# Patient Record
Sex: MALE | Race: WHITE | Employment: UNEMPLOYED | URBAN - METROPOLITAN AREA
[De-identification: names, ages, dates, MRNs, and addresses within clinical notes are randomized per-mention and may not be internally consistent; named-entity substitution may affect disease eponyms.]

---

## 2017-11-18 ENCOUNTER — HOSPITAL ENCOUNTER (EMERGENCY)
Age: 52
Discharge: HOME | End: 2017-11-18
Attending: EMERGENCY MEDICINE
Payer: MEDICARE

## 2017-11-18 ENCOUNTER — APPOINTMENT (OUTPATIENT)
Dept: GENERAL RADIOLOGY | Age: 52
End: 2017-11-18
Payer: MEDICARE

## 2017-11-18 VITALS
HEART RATE: 72 BPM | OXYGEN SATURATION: 97 % | SYSTOLIC BLOOD PRESSURE: 141 MMHG | HEIGHT: 68 IN | DIASTOLIC BLOOD PRESSURE: 94 MMHG | BODY MASS INDEX: 25.76 KG/M2 | TEMPERATURE: 98.3 F | RESPIRATION RATE: 17 BRPM | WEIGHT: 170 LBS

## 2017-11-18 DIAGNOSIS — L02.91 ABSCESS: Primary | ICD-10-CM

## 2017-11-18 PROCEDURE — 99283 EMERGENCY DEPT VISIT LOW MDM: CPT

## 2017-11-18 PROCEDURE — 6370000000 HC RX 637 (ALT 250 FOR IP): Performed by: EMERGENCY MEDICINE

## 2017-11-18 PROCEDURE — 2500000003 HC RX 250 WO HCPCS: Performed by: EMERGENCY MEDICINE

## 2017-11-18 PROCEDURE — 10060 I&D ABSCESS SIMPLE/SINGLE: CPT

## 2017-11-18 PROCEDURE — 73090 X-RAY EXAM OF FOREARM: CPT

## 2017-11-18 RX ORDER — SULFAMETHOXAZOLE AND TRIMETHOPRIM 800; 160 MG/1; MG/1
1 TABLET ORAL 2 TIMES DAILY
Qty: 14 TABLET | Refills: 0 | Status: SHIPPED | OUTPATIENT
Start: 2017-11-18 | End: 2017-11-25

## 2017-11-18 RX ORDER — CEPHALEXIN 500 MG/1
500 CAPSULE ORAL 4 TIMES DAILY
Qty: 28 CAPSULE | Refills: 0 | Status: SHIPPED | OUTPATIENT
Start: 2017-11-18 | End: 2017-11-25

## 2017-11-18 RX ORDER — CEPHALEXIN 250 MG/1
500 CAPSULE ORAL ONCE
Status: COMPLETED | OUTPATIENT
Start: 2017-11-18 | End: 2017-11-18

## 2017-11-18 RX ORDER — LIDOCAINE HYDROCHLORIDE 10 MG/ML
20 INJECTION, SOLUTION INFILTRATION; PERINEURAL ONCE
Status: COMPLETED | OUTPATIENT
Start: 2017-11-18 | End: 2017-11-18

## 2017-11-18 RX ORDER — SULFAMETHOXAZOLE AND TRIMETHOPRIM 800; 160 MG/1; MG/1
1 TABLET ORAL ONCE
Status: COMPLETED | OUTPATIENT
Start: 2017-11-18 | End: 2017-11-18

## 2017-11-18 RX ADMIN — SULFAMETHOXAZOLE AND TRIMETHOPRIM 1 TABLET: 800; 160 TABLET ORAL at 11:38

## 2017-11-18 RX ADMIN — LIDOCAINE HYDROCHLORIDE 20 ML: 10 INJECTION, SOLUTION INFILTRATION; PERINEURAL at 11:30

## 2017-11-18 RX ADMIN — CEPHALEXIN 500 MG: 250 CAPSULE ORAL at 11:38

## 2017-11-18 ASSESSMENT — ENCOUNTER SYMPTOMS
RHINORRHEA: 0
SINUS PRESSURE: 0
CONSTIPATION: 0
ABDOMINAL PAIN: 0
BLOOD IN STOOL: 0
SHORTNESS OF BREATH: 0
NAUSEA: 0
BACK PAIN: 0
SORE THROAT: 0
VOMITING: 0
DIARRHEA: 0
COUGH: 0

## 2017-11-18 ASSESSMENT — PAIN DESCRIPTION - DESCRIPTORS: DESCRIPTORS: BURNING

## 2017-11-18 ASSESSMENT — PAIN DESCRIPTION - LOCATION: LOCATION: ARM;WRIST

## 2017-11-18 ASSESSMENT — PAIN SCALES - GENERAL
PAINLEVEL_OUTOF10: 10
PAINLEVEL_OUTOF10: 7

## 2017-11-18 ASSESSMENT — PAIN DESCRIPTION - PAIN TYPE: TYPE: ACUTE PAIN

## 2017-11-18 ASSESSMENT — PAIN DESCRIPTION - ORIENTATION: ORIENTATION: LEFT

## 2017-11-18 NOTE — ED PROVIDER NOTES
[facility]  ED  Emergency Department Encounter  Emergency Medicine Resident     Pt Name: Buzz Trent  MRN: [id number]  [place] 1965  Date of evaluation: 11/18/17  PCP:  Zahraa Chappell MD    [address] [place] [address]       Chief Complaint   Patient presents with    Abscess     left arm        HISTORY OF PRESENT ILLNESS  (Location/Symptom, Timing/Onset, Context/Setting, Quality, Duration, Modifying Factors, Severity.)      Buzz Trent is a 49 y.o. male who presents with Left upper extremity abscess. Patient states that he first noticed it 2 days ago. He states that he is an IV heroin user, and states that he does inject in the area where the abscess is. Patient states that he's had multiple abscesses in the past, but not in the area with the abscess is. Patient states last time he used heroin was 2 days ago and it was in the area where the abscess is. Patient states that the area around the abscess isn't painful, and flexion of his left wrist is decreased due to pain. Patient states that he did try to open it himself with a razor blade, but could not penetrate the dermal layer. Patient states that his last tetanus shot was within the last year. Patient denies fever, chills, nausea, vomiting, numbness, tingling, weakness, left elbow pain or decreased range of motion. PAST MEDICAL / SURGICAL / SOCIAL / FAMILY HISTORY      has a past medical history of Bipolar 1 disorder ([facility] [address] Chronic mental illness; Depression; Hepatitis C; Hypertension; and Psoriasis. has no past surgical history on file. Social History     Social History    Marital status: Single     Spouse name: N/A    Number of children: N/A    Years of education: N/A     Occupational History    Not on file.      Social History Main Topics    Smoking status: Current Every Day Smoker     Packs/day: 1.00     Years: 15.00     Types: Cigarettes    Smokeless tobacco: Never Used      Comment: pt accepting of nicotine patch  Alcohol use 0.0 oz/week      Comment: social     Drug use:      Types: IV      Comment: heroion, 2-3 times a day    Sexual activity: Not on file     Other Topics Concern    Not on file     Social History Narrative    No narrative on file       Family History   Problem Relation Age of Onset    Cancer Father     Cancer Child        Allergies:  Haldol [haloperidol]    Home Medications:  Prior to Admission medications    Medication Sig Start Date End Date Taking? Authorizing Provider   cephALEXin (KEFLEX) 500 MG capsule Take 1 capsule by mouth 4 times daily for 7 days 11/18/17 11/25/17 Yes Landry Darnell,    sulfamethoxazole-trimethoprim (BACTRIM DS) 800-160 MG per tablet Take 1 tablet by mouth 2 times daily for 7 days 11/18/17 11/25/17 Yes Landry Darnell DO   citalopram (CELEXA) 20 MG tablet Take 1 tablet by mouth daily 3/29/16  Yes Sky Figueroa MD   QUEtiapine (SEROQUEL) 200 MG tablet Take 1 tablet by mouth nightly 3/29/16  Yes Sky Figueroa MD   traZODone (DESYREL) 50 MG tablet Take 1 tablet by mouth nightly 3/29/16  Yes Sky Figueroa MD   amLODIPine (NORVASC) 10 MG tablet TAKE 1 TABLET BY MOUTH DAILY FOR 30 DAYS. 11/23/15  Yes Nate Pena MD   omeprazole (PRILOSEC OTC) 20 MG tablet Take 1 tablet by mouth daily. 2/10/15 7/1/15  Frieda Zavala MD       REVIEW OF SYSTEMS    (2-9 systems for level 4, 10 or more for level 5)      Review of Systems   Constitutional: Negative for chills and fever. HENT: Negative for congestion, rhinorrhea, sinus pressure and sore throat. Respiratory: Negative for cough and shortness of breath. Cardiovascular: Negative for chest pain. Gastrointestinal: Negative for abdominal pain, blood in stool, constipation, diarrhea, nausea and vomiting. Musculoskeletal: Negative for back pain and neck pain. Skin: Positive for wound. Negative for rash.    Neurological: Negative for dizziness, weakness, light-headedness, numbness and headaches. Psychiatric/Behavioral: Negative for suicidal ideas. PHYSICAL EXAM   (up to 7 for level 4, 8 or more for level 5)      INITIAL VITALS:   BP (!) 141/94   Pulse 72   Temp 98.3 °F (36.8 °C) (Oral)   Resp 17   Ht 5' 8\" (1.727 m)   Wt 170 lb (77.1 kg)   SpO2 97%   BMI 25.85 kg/m²     Physical Exam   Constitutional: He is oriented to person, place, and time. He appears well-developed and well-nourished. HENT:   Head: Normocephalic and atraumatic. Right Ear: Tympanic membrane normal.   Left Ear: Tympanic membrane normal.   Eyes: EOM are normal. Pupils are equal, round, and reactive to light. No scleral icterus. Neck: Normal range of motion. Neck supple. No JVD present. Cardiovascular: Normal rate, regular rhythm, normal heart sounds and intact distal pulses. Exam reveals no gallop and no friction rub. No murmur heard. 2+ left radial pulse. Capillary refill less than 2 seconds in all 5 digits of the left hand. Pulmonary/Chest: Effort normal and breath sounds normal. No respiratory distress. He has no wheezes. He has no rales. Abdominal: Soft. Bowel sounds are normal. He exhibits no distension and no mass. There is no tenderness. There is no rebound and no guarding. Musculoskeletal: Normal range of motion. He exhibits no edema. Lymphadenopathy:     He has no cervical adenopathy. Neurological: He is alert and oriented to person, place, and time. He has normal strength. No sensory deficit. GCS eye subscore is 4. GCS verbal subscore is 5. GCS motor subscore is 6. Flexion and extension decreased in the left wrist due to pain. Sensation intact distally in all 5 digits of the left hand. Skin: Skin is warm and dry. No rash noted. He is not diaphoretic. 3 cm abscess that is approximately 2 cm raised on the dorsal aspect of the left lower extremity proximal to the left wrist.  There is a superficial transverse laceration over the apex that does not penetrate the dermal layer.

## 2018-01-01 ENCOUNTER — HOSPITAL ENCOUNTER (EMERGENCY)
Age: 53
Discharge: HOME | End: 2018-01-01
Attending: EMERGENCY MEDICINE
Payer: MEDICARE

## 2018-01-01 ENCOUNTER — APPOINTMENT (OUTPATIENT)
Dept: GENERAL RADIOLOGY | Age: 53
End: 2018-01-01
Payer: MEDICARE

## 2018-01-01 VITALS
RESPIRATION RATE: 18 BRPM | OXYGEN SATURATION: 97 % | DIASTOLIC BLOOD PRESSURE: 103 MMHG | SYSTOLIC BLOOD PRESSURE: 153 MMHG | TEMPERATURE: 97.2 F | WEIGHT: 180 LBS | BODY MASS INDEX: 27.37 KG/M2 | HEART RATE: 85 BPM

## 2018-01-01 DIAGNOSIS — R13.10 DYSPHAGIA, UNSPECIFIED TYPE: Primary | ICD-10-CM

## 2018-01-01 PROCEDURE — 71046 X-RAY EXAM CHEST 2 VIEWS: CPT

## 2018-01-01 PROCEDURE — 70360 X-RAY EXAM OF NECK: CPT

## 2018-01-01 PROCEDURE — G0383 LEV 4 HOSP TYPE B ED VISIT: HCPCS

## 2018-01-01 PROCEDURE — 6370000000 HC RX 637 (ALT 250 FOR IP): Performed by: EMERGENCY MEDICINE

## 2018-01-01 RX ORDER — MAGNESIUM HYDROXIDE/ALUMINUM HYDROXICE/SIMETHICONE 120; 1200; 1200 MG/30ML; MG/30ML; MG/30ML
30 SUSPENSION ORAL
Status: COMPLETED | OUTPATIENT
Start: 2018-01-01 | End: 2018-01-01

## 2018-01-01 RX ADMIN — LIDOCAINE HYDROCHLORIDE 15 ML: 20 SOLUTION ORAL; TOPICAL at 14:24

## 2018-01-01 RX ADMIN — ALUMINUM HYDROXIDE, MAGNESIUM HYDROXIDE, AND SIMETHICONE 30 ML: 200; 200; 20 SUSPENSION ORAL at 14:24

## 2018-01-01 ASSESSMENT — PAIN DESCRIPTION - FREQUENCY: FREQUENCY: INTERMITTENT

## 2018-01-01 ASSESSMENT — PAIN DESCRIPTION - PAIN TYPE: TYPE: ACUTE PAIN

## 2018-01-01 ASSESSMENT — ENCOUNTER SYMPTOMS
COUGH: 0
NAUSEA: 0
BACK PAIN: 0
VOMITING: 0
SORE THROAT: 0
WHEEZING: 0
EYE ITCHING: 0
EYE DISCHARGE: 0
BLOOD IN STOOL: 0
SHORTNESS OF BREATH: 0
COLOR CHANGE: 0
ABDOMINAL PAIN: 0
ABDOMINAL DISTENTION: 0
CHEST TIGHTNESS: 0
STRIDOR: 0
DIARRHEA: 0

## 2018-01-01 ASSESSMENT — PAIN DESCRIPTION - LOCATION: LOCATION: THROAT

## 2018-01-01 ASSESSMENT — PAIN DESCRIPTION - DESCRIPTORS: DESCRIPTORS: SORE

## 2018-01-01 ASSESSMENT — PAIN SCALES - GENERAL: PAINLEVEL_OUTOF10: 7

## 2018-01-01 NOTE — ED PROVIDER NOTES
Constitutional: He is oriented to person, place, and time. Vital signs are normal. He appears well-developed and well-nourished. No distress. HENT:   Head: Normocephalic and atraumatic. Eyes: Conjunctivae and EOM are normal. Pupils are equal, round, and reactive to light. Right eye exhibits no discharge. Left eye exhibits no discharge. Neck: Neck supple. Cardiovascular: Normal rate, regular rhythm, S1 normal, S2 normal, normal heart sounds and normal pulses. Pulses:       Radial pulses are 2+ on the right side, and 2+ on the left side. Pulmonary/Chest: Effort normal and breath sounds normal. No accessory muscle usage. No respiratory distress. He has no decreased breath sounds. He has no wheezes. He has no rales. He exhibits no tenderness. Abdominal: Soft. Bowel sounds are normal. He exhibits no distension, no abdominal bruit and no mass. There is no tenderness. There is no rebound, no guarding and no CVA tenderness. Musculoskeletal: Normal range of motion. Lymphadenopathy:     He has no cervical adenopathy. Neurological: He is alert and oriented to person, place, and time. GCS eye subscore is 4. GCS verbal subscore is 5. GCS motor subscore is 6. Skin: Skin is warm and dry. He is not diaphoretic. DIFFERENTIAL  DIAGNOSIS     PLAN (LABS / IMAGING / EKG):  Orders Placed This Encounter   Procedures    XR CHEST STANDARD (2 VW)    XR Neck Soft Tissue       MEDICATIONS ORDERED:  Orders Placed This Encounter   Medications    aluminum & magnesium hydroxide-simethicone (MAALOX) 801-457-80 MG/5ML suspension 30 mL    DISCONTD: lidocaine viscous (XYLOCAINE) 2 % solution 15 mL         DIAGNOSTIC RESULTS / EMERGENCY DEPARTMENT COURSE / MDM     LABS:  No results found for this visit on 01/01/18. IMPRESSION: Patient is a 60-year-old gentleman who presents for evaluation for painful swallowing.   Patient reported to me that symptoms started 3 days ago after he had a choking episode while eating ham she was able to dislodge the food bolus. The abdomen is benign and some pain when he swallows solids, he is on better today. On my initial encounter the patient is well-appearing and nontoxic looking doesn't appear to be in any acute distress. Vital signs reviewed hypertensive otherwise not tachycardic tachypneic. He is speaking in full sentences, no stridor. On physical exam there is no subcutaneous emphysema in palpation of the neck or the chest.  Patient was able to swallow a glass water for me without any difficulty. Would obtain plain films of the neck soft tissue and chest to evaluate for any similar cutaneous emphysema I do anticipate discharge. We'll give patient Maalox and viscous lidocaine. RADIOLOGY:  Xr Neck Soft Tissue    Result Date: 1/1/2018  EXAMINATION: TWO VIEWS OF THE CHEST; AP AND LATERAL VIEWS OF THE NECK SOFT TISSUES 1/1/2018 2:36 pm COMPARISON: 8 April 2016 HISTORY: ORDERING SYSTEM PROVIDED HISTORY: choked on foot, throat pain, evaluate for mediastinal air TECHNOLOGIST PROVIDED HISTORY: Reason for exam:->choked on foot, throat pain, evaluate for mediastinal air FINDINGS: Chest:  Osseous and mediastinal structures are unremarkable. Heart size is normal.  Lungs are clear without vascular congestion, focal consolidation, effusion, or pneumothorax. Osseous and mediastinal structures are unremarkable. Soft tissue neck exam:  Alignment of the cervical spine is unremarkable. Mild degenerative and degenerative disc disease is noted at C5-C6. No acute fracture, subluxation, or prevertebral soft tissue swelling is noted. Epiglottis is normal.  No subglottic airway narrowing is noted. Soft tissues are otherwise unremarkable. There is no evidence of a radiopaque foreign body. Chest:  No evidence of acute cardiopulmonary disease. Soft tissues of the neck:  Unremarkable. No evidence of epiglottitis, radiopaque foreign body, or subglottic airway narrowing.      Xr Chest Standard (2

## 2018-01-11 ENCOUNTER — HOSPITAL ENCOUNTER (OUTPATIENT)
Age: 53
Setting detail: SPECIMEN
Discharge: HOME | End: 2018-01-11
Payer: MEDICARE

## 2018-01-11 LAB
ABSOLUTE EOS #: 0.13 K/UL (ref 0–0.44)
ABSOLUTE IMMATURE GRANULOCYTE: 0.03 K/UL (ref 0–0.3)
ABSOLUTE LYMPH #: 2.66 K/UL (ref 1.1–3.7)
ABSOLUTE MONO #: 0.56 K/UL (ref 0.1–1.2)
ALBUMIN SERPL-MCNC: 3.8 G/DL (ref 3.5–5.2)
ALBUMIN/GLOBULIN RATIO: 0.9 (ref 1–2.5)
ALP BLD-CCNC: 73 U/L (ref 40–129)
ALT SERPL-CCNC: 233 U/L (ref 5–41)
ANION GAP SERPL CALCULATED.3IONS-SCNC: 14 MMOL/L (ref 9–17)
AST SERPL-CCNC: 170 U/L
BASOPHILS # BLD: 1 % (ref 0–2)
BASOPHILS ABSOLUTE: 0.07 K/UL (ref 0–0.2)
BILIRUB SERPL-MCNC: 0.62 MG/DL (ref 0.3–1.2)
BILIRUBIN DIRECT: 0.16 MG/DL
BILIRUBIN, INDIRECT: 0.46 MG/DL (ref 0–1)
BUN BLDV-MCNC: 17 MG/DL (ref 6–20)
CALCIUM SERPL-MCNC: 9 MG/DL (ref 8.6–10.4)
CHLORIDE BLD-SCNC: 99 MMOL/L (ref 98–107)
CO2: 25 MMOL/L (ref 20–31)
CREAT SERPL-MCNC: 0.81 MG/DL (ref 0.7–1.2)
DIFFERENTIAL TYPE: ABNORMAL
EOSINOPHILS RELATIVE PERCENT: 2 % (ref 1–4)
GFR AFRICAN AMERICAN: >60 ML/MIN
GFR NON-AFRICAN AMERICAN: >60 ML/MIN
GFR SERPL CREATININE-BSD FRML MDRD: ABNORMAL ML/MIN/{1.73_M2}
GFR SERPL CREATININE-BSD FRML MDRD: ABNORMAL ML/MIN/{1.73_M2}
GLUCOSE BLD-MCNC: 94 MG/DL (ref 70–99)
HAV IGM SER IA-ACNC: NONREACTIVE
HCT VFR BLD CALC: 51.4 % (ref 40.7–50.3)
HEMOGLOBIN: 16.9 G/DL (ref 13–17)
HEPATITIS B CORE IGM ANTIBODY: NONREACTIVE
HEPATITIS B SURFACE ANTIGEN: NONREACTIVE
HEPATITIS C ANTIBODY: REACTIVE
HIV AG/AB: NONREACTIVE
IMMATURE GRANULOCYTES: 0 %
LYMPHOCYTES # BLD: 31 % (ref 24–43)
MCH RBC QN AUTO: 30.2 PG (ref 25.2–33.5)
MCHC RBC AUTO-ENTMCNC: 32.9 G/DL (ref 28.4–34.8)
MCV RBC AUTO: 91.9 FL (ref 82.6–102.9)
MONOCYTES # BLD: 7 % (ref 3–12)
PDW BLD-RTO: 14.4 % (ref 11.8–14.4)
PLATELET # BLD: 259 K/UL (ref 138–453)
PLATELET ESTIMATE: ABNORMAL
PMV BLD AUTO: 10.1 FL (ref 8.1–13.5)
POTASSIUM SERPL-SCNC: 4.8 MMOL/L (ref 3.7–5.3)
RBC # BLD: 5.59 M/UL (ref 4.21–5.77)
RBC # BLD: ABNORMAL 10*6/UL
SEG NEUTROPHILS: 59 % (ref 36–65)
SEGMENTED NEUTROPHILS ABSOLUTE COUNT: 5.22 K/UL (ref 1.5–8.1)
SODIUM BLD-SCNC: 138 MMOL/L (ref 135–144)
T3 FREE: 3.57 PG/ML (ref 2.02–4.43)
THYROXINE, FREE: 1.32 NG/DL (ref 0.93–1.7)
TOTAL PROTEIN: 7.9 G/DL (ref 6.4–8.3)
TSH SERPL DL<=0.05 MIU/L-ACNC: 0.39 MIU/L (ref 0.3–5)
WBC # BLD: 8.7 K/UL (ref 3.5–11.3)
WBC # BLD: ABNORMAL 10*3/UL

## 2018-01-13 LAB
QUANTIFERON (R) TB GOLD (INCUBATED): NEGATIVE
QUANTIFERON MITOGEN: 2.74 IU/ML
QUANTIFERON NIL: 0.61 IU/ML
QUANTIFERON TB AG MINUS NIL: 0.19 IU/ML (ref 0–0.34)

## 2018-02-18 ENCOUNTER — HOSPITAL ENCOUNTER (EMERGENCY)
Age: 53
Discharge: HOME | End: 2018-02-18
Attending: EMERGENCY MEDICINE
Payer: MEDICARE

## 2018-02-18 VITALS
TEMPERATURE: 97 F | SYSTOLIC BLOOD PRESSURE: 158 MMHG | BODY MASS INDEX: 29.65 KG/M2 | OXYGEN SATURATION: 98 % | HEART RATE: 77 BPM | DIASTOLIC BLOOD PRESSURE: 110 MMHG | WEIGHT: 195 LBS | RESPIRATION RATE: 14 BRPM
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
## 2018-02-18 PROCEDURE — 99282 EMERGENCY DEPT VISIT SF MDM: CPT

## 2018-02-18 PROCEDURE — 6370000000 HC RX 637 (ALT 250 FOR IP): Performed by: EMERGENCY MEDICINE

## 2018-02-18 RX ORDER — IBUPROFEN 800 MG/1
800 TABLET ORAL ONCE
Status: COMPLETED | OUTPATIENT
Start: 2018-02-18 | End: 2018-02-18

## 2018-02-18 RX ORDER — GAUZE BANDAGE 4" X 4"
SPONGE TOPICAL
Qty: 12 EACH | Refills: 0 | Status: ON HOLD | OUTPATIENT
Start: 2018-02-18 | End: 2020-03-09

## 2018-02-18 RX ORDER — CEPHALEXIN 250 MG/1
500 CAPSULE ORAL ONCE
Status: COMPLETED | OUTPATIENT
Start: 2018-02-18 | End: 2018-02-18

## 2018-02-18 RX ORDER — IBUPROFEN 800 MG/1
800 TABLET ORAL EVERY 8 HOURS PRN
Qty: 30 TABLET | Refills: 0 | Status: SHIPPED | OUTPATIENT
Start: 2018-02-18 | End: 2018-04-15 | Stop reason: ALTCHOICE

## 2018-02-18 RX ORDER — CEPHALEXIN 500 MG/1
500 CAPSULE ORAL 4 TIMES DAILY
Qty: 28 CAPSULE | Refills: 0 | Status: SHIPPED | OUTPATIENT
Start: 2018-02-18 | End: 2018-02-25

## 2018-02-18 RX ORDER — GAUZE BANDAGE 2" X 2"
BANDAGE TOPICAL
Qty: 30 EACH | Refills: 0 | Status: ON HOLD | OUTPATIENT
Start: 2018-02-18 | End: 2020-03-09

## 2018-02-18 RX ORDER — BACITRACIN, NEOMYCIN, POLYMYXIN B 400; 3.5; 5 [USP'U]/G; MG/G; [USP'U]/G
OINTMENT TOPICAL
Qty: 1 TUBE | Refills: 0 | Status: SHIPPED | OUTPATIENT
Start: 2018-02-18 | End: 2018-02-28

## 2018-02-18 RX ADMIN — CEPHALEXIN 500 MG: 250 CAPSULE ORAL at 10:21

## 2018-02-18 RX ADMIN — IBUPROFEN 800 MG: 800 TABLET, FILM COATED ORAL at 10:21

## 2018-02-18 ASSESSMENT — ENCOUNTER SYMPTOMS
NAUSEA: 0
ABDOMINAL PAIN: 0
COLOR CHANGE: 1
VOMITING: 0
SHORTNESS OF BREATH: 0

## 2018-02-18 ASSESSMENT — PAIN DESCRIPTION - LOCATION: LOCATION: ARM

## 2018-02-18 ASSESSMENT — PAIN SCALES - GENERAL: PAINLEVEL_OUTOF10: 7

## 2018-02-18 ASSESSMENT — PAIN DESCRIPTION - ORIENTATION: ORIENTATION: RIGHT

## 2018-02-18 NOTE — ED NOTES
Pts ring removed by Dr Franklin Dowd and Dr. Tereso Casillas with electric ring cutter. Pts hand placed in iodine bath to soak per Dr. Franklin Dowd. Await plastics consult.      Desirae Delvalle RN  02/18/18 [id number]

## 2018-02-18 NOTE — ED PROVIDER NOTES
[facility]  ED  eMERGENCY dEPARTMENT eNCOUnter   Attending Attestation     Pt Name: Jeane Donnelly  MRN: [id number]  [place] 1965  Date of evaluation: 2/18/18       Jeane Donnelly is a 47 y.o. male who presents with Ring Removal (ring to left index finger for two days that is too tight. pt states there were blisters on the finger that he popped.)      History: pt presents with ring stuck on left index finger. There is significant swelling, pain, blistering and sloughing of skin. Pt has no other complaints. Exam: Patient's left second digit is very erythematous, edematous, there is significant sloughing of circumferential skin and blistering. Capillary refill slow. Ring removal performed by myself and Dr. Cosmo Kyle. There was significant improvement in the swelling and The pain. Patient tolerated the procedure well. Had the patient soak in dilute Betadine bath for a very short period of time or to cleanse the wound given the fact that it had been present for some time and there were fragments of metal secondary to removal.  Will discuss with hand and provide follow-up. Will wrap in nonadherent gauze, started on Keflex, plan for discharge and follow-up. I performed a history and physical examination of the patient and discussed management with the resident. I reviewed the residents note and agree with the documented findings and plan of care. Any areas of disagreement are noted on the chart. I was personally present for the key portions of any procedures. I have documented in the chart those procedures where I was not present during the key portions. I have personally reviewed all images and agree with the resident's interpretation. I have reviewed the emergency nurses triage note. I agree with the chief complaint, past medical history, past surgical history, allergies, medications, social and family history as documented unless otherwise noted below.  Documentation of the HPI,
symptoms worsen      DISCHARGE MEDICATIONS:  Discharge Medication List as of 2/18/2018 10:19 AM      START taking these medications    Details   cephALEXin (KEFLEX) 500 MG capsule Take 1 capsule by mouth 4 times daily for 7 days, Disp-28 capsule, R-0Print      ibuprofen (ADVIL;MOTRIN) 800 MG tablet Take 1 tablet by mouth every 8 hours as needed for Pain, Disp-30 tablet, R-0Print             Kevin Ballard MD  Emergency Medicine Resident    (Please note that portions of this note were completed with a voice recognition program.  Efforts were made to edit the dictations but occasionally words are mis-transcribed.)        Kevin Ballard MD  Resident  02/18/18 [id number]

## 2018-02-24 ENCOUNTER — HOSPITAL ENCOUNTER (EMERGENCY)
Age: 53
Discharge: HOME | End: 2018-02-24
Attending: EMERGENCY MEDICINE
Payer: MEDICARE

## 2018-02-24 VITALS
HEART RATE: 82 BPM | SYSTOLIC BLOOD PRESSURE: 178 MMHG | RESPIRATION RATE: 18 BRPM | OXYGEN SATURATION: 98 % | TEMPERATURE: 96.8 F | DIASTOLIC BLOOD PRESSURE: 106 MMHG

## 2018-02-24 DIAGNOSIS — S61.209S: Primary | ICD-10-CM

## 2018-02-24 LAB
ABSOLUTE EOS #: 0.22 K/UL (ref 0–0.44)
ABSOLUTE IMMATURE GRANULOCYTE: 0.06 K/UL (ref 0–0.3)
ABSOLUTE LYMPH #: 2.47 K/UL (ref 1.1–3.7)
ABSOLUTE MONO #: 0.86 K/UL (ref 0.1–1.2)
BASOPHILS # BLD: 1 % (ref 0–2)
BASOPHILS ABSOLUTE: 0.07 K/UL (ref 0–0.2)
DIFFERENTIAL TYPE: ABNORMAL
EOSINOPHILS RELATIVE PERCENT: 2 % (ref 1–4)
HCT VFR BLD CALC: 44.4 % (ref 40.7–50.3)
HEMOGLOBIN: 14.7 G/DL (ref 13–17)
IMMATURE GRANULOCYTES: 1 %
LYMPHOCYTES # BLD: 19 % (ref 24–43)
MCH RBC QN AUTO: 30.5 PG (ref 25.2–33.5)
MCHC RBC AUTO-ENTMCNC: 33.1 G/DL (ref 28.4–34.8)
MCV RBC AUTO: 92.1 FL (ref 82.6–102.9)
MONOCYTES # BLD: 7 % (ref 3–12)
NRBC AUTOMATED: 0 PER 100 WBC
PDW BLD-RTO: 13.9 % (ref 11.8–14.4)
PLATELET # BLD: 279 K/UL (ref 138–453)
PLATELET ESTIMATE: ABNORMAL
PMV BLD AUTO: 9.2 FL (ref 8.1–13.5)
RBC # BLD: 4.82 M/UL (ref 4.21–5.77)
RBC # BLD: ABNORMAL 10*6/UL
SEG NEUTROPHILS: 70 % (ref 36–65)
SEGMENTED NEUTROPHILS ABSOLUTE COUNT: 9.02 K/UL (ref 1.5–8.1)
WBC # BLD: 12.7 K/UL (ref 3.5–11.3)
WBC # BLD: ABNORMAL 10*3/UL

## 2018-02-24 PROCEDURE — 97597 DBRDMT OPN WND 1ST 20 CM/<: CPT

## 2018-02-24 PROCEDURE — 6360000002 HC RX W HCPCS: Performed by: PHYSICIAN ASSISTANT

## 2018-02-24 PROCEDURE — 85025 COMPLETE CBC W/AUTO DIFF WBC: CPT

## 2018-02-24 PROCEDURE — 96372 THER/PROPH/DIAG INJ SC/IM: CPT

## 2018-02-24 PROCEDURE — 99282 EMERGENCY DEPT VISIT SF MDM: CPT

## 2018-02-24 RX ORDER — KETOROLAC TROMETHAMINE 30 MG/ML
30 INJECTION, SOLUTION INTRAMUSCULAR; INTRAVENOUS ONCE
Status: COMPLETED | OUTPATIENT
Start: 2018-02-24 | End: 2018-02-24

## 2018-02-24 RX ADMIN — KETOROLAC TROMETHAMINE 30 MG: 30 INJECTION, SOLUTION INTRAMUSCULAR at 20:33

## 2018-02-24 ASSESSMENT — PAIN SCALES - GENERAL
PAINLEVEL_OUTOF10: 8
PAINLEVEL_OUTOF10: 7

## 2018-02-24 ASSESSMENT — PAIN DESCRIPTION - DESCRIPTORS: DESCRIPTORS: DISCOMFORT;SHOOTING;SHARP

## 2018-02-24 ASSESSMENT — ENCOUNTER SYMPTOMS
GASTROINTESTINAL NEGATIVE: 1
RESPIRATORY NEGATIVE: 1
ALLERGIC/IMMUNOLOGIC NEGATIVE: 1

## 2018-02-24 ASSESSMENT — PAIN DESCRIPTION - LOCATION: LOCATION: FINGER (COMMENT WHICH ONE)

## 2018-02-24 ASSESSMENT — PAIN DESCRIPTION - ORIENTATION: ORIENTATION: LEFT

## 2018-02-25 NOTE — ED PROVIDER NOTES
[facility] ED  Emergency Department Encounter  Mid Level Provider     Pt Name: Sara Andrade  MRN: [id number]  [place] 1965  Date of evaluation: 2/24/18  PCP:  Fabricio Kwong MD    [address] [place] [address]       Chief Complaint   Patient presents with    Finger Injury     pt reports that he had a blister on his left pointer finger, states he has been taking antibiotics but area has gotten worse       HISTORY OF PRESENT ILLNESS  (Location/Symptom, Timing/Onset, Context/Setting, Quality, Duration, Modifying Factors, Severity.)      Sara Andrade is a 47 y.o. male who presents with Pain and swelling on his left index finger. He was seen in the emergency department approximately one week ago and states that he was told he needed to be admitted to the hospital, but he left against medical advice. He says that he had a ring on his left finger that was on too tight. They eventually were able to cut it off, however, because some wounds and redness on his finger. He was postictal.  Follow up with the plastic surgery office for debridement. However, he never scheduled with them. He is taking the Keflex daily as directed on the label. He says that he has not been having any fevers or chills, nausea, vomiting, chest pain or shortness of breath. He denies numbness or tingling in the left index finger, but says that it's somewhat painful and sometimes it's hard to move. There is been no drainage coming from it. He's been wrapping it with paper towels and gauze. He says she's been putting bacitracin on it as well. Allergic to Haldol says that he smokes. He denies illicit drug use. Maurice to be hooked on drugs but now he is not on them     PAST MEDICAL / SURGICAL / SOCIAL / FAMILY HISTORY      has a past medical history of Bipolar 1 disorder ([address] Chronic mental illness; Depression; Hepatitis C; Hypertension; and Psoriasis. Reviewed   has no past surgical history on file.   Review  Social There are multiple areas of yellow macerated tissue surrounding the proximal circumference of the finger at the proximal digit. There are many areas of skin that is sloughing off. Please see procedure note. All of these areas were slightly debrided with an 11 blade, no purulent material was expressed during this and very light amount of bleeding after removal of these tissues, which was controlled with gentle pressure. Neurological: He is alert and oriented to person, place, and time. He has normal strength. Skin: Skin is warm and intact. Psychiatric: He has a normal mood and affect. His speech is normal and behavior is normal. Judgment and thought content normal.   Nursing note and vitals reviewed. DIFFERENTIAL  DIAGNOSIS   Compartment syndrome. Wound infection. Cellulitis of finger. osteomyelitis. Looks or tenosynovitis  PLAN (LABS / IMAGING / EKG):  Orders Placed This Encounter   Procedures    CBC WITH AUTO DIFFERENTIAL    Wound care       MEDICATIONS ORDERED:  Orders Placed This Encounter   Medications    ketorolac (TORADOL) injection 30 mg       Controlled Substances Monitoring:      DIAGNOSTIC RESULTS / [address] [place] [address] / [facility]     RADIOLOGY:   I directly visualized (with the attending physician) the following  images and reviewed the radiologist interpretations:  No results found.     LABS:  Results for orders placed or performed during the hospital encounter of 02/24/18   CBC WITH AUTO DIFFERENTIAL   Result Value Ref Range    WBC 12.7 (H) 3.5 - 11.3 k/uL    RBC 4.82 4.21 - 5.77 m/uL    Hemoglobin 14.7 13.0 - 17.0 g/dL    Hematocrit 44.4 40.7 - 50.3 %    MCV 92.1 82.6 - 102.9 fL    MCH 30.5 25.2 - 33.5 pg    MCHC 33.1 28.4 - 34.8 g/dL    RDW 13.9 11.8 - 14.4 %    Platelets 729 447 - 157 k/uL    MPV 9.2 8.1 - 13.5 fL    NRBC Automated 0.0 0.0 per 100 WBC    Differential Type NOT REPORTED     Seg Neutrophils 70 (H) 36 - 65 %    Lymphocytes 19 (L) 24 - 43 %    Monocytes 7 3 - 12 % Dressing applied:  Adaptic dressing (bacitracin)    Wrapped with: webroll. Post-procedure details:     Patient tolerance of procedure: Tolerated well, no immediate complications  Comments:      Bacitracin, adaptic, 4x4, webroll dressing after debridement           FINAL IMPRESSION      1. Open wound of finger of left hand, sequela          DISPOSITION / PLAN     DISPOSITION Decision To Discharge  Continue Keflex. Follow-up with hand. Over-the-counter Tylenol or Motrin. Dressing changes.     PATIENT REFERRED TO:  Patricia Chen MD  [address] [place] [address]  [phone]    Schedule an appointment as soon as possible for a visit in 1 week  As needed    [address] [facility]  [address] [place] [address]  [phone]  Call in 2 days  Plastic/Hand clinic for follow up of left index finger wound and recheck wound    [facility]  [address] [place] [address]  [phone]  Go to   As needed, If symptoms worsen      DISCHARGE MEDICATIONS:  Discharge Medication List as of 2/24/2018  9:22 PM          Florentin Camara PA-C PA-C  Emergency Medicine Physician Assistant    (Please note that portions of this note were completed with a voice recognition program.  Efforts were made to edit the dictations but occasionally words are mis-transcribed.)     Florentin Camara PA-C  02/25/18 [id number]

## 2018-04-15 ENCOUNTER — HOSPITAL ENCOUNTER (EMERGENCY)
Age: 53
Discharge: HOME | End: 2018-04-15
Attending: EMERGENCY MEDICINE
Payer: MEDICARE

## 2018-04-15 ENCOUNTER — APPOINTMENT (OUTPATIENT)
Dept: GENERAL RADIOLOGY | Age: 53
End: 2018-04-15
Payer: MEDICARE

## 2018-04-15 VITALS
BODY MASS INDEX: 30.31 KG/M2 | WEIGHT: 200 LBS | SYSTOLIC BLOOD PRESSURE: 156 MMHG | OXYGEN SATURATION: 98 % | RESPIRATION RATE: 18 BRPM | TEMPERATURE: 96.8 F | HEART RATE: 64 BPM | DIASTOLIC BLOOD PRESSURE: 99 MMHG | HEIGHT: 68 IN

## 2018-04-15 DIAGNOSIS — L02.91 ABSCESS: Primary | ICD-10-CM

## 2018-04-15 DIAGNOSIS — L03.114 CELLULITIS OF LEFT UPPER EXTREMITY: ICD-10-CM

## 2018-04-15 PROCEDURE — 73090 X-RAY EXAM OF FOREARM: CPT

## 2018-04-15 PROCEDURE — 99283 EMERGENCY DEPT VISIT LOW MDM: CPT

## 2018-04-15 PROCEDURE — 6370000000 HC RX 637 (ALT 250 FOR IP): Performed by: PHYSICIAN ASSISTANT

## 2018-04-15 RX ORDER — DOXYCYCLINE HYCLATE 100 MG
100 TABLET ORAL 2 TIMES DAILY
Qty: 14 TABLET | Refills: 0 | Status: SHIPPED | OUTPATIENT
Start: 2018-04-15 | End: 2018-04-22

## 2018-04-15 RX ORDER — DOXYCYCLINE HYCLATE 100 MG
100 TABLET ORAL ONCE
Status: COMPLETED | OUTPATIENT
Start: 2018-04-15 | End: 2018-04-15

## 2018-04-15 RX ORDER — IBUPROFEN 800 MG/1
800 TABLET ORAL EVERY 8 HOURS PRN
Qty: 20 TABLET | Refills: 0 | Status: ON HOLD | OUTPATIENT
Start: 2018-04-15 | End: 2018-07-05 | Stop reason: HOSPADM

## 2018-04-15 RX ADMIN — DOXYCYCLINE HYCLATE 100 MG: 100 TABLET, COATED ORAL at 16:27

## 2018-04-15 ASSESSMENT — ENCOUNTER SYMPTOMS
RHINORRHEA: 0
SORE THROAT: 0
EYE DISCHARGE: 0
BACK PAIN: 0
NAUSEA: 0
SHORTNESS OF BREATH: 0
WHEEZING: 0
EYE ITCHING: 0
COLOR CHANGE: 1
EYE PAIN: 0
VOMITING: 0
COUGH: 0

## 2018-04-15 ASSESSMENT — PAIN SCALES - GENERAL: PAINLEVEL_OUTOF10: 7

## 2018-04-15 ASSESSMENT — PAIN DESCRIPTION - PAIN TYPE: TYPE: ACUTE PAIN

## 2018-04-15 ASSESSMENT — PAIN DESCRIPTION - ORIENTATION: ORIENTATION: LEFT

## 2018-04-15 ASSESSMENT — PAIN DESCRIPTION - LOCATION: LOCATION: ARM

## 2018-04-15 ASSESSMENT — PAIN DESCRIPTION - PROGRESSION: CLINICAL_PROGRESSION: NOT CHANGED

## 2018-06-24 ENCOUNTER — HOSPITAL ENCOUNTER (EMERGENCY)
Age: 53
Discharge: HOME | End: 2018-06-24
Attending: EMERGENCY MEDICINE
Payer: MEDICARE

## 2018-06-24 VITALS
WEIGHT: 157 LBS | OXYGEN SATURATION: 97 % | BODY MASS INDEX: 23.79 KG/M2 | TEMPERATURE: 97.9 F | SYSTOLIC BLOOD PRESSURE: 174 MMHG | HEART RATE: 51 BPM | DIASTOLIC BLOOD PRESSURE: 107 MMHG | HEIGHT: 68 IN | RESPIRATION RATE: 14 BRPM

## 2018-06-24 DIAGNOSIS — T40.1X4A DIACETYLMORPHINE OVERDOSE, UNDETERMINED INTENT, INITIAL ENCOUNTER: Primary | ICD-10-CM

## 2018-06-24 PROCEDURE — 99284 EMERGENCY DEPT VISIT MOD MDM: CPT

## 2018-06-24 ASSESSMENT — PAIN DESCRIPTION - PROGRESSION: CLINICAL_PROGRESSION: NOT CHANGED

## 2018-06-24 ASSESSMENT — PAIN DESCRIPTION - PAIN TYPE: TYPE: CHRONIC PAIN

## 2018-06-24 ASSESSMENT — PAIN DESCRIPTION - FREQUENCY: FREQUENCY: CONTINUOUS

## 2018-06-24 ASSESSMENT — ENCOUNTER SYMPTOMS
SHORTNESS OF BREATH: 0
BACK PAIN: 1
WHEEZING: 0
ABDOMINAL PAIN: 0

## 2018-06-24 ASSESSMENT — PAIN SCALES - WONG BAKER: WONGBAKER_NUMERICALRESPONSE: 2

## 2018-06-24 ASSESSMENT — PAIN DESCRIPTION - DESCRIPTORS: DESCRIPTORS: OTHER (COMMENT)

## 2018-06-24 ASSESSMENT — PAIN DESCRIPTION - ONSET: ONSET: ON-GOING

## 2018-06-24 ASSESSMENT — PAIN DESCRIPTION - LOCATION: LOCATION: BACK

## 2018-07-02 ENCOUNTER — HOSPITAL ENCOUNTER (INPATIENT)
Age: 53
LOS: 2 days | Discharge: HOME | DRG: 773 | End: 2018-07-05
Attending: EMERGENCY MEDICINE | Admitting: PSYCHIATRY & NEUROLOGY
Payer: MEDICARE

## 2018-07-02 DIAGNOSIS — F11.20 HEROIN ADDICTION (HCC): Primary | ICD-10-CM

## 2018-07-02 DIAGNOSIS — F11.20 OPIOID TYPE DEPENDENCE, CONTINUOUS (HCC): Chronic | ICD-10-CM

## 2018-07-02 PROCEDURE — 99284 EMERGENCY DEPT VISIT MOD MDM: CPT

## 2018-07-02 ASSESSMENT — ENCOUNTER SYMPTOMS
CONSTIPATION: 0
EYE REDNESS: 0
NAUSEA: 0
VOMITING: 0
COUGH: 0
ABDOMINAL PAIN: 0
WHEEZING: 0
DIARRHEA: 0
EYE DISCHARGE: 0
COLOR CHANGE: 0
EYE PAIN: 0
SINUS PRESSURE: 0
BLOOD IN STOOL: 0
CHEST TIGHTNESS: 0
TROUBLE SWALLOWING: 0
FACIAL SWELLING: 0
BACK PAIN: 0
SORE THROAT: 0
SHORTNESS OF BREATH: 0
RHINORRHEA: 0

## 2018-07-03 LAB
ABSOLUTE EOS #: 0.5 K/UL (ref 0–0.4)
ABSOLUTE IMMATURE GRANULOCYTE: ABNORMAL K/UL (ref 0–0.3)
ABSOLUTE LYMPH #: 2.7 K/UL (ref 1–4.8)
ABSOLUTE MONO #: 0.9 K/UL (ref 0.1–1.3)
ALBUMIN SERPL-MCNC: 3.2 G/DL (ref 3.5–5.2)
ALBUMIN/GLOBULIN RATIO: ABNORMAL (ref 1–2.5)
ALP BLD-CCNC: 65 U/L (ref 40–129)
ALT SERPL-CCNC: 51 U/L (ref 5–41)
ANION GAP SERPL CALCULATED.3IONS-SCNC: 11 MMOL/L (ref 9–17)
AST SERPL-CCNC: 42 U/L
BASOPHILS # BLD: 1 % (ref 0–2)
BASOPHILS ABSOLUTE: 0.1 K/UL (ref 0–0.2)
BILIRUB SERPL-MCNC: 0.25 MG/DL (ref 0.3–1.2)
BUN BLDV-MCNC: 13 MG/DL (ref 6–20)
BUN/CREAT BLD: ABNORMAL (ref 9–20)
CALCIUM SERPL-MCNC: 8.3 MG/DL (ref 8.6–10.4)
CHLORIDE BLD-SCNC: 110 MMOL/L (ref 98–107)
CHOLESTEROL/HDL RATIO: 3.2
CHOLESTEROL: 70 MG/DL
CO2: 24 MMOL/L (ref 20–31)
CREAT SERPL-MCNC: 0.66 MG/DL (ref 0.7–1.2)
DIFFERENTIAL TYPE: ABNORMAL
EOSINOPHILS RELATIVE PERCENT: 6 % (ref 0–4)
ESTIMATED AVERAGE GLUCOSE: 105 MG/DL
GFR AFRICAN AMERICAN: >60 ML/MIN
GFR NON-AFRICAN AMERICAN: >60 ML/MIN
GFR SERPL CREATININE-BSD FRML MDRD: ABNORMAL ML/MIN/{1.73_M2}
GFR SERPL CREATININE-BSD FRML MDRD: ABNORMAL ML/MIN/{1.73_M2}
GLUCOSE BLD-MCNC: 122 MG/DL (ref 70–99)
HBA1C MFR BLD: 5.3 % (ref 4–6)
HCT VFR BLD CALC: 42.5 % (ref 41–53)
HDLC SERPL-MCNC: 22 MG/DL
HEMOGLOBIN: 14.1 G/DL (ref 13.5–17.5)
IMMATURE GRANULOCYTES: ABNORMAL %
LDL CHOLESTEROL: 37 MG/DL (ref 0–130)
LYMPHOCYTES # BLD: 34 % (ref 24–44)
MCH RBC QN AUTO: 30.2 PG (ref 26–34)
MCHC RBC AUTO-ENTMCNC: 33.2 G/DL (ref 31–37)
MCV RBC AUTO: 90.9 FL (ref 80–100)
MONOCYTES # BLD: 11 % (ref 1–7)
NRBC AUTOMATED: ABNORMAL PER 100 WBC
PDW BLD-RTO: 14.1 % (ref 11.5–14.9)
PLATELET # BLD: 193 K/UL (ref 150–450)
PLATELET ESTIMATE: ABNORMAL
PMV BLD AUTO: 8.3 FL (ref 6–12)
POTASSIUM SERPL-SCNC: 3.7 MMOL/L (ref 3.7–5.3)
RBC # BLD: 4.67 M/UL (ref 4.5–5.9)
RBC # BLD: ABNORMAL 10*6/UL
SEG NEUTROPHILS: 48 % (ref 36–66)
SEGMENTED NEUTROPHILS ABSOLUTE COUNT: 3.8 K/UL (ref 1.3–9.1)
SODIUM BLD-SCNC: 145 MMOL/L (ref 135–144)
T3 FREE: 3.26 PG/ML (ref 2.02–4.43)
T4 TOTAL: 7.2 UG/DL (ref 4.5–12)
TOTAL PROTEIN: 6.6 G/DL (ref 6.4–8.3)
TRIGL SERPL-MCNC: 57 MG/DL
TSH SERPL DL<=0.05 MIU/L-ACNC: 0.48 MIU/L (ref 0.3–5)
VLDLC SERPL CALC-MCNC: ABNORMAL MG/DL (ref 1–30)
WBC # BLD: 7.9 K/UL (ref 3.5–11)
WBC # BLD: ABNORMAL 10*3/UL

## 2018-07-03 PROCEDURE — 80061 LIPID PANEL: CPT

## 2018-07-03 PROCEDURE — 80053 COMPREHEN METABOLIC PANEL: CPT

## 2018-07-03 PROCEDURE — 84436 ASSAY OF TOTAL THYROXINE: CPT

## 2018-07-03 PROCEDURE — 85025 COMPLETE CBC W/AUTO DIFF WBC: CPT

## 2018-07-03 PROCEDURE — 84443 ASSAY THYROID STIM HORMONE: CPT

## 2018-07-03 PROCEDURE — 84481 FREE ASSAY (FT-3): CPT

## 2018-07-03 PROCEDURE — 36415 COLL VENOUS BLD VENIPUNCTURE: CPT

## 2018-07-03 PROCEDURE — 83036 HEMOGLOBIN GLYCOSYLATED A1C: CPT

## 2018-07-03 PROCEDURE — 1240000000 HC EMOTIONAL WELLNESS R&B

## 2018-07-03 RX ORDER — BUPRENORPHINE AND NALOXONE 8; 2 MG/1; MG/1
1 FILM, SOLUBLE BUCCAL; SUBLINGUAL 2 TIMES DAILY
Status: DISCONTINUED | OUTPATIENT
Start: 2018-07-03 | End: 2018-07-05 | Stop reason: HOSPADM

## 2018-07-03 RX ORDER — TRAZODONE HYDROCHLORIDE 50 MG/1
50 TABLET ORAL NIGHTLY PRN
Status: DISCONTINUED | OUTPATIENT
Start: 2018-07-03 | End: 2018-07-05 | Stop reason: HOSPADM

## 2018-07-03 RX ORDER — QUETIAPINE FUMARATE 200 MG/1
200 TABLET, FILM COATED ORAL NIGHTLY
Status: DISCONTINUED | OUTPATIENT
Start: 2018-07-03 | End: 2018-07-05 | Stop reason: HOSPADM

## 2018-07-03 RX ORDER — ACETAMINOPHEN 500 MG
500 TABLET ORAL EVERY 4 HOURS PRN
Status: DISCONTINUED | OUTPATIENT
Start: 2018-07-03 | End: 2018-07-05 | Stop reason: HOSPADM

## 2018-07-03 RX ORDER — LOPERAMIDE HYDROCHLORIDE 2 MG/1
2 CAPSULE ORAL 2 TIMES DAILY PRN
Status: DISCONTINUED | OUTPATIENT
Start: 2018-07-03 | End: 2018-07-05 | Stop reason: HOSPADM

## 2018-07-03 RX ORDER — MAGNESIUM HYDROXIDE/ALUMINUM HYDROXICE/SIMETHICONE 120; 1200; 1200 MG/30ML; MG/30ML; MG/30ML
30 SUSPENSION ORAL 3 TIMES DAILY PRN
Status: DISCONTINUED | OUTPATIENT
Start: 2018-07-03 | End: 2018-07-05 | Stop reason: HOSPADM

## 2018-07-03 RX ORDER — CITALOPRAM 20 MG/1
20 TABLET ORAL DAILY
Status: DISCONTINUED | OUTPATIENT
Start: 2018-07-03 | End: 2018-07-05 | Stop reason: HOSPADM

## 2018-07-03 ASSESSMENT — SLEEP AND FATIGUE QUESTIONNAIRES
DO YOU USE A SLEEP AID: NO
AVERAGE NUMBER OF SLEEP HOURS: 9
SLEEP PATTERN: NORMAL
DO YOU HAVE DIFFICULTY SLEEPING: NO
DO YOU HAVE DIFFICULTY SLEEPING: COMMENT
DO YOU USE A SLEEP AID: COMMENT
SLEEP PATTERN: UTA

## 2018-07-03 ASSESSMENT — LIFESTYLE VARIABLES
HISTORY_ALCOHOL_USE: NO
HISTORY_ALCOHOL_USE: NO

## 2018-07-03 ASSESSMENT — PAIN - FUNCTIONAL ASSESSMENT: PAIN_FUNCTIONAL_ASSESSMENT: 0-10

## 2018-07-03 ASSESSMENT — PATIENT HEALTH QUESTIONNAIRE - PHQ9: SUM OF ALL RESPONSES TO PHQ QUESTIONS 1-9: 4

## 2018-07-03 NOTE — PLAN OF CARE
medication compliance, individualized assessments and care planning    Outcome: needs reinforcement    PATIENT GOALS: to be discussed with patient within 72 hours    PLAN/TREATMENT RECOMMENDATIONS:     continue group therapy , medications compliance, goal setting, individualized assessments and care, continue to monitor pt on unit      SHORT-TERM GOALS:   Time frame for Short-Term Goals: 5-7 days    LONG-TERM GOALS:  Time frame for Long-Term Goals: 6 months  Members Present in Team Meeting: See Signature Sheet    Rusty Chance Sellers

## 2018-07-03 NOTE — CARE COORDINATION
BHI Biopsychosocial Assessment    Current Level of Psychosocial Functioning     Independent   Dependent    Minimal Assist  X    Psychosocial High Risk Factors (check all that apply)    Unable to obtain meds   Chronic illness/pain    Substance abuse  X- hx of heroin dependence  Lack of Family Support  X  Financial stress  X  Isolation  X  Inadequate Community Resources  Suicide attempt(s) X  Not taking medications  X  Victim of crime   Developmental Delay  Unable to manage personal needs    Age 72 or older   Homeless  No transportation   Readmission within 30 days  Unemployment X  Traumatic Event      Psychiatric Advanced Directives: SONIDO    Family to Involve in Treatment:  SONIDO    Sexual Orientation:   SONIDO    Patient Strengths: SSI income, medicaid, linked with [facility]    Patient Barriers:  AOD abuse, lack of insight, not compliant with treatment    Opiate/AOD Education Provided:  Patient is linked and not compliant information resource folder left for patient    CMHC/mental health history: linked with [facility] center / Dr Israel Shah of Care   medication management, group/individual therapies, family meetings, psycho -education, treatment team meetings to assist with stabilization    Initial Discharge Plan:  Link back to [facility] for MH/ Link to Suboxone program at [facility] and identify safe alternative housing if patient cannot return home      Clinical Summary:  Clinician attempted several times to meet with patient and Dr Riya Guerra met with clinician and reported patient linked with [facility] and to link patient with suboxone program when ready for discharge. Henrik Choe is a 47year old male who presents to the ED for heroin dependence. Pt states Dr. Norma Madison told him to come to the ED to be admitted inpatient. Pt denies SI/HI. Pt denies hallucinations/delusions. Pt states he abuses heroin 3 times a day. Pt states last use was this morning. Pt has a past diagnosis of Bipolar. Pt states he is med compliant.  Pt is linked with [facility],

## 2018-07-03 NOTE — BH NOTE
Patient was admitted through the ER with complaints of increasing depression and suicidal ideation. Detailed note dictated    Mental Status Examination:    Appearance: grooming:alert, cooperative, severe distress  Motor Activity:psychomotor activity: slow  Speech:rate/volume:delayed, increased latency of response, normal pitch and normal volume  Attitude: cooperative with interview  Affect: labile  Mood: dysphoric  Thought Processes: linear goal directed  Thought Content: ideas of helplessness and hopelessness  Suicidal Ideation: has suicidal thoughts  Homicidal Ideation: patient denies  Perceptions: denies  Insight/Judgment: impaired  Cognition: Alert, oriented to person, place, time, and situation; immediate recall 3/3 and delayed recall 3/3; concentration mildly impaired; it is evident during interview that patient's fund of knowledge is average; no language deficit noted  Axis I Opiate dependence, Bipolar disorder.   Axis II None  Axis III Hypertension  Axis IV Severe  Axis V 30  Tx plan: Safe therapeutic milieu  Medications as listed   Current Facility-Administered Medications   Medication Dose Route Frequency Provider Last Rate Last Dose    acetaminophen (TYLENOL) tablet 500 mg  500 mg Oral Q4H PRN Rebecca Arita MD        aluminum & magnesium hydroxide-simethicone (MAALOX) 200-200-20 MG/5ML suspension 30 mL  30 mL Oral TID PRN Rebecca Arita MD        loperamide (IMODIUM) capsule 2 mg  2 mg Oral BID PRN Rebecca Arita MD        magnesium hydroxide (MILK OF MAGNESIA) 400 MG/5ML suspension 10 mL  10 mL Oral BID PRN Rebecca Arita MD        traZODone (DESYREL) tablet 50 mg  50 mg Oral Nightly PRN Barrett CROWLEY MD        QUEtiapine (SEROQUEL) tablet 200 mg  200 mg Oral Nightly Barrett CROWLEY MD        citalopram (CELEXA) tablet 20 mg  20 mg Oral Daily Barrett CROWLEY MD        buprenorphine-naloxone (SUBOXONE) 8-2 MG SL film 1 Film  1 Film Sublingual BID Ulysses Coffer Geovany Amato MD         [email or web address]    H&P  Labs  Encourage to attend groups  Supportive therapy  ELOS: 5-7 days      Electronically signed by Vesna Carey MD on 7/3/2018 at 9:03 AM

## 2018-07-03 NOTE — PLAN OF CARE
Problem: Substance Abuse:  Goal: Participates in care planning  Participates in care planning   Outcome: Ongoing  Pt did not attend Well-being/Coping Skills group at 1330 d/t resting in room despite staff invitation to attend.

## 2018-07-03 NOTE — ED PROVIDER NOTES
[address]  eMERGENCY dEPARTMENT eNCOUnter      Pt Name: Josue Manning  MRN: [id number]  [place] 1965  Date of evaluation: 7/2/18      CHIEF COMPLAINT       Chief Complaint   Patient presents with    Addiction Problem     opiates: Pt said that he last used Heroin this morning. HISTORY OF PRESENT ILLNESS    Josue Manning is a 47 y.o. male who presents complaining of Drug dependence. Patient is here stating that he wants to be admitted for heroin use and drug rehab. Patient states that he used to be on Suboxone but ended up losing his doctor. Patient has been using again and states his last use was this morning. Patient states that he does see Dr. Barber Baez who evidently about a month ago wanted to make him an inpatient for drug rehab but states that he was unable to at that time. Patient states now he is able to do that and would like to be admitted. Patient denies any suicidal or homicidal thoughts. Patient states he is in mild withdrawal just some shakiness but no other symptoms. REVIEW OF SYSTEMS       Review of Systems   Constitutional: Negative for activity change, appetite change, chills, diaphoresis and fever. HENT: Negative for congestion, ear pain, facial swelling, nosebleeds, rhinorrhea, sinus pressure, sore throat and trouble swallowing. Eyes: Negative for pain, discharge and redness. Respiratory: Negative for cough, chest tightness, shortness of breath and wheezing. Cardiovascular: Negative for chest pain, palpitations and leg swelling. Gastrointestinal: Negative for abdominal pain, blood in stool, constipation, diarrhea, nausea and vomiting. Genitourinary: Negative for difficulty urinating, dysuria, flank pain, frequency, genital sores and hematuria. Musculoskeletal: Negative for arthralgias, back pain, gait problem, joint swelling, myalgias and neck pain. Skin: Negative for color change, pallor, rash and wound.    Neurological: Negative for

## 2018-07-03 NOTE — PLAN OF CARE
Problem: Substance Abuse:  Goal: Participates in care planning  Participates in care planning   Outcome: Ongoing  Pt did not attend Community Meeting/Goal Setting skills group at 0900 d/t resting in room despite staff invitation to attend.

## 2018-07-03 NOTE — FLOWSHEET NOTE
Patient declined visit with Nuzhat Magdaleno, stating he is \"too sick\";      07/03/18 4271   Encounter Summary   Services provided to: Patient   Referral/Consult From: Rolando   Continue Visiting (7/3/18)   Complexity of Encounter Low   Length of Encounter 15 minutes   Routine   Outcome Refused/declined

## 2018-07-03 NOTE — CARE COORDINATION
Clinician attempted to complete psychosocial assessment and patient was in bed and would not rouse to any verbal prompts.
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
## 2018-07-03 NOTE — PLAN OF CARE
Problem: Substance Abuse:  Goal: Absence of drug withdrawal signs and symptoms  Absence of drug withdrawal signs and symptoms   Patient did not attend skills group which focused on therapeutic jenga from [phone] despite staff encouragement and prompts.

## 2018-07-04 PROCEDURE — 1240000000 HC EMOTIONAL WELLNESS R&B

## 2018-07-04 PROCEDURE — 6360000002 HC RX W HCPCS: Performed by: PSYCHIATRY & NEUROLOGY

## 2018-07-04 PROCEDURE — 6370000000 HC RX 637 (ALT 250 FOR IP): Performed by: PSYCHIATRY & NEUROLOGY

## 2018-07-04 RX ORDER — NICOTINE 21 MG/24HR
1 PATCH, TRANSDERMAL 24 HOURS TRANSDERMAL DAILY
Status: DISCONTINUED | OUTPATIENT
Start: 2018-07-04 | End: 2018-07-05 | Stop reason: HOSPADM

## 2018-07-04 RX ADMIN — TRAZODONE HYDROCHLORIDE 50 MG: 50 TABLET ORAL at 20:36

## 2018-07-04 RX ADMIN — QUETIAPINE FUMARATE 200 MG: 200 TABLET ORAL at 20:36

## 2018-07-04 RX ADMIN — BUPRENORPHINE HYDROCHLORIDE, NALOXONE HYDROCHLORIDE 1 FILM: 8; 2 FILM, SOLUBLE BUCCAL; SUBLINGUAL at 20:36

## 2018-07-04 RX ADMIN — CITALOPRAM HYDROBROMIDE 20 MG: 20 TABLET ORAL at 08:46

## 2018-07-04 RX ADMIN — BUPRENORPHINE HYDROCHLORIDE, NALOXONE HYDROCHLORIDE 1 FILM: 8; 2 FILM, SOLUBLE BUCCAL; SUBLINGUAL at 08:46

## 2018-07-04 ASSESSMENT — PAIN SCALES - GENERAL
PAINLEVEL_OUTOF10: 0
PAINLEVEL_OUTOF10: 0

## 2018-07-04 NOTE — CARE COORDINATION
SOCIAL SERVICE NOTE: SW faxed Court letter to [facility] court as requested by PT. PT reports that he has a scheduled court date at [facility] court on July 5, 2018.

## 2018-07-04 NOTE — CARE COORDINATION
Pt declined to attend psychotherapy at 1100 am despite encouragement. PT was offered 1:1 and declined on this date.

## 2018-07-04 NOTE — PLAN OF CARE
Problem: Substance Abuse:  Goal: Absence of drug withdrawal signs and symptoms  Absence of drug withdrawal signs and symptoms   [facility]  Day 3 Interdisciplinary Treatment Plan NOTE    Review Date & Time:7/4/18    Admission Type:   Admission Type: Voluntary    Reason for admission:  Reason for Admission: Pt has been off of his medications and is having severe depression. Estimated Length of Stay: 5-7 days  Estimated Discharge Date Update: to be determined by physician    PATIENT STRENGTHS:  Patient Strengths Strengths: Communication, Connection to output provider  Patient Strengths and Limitations:Limitations: Tendency to isolate self, Difficulty problem solving/relies on others to help solve problems  Addictive Behavior:Addictive Behavior  In the past 3 months, have you felt or has someone told you that you have a problem with:  : None  Do you have a history of Chemical Use?: No  Do you have a history of Alcohol Use?: No  Do you have a history of Street Drug Abuse?: Yes  Histroy of Prescripton Drug Abuse?: No  Medical Problems:  Past Medical History:   Diagnosis Date    B[facility] ([facility]     Chronic mental illness     Depression     Hepatitis C     Hypertension     Psoriasis        Risk:  Fall RiskTotal: 75  Cj Scale Cj Scale Score: 22  BVC Total: 1  Change in scores no Changes to plan of Care no    Status EXAM:   Status and Exam  Normal: No  Facial Expression: Flat, Sad, Avoids Gaze  Affect: Constricted  Level of Consciousness: Alert  Mood:Normal: No  Mood: Irritable, Anxious  Motor Activity:Normal: No  Motor Activity: Decreased  Interview Behavior: Evasive  Preception: Bypro to Person, Bypro to Time, Bypro to Place, Bypro to Situation  Attention:Normal: No  Attention: Distractible  Thought Processes: Loose Assoc.   Thought Content:Normal: No  Thought Content: Preoccupations  Hallucinations: None  Delusions: Yes  Delusions: Persecution  Memory:Normal: Yes  Insight and Judgment: No  Insight and Judgment: Poor Judgment, Poor Insight, Unmotivated  Present Suicidal Ideation: No  Present Homicidal Ideation: No    Daily Assessment Last Entry:   Daily Sleep (WDL): Within Defined Limits         Patient Currently in Pain: Denies  Daily Nutrition (WDL): Exceptions to WDL  Appetite Change: Decreased  Barriers to Nutrition: None  Level of Assistance: Independent/Self    Patient Monitoring:  Frequency of Checks: 4 times per hour, close    Psychiatric Symptoms:   Depression Symptoms  Depression Symptoms: Feelings of helplessness, Feelings of hopelessess, Impaired concentration  Anxiety Symptoms  Anxiety Symptoms: Generalized  Celeste Symptoms  Celeste Symptoms: No problems reported or observed.      Psychosis Symptoms  Delusion Type: Persecutory    Suicide Risk CSSR-S:  1) Within the past month, have you wished you were dead or wished you could go to sleep and not wake up? :  (SONIDO)  2) Within the past month, have you actually had any thoughts of killing yourself? :  (SONIDO)  6)  Have you ever done anything, started to do anything, or prepared to do anything to end your life?: NO  Change in Result na  Change in Plan of care na       EDUCATION:   EDUCATION:   Learner Progress Toward Treatment Goals: Reviewed results and recommendations of this team, Reviewed group plan and strategies, Reviewed signs, symptoms and risk of self harm and violent behavior, Reviewed goals and plan of care    Method:small group, individual verbal education    Outcome:verbalized by patient, but needs reinforcement to obtain goals    PATIENT GOALS:  Short term: Patient is encouraged to set goals   Long term: patient is encouraged to set goals    PLAN/TREATMENT RECOMMENDATIONS UPDATE: continue with group therapies, increased socialization, continue planning for after discharge goals, continue with medication compliance    SHORT-TERM GOALS UPDATE:   Time frame for Short-Term Goals: 5-7 days    LONG-TERM GOALS UPDATE:   Time frame for Long-Term Goals: 6 months  Members Present in Team Meeting: See Signature [address] [facility]

## 2018-07-04 NOTE — PLAN OF CARE
Problem: Substance Abuse:  Goal: Absence of drug withdrawal signs and symptoms  Absence of drug withdrawal signs and symptoms   Outcome: Ongoing    Goal: Participates in care planning  Participates in care planning   Outcome: Ongoing  Pt denies any SI,HI AVH, calm controlled cooperative with treatment. Evasive during 1:1. Isolative aloof of peers, mostly out for needs only. Reports decreased appetite normal sleep. spontaneous safety checks to be maintained by staff.

## 2018-07-05 VITALS
HEART RATE: 56 BPM | HEIGHT: 68 IN | OXYGEN SATURATION: 96 % | DIASTOLIC BLOOD PRESSURE: 85 MMHG | SYSTOLIC BLOOD PRESSURE: 146 MMHG | TEMPERATURE: 97.7 F | BODY MASS INDEX: 27.28 KG/M2 | RESPIRATION RATE: 14 BRPM | WEIGHT: 180 LBS

## 2018-07-05 PROBLEM — F11.20 OPIOID TYPE DEPENDENCE, CONTINUOUS (HCC): Chronic | Status: ACTIVE | Noted: 2018-07-05

## 2018-07-05 PROBLEM — F11.20 OPIOID TYPE DEPENDENCE, CONTINUOUS (HCC): Status: ACTIVE | Noted: 2018-07-05

## 2018-07-05 PROCEDURE — 6370000000 HC RX 637 (ALT 250 FOR IP): Performed by: PSYCHIATRY & NEUROLOGY

## 2018-07-05 PROCEDURE — 6360000002 HC RX W HCPCS: Performed by: PSYCHIATRY & NEUROLOGY

## 2018-07-05 PROCEDURE — 5130000000 HC BRIDGE APPOINTMENT: Performed by: COUNSELOR

## 2018-07-05 RX ORDER — TRAZODONE HYDROCHLORIDE 50 MG/1
50 TABLET ORAL NIGHTLY
Qty: 15 TABLET | Refills: 0 | Status: ON HOLD | OUTPATIENT
Start: 2018-07-05 | End: 2020-03-09

## 2018-07-05 RX ORDER — QUETIAPINE FUMARATE 200 MG/1
200 TABLET, FILM COATED ORAL NIGHTLY
Qty: 30 TABLET | Refills: 0 | Status: ON HOLD | OUTPATIENT
Start: 2018-07-05 | End: 2020-03-09 | Stop reason: DRUGHIGH

## 2018-07-05 RX ORDER — BUPRENORPHINE AND NALOXONE 8; 2 MG/1; MG/1
1 FILM, SOLUBLE BUCCAL; SUBLINGUAL 2 TIMES DAILY
Qty: 14 EACH | Refills: 0 | Status: SHIPPED | OUTPATIENT
Start: 2018-07-05 | End: 2018-07-12

## 2018-07-05 RX ORDER — CITALOPRAM 20 MG/1
20 TABLET ORAL DAILY
Qty: 30 TABLET | Refills: 0 | Status: ON HOLD | OUTPATIENT
Start: 2018-07-05 | End: 2020-06-24 | Stop reason: SDUPTHER

## 2018-07-05 RX ADMIN — CITALOPRAM HYDROBROMIDE 20 MG: 20 TABLET ORAL at 08:25

## 2018-07-05 RX ADMIN — BUPRENORPHINE HYDROCHLORIDE, NALOXONE HYDROCHLORIDE 1 FILM: 8; 2 FILM, SOLUBLE BUCCAL; SUBLINGUAL at 08:26

## 2018-07-05 ASSESSMENT — PAIN SCALES - GENERAL: PAINLEVEL_OUTOF10: 0

## 2018-07-05 NOTE — PLAN OF CARE
Problem: Substance Abuse:  Goal: Participates in care planning  Participates in care planning   Outcome: Ongoing  Patient is unwilling to participate in care planning at this time.

## 2018-07-05 NOTE — BH NOTE
Patient given tobacco quitline number 19297276297 at this time, refusing to call at this time, states \" I just dont want to quit now\"- patient given information as to the dangers of long term tobacco use. Continue to reinforce the importance of tobacco cessation.

## 2018-07-05 NOTE — PLAN OF CARE
Problem: Substance Abuse:  Goal: Participates in care planning  Participates in care planning   Outcome: Ongoing  Psychoeducation Group Note    Date: 7/5/2018  Start Time: 0900  End Time: 0915    Number Participants in Group:  5    Goal:  Patient will demonstrate increased interpersonal interaction   Topic: Community meeting/ goals group    Discipline Responsible:   OT  AT  [facility]. x RT  Other       Participation Level:     None x Minimal   x Active Listener  Interactive    Monopolizing         Participation Quality:  x Appropriate  Inappropriate          Attentive        Intrusive   x       Sharing        Resistant          Supportive        Lethargic       Affective:   x Congruent  Incongruent  Blunted  Flat    Constricted  Anxious  Elated  Angry    Labile  Depressed  Other         Cognitive:  x Alert x Oriented PPTP     Concentration  G x F  P   Attention Span  G x F  P   Short-Term Memory x G  F  P   Long-Term Memory x G  F  P   ProblemSolving/  Decision Making x G  F  P   Ability to Process  Information x G  F  P      Contributing Factors             Delusional             Hallucinating             Flight of Ideas             Other:       Modes of Intervention:  x Education x Support x Exploration   x Clarifying x Problem Solving  Confrontation   x Socialization  Limit Setting x Reality Testing   x Activity  Movement  Media    Other:            Response to Learning:  x Able to verbalize current knowledge/experience   x Able to verbalize/acknowledge new learning   x Able to retain information    Capable of insight    Able to change behavior   x Progressing to goal    Other:        Comments:

## 2018-07-05 NOTE — PLAN OF CARE
Problem: Substance Abuse:  Goal: Absence of drug withdrawal signs and symptoms  Absence of drug withdrawal signs and symptoms   Outcome: Ongoing  Patient verbalizes active withdrawals.

## 2018-07-05 NOTE — DISCHARGE SUMMARY
Psychiatry: Discharge Note  Patient is stable. He  denies any hallucinations or delusions. He denies any suicidal or homicidal thoughts or plans. He agreed to follow up with [address] [place] [address]. He verbalizes a plan to f/u with outpatient care and keep himself safe  Discharge diagnosis:  Axis I: Bipolar disorder, Opiate dependence  Axis II: None  Axis III : HTN, Hep c  Axis IV : Lack of support  Axis V 45  Discharge Medications:   Current Discharge Medication List           Details   buprenorphine-naloxone (SUBOXONE) 8-2 MG FILM SL film Place 1 Film under the tongue 2 times daily for 7 days. Sami Peoples: 14 each, Refills: 0    Comments: [id number]  Associated Diagnoses: Opioid type dependence, continuous ([facility]              Details   citalopram (CELEXA) 20 MG tablet Take 1 tablet by mouth daily  Qty: 30 tablet, Refills: 0      QUEtiapine (SEROQUEL) 200 MG tablet Take 1 tablet by mouth nightly  Qty: 30 tablet, Refills: 0      traZODone (DESYREL) 50 MG tablet Take 1 tablet by mouth nightly  Qty: 15 tablet, Refills: 0              Details   amLODIPine (NORVASC) 10 MG tablet TAKE 1 TABLET BY MOUTH DAILY FOR 30 DAYS. Qty: 30 tablet, Refills: 0      Wound Dressings (ADAPTIC NON-ADHERING DRESSING) PADS Use to cover finger after applying bacitracin on a daily basis. Qty: 12 each, Refills: 0      Gauze Bandages (QC STRETCH GAUZE BDG 2\"X2YD) MISC Used to cover the finger after dressing changes.   Qty: 30 each, Refills: 0           Electronically signed by Juanpablo Peters MD on 7/5/2018 at 8:57 AM Appears euvolemic at present. Continue coreg and hydralazine, remains off diuretics. Strict I & O Appears euvolemic at present. Continue coreg and hydralazine, and isordil

## 2018-07-05 NOTE — PLAN OF CARE
Problem: Substance Abuse:  Goal: Participates in care planning  Participates in care planning   Psychoeducation Group Note    Date: 7/5/18  Start Time:  1100  End Time: 1150    Number Participants in Group:  7/18    Goal:  Patient will demonstrate increased interpersonal interaction   Topic: creative expression    Discipline Responsible:   OT  AT  [facility]. x RT  Other       Participation Level:     None  Minimal   x Active Listener x Interactive    Monopolizing         Participation Quality:  x Appropriate  Inappropriate   x       Attentive        Intrusive          Sharing        Resistant          Supportive        Lethargic       Affective:   x Congruent  Incongruent  Blunted  Flat    Constricted  Anxious  Elated  Angry    Labile  Depressed  Other         Cognitive:  x Alert x Oriented PPTP     Concentration x G  F  P   Attention Span x G  F  P   Short-Term Memory  G  F  P   Long-Term Memory  G  F  P   ProblemSolving/  Decision Making x G  F  P   Ability to Process  Information x G  F  P      Contributing Factors             Delusional             Hallucinating             Flight of Ideas             Other:       Modes of Intervention:  x Education x Support  Exploration    Clarifying x Problem Solving  Confrontation   x Socialization  Limit Setting  Reality Testing   x Activity  Movement  Media    Other:            Response to Learning:  x Able to verbalize current knowledge/experience   x Able to verbalize/acknowledge new learning   x Able to retain information   x Capable of insight    Able to change behavior    Progressing to goal    Other:        Comments:
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
## 2018-07-06 NOTE — DISCHARGE SUMMARY
[address]                   [address] [facility], [address]                                 DISCHARGE SUMMARY    PATIENT NAME: Meliza Leon                   :        1965  MED REC NO:   [id number]                              ROOM:       12  ACCOUNT NO:   [de-identified]                           ADMIT DATE: 2018  PROVIDER:     Bishop Gibbons                   [facility] DATE: 2018    HISTORY OF PRESENTING ILLNESS AND REASON FOR CURRENT ADMISSION:  The  patient is a 27-year-old male who is withdrawing from opioids, feels  depressed and sad, has suicidal thoughts and wanted to kill himself. With  this, he is admitted to [address] [place] [address]:  History of bipolar disorder and opioid  dependence. MEDICAL AND SURGICAL HISTORY:  He has hepatitis C and hypertension. He  states he has adverse reactions with HALOPERIDOL and LATUDA. COURSE DURING THE HOSPITAL STAY:  After getting admitted to the hospital,  he was started on Seroquel 200 mg at bedtime, trazodone 50 mg at bedtime,  Suboxone sublingual strips one strip two times a day and he is stabilized  and he is being discharged home. MENTAL STATUS EXAMINATION:  At the time of discharge, the patient is  cooperative. He has adequate eye contact. He has adequate rapport. His  speech is within normal limits. His mood subjectively okay and objectively  appears to be euthymic. He has appropriate affect. Thought process and  contents are within normal limits. He denies any hallucinations or  delusions. He denies any suicidal or homicidal thoughts or plans. He is  of average intelligence. He is oriented to time, place, and person. His  memory to recent, remote, and immediate events is within normal limits. He  has adequate attention and concentration. His insight and judgement are  fair. His abstraction is fair. DIAGNOSES:  At the time of discharge:  1.

## 2018-07-09 NOTE — CARE COORDINATION
Name: Aleisha Martines    : 1965    Discharge Date: 18    Primary Auth/Cert #:[id number]  Discharged to home     Discharge Medications:      Medication List      START taking these medications    buprenorphine-naloxone 8-2 MG Film SL film  Commonly known as:  SUBOXONE  Place 1 Film under the tongue 2 times daily for 7 days. .  Notes to patient:  Treat addiction        CONTINUE taking these medications    ADAPTIC NON-ADHERING DRESSING Pads  Use to cover finger after applying bacitracin on a daily basis. Notes to patient:  Topical antibiotic     amLODIPine 10 MG tablet  Commonly known as:  NORVASC  TAKE 1 TABLET BY MOUTH DAILY FOR 30 DAYS. Notes to patient:  Decrease blood pressure     citalopram 20 MG tablet  Commonly known as:  CELEXA  Take 1 tablet by mouth daily  Notes to patient:  Antidepressant     QC STRETCH GAUZE BDG 2\"X2YD Misc  Used to cover the finger after dressing changes.   Notes to patient:  Applied to fingers after dressing changes     QUEtiapine 200 MG tablet  Commonly known as:  SEROQUEL  Take 1 tablet by mouth nightly  Notes to patient:  Help clearer thinking     traZODone 50 MG tablet  Commonly known as:  DESYREL  Take 1 tablet by mouth nightly  Notes to patient:  Improve sleep/ Antidepressant        STOP taking these medications    ibuprofen 800 MG tablet  Commonly known as:  ADVIL;MOTRIN           Where to Get Your Medications      You can get these medications from any pharmacy    Bring a paper prescription for each of these medications  · buprenorphine-naloxone 8-2 MG Film SL film  · citalopram 20 MG tablet  · QUEtiapine 200 MG tablet  · traZODone 50 MG tablet         Follow Up Appointment: Jessica Espitia MD  [address] [facility]  [facility] [address] [phone]          [facility] [address]  [phone]  Rimma Ortega [phone]  On 2018  @ 1 pm  with Dr Estuardo Jones [facility] [place] [address]  [phone]  [phone]  On 7/9/2018  @ 730 am walk in assessment for suboxone program (m-f 730 am- 12 PM) first come first serve assessment

## 2018-07-27 ENCOUNTER — HOSPITAL ENCOUNTER (EMERGENCY)
Age: 53
Discharge: LEFT BEFORE DISCHARGE | End: 2018-07-27
Payer: MEDICARE

## 2018-07-30 ENCOUNTER — HOSPITAL ENCOUNTER (EMERGENCY)
Age: 53
Discharge: HOME | End: 2018-07-30
Attending: EMERGENCY MEDICINE
Payer: MEDICARE

## 2018-07-30 VITALS
BODY MASS INDEX: 27.37 KG/M2 | DIASTOLIC BLOOD PRESSURE: 101 MMHG | RESPIRATION RATE: 16 BRPM | SYSTOLIC BLOOD PRESSURE: 180 MMHG | HEART RATE: 63 BPM | WEIGHT: 180 LBS | OXYGEN SATURATION: 96 % | TEMPERATURE: 97.3 F

## 2018-07-30 DIAGNOSIS — K40.90 LEFT INGUINAL HERNIA: Primary | ICD-10-CM

## 2018-07-30 PROCEDURE — 99283 EMERGENCY DEPT VISIT LOW MDM: CPT

## 2018-07-30 RX ORDER — IBUPROFEN 400 MG/1
400 TABLET ORAL EVERY 6 HOURS PRN
Qty: 60 TABLET | Refills: 0 | Status: ON HOLD | OUTPATIENT
Start: 2018-07-30 | End: 2018-11-21 | Stop reason: HOSPADM

## 2018-07-30 RX ORDER — ACETAMINOPHEN 325 MG/1
325 TABLET ORAL EVERY 6 HOURS PRN
Qty: 60 TABLET | Refills: 0 | Status: ON HOLD | OUTPATIENT
Start: 2018-07-30 | End: 2018-11-21 | Stop reason: HOSPADM

## 2018-07-30 ASSESSMENT — PAIN DESCRIPTION - LOCATION: LOCATION: GROIN

## 2018-07-30 ASSESSMENT — ENCOUNTER SYMPTOMS
SHORTNESS OF BREATH: 0
WHEEZING: 0
BLOOD IN STOOL: 0
BACK PAIN: 0
CONSTIPATION: 0
ABDOMINAL PAIN: 1
VOMITING: 0
NAUSEA: 0

## 2018-07-30 ASSESSMENT — PAIN DESCRIPTION - ORIENTATION: ORIENTATION: LEFT

## 2018-07-30 NOTE — ED PROVIDER NOTES
[facility]     Emergency Department     Faculty Attestation    I performed a history and physical examination of the patient and discussed management with the resident. I reviewed the residents note and agree with the documented findings including all diagnostic interpretations and plan of care. Any areas of disagreement are noted on the chart. I was personally present for the key portions of any procedures. I have documented in the chart those procedures where I was not present during the key portions. I have reviewed the emergency nurses triage note. I agree with the chief complaint, past medical history, past surgical history, allergies, medications, social and family history as documented unless otherwise noted below. Documentation of the HPI, Physical Exam and Medical Decision Making performed by luciibsoniya is based on my personal performance of the HPI, PE and MDM. For Physician Assistant/ Nurse Practitioner cases/documentation I have personally evaluated this patient and have completed at least one if not all key elements of the E/M (history, physical exam, and MDM). Additional findings are as noted. Primary Care Physician: Jericho Lee MD    History: This is a 47 y.o. male who presents to the Emergency Department with complaint of groin pain. Left-sided. Bulging. Worse with cough and lifting. No previous history of hernia. No previous history of abdominal surgery. Normal bowel movements. Physical:     weight is 180 lb (81.6 kg). His oral temperature is 97.3 °F (36.3 °C). His blood pressure is 180/101 (abnormal) and his pulse is 63.  His respiration is 16 and oxygen saturation is 96%.    47 y.o. male no acute distress, abdomen soft nontender, there is a left indirect hernia, normal scrotal exam.  Hernia is reducible    Impression: Direct inguinal hernia    Plan: Surgery follow-up, analgesia      Subhash Patel MD  Attending Emergency Physician        Luis Alberto Wilson MD  07/30/18 5516

## 2018-08-10 ENCOUNTER — APPOINTMENT (OUTPATIENT)
Dept: GENERAL RADIOLOGY | Age: 53
End: 2018-08-10
Payer: MEDICARE

## 2018-08-10 ENCOUNTER — HOSPITAL ENCOUNTER (EMERGENCY)
Age: 53
Discharge: HOME | End: 2018-08-11
Attending: EMERGENCY MEDICINE
Payer: MEDICARE

## 2018-08-10 DIAGNOSIS — R07.89 CHEST WALL PAIN: Primary | ICD-10-CM

## 2018-08-10 LAB
ABSOLUTE EOS #: 0.29 K/UL (ref 0–0.44)
ABSOLUTE IMMATURE GRANULOCYTE: 0.04 K/UL (ref 0–0.3)
ABSOLUTE LYMPH #: 2.98 K/UL (ref 1.1–3.7)
ABSOLUTE MONO #: 0.79 K/UL (ref 0.1–1.2)
ALBUMIN SERPL-MCNC: 3.9 G/DL (ref 3.5–5.2)
ALBUMIN/GLOBULIN RATIO: 1.1 (ref 1–2.5)
ALP BLD-CCNC: 85 U/L (ref 40–129)
ALT SERPL-CCNC: 127 U/L (ref 5–41)
ANION GAP SERPL CALCULATED.3IONS-SCNC: 11 MMOL/L (ref 9–17)
AST SERPL-CCNC: 93 U/L
BASOPHILS # BLD: 1 % (ref 0–2)
BASOPHILS ABSOLUTE: 0.06 K/UL (ref 0–0.2)
BILIRUB SERPL-MCNC: 0.4 MG/DL (ref 0.3–1.2)
BUN BLDV-MCNC: 12 MG/DL (ref 6–20)
BUN/CREAT BLD: ABNORMAL (ref 9–20)
CALCIUM SERPL-MCNC: 8.9 MG/DL (ref 8.6–10.4)
CHLORIDE BLD-SCNC: 99 MMOL/L (ref 98–107)
CO2: 28 MMOL/L (ref 20–31)
CREAT SERPL-MCNC: 0.81 MG/DL (ref 0.7–1.2)
DIFFERENTIAL TYPE: NORMAL
EKG ATRIAL RATE: 62 BPM
EKG P AXIS: 26 DEGREES
EKG P-R INTERVAL: 148 MS
EKG Q-T INTERVAL: 400 MS
EKG QRS DURATION: 84 MS
EKG QTC CALCULATION (BAZETT): 406 MS
EKG R AXIS: 48 DEGREES
EKG T AXIS: 36 DEGREES
EKG VENTRICULAR RATE: 62 BPM
EOSINOPHILS RELATIVE PERCENT: 3 % (ref 1–4)
GFR AFRICAN AMERICAN: >60 ML/MIN
GFR NON-AFRICAN AMERICAN: >60 ML/MIN
GFR SERPL CREATININE-BSD FRML MDRD: ABNORMAL ML/MIN/{1.73_M2}
GFR SERPL CREATININE-BSD FRML MDRD: ABNORMAL ML/MIN/{1.73_M2}
GLUCOSE BLD-MCNC: 97 MG/DL (ref 70–99)
HCT VFR BLD CALC: 46.4 % (ref 40.7–50.3)
HEMOGLOBIN: 15.4 G/DL (ref 13–17)
IMMATURE GRANULOCYTES: 0 %
LYMPHOCYTES # BLD: 29 % (ref 24–43)
MCH RBC QN AUTO: 30.6 PG (ref 25.2–33.5)
MCHC RBC AUTO-ENTMCNC: 33.2 G/DL (ref 28.4–34.8)
MCV RBC AUTO: 92.2 FL (ref 82.6–102.9)
MONOCYTES # BLD: 8 % (ref 3–12)
NRBC AUTOMATED: 0 PER 100 WBC
PDW BLD-RTO: 14.4 % (ref 11.8–14.4)
PLATELET # BLD: 249 K/UL (ref 138–453)
PLATELET ESTIMATE: NORMAL
PMV BLD AUTO: 10.2 FL (ref 8.1–13.5)
POC TROPONIN I: 0 NG/ML (ref 0–0.1)
POC TROPONIN INTERP: NORMAL
POTASSIUM SERPL-SCNC: 3.6 MMOL/L (ref 3.7–5.3)
RBC # BLD: 5.03 M/UL (ref 4.21–5.77)
RBC # BLD: NORMAL 10*6/UL
SEG NEUTROPHILS: 59 % (ref 36–65)
SEGMENTED NEUTROPHILS ABSOLUTE COUNT: 6.26 K/UL (ref 1.5–8.1)
SODIUM BLD-SCNC: 138 MMOL/L (ref 135–144)
TOTAL PROTEIN: 7.4 G/DL (ref 6.4–8.3)
WBC # BLD: 10.4 K/UL (ref 3.5–11.3)
WBC # BLD: NORMAL 10*3/UL

## 2018-08-10 PROCEDURE — 99285 EMERGENCY DEPT VISIT HI MDM: CPT

## 2018-08-10 PROCEDURE — 80053 COMPREHEN METABOLIC PANEL: CPT

## 2018-08-10 PROCEDURE — 71046 X-RAY EXAM CHEST 2 VIEWS: CPT

## 2018-08-10 PROCEDURE — 85025 COMPLETE CBC W/AUTO DIFF WBC: CPT

## 2018-08-10 PROCEDURE — 84484 ASSAY OF TROPONIN QUANT: CPT

## 2018-08-10 PROCEDURE — 93005 ELECTROCARDIOGRAM TRACING: CPT

## 2018-08-10 ASSESSMENT — HEART SCORE: ECG: 0

## 2018-08-11 VITALS
HEART RATE: 61 BPM | BODY MASS INDEX: 25.01 KG/M2 | SYSTOLIC BLOOD PRESSURE: 161 MMHG | HEIGHT: 68 IN | DIASTOLIC BLOOD PRESSURE: 107 MMHG | WEIGHT: 165 LBS | OXYGEN SATURATION: 95 % | TEMPERATURE: 97.2 F | RESPIRATION RATE: 18 BRPM

## 2018-08-11 LAB
POC TROPONIN I: 0 NG/ML (ref 0–0.1)
POC TROPONIN INTERP: NORMAL

## 2018-08-11 PROCEDURE — 84484 ASSAY OF TROPONIN QUANT: CPT

## 2018-08-11 NOTE — ED PROVIDER NOTES
[facility] ED     Emergency Department     Faculty Attestation    I performed a history and physical examination of the patient and discussed management with the resident. I reviewed the residents note and agree with the documented findings and plan of care. Any areas of disagreement are noted on the chart. I was personally present for the key portions of any procedures. I have documented in the chart those procedures where I was not present during the key portions. I have reviewed the emergency nurses triage note. I agree with the chief complaint, past medical history, past surgical history, allergies, medications, social and family history as documented unless otherwise noted below. For Physician Assistant/ Nurse Practitioner cases/documentation I have personally evaluated this patient and have completed at least one if not all key elements of the E/M (history, physical exam, and MDM). Additional findings are as noted. Patient presents with chest pain that he has had off and on for the past few days. He says it is sharp and shoots across his chest.  He says he does feel little short of breath with it. He denies fever or chills, cough, abdominal pain. He says he has had some nausea and vomiting. Patient has no significant history of coronary artery disease but does have a history of hypertension and does smoke. On exam, patient is resting comfortable in the bed. Lungs are clear to auscultation bilaterally and heart sounds are normal.  Abdomen is soft and nontender. N/L calves are NONTENDER AND NONSWOLLEN. WILL GET EKG, CHEST X-RAY, AND LABS AND REASSESS.     EKG Interpretation    Interpreted by emergency department physician    Rhythm: normal sinus   Rate: normal  Axis: normal  Ectopy: none  Conduction: normal  ST Segments: normal  T Waves: non specific changes  Q Waves: none    Clinical Impression: non-specific EKG    Judith Marie MD  Attending Emergency Physician              Vibha Arthur MD  08/10/18 4985

## 2018-08-11 NOTE — ED PROVIDER NOTES
Basophils 1 0 - 2 %    Immature Granulocytes 0 0 %    Segs Absolute 6.26 1.50 - 8.10 k/uL    Absolute Lymph # 2.98 1.10 - 3.70 k/uL    Absolute Mono # 0.79 0.10 - 1.20 k/uL    Absolute Eos # 0.29 0.00 - 0.44 k/uL    Basophils # 0.06 0.00 - 0.20 k/uL    Absolute Immature Granulocyte 0.04 0.00 - 0.30 k/uL    WBC Morphology NOT REPORTED     RBC Morphology NOT REPORTED     Platelet Estimate NOT REPORTED    Comprehensive Metabolic Panel   Result Value Ref Range    Glucose 97 70 - 99 mg/dL    BUN 12 6 - 20 mg/dL    CREATININE 0.81 0.70 - 1.20 mg/dL    Bun/Cre Ratio NOT REPORTED 9 - 20    Calcium 8.9 8.6 - 10.4 mg/dL    Sodium 138 135 - 144 mmol/L    Potassium 3.6 (L) 3.7 - 5.3 mmol/L    Chloride 99 98 - 107 mmol/L    CO2 28 20 - 31 mmol/L    Anion Gap 11 9 - 17 mmol/L    Alkaline Phosphatase 85 40 - 129 U/L     (H) 5 - 41 U/L    AST 93 (H) <40 U/L    Total Bilirubin 0.40 0.3 - 1.2 mg/dL    Total Protein 7.4 6.4 - 8.3 g/dL    Alb 3.9 3.5 - 5.2 g/dL    Albumin/Globulin Ratio 1.1 1.0 - 2.5    GFR Non-African American >60 >60 mL/min    GFR African American >60 >60 mL/min    GFR Comment          GFR Staging NOT REPORTED    POCT troponin   Result Value Ref Range    POC Troponin I 0.00 0.00 - 0.10 ng/mL    POC Troponin Interp       The Troponin-I (POC) results cannot be compared to the Troponin-T results. POCT troponin   Result Value Ref Range    POC Troponin I 0.00 0.00 - 0.10 ng/mL    POC Troponin Interp       The Troponin-I (POC) results cannot be compared to the Troponin-T results. RADIOLOGY:  Xr Chest Standard (2 Vw)    Result Date: 8/10/2018  EXAMINATION: TWO VIEWS OF THE CHEST 8/10/2018 10:05 pm COMPARISON: January 1, 2018 HISTORY: ORDERING SYSTEM PROVIDED HISTORY: chest pain FINDINGS: The lungs are without acute focal process. There is no effusion or pneumothorax. The cardiomediastinal silhouette is normal. The osseous structures are intact without acute process.      Stable negative chest.     EKG

## 2018-08-16 ENCOUNTER — HOSPITAL ENCOUNTER (EMERGENCY)
Age: 53
Discharge: LEFT BEFORE DISCHARGE | End: 2018-08-16
Attending: EMERGENCY MEDICINE
Payer: MEDICARE

## 2018-08-16 VITALS
SYSTOLIC BLOOD PRESSURE: 181 MMHG | HEIGHT: 68 IN | HEART RATE: 69 BPM | RESPIRATION RATE: 18 BRPM | BODY MASS INDEX: 24.25 KG/M2 | DIASTOLIC BLOOD PRESSURE: 100 MMHG | OXYGEN SATURATION: 97 % | TEMPERATURE: 98.4 F | WEIGHT: 160 LBS

## 2018-08-16 DIAGNOSIS — K40.91 UNILATERAL RECURRENT INGUINAL HERNIA WITHOUT OBSTRUCTION OR GANGRENE: Primary | ICD-10-CM

## 2018-08-16 PROCEDURE — 99282 EMERGENCY DEPT VISIT SF MDM: CPT

## 2018-08-17 ASSESSMENT — ENCOUNTER SYMPTOMS
NAUSEA: 0
VOMITING: 0
WHEEZING: 0
SHORTNESS OF BREATH: 0
COLOR CHANGE: 0
ABDOMINAL PAIN: 1
COUGH: 0
DIARRHEA: 0
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
## 2018-08-17 NOTE — ED PROVIDER NOTES
[facility]  ED  Emergency Department Encounter  Emergency Medicine Resident Note     Pt Name: Jose A Harden  MRN: [id number]  [place] 1965  Date of evaluation: 8/16/2018  PCP:  Renetta Paz MD    [address] [place] [address]       Chief Complaint   Patient presents with    Hernia       HISTORY OF PRESENT ILLNESS  (Location/Symptom, Timing/Onset, Context/Setting, Quality, Duration, Modifying Factors, Severity.)      Jose A Harden is a 47 y.o. male who presents with Concerns of his left inguinal hernia. He's been seen for this before. He states that when he coughs or stands up that it pops out. He states that it quickly reduces, but is causing him pain. Patient had been seen for this prior visit and never signed up. 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 Patient denies any nausea or vomiting. Denies any diarrhea or constipation. No blood in his stools. No fevers or chills. PAST MEDICAL / SURGICAL / SOCIAL / FAMILY HISTORY     Past Medical History:  has a past medical history of Bipolar 1 disorder ([address] Chronic mental illness; Depression; Hepatitis C; Hypertension; and Psoriasis. Past Surgical History: reviewed with patient and none reported. Allergies:  Haldol [haloperidol] and Latuda [lurasidone hcl]     Home Meds:   Prior to Visit Medications    Medication Sig Taking?  Authorizing Provider   citalopram (CELEXA) 20 MG tablet Take 1 tablet by mouth daily Yes Barrett CROWLEY MD   QUEtiapine (SEROQUEL) 200 MG tablet Take 1 tablet by mouth nightly Yes Rebecca Arita MD   traZODone (DESYREL) 50 MG tablet Take 1 tablet by mouth nightly Yes Eliel CROWLEY MD   acetaminophen (TYLENOL) 325 MG tablet Take 1 tablet by mouth every 6 hours as needed for Pain  Divya Dirk, DO   ibuprofen (IBU) 400 MG tablet Take 1 tablet by mouth every 6 hours as needed for Pain  Divya Dirk, DO   Wound Dressings atraumatic. Mouth/Throat: Oropharynx is clear and moist.   Eyes: Conjunctivae and EOM are normal. No scleral icterus. Neck: Normal range of motion. Neck supple. Cardiovascular: Normal rate, regular rhythm and normal heart sounds. Exam reveals no gallop and no friction rub. No murmur heard. Pulmonary/Chest: Effort normal and breath sounds normal. No respiratory distress. He has no wheezes. He has no rales. Abdominal: Soft. He exhibits no distension. There is no tenderness. There is no rebound and no guarding. There is a left inguinal hernia that does protrude when patient bears down, however otherwise is not present is easily reducible no tenderness. Musculoskeletal: Normal range of motion. He exhibits no deformity. Neurological: He is alert and oriented to person, place, and time. Coordination normal.   Skin: Skin is warm. No rash noted. He is not diaphoretic. No erythema. No pallor. Psychiatric: He has a normal mood and affect. His behavior is normal. Judgment and thought content normal.   Nursing note and vitals reviewed. DIFFERENTIAL DIAGNOSIS/IMPRESSION     DDX: Inguinal hernia    Impression: 47 y.o. male who presents with intermittent inguinal hernia. Patient does have a hernia that protrudes only with straining. Easily reducible. No tenderness. No signs or symptoms of infection including no fever, chills, nausea, vomiting or stool changes. Hasn't seen for this previously. No further workup is indicated at this time in the emergency department. Did plan on discharging the patient with instructions to follow-up with the surgery clinic, however patient eloped prior to getting evaluation by attending physician or receiving discharge papers. DIAGNOSTIC RESULTS     EKG: All EKG's are interpreted by the Emergency Department Physician who either signs or Co-signs this chart in the absence of a cardiologist.    Not clinically indicated at this time.       LABS: Labs were reviewed by

## 2018-09-08 ENCOUNTER — APPOINTMENT (OUTPATIENT)
Dept: CT IMAGING | Age: 53
End: 2018-09-08
Payer: MEDICARE

## 2018-09-08 ENCOUNTER — HOSPITAL ENCOUNTER (EMERGENCY)
Age: 53
Discharge: HOME | End: 2018-09-08
Attending: EMERGENCY MEDICINE
Payer: MEDICARE

## 2018-09-08 VITALS
OXYGEN SATURATION: 98 % | SYSTOLIC BLOOD PRESSURE: 156 MMHG | RESPIRATION RATE: 16 BRPM | TEMPERATURE: 97.7 F | HEART RATE: 65 BPM | DIASTOLIC BLOOD PRESSURE: 90 MMHG

## 2018-09-08 DIAGNOSIS — S02.642A CLOSED FRACTURE OF LEFT RAMUS OF MANDIBLE, INITIAL ENCOUNTER (HCC): Primary | ICD-10-CM

## 2018-09-08 PROCEDURE — 99283 EMERGENCY DEPT VISIT LOW MDM: CPT

## 2018-09-08 PROCEDURE — 6370000000 HC RX 637 (ALT 250 FOR IP): Performed by: STUDENT IN AN ORGANIZED HEALTH CARE EDUCATION/TRAINING PROGRAM

## 2018-09-08 PROCEDURE — 70486 CT MAXILLOFACIAL W/O DYE: CPT

## 2018-09-08 RX ORDER — AMOXICILLIN AND CLAVULANATE POTASSIUM 875; 125 MG/1; MG/1
1 TABLET, FILM COATED ORAL 2 TIMES DAILY
Qty: 14 TABLET | Refills: 0 | Status: SHIPPED | OUTPATIENT
Start: 2018-09-08 | End: 2018-09-15

## 2018-09-08 RX ORDER — AMOXICILLIN AND CLAVULANATE POTASSIUM 875; 125 MG/1; MG/1
1 TABLET, FILM COATED ORAL ONCE
Status: COMPLETED | OUTPATIENT
Start: 2018-09-08 | End: 2018-09-08

## 2018-09-08 RX ORDER — HYDROCODONE BITARTRATE AND ACETAMINOPHEN 5; 325 MG/1; MG/1
1 TABLET ORAL ONCE
Status: COMPLETED | OUTPATIENT
Start: 2018-09-08 | End: 2018-09-08

## 2018-09-08 RX ORDER — HYDROCODONE BITARTRATE AND ACETAMINOPHEN 5; 325 MG/1; MG/1
1 TABLET ORAL EVERY 6 HOURS PRN
Qty: 15 TABLET | Refills: 0 | Status: SHIPPED | OUTPATIENT
Start: 2018-09-08 | End: 2018-09-15

## 2018-09-08 RX ADMIN — AMOXICILLIN AND CLAVULANATE POTASSIUM 1 TABLET: 875; 125 TABLET, FILM COATED ORAL at 21:32

## 2018-09-08 RX ADMIN — HYDROCODONE BITARTRATE AND ACETAMINOPHEN 1 TABLET: 5; 325 TABLET ORAL at 20:22

## 2018-09-08 ASSESSMENT — PAIN SCALES - GENERAL
PAINLEVEL_OUTOF10: 9
PAINLEVEL_OUTOF10: 9

## 2018-09-08 ASSESSMENT — ENCOUNTER SYMPTOMS
VOMITING: 0
FACIAL SWELLING: 1
ABDOMINAL PAIN: 0
NAUSEA: 0
SHORTNESS OF BREATH: 0

## 2018-09-08 ASSESSMENT — PAIN DESCRIPTION - ORIENTATION: ORIENTATION: LEFT;LOWER

## 2018-09-08 ASSESSMENT — PAIN DESCRIPTION - FREQUENCY: FREQUENCY: CONTINUOUS

## 2018-09-08 ASSESSMENT — PAIN DESCRIPTION - PAIN TYPE: TYPE: ACUTE PAIN

## 2018-09-08 ASSESSMENT — PAIN DESCRIPTION - DESCRIPTORS: DESCRIPTORS: SHARP

## 2018-09-08 ASSESSMENT — PAIN DESCRIPTION - LOCATION: LOCATION: JAW

## 2018-09-09 NOTE — ED PROVIDER NOTES
Emergency Medicine Attending Note    I have seen and examined the patient concurrently at the bedside with the Resident/MLP. Please see my key portion documented:      I agree with the assessment and plan as discussed with Dr. Deejay Chatterjee. Electronically signed:  Micaela Steen M.D.            Jose Meza MD  09/08/18 [id number]
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
## 2018-10-07 ENCOUNTER — HOSPITAL ENCOUNTER (EMERGENCY)
Age: 53
Discharge: HOME | End: 2018-10-08
Attending: EMERGENCY MEDICINE
Payer: MEDICARE

## 2018-10-07 VITALS
TEMPERATURE: 97.7 F | WEIGHT: 160 LBS | DIASTOLIC BLOOD PRESSURE: 108 MMHG | HEART RATE: 65 BPM | HEIGHT: 68 IN | BODY MASS INDEX: 24.25 KG/M2 | OXYGEN SATURATION: 96 % | SYSTOLIC BLOOD PRESSURE: 191 MMHG | RESPIRATION RATE: 16 BRPM

## 2018-10-07 DIAGNOSIS — K40.91 UNILATERAL RECURRENT INGUINAL HERNIA WITHOUT OBSTRUCTION OR GANGRENE: Primary | ICD-10-CM

## 2018-10-07 LAB
ABSOLUTE EOS #: 0.51 K/UL (ref 0–0.4)
ABSOLUTE IMMATURE GRANULOCYTE: 0 K/UL (ref 0–0.3)
ABSOLUTE LYMPH #: 2.41 K/UL (ref 1–4.8)
ABSOLUTE MONO #: 0.89 K/UL (ref 0.1–0.8)
ALBUMIN SERPL-MCNC: 4 G/DL (ref 3.5–5.2)
ALBUMIN/GLOBULIN RATIO: 1.3 (ref 1–2.5)
ALP BLD-CCNC: 84 U/L (ref 40–129)
ALT SERPL-CCNC: 207 U/L (ref 5–41)
ANION GAP SERPL CALCULATED.3IONS-SCNC: 11 MMOL/L (ref 9–17)
AST SERPL-CCNC: 164 U/L
BASOPHILS # BLD: 0 % (ref 0–2)
BASOPHILS ABSOLUTE: 0 K/UL (ref 0–0.2)
BILIRUB SERPL-MCNC: 0.46 MG/DL (ref 0.3–1.2)
BILIRUBIN DIRECT: 0.17 MG/DL
BILIRUBIN, INDIRECT: 0.29 MG/DL (ref 0–1)
BUN BLDV-MCNC: 11 MG/DL (ref 6–20)
BUN/CREAT BLD: ABNORMAL (ref 9–20)
CALCIUM SERPL-MCNC: 8.6 MG/DL (ref 8.6–10.4)
CHLORIDE BLD-SCNC: 102 MMOL/L (ref 98–107)
CO2: 27 MMOL/L (ref 20–31)
CREAT SERPL-MCNC: 0.7 MG/DL (ref 0.7–1.2)
DIFFERENTIAL TYPE: ABNORMAL
EOSINOPHILS RELATIVE PERCENT: 4 % (ref 1–4)
GFR AFRICAN AMERICAN: >60 ML/MIN
GFR NON-AFRICAN AMERICAN: >60 ML/MIN
GFR SERPL CREATININE-BSD FRML MDRD: ABNORMAL ML/MIN/{1.73_M2}
GFR SERPL CREATININE-BSD FRML MDRD: ABNORMAL ML/MIN/{1.73_M2}
GLOBULIN: ABNORMAL G/DL (ref 1.5–3.8)
GLUCOSE BLD-MCNC: 109 MG/DL (ref 70–99)
HCT VFR BLD CALC: 46.7 % (ref 40.7–50.3)
HEMOGLOBIN: 15.5 G/DL (ref 13–17)
IMMATURE GRANULOCYTES: 0 %
INR BLD: 1.1
LYMPHOCYTES # BLD: 19 % (ref 24–44)
MCH RBC QN AUTO: 31 PG (ref 25.2–33.5)
MCHC RBC AUTO-ENTMCNC: 33.2 G/DL (ref 28.4–34.8)
MCV RBC AUTO: 93.4 FL (ref 82.6–102.9)
MONOCYTES # BLD: 7 % (ref 1–7)
MORPHOLOGY: ABNORMAL
NRBC AUTOMATED: 0 PER 100 WBC
PARTIAL THROMBOPLASTIN TIME: 27 SEC (ref 20.5–30.5)
PDW BLD-RTO: 14.6 % (ref 11.8–14.4)
PLATELET # BLD: 232 K/UL (ref 138–453)
PLATELET ESTIMATE: ABNORMAL
PMV BLD AUTO: 10 FL (ref 8.1–13.5)
POTASSIUM SERPL-SCNC: 3.4 MMOL/L (ref 3.7–5.3)
PROTHROMBIN TIME: 11.4 SEC (ref 9–12)
RBC # BLD: 5 M/UL (ref 4.21–5.77)
RBC # BLD: ABNORMAL 10*6/UL
SEG NEUTROPHILS: 70 % (ref 36–66)
SEGMENTED NEUTROPHILS ABSOLUTE COUNT: 8.89 K/UL (ref 1.8–7.7)
SODIUM BLD-SCNC: 140 MMOL/L (ref 135–144)
TOTAL PROTEIN: 7.2 G/DL (ref 6.4–8.3)
WBC # BLD: 12.7 K/UL (ref 3.5–11.3)
WBC # BLD: ABNORMAL 10*3/UL

## 2018-10-07 PROCEDURE — 80076 HEPATIC FUNCTION PANEL: CPT

## 2018-10-07 PROCEDURE — 85025 COMPLETE CBC W/AUTO DIFF WBC: CPT

## 2018-10-07 PROCEDURE — 85610 PROTHROMBIN TIME: CPT

## 2018-10-07 PROCEDURE — 85730 THROMBOPLASTIN TIME PARTIAL: CPT

## 2018-10-07 PROCEDURE — 80048 BASIC METABOLIC PNL TOTAL CA: CPT

## 2018-10-07 PROCEDURE — 99283 EMERGENCY DEPT VISIT LOW MDM: CPT

## 2018-10-07 RX ORDER — IBUPROFEN 400 MG/1
600 TABLET ORAL ONCE
Status: DISCONTINUED | OUTPATIENT
Start: 2018-10-07 | End: 2018-10-07

## 2018-10-08 ASSESSMENT — ENCOUNTER SYMPTOMS
VOMITING: 0
DIARRHEA: 0
BACK PAIN: 0
COUGH: 0
BLOOD IN STOOL: 1
ABDOMINAL PAIN: 0
RECTAL PAIN: 1
SHORTNESS OF BREATH: 0
NAUSEA: 0
EYE PAIN: 0

## 2018-10-08 NOTE — ED PROVIDER NOTES
Absolute 8.89 (*)     Absolute Mono # 0.89 (*)     Absolute Eos # 0.51 (*)     All other components within normal limits   BASIC METABOLIC PANEL - Abnormal; Notable for the following:     Glucose 109 (*)     Potassium 3.4 (*)     All other components within normal limits   HEPATIC FUNCTION PANEL - Abnormal; Notable for the following:      (*)      (*)     All other components within normal limits   PROTIME-INR   APTT       EMERGENCY DEPARTMENT COURSE:     -------------------------  BP: (!) 191/108, Temp: 97.7 °F (36.5 °C), Pulse: 65, Resp: 16  Physical Exam __  Constitutional:  oriented to person, place, and time. appears well-developed and well-nourished. HENT: no facial swelling or edema  Head: Normocephalic and atraumatic. Eyes: Right eye exhibits no discharge. Left eye exhibits no discharge. No scleral icterus. Neck: No JVD present. No tracheal deviation present. Cardiovascular: pulses present in extremities  Pulmonary/Chest: Effort normal. No respiratory distress. Musculoskeletal: Normal range of motion. exhibits no edema. Neurological: they are alert and oriented to person, place, and time. Skin: Skin is warm and dry. Psychiatric: has a normal mood and affect. behavior is normal.  Abd: soft non tender easily reducible hernia    Comments    Easily reducible no incarceration will need gen surgery follow up. Told to follow with PCP for bp this week    Schultz DO, RDMS.   Attending Emergency Physician         Tonja Conn DO  10/08/18 5218
DIFFERENTIAL  DIAGNOSIS     PLAN (LABS / IMAGING / EKG):  Orders Placed This Encounter   Procedures    CBC WITH AUTO DIFFERENTIAL    BASIC METABOLIC PANEL    Hepatic Function Panel    PROTIME-INR    APTT       MEDICATIONS ORDERED:  Orders Placed This Encounter   Medications    DISCONTD: ibuprofen (ADVIL;MOTRIN) tablet 600 mg       DDX:   1.) Left indirect Inguinal hernia - reducible  2.) testicular torsion  3.) external hemorrhoids  4.) Hepatitis C (chronic, known, untreated)  DIAGNOSTIC RESULTS / EMERGENCY DEPARTMENT COURSE / MDM     LABS:  Results for orders placed or performed during the hospital encounter of 10/07/18   CBC WITH AUTO DIFFERENTIAL   Result Value Ref Range    WBC 12.7 (H) 3.5 - 11.3 k/uL    RBC 5.00 4.21 - 5.77 m/uL    Hemoglobin 15.5 13.0 - 17.0 g/dL    Hematocrit 46.7 40.7 - 50.3 %    MCV 93.4 82.6 - 102.9 fL    MCH 31.0 25.2 - 33.5 pg    MCHC 33.2 28.4 - 34.8 g/dL    RDW 14.6 (H) 11.8 - 14.4 %    Platelets 188 772 - 185 k/uL    MPV 10.0 8.1 - 13.5 fL    NRBC Automated 0.0 0.0 per 100 WBC    Differential Type NOT REPORTED     WBC Morphology NOT REPORTED     RBC Morphology NOT REPORTED     Platelet Estimate NOT REPORTED     Immature Granulocytes 0 0 %    Seg Neutrophils 70 (H) 36 - 66 %    Lymphocytes 19 (L) 24 - 44 %    Monocytes 7 1 - 7 %    Eosinophils % 4 1 - 4 %    Basophils 0 0 - 2 %    Absolute Immature Granulocyte 0.00 0.00 - 0.30 k/uL    Segs Absolute 8.89 (H) 1.8 - 7.7 k/uL    Absolute Lymph # 2.41 1.0 - 4.8 k/uL    Absolute Mono # 0.89 (H) 0.1 - 0.8 k/uL    Absolute Eos # 0.51 (H) 0.0 - 0.4 k/uL    Basophils # 0.00 0.0 - 0.2 k/uL    Morphology ANISOCYTOSIS PRESENT    BASIC METABOLIC PANEL   Result Value Ref Range    Glucose 109 (H) 70 - 99 mg/dL    BUN 11 6 - 20 mg/dL    CREATININE 0.70 0.70 - 1.20 mg/dL    Bun/Cre Ratio NOT REPORTED 9 - 20    Calcium 8.6 8.6 - 10.4 mg/dL    Sodium 140 135 - 144 mmol/L    Potassium 3.4 (L) 3.7 - 5.3 mmol/L    Chloride 102 98 - 107 mmol/L

## 2018-10-26 ENCOUNTER — HOSPITAL ENCOUNTER (OUTPATIENT)
Age: 53
Setting detail: OBSERVATION
Discharge: LEFT BEFORE DISCHARGE | End: 2018-10-27
Attending: EMERGENCY MEDICINE | Admitting: EMERGENCY MEDICINE
Payer: MEDICARE

## 2018-10-26 ENCOUNTER — APPOINTMENT (OUTPATIENT)
Dept: GENERAL RADIOLOGY | Age: 53
End: 2018-10-26
Payer: MEDICARE

## 2018-10-26 DIAGNOSIS — R07.9 CHEST PAIN, UNSPECIFIED TYPE: Primary | ICD-10-CM

## 2018-10-26 LAB
ABSOLUTE EOS #: 0.22 K/UL (ref 0–0.44)
ABSOLUTE IMMATURE GRANULOCYTE: 0.04 K/UL (ref 0–0.3)
ABSOLUTE LYMPH #: 3.2 K/UL (ref 1.1–3.7)
ABSOLUTE MONO #: 1.11 K/UL (ref 0.1–1.2)
ANION GAP SERPL CALCULATED.3IONS-SCNC: 15 MMOL/L (ref 9–17)
BASOPHILS # BLD: 1 % (ref 0–2)
BASOPHILS ABSOLUTE: 0.08 K/UL (ref 0–0.2)
BUN BLDV-MCNC: 18 MG/DL (ref 6–20)
BUN/CREAT BLD: ABNORMAL (ref 9–20)
CALCIUM SERPL-MCNC: 9.2 MG/DL (ref 8.6–10.4)
CHLORIDE BLD-SCNC: 101 MMOL/L (ref 98–107)
CO2: 27 MMOL/L (ref 20–31)
CREAT SERPL-MCNC: 0.85 MG/DL (ref 0.7–1.2)
DIFFERENTIAL TYPE: ABNORMAL
EOSINOPHILS RELATIVE PERCENT: 2 % (ref 1–4)
GFR AFRICAN AMERICAN: >60 ML/MIN
GFR NON-AFRICAN AMERICAN: >60 ML/MIN
GFR SERPL CREATININE-BSD FRML MDRD: ABNORMAL ML/MIN/{1.73_M2}
GFR SERPL CREATININE-BSD FRML MDRD: ABNORMAL ML/MIN/{1.73_M2}
GLUCOSE BLD-MCNC: 150 MG/DL (ref 70–99)
HCT VFR BLD CALC: 47.3 % (ref 40.7–50.3)
HEMOGLOBIN: 15.8 G/DL (ref 13–17)
IMMATURE GRANULOCYTES: 0 %
LYMPHOCYTES # BLD: 26 % (ref 24–43)
MCH RBC QN AUTO: 30.7 PG (ref 25.2–33.5)
MCHC RBC AUTO-ENTMCNC: 33.4 G/DL (ref 28.4–34.8)
MCV RBC AUTO: 92 FL (ref 82.6–102.9)
MONOCYTES # BLD: 9 % (ref 3–12)
NRBC AUTOMATED: 0 PER 100 WBC
PDW BLD-RTO: 14.2 % (ref 11.8–14.4)
PLATELET # BLD: 284 K/UL (ref 138–453)
PLATELET ESTIMATE: ABNORMAL
PMV BLD AUTO: 10.4 FL (ref 8.1–13.5)
POC TROPONIN I: 0 NG/ML (ref 0–0.1)
POC TROPONIN I: 0.03 NG/ML (ref 0–0.1)
POC TROPONIN INTERP: NORMAL
POC TROPONIN INTERP: NORMAL
POTASSIUM SERPL-SCNC: 4.1 MMOL/L (ref 3.7–5.3)
RBC # BLD: 5.14 M/UL (ref 4.21–5.77)
RBC # BLD: ABNORMAL 10*6/UL
SEG NEUTROPHILS: 62 % (ref 36–65)
SEGMENTED NEUTROPHILS ABSOLUTE COUNT: 7.82 K/UL (ref 1.5–8.1)
SODIUM BLD-SCNC: 143 MMOL/L (ref 135–144)
WBC # BLD: 12.5 K/UL (ref 3.5–11.3)
WBC # BLD: ABNORMAL 10*3/UL

## 2018-10-26 PROCEDURE — 81001 URINALYSIS AUTO W/SCOPE: CPT

## 2018-10-26 PROCEDURE — 71045 X-RAY EXAM CHEST 1 VIEW: CPT

## 2018-10-26 PROCEDURE — 84484 ASSAY OF TROPONIN QUANT: CPT

## 2018-10-26 PROCEDURE — 85025 COMPLETE CBC W/AUTO DIFF WBC: CPT

## 2018-10-26 PROCEDURE — G0378 HOSPITAL OBSERVATION PER HR: HCPCS

## 2018-10-26 PROCEDURE — 80048 BASIC METABOLIC PNL TOTAL CA: CPT

## 2018-10-26 PROCEDURE — 99285 EMERGENCY DEPT VISIT HI MDM: CPT

## 2018-10-26 PROCEDURE — 93005 ELECTROCARDIOGRAM TRACING: CPT

## 2018-10-26 PROCEDURE — G0480 DRUG TEST DEF 1-7 CLASSES: HCPCS

## 2018-10-26 PROCEDURE — 80307 DRUG TEST PRSMV CHEM ANLYZR: CPT

## 2018-10-26 RX ORDER — ASPIRIN 81 MG/1
324 TABLET, CHEWABLE ORAL ONCE
Status: DISCONTINUED | OUTPATIENT
Start: 2018-10-26 | End: 2018-10-27 | Stop reason: HOSPADM

## 2018-10-26 RX ORDER — ONDANSETRON 2 MG/ML
4 INJECTION INTRAMUSCULAR; INTRAVENOUS ONCE
Status: DISCONTINUED | OUTPATIENT
Start: 2018-10-26 | End: 2018-10-27 | Stop reason: HOSPADM

## 2018-10-26 ASSESSMENT — PAIN DESCRIPTION - ORIENTATION: ORIENTATION: LEFT

## 2018-10-26 ASSESSMENT — HEART SCORE
ECG: 1
ECG: 1

## 2018-10-26 ASSESSMENT — PAIN DESCRIPTION - DESCRIPTORS: DESCRIPTORS: STABBING

## 2018-10-26 ASSESSMENT — PAIN DESCRIPTION - LOCATION: LOCATION: CHEST

## 2018-10-26 ASSESSMENT — PAIN DESCRIPTION - ONSET: ONSET: GRADUAL

## 2018-10-26 ASSESSMENT — PAIN DESCRIPTION - PAIN TYPE: TYPE: ACUTE PAIN

## 2018-10-26 ASSESSMENT — PAIN SCALES - GENERAL: PAINLEVEL_OUTOF10: 7

## 2018-10-26 ASSESSMENT — PAIN DESCRIPTION - FREQUENCY: FREQUENCY: CONTINUOUS

## 2018-10-27 VITALS
WEIGHT: 160 LBS | SYSTOLIC BLOOD PRESSURE: 138 MMHG | TEMPERATURE: 96 F | HEIGHT: 68 IN | HEART RATE: 57 BPM | RESPIRATION RATE: 16 BRPM | DIASTOLIC BLOOD PRESSURE: 89 MMHG | BODY MASS INDEX: 24.25 KG/M2 | OXYGEN SATURATION: 94 %

## 2018-10-27 LAB
-: ABNORMAL
ACETAMINOPHEN LEVEL: <5 UG/ML (ref 10–30)
AMORPHOUS: ABNORMAL
AMPHETAMINE SCREEN URINE: NEGATIVE
BACTERIA: ABNORMAL
BARBITURATE SCREEN URINE: NEGATIVE
BENZODIAZEPINE SCREEN, URINE: POSITIVE
BILIRUBIN URINE: ABNORMAL
BUPRENORPHINE URINE: ABNORMAL
CANNABINOID SCREEN URINE: NEGATIVE
CASTS UA: ABNORMAL /LPF (ref 0–2)
COCAINE METABOLITE, URINE: NEGATIVE
COLOR: ABNORMAL
COMMENT UA: ABNORMAL
CRYSTALS, UA: ABNORMAL /HPF
EKG ATRIAL RATE: 57 BPM
EKG ATRIAL RATE: 60 BPM
EKG ATRIAL RATE: 69 BPM
EKG P AXIS: 33 DEGREES
EKG P AXIS: 37 DEGREES
EKG P AXIS: 74 DEGREES
EKG P-R INTERVAL: 140 MS
EKG P-R INTERVAL: 154 MS
EKG P-R INTERVAL: 172 MS
EKG Q-T INTERVAL: 408 MS
EKG Q-T INTERVAL: 504 MS
EKG Q-T INTERVAL: 516 MS
EKG QRS DURATION: 86 MS
EKG QRS DURATION: 90 MS
EKG QRS DURATION: 92 MS
EKG QTC CALCULATION (BAZETT): 437 MS
EKG QTC CALCULATION (BAZETT): 502 MS
EKG QTC CALCULATION (BAZETT): 504 MS
EKG R AXIS: 50 DEGREES
EKG R AXIS: 59 DEGREES
EKG R AXIS: 66 DEGREES
EKG T AXIS: 24 DEGREES
EKG T AXIS: 27 DEGREES
EKG T AXIS: 6 DEGREES
EKG VENTRICULAR RATE: 57 BPM
EKG VENTRICULAR RATE: 60 BPM
EKG VENTRICULAR RATE: 69 BPM
EPITHELIAL CELLS UA: ABNORMAL /HPF (ref 0–5)
ETHANOL PERCENT: <0.01 %
ETHANOL: <10 MG/DL
GLUCOSE URINE: NEGATIVE
KETONES, URINE: ABNORMAL
LEUKOCYTE ESTERASE, URINE: NEGATIVE
MDMA URINE: ABNORMAL
METHADONE SCREEN, URINE: POSITIVE
METHAMPHETAMINE, URINE: ABNORMAL
MUCUS: ABNORMAL
NITRITE, URINE: NEGATIVE
OPIATES, URINE: POSITIVE
OTHER OBSERVATIONS UA: ABNORMAL
OXYCODONE SCREEN URINE: NEGATIVE
PH UA: 5 (ref 5–8)
PHENCYCLIDINE, URINE: NEGATIVE
PROPOXYPHENE, URINE: ABNORMAL
PROTEIN UA: ABNORMAL
RBC UA: ABNORMAL /HPF (ref 0–2)
RENAL EPITHELIAL, UA: ABNORMAL /HPF
SALICYLATE LEVEL: <1 MG/DL (ref 3–10)
SPECIFIC GRAVITY UA: 1.03 (ref 1–1.03)
TEST INFORMATION: ABNORMAL
TOXIC TRICYCLIC SC,BLOOD: NEGATIVE
TRICHOMONAS: ABNORMAL
TRICYCLIC ANTIDEPRESSANTS, UR: ABNORMAL
TURBIDITY: CLEAR
URINE HGB: NEGATIVE
UROBILINOGEN, URINE: NORMAL
WBC UA: ABNORMAL /HPF (ref 0–5)
YEAST: ABNORMAL

## 2018-10-27 PROCEDURE — G0378 HOSPITAL OBSERVATION PER HR: HCPCS

## 2018-10-27 PROCEDURE — 93005 ELECTROCARDIOGRAM TRACING: CPT

## 2018-10-27 RX ORDER — TRAZODONE HYDROCHLORIDE 50 MG/1
50 TABLET ORAL NIGHTLY
Status: DISCONTINUED | OUTPATIENT
Start: 2018-10-27 | End: 2018-10-27 | Stop reason: HOSPADM

## 2018-10-27 RX ORDER — AMLODIPINE BESYLATE 10 MG/1
10 TABLET ORAL DAILY
Status: DISCONTINUED | OUTPATIENT
Start: 2018-10-27 | End: 2018-10-27 | Stop reason: HOSPADM

## 2018-10-27 RX ORDER — CITALOPRAM 20 MG/1
20 TABLET ORAL DAILY
Status: DISCONTINUED | OUTPATIENT
Start: 2018-10-27 | End: 2018-10-27 | Stop reason: HOSPADM

## 2018-10-27 RX ORDER — SODIUM CHLORIDE 0.9 % (FLUSH) 0.9 %
10 SYRINGE (ML) INJECTION PRN
Status: DISCONTINUED | OUTPATIENT
Start: 2018-10-27 | End: 2018-10-27 | Stop reason: HOSPADM

## 2018-10-27 RX ORDER — QUETIAPINE FUMARATE 200 MG/1
200 TABLET, FILM COATED ORAL NIGHTLY
Status: DISCONTINUED | OUTPATIENT
Start: 2018-10-27 | End: 2018-10-27 | Stop reason: HOSPADM

## 2018-10-27 RX ORDER — SODIUM CHLORIDE 0.9 % (FLUSH) 0.9 %
10 SYRINGE (ML) INJECTION EVERY 12 HOURS SCHEDULED
Status: DISCONTINUED | OUTPATIENT
Start: 2018-10-27 | End: 2018-10-27 | Stop reason: HOSPADM

## 2018-10-27 RX ORDER — ACETAMINOPHEN 325 MG/1
650 TABLET ORAL EVERY 4 HOURS PRN
Status: DISCONTINUED | OUTPATIENT
Start: 2018-10-27 | End: 2018-10-27 | Stop reason: HOSPADM

## 2018-10-27 NOTE — H&P
sensation  Dermatological ROS - No lesions, No rash     (PQRS) Advance directives on face sheet per hospital policy. No change unless specifically mentioned in chart    [address]    has a past medical history of Bipolar 1 disorder ([facility] [address] Chronic mental illness; Depression; Hepatitis C; Hypertension; and Psoriasis. I have reviewed the past medical history with the patient and it is pertinent to this complaint. SURGICAL HISTORY      has no past surgical history on file. I have reviewed and agree with Surgical History entered and it is not pertinent to this complaint. CURRENT MEDICATIONS         amLODIPine (NORVASC) tablet 10 mg Daily   citalopram (CELEXA) tablet 20 mg Daily   QUEtiapine (SEROQUEL) tablet 200 mg Nightly   traZODone (DESYREL) tablet 50 mg Nightly   sodium chloride flush 0.9 % injection 10 mL 2 times per day   sodium chloride flush 0.9 % injection 10 mL PRN   acetaminophen (TYLENOL) tablet 650 mg Q4H PRN   enoxaparin (LOVENOX) injection 40 mg Daily   aspirin chewable tablet 324 mg Once   ondansetron (ZOFRAN) injection 4 mg Once       All medication charted and reviewed. ALLERGIES     is allergic to haldol [haloperidol] and latuda [lurasidone hcl]. FAMILY HISTORY     indicated that the status of his father is unknown. He indicated that the status of his child is unknown.      family history includes Cancer in his child and father. The patient denies any pertinent family history. I have reviewed and agree with the family history entered. I have reviewed the Family History and it is not significant to the case    SOCIAL HISTORY      reports that he has been smoking Cigarettes. He has a 15.00 pack-year smoking history. He has never used smokeless tobacco. He reports that he does not drink alcohol or use drugs. I have reviewed and agree with all Social.  There are no concerns for substance abuse/use.     PHYSICAL EXAM     INITIAL VITALS:  height is 5' 8\" (1.727 m) and

## 2018-10-27 NOTE — CONSULTS
Haldol [haloperidol] and Latuda [lurasidone hcl]    Social History:   reports that he has been smoking Cigarettes. He has a 15.00 pack-year smoking history. He has never used smokeless tobacco. He reports that he does not drink alcohol or use drugs. Family History:   Positive for early CAD    REVIEW OF SYSTEMS:    · Constitutional: there has been no unanticipated weight loss. There's been No change in energy level, No change in activity level. · Eyes: No visual changes or diplopia. No scleral icterus. · ENT: No Headaches, hearing loss or vertigo. No mouth sores or sore throat. · Cardiovascular: No problem  · Respiratory: No previous reported problems  · Gastrointestinal: No abdominal pain, appetite loss, blood in stools. No change in bowel or bladder habits. · Genitourinary: No dysuria, trouble voiding, or hematuria. · Musculoskeletal:  No gait disturbance, No weakness or joint complaints. · Integumentary: No rash or pruritis. · Neurological: No headache, diplopia, change in muscle strength, numbness or tingling. No change in gait, balance, coordination, mood, affect, memory, mentation, behavior. · Psychiatric: No anxiety, or depression. · Endocrine: No temperature intolerance. No excessive thirst, fluid intake, or urination. No tremor. · Hematologic/Lymphatic: No abnormal bruising or bleeding, blood clots or swollen lymph nodes. · Allergic/Immunologic: No nasal congestion or hives. PHYSICAL EXAM:    Physical Examination:    /89   Pulse 57   Temp 96 °F (35.6 °C) (Oral)   Resp 16   Ht 5' 8\" (1.727 m)   Wt 160 lb (72.6 kg)   SpO2 94%   BMI 24.33 kg/m²    Constitutional and General Appearance: alert, cooperative, no distress and appears stated age  HEENT: PERRL, no cervical lymphadenopathy. No masses palpable. Normal oral mucosa  Respiratory:  · Normal excursion and expansion without use of accessory muscles  · Resp Auscultation: Good respiratory effort.  No for increased work of

## 2018-10-27 NOTE — CARE COORDINATION
Case Management Initial Discharge Plan  Mai Davenport,             Met with:patient to discuss discharge plans. Information verified: address, contacts, phone number, , insurance Yes  PCP: Josh Reardon MD  Date of last visit: 6 month    Insurance Provider: [place]     Discharge Planning    Living Arrangements:  Spouse/Significant Other   Support Systems:  Spouse/Significant Other, Family Members    Home has 2 stories  4 stairs to climb to get into front door, flight stairs to climb to reach second floor  Location of bedroom/bathroom in home up     Patient able to perform ADL's:Independent    Current Services (outpatient & in home) none  DME equipment: none  DME provider: none    Pharmacy: [facility]    Potential Assistance Purchasing Medications:  No  Does patient want to participate in local refill/ meds to beds program?  No    Potential Assistance Needed:  N/A    Patient agreeable to home care: No  [place] of choice provided:  n/a    Prior SNF/Rehab Placement and Facility: no  Agreeable to SNF/Rehab: No  [place] of choice provided: n/a   Evaluation: n/a    Expected Discharge date:  10/27/18  Patient expects to be discharged to:  Home  Follow Up Appointment: Best Day/ Time:     Transportation provider: medical cab   Transportation arrangements needed for discharge: No    Readmission Risk              Risk of Unplanned Readmission:        19             Does patient have a readmission risk score greater than 14?: Yes  If yes, follow-up appointment must be made within 7 days of discharge. Discharge Plan: Plan to discharge to home independently; has established follow up care.            Electronically signed by Russ Rodriguez RN on 10/27/18 at 8:15 AM

## 2018-10-27 NOTE — PROGRESS NOTES
CDU Daily Progress Note  Attending Physician       Pt Name: Ruben Holguin  MRN: [id number]  [place] 1965  Date of evaluation: 10/27/18    I performed a history and physical examination of the patient and discussed management with the resident. I reviewed the residents note and agree with the documented findings and plan of care. Any areas of disagreement are noted on the chart. I was personally present for the key portions of any procedures. I have documented in the chart those procedures where I was not present during the key portions. I have reviewed the emergency nurses triage note. I agree with the chief complaint, past medical history, past surgical history, allergies, medications, social and family history as documented unless otherwise noted below. Documentation of the HPI, Physical Exam and Medical Decision Making performed by medical students or scribes is based on my personal performance of the HPI, PE and MDM. For Physician Assistant/ Nurse Practitioner cases/documentation I have personally evaluated this patient and have completed at least one if not all key elements of the E/M (history, physical exam, and MDM). Additional findings are as noted. The Family History, Social History and Review of Systems are unchanged from the previous day. No significant events overnight. Intermittent chest pain x few days assoc w nausea and SOB. PMHx: recent URI, HTN (noncompliant w meds), bipolar, hernia Lgroin, Hep C. ECG TWI multiple leads new, prolonged QT. CXR no acute. UA shows mod calcium oxylate crystals. Cards consulted.  Patient eloped, father called and told to have patient return    Stepan Abernathy MD  Attending Physician  Critical Decision Unit

## 2018-10-27 NOTE — PROGRESS NOTES
Spoke with patient and stated he thought he was already discharged.  I informed patient that he was not and he stated he would come back to the hospital.

## 2018-10-27 NOTE — ED PROVIDER NOTES
Emergency Medicine Attending Note    I have seen and examined the patient concurrently at the bedside with the Resident/MLP. Please see my key portion documented:    I agree with the assessment and plan as discussed with Dr. Yehuda Leong. I did look at a recent cath that showed essentially clean coronaries other than intramyocardial bridging. The profound ECG changes with T-wave inversions and all the inferior leads and also in the precordial leads V3 through V6 cannot be explained. Very long QTc on repeat ECG. Bedside echo reveals normal LV function without any obvious wall motion abnormality. RV maybe a little bit but no significant right heart strain. TA PSE visualized and appears to be normal , good systolic function. Patient will need to be admitted to the hospital and observed overnight with cardiology consultation. Addendum: Urine Tox screen positive for Methadone. This is the likely culprit of the ECG changes including Long QT as well as notable U waves. Electronically signed:  DANIKA Mccormick MD  10/26/18 9916       Emmanuelle Richter MD  10/26/18 9904       Emmanuelle Richter MD  10/27/18 1248

## 2018-10-29 NOTE — ED PROVIDER NOTES
for rash and wound. Allergic/Immunologic: Negative for environmental allergies and food allergies. Neurological: Negative for syncope and weakness. Hematological: Negative for adenopathy. Does not bruise/bleed easily. Psychiatric/Behavioral: Negative for confusion. The patient is not nervous/anxious. PHYSICAL EXAM   (up to 7 for level 4, 8 or more for level 5)      INITIAL VITALS:   /89   Pulse 57   Temp 96 °F (35.6 °C) (Oral)   Resp 16   Ht 5' 8\" (1.727 m)   Wt 160 lb (72.6 kg)   SpO2 94%   BMI 24.33 kg/m²     Physical Exam   Constitutional: He is oriented to person, place, and time. He appears well-developed and well-nourished. No distress. HENT:   Head: Normocephalic and atraumatic. Eyes: Pupils are equal, round, and reactive to light. Conjunctivae and EOM are normal.   Neck: Normal range of motion. Neck supple. Cardiovascular: Normal rate, regular rhythm, normal heart sounds and intact distal pulses. Exam reveals no gallop and no friction rub. No murmur heard. Pulmonary/Chest: Effort normal and breath sounds normal. No respiratory distress. He has no wheezes. Abdominal: Soft. Bowel sounds are normal. He exhibits no distension. There is no tenderness. There is no rebound and no guarding. Musculoskeletal: Normal range of motion. He exhibits no edema or tenderness. Neurological: He is alert and oriented to person, place, and time. Skin: Skin is warm and dry. No rash noted. Psychiatric: He has a normal mood and affect.  His behavior is normal.       DIFFERENTIAL  DIAGNOSIS     PLAN (LABS / IMAGING / EKG):  Orders Placed This Encounter   Procedures    XR CHEST PORTABLE    CBC WITH AUTO DIFFERENTIAL    Basic Metabolic Panel    Urinalysis Reflex to Culture    Urine Drug Screen    TOX SCR, BLD, ED    Microscopic Urinalysis    LAB SCANNED REPORT    Inpatient consult to Cardiology    Inpatient consult to Social Work    POCT troponin    POCT troponin    POCT Medicine Resident    (Please note that portions of thisnote were completed with a voice recognition program.  Efforts were made to edit the dictations but occasionally words are mis-transcribed.)       David Galindo MD  Resident  10/31/18 6774

## 2018-10-31 ASSESSMENT — ENCOUNTER SYMPTOMS
SORE THROAT: 0
SHORTNESS OF BREATH: 1
NAUSEA: 1
COUGH: 0
DIARRHEA: 0
VOMITING: 0
ABDOMINAL PAIN: 0
EYE PAIN: 0

## 2018-11-20 ENCOUNTER — APPOINTMENT (OUTPATIENT)
Dept: GENERAL RADIOLOGY | Age: 53
End: 2018-11-20
Payer: MEDICARE

## 2018-11-20 ENCOUNTER — HOSPITAL ENCOUNTER (OUTPATIENT)
Age: 53
Setting detail: OBSERVATION
Discharge: HOME | End: 2018-11-21
Attending: EMERGENCY MEDICINE | Admitting: EMERGENCY MEDICINE
Payer: MEDICARE

## 2018-11-20 DIAGNOSIS — I20.0 UNSTABLE ANGINA PECTORIS (HCC): Primary | ICD-10-CM

## 2018-11-20 LAB
ABSOLUTE EOS #: 0.14 K/UL (ref 0–0.44)
ABSOLUTE IMMATURE GRANULOCYTE: 0.03 K/UL (ref 0–0.3)
ABSOLUTE LYMPH #: 3.03 K/UL (ref 1.1–3.7)
ABSOLUTE MONO #: 0.62 K/UL (ref 0.1–1.2)
ANION GAP SERPL CALCULATED.3IONS-SCNC: 11 MMOL/L (ref 9–17)
BASOPHILS # BLD: 1 % (ref 0–2)
BASOPHILS ABSOLUTE: 0.09 K/UL (ref 0–0.2)
BUN BLDV-MCNC: 30 MG/DL (ref 6–20)
BUN/CREAT BLD: ABNORMAL (ref 9–20)
CALCIUM SERPL-MCNC: 9.3 MG/DL (ref 8.6–10.4)
CHLORIDE BLD-SCNC: 105 MMOL/L (ref 98–107)
CO2: 26 MMOL/L (ref 20–31)
CREAT SERPL-MCNC: 0.68 MG/DL (ref 0.7–1.2)
DIFFERENTIAL TYPE: NORMAL
EOSINOPHILS RELATIVE PERCENT: 1 % (ref 1–4)
GFR AFRICAN AMERICAN: >60 ML/MIN
GFR NON-AFRICAN AMERICAN: >60 ML/MIN
GFR SERPL CREATININE-BSD FRML MDRD: ABNORMAL ML/MIN/{1.73_M2}
GFR SERPL CREATININE-BSD FRML MDRD: ABNORMAL ML/MIN/{1.73_M2}
GLUCOSE BLD-MCNC: 105 MG/DL (ref 70–99)
HCT VFR BLD CALC: 48 % (ref 40.7–50.3)
HEMOGLOBIN: 16.1 G/DL (ref 13–17)
IMMATURE GRANULOCYTES: 0 %
LYMPHOCYTES # BLD: 30 % (ref 24–43)
MCH RBC QN AUTO: 31 PG (ref 25.2–33.5)
MCHC RBC AUTO-ENTMCNC: 33.5 G/DL (ref 28.4–34.8)
MCV RBC AUTO: 92.5 FL (ref 82.6–102.9)
MONOCYTES # BLD: 6 % (ref 3–12)
NRBC AUTOMATED: 0 PER 100 WBC
PDW BLD-RTO: 13.6 % (ref 11.8–14.4)
PLATELET # BLD: 236 K/UL (ref 138–453)
PLATELET ESTIMATE: NORMAL
PMV BLD AUTO: 10.8 FL (ref 8.1–13.5)
POC TROPONIN I: 0 NG/ML (ref 0–0.1)
POC TROPONIN I: 0.01 NG/ML (ref 0–0.1)
POC TROPONIN INTERP: NORMAL
POC TROPONIN INTERP: NORMAL
POTASSIUM SERPL-SCNC: 4.2 MMOL/L (ref 3.7–5.3)
RBC # BLD: 5.19 M/UL (ref 4.21–5.77)
RBC # BLD: NORMAL 10*6/UL
SEG NEUTROPHILS: 62 % (ref 36–65)
SEGMENTED NEUTROPHILS ABSOLUTE COUNT: 6.28 K/UL (ref 1.5–8.1)
SODIUM BLD-SCNC: 142 MMOL/L (ref 135–144)
WBC # BLD: 10.2 K/UL (ref 3.5–11.3)
WBC # BLD: NORMAL 10*3/UL

## 2018-11-20 PROCEDURE — 96374 THER/PROPH/DIAG INJ IV PUSH: CPT

## 2018-11-20 PROCEDURE — 85025 COMPLETE CBC W/AUTO DIFF WBC: CPT

## 2018-11-20 PROCEDURE — 6370000000 HC RX 637 (ALT 250 FOR IP): Performed by: STUDENT IN AN ORGANIZED HEALTH CARE EDUCATION/TRAINING PROGRAM

## 2018-11-20 PROCEDURE — G0378 HOSPITAL OBSERVATION PER HR: HCPCS

## 2018-11-20 PROCEDURE — 2580000003 HC RX 258: Performed by: EMERGENCY MEDICINE

## 2018-11-20 PROCEDURE — 6370000000 HC RX 637 (ALT 250 FOR IP): Performed by: EMERGENCY MEDICINE

## 2018-11-20 PROCEDURE — 6360000002 HC RX W HCPCS: Performed by: STUDENT IN AN ORGANIZED HEALTH CARE EDUCATION/TRAINING PROGRAM

## 2018-11-20 PROCEDURE — 80048 BASIC METABOLIC PNL TOTAL CA: CPT

## 2018-11-20 PROCEDURE — 71046 X-RAY EXAM CHEST 2 VIEWS: CPT

## 2018-11-20 PROCEDURE — 84484 ASSAY OF TROPONIN QUANT: CPT

## 2018-11-20 PROCEDURE — 93005 ELECTROCARDIOGRAM TRACING: CPT

## 2018-11-20 PROCEDURE — 99285 EMERGENCY DEPT VISIT HI MDM: CPT

## 2018-11-20 RX ORDER — SODIUM CHLORIDE 0.9 % (FLUSH) 0.9 %
10 SYRINGE (ML) INJECTION EVERY 12 HOURS SCHEDULED
Status: DISCONTINUED | OUTPATIENT
Start: 2018-11-20 | End: 2018-11-21 | Stop reason: HOSPADM

## 2018-11-20 RX ORDER — TRAZODONE HYDROCHLORIDE 50 MG/1
50 TABLET ORAL NIGHTLY
Status: DISCONTINUED | OUTPATIENT
Start: 2018-11-20 | End: 2018-11-21 | Stop reason: HOSPADM

## 2018-11-20 RX ORDER — IBUPROFEN 800 MG/1
800 TABLET ORAL ONCE
Status: COMPLETED | OUTPATIENT
Start: 2018-11-20 | End: 2018-11-20

## 2018-11-20 RX ORDER — ACETAMINOPHEN 325 MG/1
325 TABLET ORAL EVERY 6 HOURS PRN
Status: DISCONTINUED | OUTPATIENT
Start: 2018-11-20 | End: 2018-11-21 | Stop reason: HOSPADM

## 2018-11-20 RX ORDER — ACETAMINOPHEN 325 MG/1
650 TABLET ORAL EVERY 4 HOURS PRN
Status: DISCONTINUED | OUTPATIENT
Start: 2018-11-20 | End: 2018-11-21 | Stop reason: HOSPADM

## 2018-11-20 RX ORDER — SODIUM CHLORIDE 0.9 % (FLUSH) 0.9 %
10 SYRINGE (ML) INJECTION PRN
Status: DISCONTINUED | OUTPATIENT
Start: 2018-11-20 | End: 2018-11-21 | Stop reason: HOSPADM

## 2018-11-20 RX ORDER — AMLODIPINE BESYLATE 10 MG/1
10 TABLET ORAL DAILY
Status: DISCONTINUED | OUTPATIENT
Start: 2018-11-20 | End: 2018-11-21 | Stop reason: HOSPADM

## 2018-11-20 RX ORDER — IBUPROFEN 400 MG/1
400 TABLET ORAL EVERY 6 HOURS PRN
Status: DISCONTINUED | OUTPATIENT
Start: 2018-11-20 | End: 2018-11-21 | Stop reason: HOSPADM

## 2018-11-20 RX ORDER — ASPIRIN 81 MG/1
324 TABLET, CHEWABLE ORAL ONCE
Status: COMPLETED | OUTPATIENT
Start: 2018-11-20 | End: 2018-11-20

## 2018-11-20 RX ORDER — CITALOPRAM 20 MG/1
20 TABLET ORAL DAILY
Status: DISCONTINUED | OUTPATIENT
Start: 2018-11-21 | End: 2018-11-21 | Stop reason: HOSPADM

## 2018-11-20 RX ORDER — ONDANSETRON 2 MG/ML
4 INJECTION INTRAMUSCULAR; INTRAVENOUS EVERY 6 HOURS PRN
Status: DISCONTINUED | OUTPATIENT
Start: 2018-11-20 | End: 2018-11-21 | Stop reason: HOSPADM

## 2018-11-20 RX ORDER — QUETIAPINE FUMARATE 200 MG/1
200 TABLET, FILM COATED ORAL NIGHTLY
Status: DISCONTINUED | OUTPATIENT
Start: 2018-11-20 | End: 2018-11-21 | Stop reason: HOSPADM

## 2018-11-20 RX ORDER — ONDANSETRON 2 MG/ML
4 INJECTION INTRAMUSCULAR; INTRAVENOUS ONCE
Status: COMPLETED | OUTPATIENT
Start: 2018-11-20 | End: 2018-11-20

## 2018-11-20 RX ADMIN — Medication 10 ML: at 21:32

## 2018-11-20 RX ADMIN — AMLODIPINE BESYLATE 10 MG: 10 TABLET ORAL at 21:30

## 2018-11-20 RX ADMIN — IBUPROFEN 800 MG: 800 TABLET, FILM COATED ORAL at 16:43

## 2018-11-20 RX ADMIN — ONDANSETRON 4 MG: 2 INJECTION INTRAMUSCULAR; INTRAVENOUS at 16:42

## 2018-11-20 RX ADMIN — QUETIAPINE FUMARATE 200 MG: 200 TABLET ORAL at 21:30

## 2018-11-20 RX ADMIN — TRAZODONE HYDROCHLORIDE 50 MG: 50 TABLET ORAL at 21:30

## 2018-11-20 RX ADMIN — ASPIRIN 81 MG 324 MG: 81 TABLET ORAL at 17:54

## 2018-11-20 ASSESSMENT — ENCOUNTER SYMPTOMS
VOMITING: 0
SORE THROAT: 0
NAUSEA: 1
DIARRHEA: 0
CHEST TIGHTNESS: 0
CONSTIPATION: 0
RHINORRHEA: 0
SHORTNESS OF BREATH: 1
EYE PAIN: 0
BACK PAIN: 0
COUGH: 0
EYE DISCHARGE: 0
ABDOMINAL PAIN: 0
COLOR CHANGE: 0
BLOOD IN STOOL: 0

## 2018-11-20 ASSESSMENT — HEART SCORE: ECG: 1

## 2018-11-20 ASSESSMENT — PAIN DESCRIPTION - PAIN TYPE
TYPE: ACUTE PAIN
TYPE: ACUTE PAIN

## 2018-11-20 ASSESSMENT — PAIN DESCRIPTION - DESCRIPTORS: DESCRIPTORS: PRESSURE;SHARP

## 2018-11-20 ASSESSMENT — PAIN SCALES - GENERAL
PAINLEVEL_OUTOF10: 3
PAINLEVEL_OUTOF10: 5
PAINLEVEL_OUTOF10: 5

## 2018-11-20 ASSESSMENT — PAIN DESCRIPTION - LOCATION
LOCATION: CHEST
LOCATION: CHEST

## 2018-11-20 ASSESSMENT — PAIN DESCRIPTION - ORIENTATION: ORIENTATION: LEFT

## 2018-11-20 NOTE — CARE COORDINATION
Case Management Initial Discharge Plan  Norma Tyler,             Met with:patient to discuss discharge plans. Information verified: address, contacts, phone number, , insurance Yes  PCP: Rach Ca MD  Date of last visit: 2 weeks ago    Insurance Provider: [place] [employer]    Discharge Planning    Living Arrangements:  Family Members   Support Systems:  Family Members, Friends/Neighbors    Home has 1 stories  none stairs to climb to get into front door, none stairs to climb to reach second floor  Location of bedroom/bathroom in home first floor    Patient able to perform ADL's:Independent    Current Services (outpatient & in home) none  DME equipment: none  DME provider: none    Pharmacy: [facility]   Potential Assistance Purchasing Medications:  No  Does patient want to participate in local refill/ meds to beds program?  No    Potential Assistance Needed:  N/A    Patient agreeable to home care: Yes  [place] of choice provided:  n/a    Prior SNF/Rehab Placement and Facility: No  Agreeable to SNF/Rehab: No  [place] of choice provided: no   Evaluation: no    Expected Discharge date:     Patient expects to be discharged to:  HOME  Follow Up Appointment: Best Day/ Time:      Transportation provider: medical cab  Transportation arrangements needed for discharge: No    Readmission Risk              Risk of Unplanned Readmission:        0             Does patient have a readmission risk score greater than 14?: No  If yes, follow-up appointment must be made within 7 days of discharge. Discharge Plan: Discharge home independent, has established PCP. Medical cab on discharge.           Electronically signed by Kenia Laurent RN on 18 at 6:51 PM

## 2018-11-20 NOTE — ED NOTES
Pt resting on cot, alert, oriented, no distress noted. Pt updated on plan for admission, awaiting bed placement. Meal tray ordered. Pt denies any further needs at this time, will continue to monitor.       Shavon Menon RN  11/20/18 8220

## 2018-11-20 NOTE — ED PROVIDER NOTES
MEDICAL HISTORY      has a past medical history of Bipolar 1 disorder ([facility] [address] Chronic mental illness; Depression; Hepatitis C; Hypertension; and Psoriasis. SURGICAL HISTORY        has no past surgical history on file. CURRENT MEDICATIONS       Previous Medications    ACETAMINOPHEN (TYLENOL) 325 MG TABLET    Take 1 tablet by mouth every 6 hours as needed for Pain    AMLODIPINE (NORVASC) 10 MG TABLET    TAKE 1 TABLET BY MOUTH DAILY FOR 30 DAYS. CITALOPRAM (CELEXA) 20 MG TABLET    Take 1 tablet by mouth daily    GAUZE BANDAGES (QC STRETCH GAUZE BDG 2\"X2YD) MISC    Used to cover the finger after dressing changes. IBUPROFEN (IBU) 400 MG TABLET    Take 1 tablet by mouth every 6 hours as needed for Pain    QUETIAPINE (SEROQUEL) 200 MG TABLET    Take 1 tablet by mouth nightly    TRAZODONE (DESYREL) 50 MG TABLET    Take 1 tablet by mouth nightly    WOUND DRESSINGS (ADAPTIC NON-ADHERING DRESSING) PADS    Use to cover finger after applying bacitracin on a daily basis. ALLERGIES       Allergies   Allergen Reactions    Haldol [Haloperidol]     Latuda [Lurasidone Hcl] Other (See Comments)     Pt reports muscle spasms and fatigue       FAMILY HISTORY       indicated that the status of his father is unknown. He indicated that the status of his child is unknown.      family history includes Cancer in his child and father. SOCIAL HISTORY        reports that he has been smoking Cigarettes. He has a 15.00 pack-year smoking history. He has never used smokeless tobacco. He reports that he does not drink alcohol or use drugs. PHYSICALEXAM       INITIAL VITALS:  height is 5' 9\" (1.753 m) and weight is 160 lb (72.6 kg). His oral temperature is 96.8 °F (36 °C). His blood pressure is 141/90 (abnormal) and his pulse is 58. His respiration is 20 and oxygen saturation is 99%. Physical Exam   Constitutional: He is oriented to person, place, and time. He appears well-developed and well-nourished.  No CONSULTS:   None     PROCEDURES:  Procedures  None    MEDICATIONS:  Medications   ondansetron (ZOFRAN) injection 4 mg (4 mg Intravenous Given 11/20/18 1642)   ibuprofen (ADVIL;MOTRIN) tablet 800 mg (800 mg Oral Given 11/20/18 1643)   aspirin chewable tablet 324 mg (324 mg Oral Given 11/20/18 1754)         DIFFERENTIAL DIAGNOSIS/MDM:     ACS/MI, PE, PTX, AAA, Costochondritis    ACS/MI less likely due to negative troponins relatively unremarkable EKG aside from poor R wave progression. PE considered however the lack of pleuritic chest pain tachypnea or tachycardia and low while score we'll not pursue workup at this time. Pneumothorax unlikely based on presentation and negative chest x-ray. Abdominal aortic dissection considered due to patient's hypertension age and history of tobacco use however less likely based on presentation. Performed EKG demonstrated poor R-wave progression otherwise unremarkable. Chest x-ray showing no acute process. Negative troponin ×2 CBC and BMP unremarkable. Provided 324 mg of aspirin as well as ibuprofen for pain control. Zofran administered for nausea. Pain and nausea reassessed patient is improving. Observation Admission:  81-year-old male with history of coronary artery disease hypertension hyperlipidemia tobacco use presenting to emergency department with left-sided substernal chest pain that started at rest and is associated with nausea and dyspnea on exertion. Unremarkable physical exam lungs clear to auscultation all fields no murmurs rubs or gallops pulses symmetric bilaterally no pulsatile abdominal mass capillary refill is in 2 seconds. Patient had negative cardiac workup in emergency Department however he has a heart score of 5 and has not been compliant with following up his cardiologist so he'll be admitted to the observation unit for cardiology consult               FINAL IMPRESSION      1.  Unstable angina pectoris (HCC)            DISPOSITION/PLAN

## 2018-11-21 VITALS
HEIGHT: 68 IN | DIASTOLIC BLOOD PRESSURE: 91 MMHG | HEART RATE: 64 BPM | WEIGHT: 158 LBS | TEMPERATURE: 97 F | RESPIRATION RATE: 14 BRPM | SYSTOLIC BLOOD PRESSURE: 154 MMHG | OXYGEN SATURATION: 97 % | BODY MASS INDEX: 23.95 KG/M2

## 2018-11-21 LAB
TROPONIN INTERP: NORMAL
TROPONIN INTERP: NORMAL
TROPONIN T: <0.03 NG/ML
TROPONIN T: <0.03 NG/ML

## 2018-11-21 PROCEDURE — 84484 ASSAY OF TROPONIN QUANT: CPT

## 2018-11-21 PROCEDURE — G0378 HOSPITAL OBSERVATION PER HR: HCPCS

## 2018-11-21 PROCEDURE — 6370000000 HC RX 637 (ALT 250 FOR IP): Performed by: EMERGENCY MEDICINE

## 2018-11-21 PROCEDURE — 2580000003 HC RX 258: Performed by: EMERGENCY MEDICINE

## 2018-11-21 PROCEDURE — 93005 ELECTROCARDIOGRAM TRACING: CPT

## 2018-11-21 PROCEDURE — 36415 COLL VENOUS BLD VENIPUNCTURE: CPT

## 2018-11-21 RX ADMIN — Medication 10 ML: at 09:31

## 2018-11-21 RX ADMIN — IBUPROFEN 400 MG: 400 TABLET, FILM COATED ORAL at 10:17

## 2018-11-21 RX ADMIN — AMLODIPINE BESYLATE 10 MG: 10 TABLET ORAL at 09:31

## 2018-11-21 RX ADMIN — CITALOPRAM 20 MG: 20 TABLET, FILM COATED ORAL at 09:31

## 2018-11-21 ASSESSMENT — PAIN SCALES - GENERAL: PAINLEVEL_OUTOF10: 6

## 2018-11-21 NOTE — CONSULTS
[facility]  In Patient Cardiology Consult             Date:   11/21/2018  Patient name: Norma Tyler  Date of admission:  11/20/2018  3:54 PM  MRN:   [id number]  YOB: 1965    Reason for Admission: chest pain    CHIEF COMPLAINT:  Chest pain     History Obtained From:  Patient and chart    HISTORY OF PRESENT ILLNESS:    This is a 47year old male with history as below who presents due to chest pains. Left sided ache. Started while at rest. No associated diaphoresis, nausea, vomiting. Gets better when he lays on his side. Presented to ER and admitted due to the above. Past Medical History:   has a past medical history of Bipolar 1 disorder ([facility] [address] Chronic mental illness; Depression; Hepatitis C; Hypertension; and Psoriasis. Past Surgical History:   has no past surgical history on file. Home Medications:    Prior to Admission medications    Medication Sig Start Date End Date Taking? Authorizing Provider   citalopram (CELEXA) 20 MG tablet Take 1 tablet by mouth daily 7/5/18  Yes Suzi CROWLEY MD   QUEtiapine (SEROQUEL) 200 MG tablet Take 1 tablet by mouth nightly 7/5/18  Yes Estefani Ramirez MD   traZODone (DESYREL) 50 MG tablet Take 1 tablet by mouth nightly 7/5/18  Yes Davon CROWLEY MD   Wound Dressings (ADAPTIC NON-ADHERING DRESSING) PADS Use to cover finger after applying bacitracin on a daily basis. 2/18/18  Yes Tay Segal MD   Gauze Bandages (QC STRETCH GAUZE BDG 2\"X2YD) MISC Used to cover the finger after dressing changes. 2/18/18  Yes Tay Segal MD   amLODIPine (NORVASC) 10 MG tablet TAKE 1 TABLET BY MOUTH DAILY FOR 30 DAYS. 11/23/15  Yes Chandana Gee MD   omeprazole (PRILOSEC OTC) 20 MG tablet Take 1 tablet by mouth daily. 2/10/15 7/1/15  Sarah Hanson MD       Allergies:  Haldol [haloperidol] and Milana Pastel hcl]    Social History:   reports that he has been smoking Cigarettes. He has a 15.00 pack-year smoking history.  He has

## 2018-11-22 LAB
EKG ATRIAL RATE: 61 BPM
EKG ATRIAL RATE: 72 BPM
EKG P AXIS: 44 DEGREES
EKG P AXIS: 49 DEGREES
EKG P-R INTERVAL: 150 MS
EKG P-R INTERVAL: 150 MS
EKG Q-T INTERVAL: 412 MS
EKG Q-T INTERVAL: 446 MS
EKG QRS DURATION: 88 MS
EKG QRS DURATION: 90 MS
EKG QTC CALCULATION (BAZETT): 448 MS
EKG QTC CALCULATION (BAZETT): 451 MS
EKG R AXIS: 55 DEGREES
EKG R AXIS: 56 DEGREES
EKG T AXIS: -42 DEGREES
EKG T AXIS: 67 DEGREES
EKG VENTRICULAR RATE: 61 BPM
EKG VENTRICULAR RATE: 72 BPM

## 2018-11-23 LAB
EKG ATRIAL RATE: 64 BPM
EKG P AXIS: 76 DEGREES
EKG P-R INTERVAL: 162 MS
EKG Q-T INTERVAL: 430 MS
EKG QRS DURATION: 90 MS
EKG QTC CALCULATION (BAZETT): 443 MS
EKG R AXIS: 71 DEGREES
EKG T AXIS: 62 DEGREES
EKG VENTRICULAR RATE: 64 BPM

## 2018-11-23 NOTE — H&P
[facility]  CDU / OBSERVATION eNCOUnter  Resident Note     Pt Name: Sabrina Giron  MRN: [id number]  [place] 1965  Date of evaluation: 11/21/18  Patient's PCP is :  Violet Mariano MD    CHIEF COMPLAINT       Chief Complaint   Patient presents with    Chest Pain         HISTORY OF PRESENT ILLNESS    Sabrina Giron is a 47 y.o. male who presents acute onset lest sided chest pain. The pain started while the patient was watching TV. The patient says the chest pain has been intermittent for the past three days. He says the pain lasts for hours. He endorses associated nausea and shortness of breath. Location/Symptom: left sided chest pain   Timing/Onset: acute, 3 days ago  Provocation: none  Quality: dull   Radiation: none  Timing/Duration: hours  Modifying Factors: none    REVIEW OF SYSTEMS       General ROS - No fevers, No malaise   Ophthalmic ROS - No discharge, No changes in vision  ENT ROS -  No sore throat, No rhinorrhea,   Respiratory ROS - + shortness of breath, no cough, no  wheezing  Cardiovascular ROS - +chest pain, no dyspnea on exertion  Gastrointestinal ROS - No abdominal pain, +nausea, no vomiting, no change in bowel habits, no black or bloody stools  Genito-Urinary ROS - No dysuria, trouble voiding, or hematuria  Musculoskeletal ROS - No myalgias, No arthalgias  Neurological ROS - No headache, no dizziness/lightheadedness, No focal weakness, no loss of sensation  Dermatological ROS - No lesions, No rash     (PQRS) Advance directives on face sheet per hospital policy. No change unless specifically mentioned in chart    [address]    has a past medical history of Bipolar 1 disorder ([facility] [address] Chronic mental illness; Depression; Hepatitis C; Hypertension; and Psoriasis. I have reviewed the past medical history with the patient and it is pertinent to this complaint. SURGICAL HISTORY      has no past surgical history on file.   I have reviewed and agree with (0 pts)   1-2 Risk Factors (1 pt)   = 3 Risk Factors (2 pts)   Risk Factors Include:   Hypercholesterolemia   Hypertension   Diabetes Mellitus   Cigarette smoking   Positive family history   Obesity   CAD   (SLE, CKDz, HIV, Cocaine abuse)   Troponin Levels   = Normal Limit (0 pts)   1-3 Times Normal Limit (1 pt)   > 3 Times Normal Limit (2 pts)  TOTAL: 3    Percent Risk for Major Adverse Cardiac Event (MACE)  0-3 pts indicates low risk for MACE   2.5% (DISCHARGE)   4-7 pts indicates moderate risk for MACE  20.3% (OBS)  8-10 pts indicates high risk for MACE  72.7% (EARLY INVASIVE TX)    CDU IMPRESSION / PLAN      Puja Broussard is a 47 y.o. male who presents with acute left sided chest pain secondary to possible ACS      · EKG significant for T wave inversions in V3, V4, II, III, avF   · Chest Xray unremarkable, troponins were negative   · Cardiology consulted   · Continue home medications and pain control  · Monitor vitals, labs, and imaging  · DISPO: pending consults and clinical improvement    CONSULTS:    IP CONSULT TO CARDIOLOGY    PROCEDURES:  Not indicated       PATIENT REFERRED TO:    Gamal Kenney MD  [facility] [address]  [address] [place] [address], [address] [place] [address] [address]  [phone]    In 2 weeks  follow up      --  Juve Zapata MD   Emergency Medicine Resident     This dictation was generated by voice recognition computer software. Although all attempts are made to edit the dictation for accuracy, there may be errors in the transcription that are not intended.

## 2019-12-13 ENCOUNTER — HOSPITAL ENCOUNTER (EMERGENCY)
Age: 54
Discharge: HOME | End: 2019-12-13
Attending: EMERGENCY MEDICINE
Payer: MEDICARE

## 2019-12-13 ENCOUNTER — APPOINTMENT (OUTPATIENT)
Dept: CT IMAGING | Age: 54
End: 2019-12-13
Payer: MEDICARE

## 2019-12-13 VITALS
WEIGHT: 180 LBS | OXYGEN SATURATION: 96 % | SYSTOLIC BLOOD PRESSURE: 160 MMHG | HEIGHT: 68 IN | HEART RATE: 64 BPM | RESPIRATION RATE: 18 BRPM | TEMPERATURE: 94.5 F | BODY MASS INDEX: 27.28 KG/M2 | DIASTOLIC BLOOD PRESSURE: 102 MMHG

## 2019-12-13 DIAGNOSIS — R10.32 ABDOMINAL PAIN, LEFT LOWER QUADRANT: Primary | ICD-10-CM

## 2019-12-13 DIAGNOSIS — K62.5 GASTROINTESTINAL HEMORRHAGE ASSOCIATED WITH ANORECTAL SOURCE: ICD-10-CM

## 2019-12-13 LAB
ABSOLUTE EOS #: 0.12 K/UL (ref 0–0.44)
ABSOLUTE IMMATURE GRANULOCYTE: 0.04 K/UL (ref 0–0.3)
ABSOLUTE LYMPH #: 1.71 K/UL (ref 1.1–3.7)
ABSOLUTE MONO #: 0.64 K/UL (ref 0.1–1.2)
ALBUMIN SERPL-MCNC: 3.9 G/DL (ref 3.5–5.2)
ALBUMIN/GLOBULIN RATIO: 1 (ref 1–2.5)
ALP BLD-CCNC: 85 U/L (ref 40–129)
ALT SERPL-CCNC: 127 U/L (ref 5–41)
ANION GAP SERPL CALCULATED.3IONS-SCNC: 8 MMOL/L (ref 9–17)
AST SERPL-CCNC: 110 U/L
BASOPHILS # BLD: 0 % (ref 0–2)
BASOPHILS ABSOLUTE: 0.04 K/UL (ref 0–0.2)
BILIRUB SERPL-MCNC: 0.47 MG/DL (ref 0.3–1.2)
BUN BLDV-MCNC: 10 MG/DL (ref 6–20)
BUN/CREAT BLD: ABNORMAL (ref 9–20)
CALCIUM SERPL-MCNC: 9.1 MG/DL (ref 8.6–10.4)
CHLORIDE BLD-SCNC: 103 MMOL/L (ref 98–107)
CO2: 28 MMOL/L (ref 20–31)
CREAT SERPL-MCNC: 0.72 MG/DL (ref 0.7–1.2)
DIFFERENTIAL TYPE: ABNORMAL
EOSINOPHILS RELATIVE PERCENT: 1 % (ref 1–4)
GFR AFRICAN AMERICAN: >60 ML/MIN
GFR NON-AFRICAN AMERICAN: >60 ML/MIN
GFR SERPL CREATININE-BSD FRML MDRD: ABNORMAL ML/MIN/{1.73_M2}
GFR SERPL CREATININE-BSD FRML MDRD: ABNORMAL ML/MIN/{1.73_M2}
GLUCOSE BLD-MCNC: 144 MG/DL (ref 70–99)
HCT VFR BLD CALC: 43 % (ref 40.7–50.3)
HEMOGLOBIN: 14.1 G/DL (ref 13–17)
IMMATURE GRANULOCYTES: 0 %
INR BLD: 1.2
LIPASE: 17 U/L (ref 13–60)
LYMPHOCYTES # BLD: 17 % (ref 24–43)
MCH RBC QN AUTO: 30.9 PG (ref 25.2–33.5)
MCHC RBC AUTO-ENTMCNC: 32.8 G/DL (ref 28.4–34.8)
MCV RBC AUTO: 94.1 FL (ref 82.6–102.9)
MONOCYTES # BLD: 6 % (ref 3–12)
NRBC AUTOMATED: 0 PER 100 WBC
PDW BLD-RTO: 13.2 % (ref 11.8–14.4)
PLATELET # BLD: 185 K/UL (ref 138–453)
PLATELET ESTIMATE: ABNORMAL
PMV BLD AUTO: 9.8 FL (ref 8.1–13.5)
POTASSIUM SERPL-SCNC: 4.1 MMOL/L (ref 3.7–5.3)
PROTHROMBIN TIME: 12.1 SEC (ref 9–12)
RBC # BLD: 4.57 M/UL (ref 4.21–5.77)
RBC # BLD: ABNORMAL 10*6/UL
SEG NEUTROPHILS: 76 % (ref 36–65)
SEGMENTED NEUTROPHILS ABSOLUTE COUNT: 7.5 K/UL (ref 1.5–8.1)
SODIUM BLD-SCNC: 139 MMOL/L (ref 135–144)
TOTAL PROTEIN: 7.7 G/DL (ref 6.4–8.3)
WBC # BLD: 10.1 K/UL (ref 3.5–11.3)
WBC # BLD: ABNORMAL 10*3/UL

## 2019-12-13 PROCEDURE — 99284 EMERGENCY DEPT VISIT MOD MDM: CPT

## 2019-12-13 PROCEDURE — 85610 PROTHROMBIN TIME: CPT

## 2019-12-13 PROCEDURE — 83690 ASSAY OF LIPASE: CPT

## 2019-12-13 PROCEDURE — 80053 COMPREHEN METABOLIC PANEL: CPT

## 2019-12-13 PROCEDURE — 85025 COMPLETE CBC W/AUTO DIFF WBC: CPT

## 2019-12-13 PROCEDURE — 96372 THER/PROPH/DIAG INJ SC/IM: CPT

## 2019-12-13 PROCEDURE — 6360000002 HC RX W HCPCS: Performed by: STUDENT IN AN ORGANIZED HEALTH CARE EDUCATION/TRAINING PROGRAM

## 2019-12-13 RX ORDER — DICYCLOMINE HYDROCHLORIDE 10 MG/ML
20 INJECTION INTRAMUSCULAR ONCE
Status: COMPLETED | OUTPATIENT
Start: 2019-12-13 | End: 2019-12-13

## 2019-12-13 RX ADMIN — DICYCLOMINE HYDROCHLORIDE 20 MG: 10 INJECTION INTRAMUSCULAR at 15:59

## 2019-12-13 ASSESSMENT — PAIN DESCRIPTION - PAIN TYPE: TYPE: ACUTE PAIN

## 2019-12-13 ASSESSMENT — PAIN DESCRIPTION - DESCRIPTORS: DESCRIPTORS: ACHING

## 2019-12-13 ASSESSMENT — PAIN DESCRIPTION - LOCATION: LOCATION: ABDOMEN

## 2019-12-13 ASSESSMENT — PAIN SCALES - GENERAL: PAINLEVEL_OUTOF10: 7

## 2020-02-18 ENCOUNTER — APPOINTMENT (OUTPATIENT)
Dept: GENERAL RADIOLOGY | Age: 55
End: 2020-02-18
Payer: MEDICARE

## 2020-02-18 ENCOUNTER — APPOINTMENT (OUTPATIENT)
Dept: CT IMAGING | Age: 55
End: 2020-02-18
Payer: MEDICARE

## 2020-02-18 ENCOUNTER — HOSPITAL ENCOUNTER (EMERGENCY)
Age: 55
Discharge: HOME | End: 2020-02-19
Attending: EMERGENCY MEDICINE
Payer: MEDICARE

## 2020-02-18 VITALS
DIASTOLIC BLOOD PRESSURE: 107 MMHG | SYSTOLIC BLOOD PRESSURE: 159 MMHG | BODY MASS INDEX: 27.28 KG/M2 | TEMPERATURE: 97 F | RESPIRATION RATE: 20 BRPM | HEART RATE: 80 BPM | WEIGHT: 180 LBS | HEIGHT: 68 IN | OXYGEN SATURATION: 99 %

## 2020-02-18 PROCEDURE — 70450 CT HEAD/BRAIN W/O DYE: CPT

## 2020-02-18 PROCEDURE — G0480 DRUG TEST DEF 1-7 CLASSES: HCPCS

## 2020-02-18 PROCEDURE — 99284 EMERGENCY DEPT VISIT MOD MDM: CPT

## 2020-02-18 PROCEDURE — 71045 X-RAY EXAM CHEST 1 VIEW: CPT

## 2020-02-18 PROCEDURE — 80307 DRUG TEST PRSMV CHEM ANLYZR: CPT

## 2020-02-18 PROCEDURE — 72125 CT NECK SPINE W/O DYE: CPT

## 2020-02-18 RX ORDER — 0.9 % SODIUM CHLORIDE 0.9 %
1000 INTRAVENOUS SOLUTION INTRAVENOUS ONCE
Status: COMPLETED | OUTPATIENT
Start: 2020-02-18 | End: 2020-02-19

## 2020-02-19 LAB
ACETAMINOPHEN LEVEL: <5 UG/ML (ref 10–30)
AMPHETAMINE SCREEN URINE: NEGATIVE
BARBITURATE SCREEN URINE: NEGATIVE
BENZODIAZEPINE SCREEN, URINE: POSITIVE
BUPRENORPHINE URINE: ABNORMAL
CANNABINOID SCREEN URINE: NEGATIVE
COCAINE METABOLITE, URINE: POSITIVE
ETHANOL PERCENT: <0.01 %
ETHANOL: <10 MG/DL
MDMA URINE: ABNORMAL
METHADONE SCREEN, URINE: NEGATIVE
METHAMPHETAMINE, URINE: ABNORMAL
OPIATES, URINE: POSITIVE
OXYCODONE SCREEN URINE: NEGATIVE
PHENCYCLIDINE, URINE: NEGATIVE
PROPOXYPHENE, URINE: ABNORMAL
SALICYLATE LEVEL: <1 MG/DL (ref 3–10)
TEST INFORMATION: ABNORMAL
TRICYCLIC ANTIDEPRESSANTS, UR: ABNORMAL

## 2020-02-19 PROCEDURE — 2580000003 HC RX 258: Performed by: STUDENT IN AN ORGANIZED HEALTH CARE EDUCATION/TRAINING PROGRAM

## 2020-02-19 RX ADMIN — SODIUM CHLORIDE 1000 ML: 9 INJECTION, SOLUTION INTRAVENOUS at 00:32

## 2020-02-19 NOTE — ED PROVIDER NOTES
Relationships    Social connections:     Talks on phone: Not on file     Gets together: Not on file     Attends [employer] service: Not on file     Active member of club or organization: Not on file     Attends meetings of clubs or organizations: Not on file     Relationship status: Not on file    Intimate partner violence:     Fear of current or ex partner: Not on file     Emotionally abused: Not on file     Physically abused: Not on file     Forced sexual activity: Not on file   Other Topics Concern    Not on file   Social History Narrative    Not on file       Patient was advised to stop smoking or to avoid tobacco use    Family History   Problem Relation Age of Onset    Cancer Father     Cancer Child        Allergies:  Haldol [haloperidol] and Latuda [lurasidone hcl]    Home Medications:  Prior to Admission medications    Medication Sig Start Date End Date Taking? Authorizing Provider   citalopram (CELEXA) 20 MG tablet Take 1 tablet by mouth daily 7/5/18   John Hernandez MD   QUEtiapine (SEROQUEL) 200 MG tablet Take 1 tablet by mouth nightly 7/5/18   John Hernandez MD   traZODone (DESYREL) 50 MG tablet Take 1 tablet by mouth nightly 7/5/18   John Hernandez MD   Wound Dressings (ADAPTIC NON-ADHERING DRESSING) PADS Use to cover finger after applying bacitracin on a daily basis. 2/18/18   Ladan Back MD   Gauze Bandages (QC STRETCH GAUZE BDG 2\"X2YD) MISC Used to cover the finger after dressing changes. 2/18/18   Ladan Back MD   amLODIPine (NORVASC) 10 MG tablet TAKE 1 TABLET BY MOUTH DAILY FOR 30 DAYS. 11/23/15   Pat Strong MD   omeprazole (PRILOSEC OTC) 20 MG tablet Take 1 tablet by mouth daily.  2/10/15 7/1/15  Jean Claude Ramos MD       REVIEW OF SYSTEMS    (2-9 systems for level 4, 10 or more for level 5)      Review of Systems  Unable to be obtained secondary to patient's level of cooperation  PHYSICAL EXAM   (up to 7 for level 4, 8 or more for level 5)      INITIAL VITALS:  BP (!) 159/107   Pulse 80   Temp 97 °F (36.1 °C) (Oral)   Resp 20   Ht 5' 8\" (1.727 m)   Wt 180 lb (81.6 kg)   SpO2 99%   BMI 27.37 kg/m²     Physical Exam  Vitals signs reviewed. Constitutional:       General: He is not in acute distress. Appearance: He is well-developed. He is not diaphoretic. HENT:      Head: Normocephalic. Comments: Abrasions present over the forehead no hematomas     Mouth/Throat:      Mouth: Mucous membranes are moist.   Eyes:      Pupils: Pupils are equal, round, and reactive to light. Comments: Pupils pinpoint and equal reactive   Cardiovascular:      Rate and Rhythm: Normal rate and regular rhythm. Heart sounds: Normal heart sounds. No murmur. Pulmonary:      Effort: Pulmonary effort is normal. No respiratory distress. Breath sounds: Rhonchi present. Abdominal:      General: There is no distension. Palpations: Abdomen is soft. Tenderness: There is no abdominal tenderness. Musculoskeletal: Normal range of motion. Skin:     General: Skin is warm and dry. Neurological:      Mental Status: He is alert. Motor: No weakness.       Coordination: Coordination normal.         DIFFERENTIAL  DIAGNOSIS     PLAN (LABS / IMAGING / EKG):  Orders Placed This Encounter   Procedures    XR CHEST PORTABLE    CT HEAD WO CONTRAST    CT CERVICAL SPINE WO CONTRAST    Ethanol    Drug screen multi urine    Acetaminophen level    Salicylate       MEDICATIONS ORDERED:  Orders Placed This Encounter   Medications    0.9 % sodium chloride bolus       DIAGNOSTIC RESULTS / DEPARTMENT COURSE / MDM     LABS:  Results for orders placed or performed during the hospital encounter of 02/18/20   Ethanol   Result Value Ref Range    Ethanol <10 <10 mg/dL    Ethanol percent <0.010 <0.010 %   Drug screen multi urine   Result Value Ref Range    Amphetamine Screen, Ur NEGATIVE NEGATIVE    Barbiturate Screen, Ur NEGATIVE NEGATIVE    Benzodiazepine Screen, Urine POSITIVE (A) NEGATIVE    Cocaine Metabolite, Urine POSITIVE (A) NEGATIVE    Methadone Screen, Urine NEGATIVE NEGATIVE    Opiates, Urine POSITIVE (A) NEGATIVE    Phencyclidine, Urine NEGATIVE NEGATIVE    Propoxyphene, Urine NOT REPORTED NEGATIVE    Cannabinoid Scrn, Ur NEGATIVE NEGATIVE    Oxycodone Screen, Ur NEGATIVE NEGATIVE    Methamphetamine, Urine NOT REPORTED NEGATIVE    Tricyclic Antidepressants, Urine NOT REPORTED NEGATIVE    MDMA, Urine NOT REPORTED NEGATIVE    Buprenorphine Urine NOT REPORTED NEGATIVE    Test Information       Assay provides medical screening only. The absence of expected drug(s) and/or metabolite(s) may indicate diluted or adulterated urine, limitations of testing or timing of collection. Acetaminophen level   Result Value Ref Range    Acetaminophen Level <5 (L) 10 - 30 ug/mL   Salicylate   Result Value Ref Range    Salicylate Lvl <1 (L) 3 - 10 mg/dL       IMPRESSION: Patient is a 79-year-old male who presents with TPD and EMS for suspected drug overdose. Will obtain CT head, cervical spine, ED tox panel chest x-ray give fluids and continue to monitor patient. Patient requesting discharge. Patient was given written and verbalinstructions prior to discharge. Patient understood and agreed. The patient had no further questions. RADIOLOGY:  Ct Head Wo Contrast    Result Date: 2/19/2020  EXAMINATION: CT OF THE HEAD WITHOUT CONTRAST  2/18/2020 11:48 pm TECHNIQUE: CT of the head was performed without the administration of intravenous contrast. Dose modulation, iterative reconstruction, and/or weight based adjustment of the mA/kV was utilized to reduce the radiation dose to as low as reasonably achievable.  COMPARISON: 06/29/2010 HISTORY: ORDERING SYSTEM PROVIDED HISTORY: Found down in own emesis, unknown down time, abrasion to forehead TECHNOLOGIST PROVIDED HISTORY: Found down in own emesis, unknown down time, abrasion to forehead Reason for Exam: Found down in own emesis, unknown down time, abrasion to forehead Acuity: Acute Type of Exam: Initial FINDINGS: BRAIN/VENTRICLES: There is unchanged left frontal lobe encephalomalacia. There is mild to moderate patchy periventricular and subcortical white matter low attenuation that is nonspecific but most consistent with chronic small vessel ischemia. There is no acute intracranial hemorrhage, mass effect or midline shift. No abnormal extra-axial fluid collection. The gray-white differentiation is maintained without evidence of an acute infarct. There is no evidence of hydrocephalus. ORBITS: The visualized portion of the orbits demonstrate no acute abnormality. SINUSES: Mucosal thickening in the bilateral ethmoid sinuses and right sphenoid locule. Mucous retention cyst in the inferior left maxillary sinus. Paranasal sinuses are otherwise clear. Mastoids are well aerated. SOFT TISSUES/SKULL:  No acute abnormality of the visualized skull or soft tissues. Metallic opacities adjacent to the outer table of the posterior skull unchanged from the prior study. Stable cranial CT examination with unchanged left frontal lobe encephalomalacia and unchanged chronic small vessel ischemic white matter disease. No acute intracranial abnormality. Ct Cervical Spine Wo Contrast    Result Date: 2/19/2020  EXAMINATION: CT OF THE CERVICAL SPINE WITHOUT CONTRAST 2/18/2020 11:48 pm TECHNIQUE: CT of the cervical spine was performed without the administration of intravenous contrast. Multiplanar reformatted images are provided for review. Dose modulation, iterative reconstruction, and/or weight based adjustment of the mA/kV was utilized to reduce the radiation dose to as low as reasonably achievable. COMPARISON: None.  HISTORY: ORDERING SYSTEM PROVIDED HISTORY: Found down in own emesis suspected OD TECHNOLOGIST PROVIDED HISTORY: Found down in own emesis suspected OD Reason for Exam: Found down in own emesis, unknown down time, abrasion to forehead Acuity: Acute Type of Urine NOT REPORTED   MDMA, Urine NOT REPORTED   Buprenorphine Urine NOT. .. [BL]      ED Course User Index  [BL] Elmer Flores DO     Patient reevaluated resting comfortable in bed no acute distress. Patient tested positive for benzos, cocaine, opiates. Patient had no new complaints or issues at this time. Patient request to be discharged home. He ambulated on his own accord tolerated oral intake. PROCEDURES:  None    CONSULTS:  None      FINAL IMPRESSION      1.  Drug overdose, undetermined intent, initial encounter          DISPOSITION / PLAN     DISPOSITION Decision To Discharge    PATIENT REFERRED TO:  MD Ángel Hickman[address] Counts include 234 beds at the [facility]  [phone]    Call   As needed    [facility] ED  [address] [place] [address]  [phone]    As needed      DISCHARGE MEDICATIONS:  Discharge Medication List as of 2/19/2020  3:16 AM          Elmer Flores DO  Emergency Medicine Resident    (Please note thatportions of this note were completed with a voice recognition program.  Efforts were made to edit the dictations but occasionally words are mis-transcribed.)       Elmer Flores DO  Resident  02/19/20 8182

## 2020-03-08 ENCOUNTER — HOSPITAL ENCOUNTER (INPATIENT)
Age: 55
LOS: 2 days | Discharge: HOME | DRG: 753 | End: 2020-03-10
Attending: EMERGENCY MEDICINE | Admitting: PSYCHIATRY & NEUROLOGY
Payer: MEDICARE

## 2020-03-08 PROBLEM — F31.9 BIPOLAR 1 DISORDER (HCC): Status: ACTIVE | Noted: 2020-03-08

## 2020-03-08 PROBLEM — F31.9 BIPOLAR DISORDER (HCC): Status: ACTIVE | Noted: 2020-03-08

## 2020-03-08 PROCEDURE — 1240000000 HC EMOTIONAL WELLNESS R&B

## 2020-03-08 PROCEDURE — 99285 EMERGENCY DEPT VISIT HI MDM: CPT

## 2020-03-08 RX ORDER — HYDROXYZINE HYDROCHLORIDE 25 MG/1
25 TABLET, FILM COATED ORAL 3 TIMES DAILY PRN
Status: DISCONTINUED | OUTPATIENT
Start: 2020-03-08 | End: 2020-03-10 | Stop reason: HOSPADM

## 2020-03-08 RX ORDER — DICYCLOMINE HCL 20 MG
20 TABLET ORAL
Status: DISCONTINUED | OUTPATIENT
Start: 2020-03-08 | End: 2020-03-10 | Stop reason: HOSPADM

## 2020-03-08 RX ORDER — DIPHENHYDRAMINE HCL 25 MG
50 TABLET ORAL NIGHTLY PRN
Status: DISCONTINUED | OUTPATIENT
Start: 2020-03-08 | End: 2020-03-10 | Stop reason: HOSPADM

## 2020-03-08 RX ORDER — NICOTINE 21 MG/24HR
1 PATCH, TRANSDERMAL 24 HOURS TRANSDERMAL DAILY
Status: DISCONTINUED | OUTPATIENT
Start: 2020-03-09 | End: 2020-03-09

## 2020-03-08 RX ORDER — CHLORDIAZEPOXIDE HYDROCHLORIDE 25 MG/1
25 CAPSULE, GELATIN COATED ORAL 3 TIMES DAILY PRN
Status: DISCONTINUED | OUTPATIENT
Start: 2020-03-08 | End: 2020-03-10 | Stop reason: HOSPADM

## 2020-03-08 RX ORDER — TRAZODONE HYDROCHLORIDE 50 MG/1
50 TABLET ORAL NIGHTLY PRN
Status: DISCONTINUED | OUTPATIENT
Start: 2020-03-08 | End: 2020-03-10 | Stop reason: HOSPADM

## 2020-03-08 RX ORDER — ACETAMINOPHEN 325 MG/1
650 TABLET ORAL EVERY 4 HOURS PRN
Status: DISCONTINUED | OUTPATIENT
Start: 2020-03-08 | End: 2020-03-10 | Stop reason: HOSPADM

## 2020-03-08 RX ORDER — BENZTROPINE MESYLATE 1 MG/ML
2 INJECTION INTRAMUSCULAR; INTRAVENOUS 2 TIMES DAILY PRN
Status: DISCONTINUED | OUTPATIENT
Start: 2020-03-08 | End: 2020-03-10 | Stop reason: HOSPADM

## 2020-03-08 RX ORDER — CYCLOBENZAPRINE HCL 10 MG
10 TABLET ORAL 3 TIMES DAILY PRN
Status: DISCONTINUED | OUTPATIENT
Start: 2020-03-08 | End: 2020-03-10 | Stop reason: HOSPADM

## 2020-03-08 RX ORDER — CLONIDINE HYDROCHLORIDE 0.1 MG/1
0.1 TABLET ORAL 3 TIMES DAILY PRN
Status: DISCONTINUED | OUTPATIENT
Start: 2020-03-08 | End: 2020-03-10 | Stop reason: HOSPADM

## 2020-03-08 RX ORDER — FOLIC ACID 1 MG/1
1 TABLET ORAL DAILY
Status: DISCONTINUED | OUTPATIENT
Start: 2020-03-09 | End: 2020-03-10 | Stop reason: HOSPADM

## 2020-03-08 RX ORDER — THIAMINE MONONITRATE (VIT B1) 100 MG
100 TABLET ORAL DAILY
Status: DISCONTINUED | OUTPATIENT
Start: 2020-03-09 | End: 2020-03-10 | Stop reason: HOSPADM

## 2020-03-08 RX ORDER — MAGNESIUM HYDROXIDE/ALUMINUM HYDROXICE/SIMETHICONE 120; 1200; 1200 MG/30ML; MG/30ML; MG/30ML
30 SUSPENSION ORAL EVERY 6 HOURS PRN
Status: DISCONTINUED | OUTPATIENT
Start: 2020-03-08 | End: 2020-03-10 | Stop reason: HOSPADM

## 2020-03-08 ASSESSMENT — SLEEP AND FATIGUE QUESTIONNAIRES
DO YOU HAVE DIFFICULTY SLEEPING: NO
AVERAGE NUMBER OF SLEEP HOURS: 6
DO YOU USE A SLEEP AID: NO

## 2020-03-08 ASSESSMENT — ENCOUNTER SYMPTOMS
ABDOMINAL PAIN: 0
COUGH: 0

## 2020-03-08 ASSESSMENT — PAIN SCALES - GENERAL: PAINLEVEL_OUTOF10: 0

## 2020-03-08 ASSESSMENT — LIFESTYLE VARIABLES: HISTORY_ALCOHOL_USE: YES

## 2020-03-08 NOTE — ED PROVIDER NOTES
[address]      Pt Name: Malka Loya  MRN: [id number]  [place] 1965  Date of evaluation: 3/8/20      CHIEF COMPLAINT       Chief Complaint   Patient presents with    Suicidal     HISTORY OF PRESENT ILLNESS   HPI 49 y.o. male presents with c/o suicidal thoughts. The patient took a cab from home to the emergency department. The patient reports feeling depressed and having thought of suicide for the last few days. The patient does have a h/o of previous suicide attempt many years ago when he attempted to hang himself. The patient reports that their symptoms were provoked by housing issues, drug use. The patient does have a h/o of previous psychiatric admission. The patient denies attempts at self harm to this point. The patient denies medical complaints at this time. REVIEW OF SYSTEMS     Review of Systems   Constitutional: Negative for fever. HENT: Negative for congestion. Eyes: Negative for visual disturbance. Respiratory: Negative for cough. Cardiovascular: Negative for chest pain. Gastrointestinal: Negative for abdominal pain. Genitourinary: Negative for dysuria. Musculoskeletal: Negative for arthralgias. Skin: Negative for rash. Neurological: Negative for headaches. PAST MEDICAL HISTORY     Past Medical History:   Diagnosis Date    Bipolar 1 disorder ([facility] [address]     Chronic mental illness     Depression     Hepatitis C     Hypertension     Psoriasis        SURGICAL HISTORY     History reviewed. No pertinent surgical history. CURRENT MEDICATIONS       Current Discharge Medication List      CONTINUE these medications which have NOT CHANGED    Details   QUEtiapine (SEROQUEL) 400 MG tablet Take 400 mg by mouth 2 times daily      buprenorphine-naloxone (SUBOXONE) 8-2 MG FILM SL film Place 1 Film under the tongue 2 times daily.  LF 2/28/20 (5 day supply)   Last prescribed by Dr. Ingrid Camargo      metoprolol tartrate (LOPRESSOR) 25 MG tablet Take 25 mg by mouth 2 times daily      hydrOXYzine (VISTARIL) 25 MG capsule Take 25 mg by mouth 3 times daily as needed for Anxiety      citalopram (CELEXA) 20 MG tablet Take 1 tablet by mouth daily  Qty: 30 tablet, Refills: 0      amLODIPine (NORVASC) 10 MG tablet TAKE 1 TABLET BY MOUTH DAILY FOR 30 DAYS. Qty: 30 tablet, Refills: 0             ALLERGIES     is allergic to haldol [haloperidol] and latuda [lurasidone hcl]. FAMILY HISTORY     He indicated that the status of his father is unknown. He indicated that the status of his child is unknown. SOCIAL HISTORY      reports that he has been smoking cigarettes. He has a 15.00 pack-year smoking history. He has never used smokeless tobacco. He reports previous drug use. He reports that he does not drink alcohol. PHYSICAL EXAM     INITIAL VITALS: /76   Pulse 50   Temp 98 °F (36.7 °C) (Oral)   Resp 14   Ht 5' 8\" (1.727 m)   Wt 180 lb (81.6 kg)   SpO2 97%   BMI 27.37 kg/m²   Gen: NAD  Head: Normocephalic, atraumatic  Eye: Pupils equal round reactive to light, no conjunctivitis  Heart: Regular rate and rhythm no murmurs  Lungs: Clear to auscultation bilaterally, no respiratory distress  Chest wall: No crepitus, no tenderness palpation  Abdomen: Soft, nontender, nondistended, with no peritoneal signs  Skin: No diaphoresis. no lacerations. Neurologic: Patient is alert and oriented x3, motor and sensation is intact in all 4 extremities, speech is fluent  Extremities: Full range of motion, no cyanosis, no edema, no signs of trauma, no tenderness to palpation    MEDICAL DECISION MAKING:     [facility]  49 y.o. male with depression, suicidal thoughts, previous suicide attempt. The patient does not appear intoxicated. The patient is showing no signs of any acute active toxidrome. The patient denies any overdose attempt or  medical complaints at this time. The patient is medically clear for psychiatric evaluation.           Hospital Course: magnesium hydroxide-simethicone (MAALOX) 200-200-20 MG/5ML suspension 30 mL    nicotine (NICODERM CQ) 14 MG/24HR 1 patch    cloNIDine (CATAPRES) tablet 0.1 mg    cyclobenzaprine (FLEXERIL) tablet 10 mg    dicyclomine (BENTYL) tablet 20 mg    diphenhydrAMINE (BENADRYL) tablet 50 mg    hydrOXYzine (ATARAX) tablet 25 mg    chlordiazePOXIDE (LIBRIUM) capsule 25 mg    folic acid (FOLVITE) tablet 1 mg    vitamin B-1 (THIAMINE) tablet 100 mg     -------------------------  CRITICAL CARE:   CONSULTS: IP CONSULT TO HOSPITALIST  PROCEDURES: Procedures     FINAL IMPRESSION      1.  Suicidal ideation          DISPOSITION/PLAN   DISPOSITION Admitted 03/08/2020 05:17:34 PM      PATIENT REFERRED TO:  Isaac Martini MD  [place] [address]  [phone]            DISCHARGE MEDICATIONS:  Current Discharge Medication List            Mary Stein MD  Attending Emergency Physician        Mary Stein MD  03/09/20 1111       Mary Stein MD  03/09/20 1110

## 2020-03-08 NOTE — ED NOTES
Provisional Diagnosis:   Bipolar disorder    Psychosocial and Contextual Factors:     Patient has substance abuse  Issues. Patient has social issues. C-SSRS Summary:      Patient: X  Family:   Agency:         Present Suicidal Behavior:  X    Verbal:  Patient reports suicidal ideations. Attempt: Patient denies. Past Suicidal Behavior:  X    Verbal: Patient reports a history of suicidal thoughts. Attempt: Patient reports trying to hang himself in 2005. Substance Abuse: Opiate abuse. Self-Injurious/Self-Mutilation: Patient denies. Trauma Identified:  Patient denies. Protective Factors:    Patient has housing. Patient has insurance. Patient is linked with mental health agency. Risk Factors:    Patient is non compliant with psych medications. Patient has substance abuse issues. Patient has poor insight and coping skills. Clinical Summary:    Patient is a 49year old  male that arrived to the emergency department today by self transportion  from his home for suicidal thoughts. Patient reports feeling suicidal because of housing issues and substance abuse. Patient reports previous suicide attempt; in 2005 by hanging himself. Patient denies H/I at this time. Patient denies auditory or visual hallucinations. Patient reports a history of depression. Patient reports being linked to [facility] and is non compliant with treatment. Per [facility] patient was last admitted to the [facility] in 2018. Patient reports he is not currently taking any medications. Patient reports having housing at this time but verbalizes that there are stressors related to it. Patient has a history of substance abuse issues. Patient reports continuing to struggle with opiate abuse. Patient denies any drug or alcohol use today prior to his arrival to the ER. Patient is voluntary.              Level of Care Disposition:    Writer consulted with Dr. Patric Lopez, psychiatrist. Patient to be admitted to the [facility] for safety and stabilization.

## 2020-03-09 LAB
ABSOLUTE EOS #: 0.3 K/UL (ref 0–0.4)
ABSOLUTE IMMATURE GRANULOCYTE: ABNORMAL K/UL (ref 0–0.3)
ABSOLUTE LYMPH #: 2.8 K/UL (ref 1–4.8)
ABSOLUTE MONO #: 0.7 K/UL (ref 0.1–1.3)
ALBUMIN SERPL-MCNC: 4.1 G/DL (ref 3.5–5.2)
ALBUMIN/GLOBULIN RATIO: ABNORMAL (ref 1–2.5)
ALP BLD-CCNC: 79 U/L (ref 40–129)
ALT SERPL-CCNC: 167 U/L (ref 5–41)
ANION GAP SERPL CALCULATED.3IONS-SCNC: 10 MMOL/L (ref 9–17)
AST SERPL-CCNC: 147 U/L
BASOPHILS # BLD: 1 % (ref 0–2)
BASOPHILS ABSOLUTE: 0.1 K/UL (ref 0–0.2)
BILIRUB SERPL-MCNC: 0.53 MG/DL (ref 0.3–1.2)
BUN BLDV-MCNC: 25 MG/DL (ref 6–20)
BUN/CREAT BLD: ABNORMAL (ref 9–20)
CALCIUM SERPL-MCNC: 9.3 MG/DL (ref 8.6–10.4)
CHLORIDE BLD-SCNC: 110 MMOL/L (ref 98–107)
CHOLESTEROL/HDL RATIO: 3.1
CHOLESTEROL: 83 MG/DL
CO2: 23 MMOL/L (ref 20–31)
CREAT SERPL-MCNC: 0.88 MG/DL (ref 0.7–1.2)
DIFFERENTIAL TYPE: ABNORMAL
EOSINOPHILS RELATIVE PERCENT: 4 % (ref 0–4)
ESTIMATED AVERAGE GLUCOSE: 103 MG/DL
GFR AFRICAN AMERICAN: >60 ML/MIN
GFR NON-AFRICAN AMERICAN: >60 ML/MIN
GFR SERPL CREATININE-BSD FRML MDRD: ABNORMAL ML/MIN/{1.73_M2}
GFR SERPL CREATININE-BSD FRML MDRD: ABNORMAL ML/MIN/{1.73_M2}
GLUCOSE BLD-MCNC: 111 MG/DL (ref 70–99)
HBA1C MFR BLD: 5.2 % (ref 4–6)
HCT VFR BLD CALC: 45.1 % (ref 41–53)
HDLC SERPL-MCNC: 27 MG/DL
HEMOGLOBIN: 14.9 G/DL (ref 13.5–17.5)
IMMATURE GRANULOCYTES: ABNORMAL %
LDL CHOLESTEROL: 41 MG/DL (ref 0–130)
LYMPHOCYTES # BLD: 35 % (ref 24–44)
MCH RBC QN AUTO: 30.8 PG (ref 26–34)
MCHC RBC AUTO-ENTMCNC: 33.1 G/DL (ref 31–37)
MCV RBC AUTO: 93.1 FL (ref 80–100)
MONOCYTES # BLD: 9 % (ref 1–7)
NRBC AUTOMATED: ABNORMAL PER 100 WBC
PDW BLD-RTO: 14.4 % (ref 11.5–14.9)
PLATELET # BLD: 204 K/UL (ref 150–450)
PLATELET ESTIMATE: ABNORMAL
PMV BLD AUTO: 8.3 FL (ref 6–12)
POTASSIUM SERPL-SCNC: 4.9 MMOL/L (ref 3.7–5.3)
RBC # BLD: 4.84 M/UL (ref 4.5–5.9)
RBC # BLD: ABNORMAL 10*6/UL
SEG NEUTROPHILS: 51 % (ref 36–66)
SEGMENTED NEUTROPHILS ABSOLUTE COUNT: 4 K/UL (ref 1.3–9.1)
SODIUM BLD-SCNC: 143 MMOL/L (ref 135–144)
THYROXINE, FREE: 1.14 NG/DL (ref 0.93–1.7)
TOTAL PROTEIN: 7.5 G/DL (ref 6.4–8.3)
TRIGL SERPL-MCNC: 74 MG/DL
TSH SERPL DL<=0.05 MIU/L-ACNC: 0.5 MIU/L (ref 0.3–5)
VLDLC SERPL CALC-MCNC: ABNORMAL MG/DL (ref 1–30)
WBC # BLD: 8 K/UL (ref 3.5–11)
WBC # BLD: ABNORMAL 10*3/UL

## 2020-03-09 PROCEDURE — 90792 PSYCH DIAG EVAL W/MED SRVCS: CPT | Performed by: NURSE PRACTITIONER

## 2020-03-09 PROCEDURE — 36415 COLL VENOUS BLD VENIPUNCTURE: CPT

## 2020-03-09 PROCEDURE — 84439 ASSAY OF FREE THYROXINE: CPT

## 2020-03-09 PROCEDURE — 6370000000 HC RX 637 (ALT 250 FOR IP): Performed by: NURSE PRACTITIONER

## 2020-03-09 PROCEDURE — 83036 HEMOGLOBIN GLYCOSYLATED A1C: CPT

## 2020-03-09 PROCEDURE — 84443 ASSAY THYROID STIM HORMONE: CPT

## 2020-03-09 PROCEDURE — 1240000000 HC EMOTIONAL WELLNESS R&B

## 2020-03-09 PROCEDURE — 80061 LIPID PANEL: CPT

## 2020-03-09 PROCEDURE — 80053 COMPREHEN METABOLIC PANEL: CPT

## 2020-03-09 PROCEDURE — 85025 COMPLETE CBC W/AUTO DIFF WBC: CPT

## 2020-03-09 RX ORDER — HYDROXYZINE PAMOATE 25 MG/1
25 CAPSULE ORAL 3 TIMES DAILY PRN
Status: ON HOLD | COMMUNITY
End: 2020-03-10 | Stop reason: HOSPADM

## 2020-03-09 RX ORDER — QUETIAPINE FUMARATE 200 MG/1
400 TABLET, FILM COATED ORAL 2 TIMES DAILY
Status: DISCONTINUED | OUTPATIENT
Start: 2020-03-09 | End: 2020-03-09

## 2020-03-09 RX ORDER — QUETIAPINE FUMARATE 400 MG/1
400 TABLET, FILM COATED ORAL 2 TIMES DAILY
Status: ON HOLD | COMMUNITY
End: 2020-03-10 | Stop reason: HOSPADM

## 2020-03-09 RX ORDER — QUETIAPINE FUMARATE 200 MG/1
200 TABLET, FILM COATED ORAL NIGHTLY
Status: DISCONTINUED | OUTPATIENT
Start: 2020-03-09 | End: 2020-03-10 | Stop reason: HOSPADM

## 2020-03-09 RX ORDER — AMLODIPINE BESYLATE 10 MG/1
10 TABLET ORAL DAILY
Status: DISCONTINUED | OUTPATIENT
Start: 2020-03-09 | End: 2020-03-10 | Stop reason: HOSPADM

## 2020-03-09 RX ORDER — CITALOPRAM 20 MG/1
20 TABLET ORAL DAILY
Status: DISCONTINUED | OUTPATIENT
Start: 2020-03-09 | End: 2020-03-10 | Stop reason: HOSPADM

## 2020-03-09 RX ORDER — BUPRENORPHINE AND NALOXONE 8; 2 MG/1; MG/1
1 FILM, SOLUBLE BUCCAL; SUBLINGUAL 2 TIMES DAILY
Status: ON HOLD | COMMUNITY
End: 2020-03-10 | Stop reason: HOSPADM

## 2020-03-09 RX ADMIN — CLONIDINE HYDROCHLORIDE 0.1 MG: 0.1 TABLET ORAL at 01:30

## 2020-03-09 RX ADMIN — TRAZODONE HYDROCHLORIDE 50 MG: 50 TABLET ORAL at 01:30

## 2020-03-09 RX ADMIN — FOLIC ACID 1 MG: 1 TABLET ORAL at 08:42

## 2020-03-09 RX ADMIN — DICYCLOMINE HYDROCHLORIDE 20 MG: 20 TABLET ORAL at 12:31

## 2020-03-09 RX ADMIN — THIAMINE HCL TAB 100 MG 100 MG: 100 TAB at 08:42

## 2020-03-09 RX ADMIN — CYCLOBENZAPRINE HYDROCHLORIDE 10 MG: 10 TABLET, FILM COATED ORAL at 17:36

## 2020-03-09 RX ADMIN — QUETIAPINE FUMARATE 200 MG: 200 TABLET ORAL at 20:30

## 2020-03-09 RX ADMIN — DICYCLOMINE HYDROCHLORIDE 20 MG: 20 TABLET ORAL at 17:36

## 2020-03-09 RX ADMIN — CYCLOBENZAPRINE HYDROCHLORIDE 10 MG: 10 TABLET, FILM COATED ORAL at 08:42

## 2020-03-09 RX ADMIN — AMLODIPINE BESYLATE 10 MG: 10 TABLET ORAL at 12:31

## 2020-03-09 RX ADMIN — ACETAMINOPHEN 650 MG: 325 TABLET, FILM COATED ORAL at 01:30

## 2020-03-09 RX ADMIN — CHLORDIAZEPOXIDE HYDROCHLORIDE 25 MG: 25 CAPSULE ORAL at 08:41

## 2020-03-09 RX ADMIN — METOPROLOL TARTRATE 25 MG: 25 TABLET ORAL at 20:30

## 2020-03-09 RX ADMIN — DICYCLOMINE HYDROCHLORIDE 20 MG: 20 TABLET ORAL at 20:30

## 2020-03-09 RX ADMIN — CITALOPRAM HYDROBROMIDE 20 MG: 20 TABLET ORAL at 12:31

## 2020-03-09 RX ADMIN — METOPROLOL TARTRATE 25 MG: 25 TABLET ORAL at 12:31

## 2020-03-09 RX ADMIN — DICYCLOMINE HYDROCHLORIDE 20 MG: 20 TABLET ORAL at 08:42

## 2020-03-09 RX ADMIN — CHLORDIAZEPOXIDE HYDROCHLORIDE 25 MG: 25 CAPSULE ORAL at 17:36

## 2020-03-09 RX ADMIN — TRAZODONE HYDROCHLORIDE 50 MG: 50 TABLET ORAL at 20:30

## 2020-03-09 RX ADMIN — CYCLOBENZAPRINE HYDROCHLORIDE 10 MG: 10 TABLET, FILM COATED ORAL at 01:30

## 2020-03-09 RX ADMIN — CHLORDIAZEPOXIDE HYDROCHLORIDE 25 MG: 25 CAPSULE ORAL at 01:31

## 2020-03-09 RX ADMIN — HYDROXYZINE HYDROCHLORIDE 25 MG: 25 TABLET, FILM COATED ORAL at 01:30

## 2020-03-09 ASSESSMENT — SLEEP AND FATIGUE QUESTIONNAIRES
DO YOU HAVE DIFFICULTY SLEEPING: NO
DO YOU USE A SLEEP AID: NO
AVERAGE NUMBER OF SLEEP HOURS: 6

## 2020-03-09 ASSESSMENT — PAIN DESCRIPTION - PAIN TYPE: TYPE: ACUTE PAIN

## 2020-03-09 ASSESSMENT — PAIN SCALES - GENERAL
PAINLEVEL_OUTOF10: 2
PAINLEVEL_OUTOF10: 0

## 2020-03-09 ASSESSMENT — PAIN DESCRIPTION - LOCATION: LOCATION: HEAD

## 2020-03-09 ASSESSMENT — LIFESTYLE VARIABLES: HISTORY_ALCOHOL_USE: YES

## 2020-03-09 NOTE — PROGRESS NOTES
Pharmacy Medication History Note      List of current medications patient is taking is complete. Source of information: Ofe Franklin, [facility]    Changes made to medication list:  Medications removed (include reason, ex. therapy complete or physician discontinued, noncompliance):  Gauze bandages (list clean up), Wound dressings (list clean up)     Medications added/doses adjusted:  Adjusted Citalopram to 20 mg daily  Adjusted Quetiapine to 400 mg twice daily  Added Hydroxyzine pamoate 25 mg three times daily as needed for anxiety  Added Metoprolol tartrate 25 mg twice daily  Added Suboxone 8-2 SL film twice daily    Other notes (ex. Recent course of antibiotics, Coumadin dosing):  Suboxone was last prescribed by a Dr. Moshe Rasheed. Last filled on 2/28/20 for a 5 day supply. The patient's remaining prescriptions have not been filled since January. Please let me know if you have any questions about this encounter. Thank you!     Electronically signed by Stephanie Ivory, [facility] on 3/9/2020 at 10:20 AM

## 2020-03-09 NOTE — GROUP NOTE
Group Therapy Note    Date: 3/9/2020    Group Start Time: 0900  Group End Time: 3280  Group Topic: [address]      Patient declined to attend community meeting/goal setting group at 0900 despite encouragement from staff. 1:1 talk time offered by staff as alternative to group session.           Signature:  Cuong Mcgovern

## 2020-03-09 NOTE — GROUP NOTE
Group Therapy Note    Date: 3/9/2020    Group Start Time: 4617  Group End Time: 6853  Group Topic: Healthy Living/Wellness    [address], [facility]        Group Therapy Note    Attendees: 8/23  patient refused to attend discharge planning  group at [phone] after encouragement from staff.   1:1 talk time provided as alternative to group session              Signature:  Catherine Son, [address]

## 2020-03-09 NOTE — GROUP NOTE
Group Therapy Note    Date: 3/9/2020    Group Start Time: 1600  Group End Time: 6889  Group Topic: Healthy Living/Wellness    [address] [facility]        Group Therapy Note    Attendees: 12    Patient refused 1600 group. Patient was offered alternative 1:1, and continued to refuse. Staff will continue to educate and encourage group attendance.     Signature:  Stephon Calderon LPN

## 2020-03-09 NOTE — FLOWSHEET NOTE
03/09/20 1432   Encounter Summary   Services provided to: Patient   Referral/Consult From: Rolando   Continue Visiting   (3-9-20)   Complexity of Encounter Low   Length of Encounter 15 minutes   Routine   Type Initial   Spiritual/Yarsanism   Type Ritual   Intervention Anointing   Sacraments   Sacrament of Sick-Anointing Anointed  (Cheryle Balls 3-9-20)

## 2020-03-09 NOTE — PLAN OF CARE
[address] [facility]  Initial Interdisciplinary Treatment Plan NO      Original treatment plan Date & Time: 3/9/20    Admission Type:  Admission Type: Voluntary    Reason for admission:   Reason for Admission: Patient  verbalized SI no plan    Estimated Length of Stay:  5-7days  Estimated Discharge Date: to be determined by physician    PATIENT STRENGTHS:  Patient Strengths:Strengths: Medication Compliance, Connection to output provider  Patient Strengths and Limitations:Limitations: Perceives need for assistance with self-care, General negative or hopeless attitude about future/recovery, Inappropriate/potentially harmful leisure interests  Addictive Behavior: Addictive Behavior  In the past 3 months, have you felt or has someone told you that you have a problem with:  : None  Do you have a history of Chemical Use?: No  Do you have a history of Alcohol Use?: Yes  Do you have a history of Street Drug Abuse?: Yes  Histroy of Prescripton Drug Abuse?: No  Medical Problems:  Past Medical History:   Diagnosis Date    Bipolar 1 disorder ([facility] [address]     Chronic mental illness     Depression     Hepatitis C     Hypertension     Psoriasis      Status EXAM:Status and Exam  Normal: Yes  Facial Expression: Flat  Affect: Appropriate  Level of Consciousness: Alert  Mood:Normal: No  Mood: Anxious, Sad  Motor Activity:Normal: Yes  Interview Behavior: Impulsive  Preception: [place] to Person, [place] to Time, [place] to Place, [place] to Situation  Attention:Normal: No  Attention: Distractible  Thought Processes: Blocking  Thought Content:Normal: No  Thought Content: Poverty of Content, Preoccupations  Hallucinations: None  Delusions: No  Memory:Normal: No  Memory: Poor Recent, Poor Remote  Insight and Judgment: Yes  Present Suicidal Ideation: No  Present Homicidal Ideation: No    EDUCATION:   Learner Progress Toward Treatment Goals: reviewed group plans and strategies for care    Method:group therapy, medication compliance,

## 2020-03-09 NOTE — GROUP NOTE
Group Therapy Note    Date: 3/9/2020    Group Start Time: 1100  Group End Time: 4715  Group Topic: Psychoeducation    [facility]      Patient declined to attend recreational therapy group at 1100 despite encouragement from staff. 1:1 talk time offered by staff as alternative to group session.       Signature:  Dirk Dias
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
## 2020-03-09 NOTE — PLAN OF CARE
Problem: Altered Mood, Depressive Behavior:  Goal: Ability to disclose and discuss suicidal ideas will improve  Description: Ability to disclose and discuss suicidal ideas will improve  3/9/2020 1959 by Nikole Rich LPN  Outcome: Ongoing     Problem: Substance Abuse:  Goal: Absence of drug withdrawal signs and symptoms  Description: Absence of drug withdrawal signs and symptoms  3/9/2020 1959 by Nikole Rich LPN  Outcome: Ongoing   Patient denies suicidal ideas at this time. Patient has complaints of stomach cramps at this time. Patient received withdrawal medications at 1736. Patient is free of self harm at this time. Patient agrees to seek out staff if thoughts to harm self arise. Staff will provide support and reassurance as needed. Safety checks maintained every 15 minutes.

## 2020-03-09 NOTE — BH NOTE
Reported history of psychiatric inpatient hospitalizations. Reported history of suicide attempts. History of Substance Abuse     Currently reports using alcohol 2 times/week estimating he drinks 4-6 beers per setting. Denies current drug use. Stated that he was linked with a suboxone program.  Patient's last fill for suboxone was on 2/28/2020 for a five day supply. Reports he smokes a pack a day of cigarettes. Family History of psychiatric disorders    Family history: denied . Past psychiatric medications: Seroquel, Suboxone, Celexa, BuSpar    Medical History   Allergies:  Haldol [haloperidol] and Latuda [lurasidone hcl]   Past Medical History:   Diagnosis Date    Bipolar 1 disorder ([facility] [address]     Chronic mental illness     Depression     Hepatitis C     Hypertension     Psoriasis       History reviewed. No pertinent surgical history. Neurologic Exam    See H&P for further physical examination. SOCIAL HISTORY:  patient was born and raised in [address]. He is currently homeless. He was just evicted from his apartment in [facility]. He has 2 brothers and 1 sisters. Denies any history of abuse. Currently is on disability. Reports he last worked in 2007 and worked in an [employer] shop. He is not . He has 1 son. Reports his highest level of education was high school. No  history. Denies any legal issues. Identifies as [employer]an.     Social History     Socioeconomic History    Marital status: Single     Spouse name: Not on file    Number of children: Not on file    Years of education: Not on file    Highest education level: Not on file   Occupational History    Not on file   Social Needs    Financial resource strain: Not on file    Food insecurity     Worry: Not on file     Inability: Not on file    Transportation needs     Medical: Not on file     Non-medical: Not on file   Tobacco Use    Smoking status: Current Every Day Smoker     Packs/day: 1.00     Years: 15.00     Pack years: 15.00     Types: Cigarettes    Smokeless tobacco: Never Used   Substance and Sexual Activity    Alcohol use: No     Alcohol/week: 0.0 standard drinks     Comment: social     Drug use: Not Currently     Comment: reports hisory of heroin abuse, currently receiving treatment with suboxone.  Sexual activity: Not on file   Lifestyle    Physical activity     Days per week: Not on file     Minutes per session: Not on file    Stress: Not on file   Relationships    Social connections     Talks on phone: Not on file     Gets together: Not on file     Attends Muslim service: Not on file     Active member of club or organization: Not on file     Attends meetings of clubs or organizations: Not on file     Relationship status: Not on file    Intimate partner violence     Fear of current or ex partner: Not on file     Emotionally abused: Not on file     Physically abused: Not on file     Forced sexual activity: Not on file   Other Topics Concern    Not on file   Social History Narrative    Not on file         Mental Status  Pt. was alert, fully oriented, and cooperative. Appearance and hygiene weredisheveled, poor hygiene . Mood was irritable. Affect was mood congruent Thought process was tangential; racing thoughts; flight of ideas; rumination on being evicted from apartment. Patient denied any hallucinations or paranoia. Patient denied suicidal ideations. Patient denied homicidal ideations . Patient's gross cognitive functions were intact. Insight and judgement were poor. Both recent and remote memory were intact.     Psychomotor status was restless     Labs  Recent Results (from the past 72 hour(s))   Comprehensive Metabolic Panel w/ Reflex to MG    Collection Time: 03/09/20  7:03 AM   Result Value Ref Range    Glucose 111 (H) 70 - 99 mg/dL    BUN 25 (H) 6 - 20 mg/dL    CREATININE 0.88 0.70 - 1.20 mg/dL    Bun/Cre Ratio NOT REPORTED 9 - 20    Calcium 9.3 8.6 - 10.4 mg/dL    Sodium 143 135 - 144 mmol/L    Potassium 4.9 3.7 - 5.3 mmol/L    Chloride 110 (H) 98 - 107 mmol/L    CO2 23 20 - 31 mmol/L    Anion Gap 10 9 - 17 mmol/L    Alkaline Phosphatase 79 40 - 129 U/L     (H) 5 - 41 U/L     (H) <40 U/L    Total Bilirubin 0.53 0.3 - 1.2 mg/dL    Total Protein 7.5 6.4 - 8.3 g/dL    Alb 4.1 3.5 - 5.2 g/dL    Albumin/Globulin Ratio NOT REPORTED 1.0 - 2.5    GFR Non-African American >60 >60 mL/min    GFR African American >60 >60 mL/min    GFR Comment          GFR Staging NOT REPORTED    Hemoglobin A1c    Collection Time: 03/09/20  7:03 AM   Result Value Ref Range    Hemoglobin A1C 5.2 4.0 - 6.0 %    Estimated Avg Glucose 103 mg/dL   TSH without Reflex    Collection Time: 03/09/20  7:03 AM   Result Value Ref Range    TSH 0.50 0.30 - 5.00 mIU/L   T4, free    Collection Time: 03/09/20  7:03 AM   Result Value Ref Range    Thyroxine, Free 1.14 0.93 - 1.70 ng/dL   Lipid panel - fasting    Collection Time: 03/09/20  7:03 AM   Result Value Ref Range    Cholesterol 83 <200 mg/dL    HDL 27 (L) >40 mg/dL    LDL Cholesterol 41 0 - 130 mg/dL    Chol/HDL Ratio 3.1 <5    Triglycerides 74 <150 mg/dL    VLDL NOT REPORTED 1 - 30 mg/dL   CBC auto differential    Collection Time: 03/09/20  7:03 AM   Result Value Ref Range    WBC 8.0 3.5 - 11.0 k/uL    RBC 4.84 4.5 - 5.9 m/uL    Hemoglobin 14.9 13.5 - 17.5 g/dL    Hematocrit 45.1 41 - 53 %    MCV 93.1 80 - 100 fL    MCH 30.8 26 - 34 pg    MCHC 33.1 31 - 37 g/dL    RDW 14.4 11.5 - 14.9 %    Platelets 976 434 - 620 k/uL    MPV 8.3 6.0 - 12.0 fL    NRBC Automated NOT REPORTED per 100 WBC    Differential Type NOT REPORTED     Seg Neutrophils 51 36 - 66 %    Lymphocytes 35 24 - 44 %    Monocytes 9 (H) 1 - 7 %    Eosinophils % 4 0 - 4 %    Basophils 1 0 - 2 %    Immature Granulocytes NOT REPORTED 0 %    Segs Absolute 4.00 1.3 - 9.1 k/uL    Absolute Lymph # 2.80 1.0 - 4.8 k/uL    Absolute Mono # 0.70 0.1 - 1.3 k/uL    Absolute Eos # 0.30 0.0 - 0.4 k/uL    Basophils Absolute 0. 10 0.0 - 0.2 k/uL    Absolute Immature Granulocyte NOT REPORTED 0.00 - 0.30 k/uL    WBC Morphology NOT REPORTED     RBC Morphology NOT REPORTED     Platelet Estimate NOT REPORTED          Diagnostic Impression  Active Problems:    Bipolar disorder (HCC)    Bipolar 1 disorder (HCC)  Resolved Problems:    * No resolved hospital problems. *          Medications   amLODIPine  1 tablet Oral Daily    citalopram  20 mg Oral Daily    metoprolol tartrate  25 mg Oral BID    QUEtiapine  400 mg Oral BID    nicotine  1 patch Transdermal Daily    dicyclomine  20 mg Oral 4x Daily AC & HS    folic acid  1 mg Oral Daily    thiamine  100 mg Oral Daily     acetaminophen, traZODone, benztropine mesylate, magnesium hydroxide, aluminum & magnesium hydroxide-simethicone, cloNIDine, cyclobenzaprine, diphenhydrAMINE, hydrOXYzine, chlordiazePOXIDE    Treatment Plan:     Admit to inpatient psychiatric treatment   Supportive therapy with medication management. Reviewed risks and benefits as well as potential side effects with patient.  Restart home medications. Seroquel will only be started at 200mg at HS with plan to titrate as necessary to treat symptoms and as tolerated.  Therapeutic activities and groups   Follow up at [facility] after symptoms stabilized    Estimated length of stay: 5-7 days    JILLIAN Egan - GERRI  Psychiatric Advanced Practice Nurse  Fletcher voice recognition software used in portions of this document.  Occasionally words are mis-transcribed [phone]

## 2020-03-09 NOTE — BH NOTE
`[facility] [place]  Admission Note     Admission Type:   Admission Type: Voluntary    Reason for admission:  Reason for Admission: Patient  verbalized SI no plan    PATIENT STRENGTHS:  Strengths: Medication Compliance, Connection to output provider    Patient Strengths and Limitations:  Limitations: Perceives need for assistance with self-care, General negative or hopeless attitude about future/recovery, Inappropriate/potentially harmful leisure interests    Addictive Behavior:   Addictive Behavior  In the past 3 months, have you felt or has someone told you that you have a problem with:  : None  Do you have a history of Chemical Use?: No  Do you have a history of Alcohol Use?: Yes  Do you have a history of Street Drug Abuse?: Yes  Histroy of Prescripton Drug Abuse?: No    Medical Problems:   Past Medical History:   Diagnosis Date    Bipolar 1 disorder ([facility] [address]     Chronic mental illness     Depression     Hepatitis C     Hypertension     Psoriasis        Status EXAM:  Status and Exam  Normal: Yes  Facial Expression: Flat  Affect: Appropriate  Level of Consciousness: Alert  Mood:Normal: No  Mood: Anxious, Sad  Motor Activity:Normal: Yes  Interview Behavior: Impulsive  Preception: [place] to Person, [place] to Time, [place] to Place, [place] to Situation  Attention:Normal: No  Attention: Distractible  Thought Processes: Blocking  Thought Content:Normal: No  Thought Content: Poverty of Content, Preoccupations  Hallucinations: None  Delusions: No  Memory:Normal: No  Memory: Poor Recent, Poor Remote  Insight and Judgment: Yes  Present Suicidal Ideation: No  Present Homicidal Ideation: No    Tobacco Screening:  Practical Counseling, on admission, deshawn X, if applicable and completed (first 3 are required if patient doesn't refuse):            ( )  Recognizing danger situations (included triggers and roadblocks)                    ( )  Coping skills (new ways to manage stress, exercise, relaxation techniques, needs at this time. 15 minute safety checks were initiated.                    Shala Dears, RN

## 2020-03-09 NOTE — PLAN OF CARE
Problem: Altered Mood, Depressive Behavior:  Goal: Ability to disclose and discuss suicidal ideas will improve  Description: Ability to disclose and discuss suicidal ideas will improve  Outcome: Ongoing     Problem: Substance Abuse:  Goal: Absence of drug withdrawal signs and symptoms  Description: Absence of drug withdrawal signs and symptoms  Outcome: Ongoing     Problem: Pain:  Goal: Pain level will decrease  Description: Pain level will decrease  Outcome: Ongoing

## 2020-03-10 VITALS
BODY MASS INDEX: 27.28 KG/M2 | RESPIRATION RATE: 14 BRPM | TEMPERATURE: 97.1 F | HEART RATE: 60 BPM | HEIGHT: 68 IN | DIASTOLIC BLOOD PRESSURE: 92 MMHG | SYSTOLIC BLOOD PRESSURE: 153 MMHG | WEIGHT: 180 LBS | OXYGEN SATURATION: 97 %

## 2020-03-10 PROCEDURE — 6370000000 HC RX 637 (ALT 250 FOR IP): Performed by: NURSE PRACTITIONER

## 2020-03-10 PROCEDURE — 99238 HOSP IP/OBS DSCHRG MGMT 30/<: CPT | Performed by: NURSE PRACTITIONER

## 2020-03-10 RX ORDER — CLONIDINE HYDROCHLORIDE 0.1 MG/1
0.1 TABLET ORAL 3 TIMES DAILY PRN
Qty: 14 TABLET | Refills: 0 | Status: ON HOLD | OUTPATIENT
Start: 2020-03-10 | End: 2020-06-24 | Stop reason: HOSPADM

## 2020-03-10 RX ORDER — QUETIAPINE FUMARATE 200 MG/1
200 TABLET, FILM COATED ORAL NIGHTLY
Qty: 14 TABLET | Refills: 0 | Status: ON HOLD | OUTPATIENT
Start: 2020-03-10 | End: 2020-06-24 | Stop reason: HOSPADM

## 2020-03-10 RX ORDER — HYDROXYZINE HYDROCHLORIDE 25 MG/1
25 TABLET, FILM COATED ORAL 3 TIMES DAILY PRN
Qty: 30 TABLET | Refills: 0 | Status: SHIPPED | OUTPATIENT
Start: 2020-03-10 | End: 2020-03-20

## 2020-03-10 RX ORDER — LANOLIN ALCOHOL/MO/W.PET/CERES
100 CREAM (GRAM) TOPICAL DAILY
Qty: 14 TABLET | Refills: 0 | Status: SHIPPED | OUTPATIENT
Start: 2020-03-11 | End: 2021-08-18 | Stop reason: SDUPTHER

## 2020-03-10 RX ORDER — TRAZODONE HYDROCHLORIDE 50 MG/1
50 TABLET ORAL NIGHTLY PRN
Qty: 14 TABLET | Refills: 0 | Status: ON HOLD | OUTPATIENT
Start: 2020-03-10 | End: 2020-06-24 | Stop reason: SDUPTHER

## 2020-03-10 RX ORDER — FOLIC ACID 1 MG/1
1 TABLET ORAL DAILY
Qty: 14 TABLET | Refills: 0 | Status: SHIPPED | OUTPATIENT
Start: 2020-03-11 | End: 2021-08-18 | Stop reason: SDUPTHER

## 2020-03-10 RX ADMIN — DICYCLOMINE HYDROCHLORIDE 20 MG: 20 TABLET ORAL at 08:30

## 2020-03-10 RX ADMIN — THIAMINE HCL TAB 100 MG 100 MG: 100 TAB at 08:30

## 2020-03-10 RX ADMIN — AMLODIPINE BESYLATE 10 MG: 10 TABLET ORAL at 08:30

## 2020-03-10 RX ADMIN — FOLIC ACID 1 MG: 1 TABLET ORAL at 08:30

## 2020-03-10 RX ADMIN — CITALOPRAM HYDROBROMIDE 20 MG: 20 TABLET ORAL at 08:30

## 2020-03-10 RX ADMIN — CHLORDIAZEPOXIDE HYDROCHLORIDE 25 MG: 25 CAPSULE ORAL at 08:33

## 2020-03-10 RX ADMIN — METOPROLOL TARTRATE 25 MG: 25 TABLET ORAL at 08:30

## 2020-03-10 NOTE — BH NOTE
[facility]  Discharge Note    Pt discharged with followings belongings:   Dentures: None  Vision - Corrective Lenses: None  Hearing Aid: None  Jewelry: Ring  Body Piercings Removed: N/A  Clothing: Footwear, Jacket / coat, Pants, Shirt  Were All Patient Medications Collected?: Not Applicable  Other Valuables: Cell phone, [address] sent home with patient. Valuables retrieved from safe and returned to patient. Patient education on aftercare instructions. Information faxed to [facility] by Oscar Orozco. Patient verbalize understanding of AVS. Pt discharged to home via cab.      Status EXAM upon discharge:  Status and Exam  Normal: No  Facial Expression: Avoids Gaze, Flat  Affect: Appropriate  Level of Consciousness: Alert  Mood:Normal: No  Mood: Depressed, Anxious  Motor Activity:Normal: Yes  Motor Activity: Decreased  Interview Behavior: Cooperative, Evasive  Preception: [place] to Person, [place] to Time, [place] to Place, [place] to Situation  Attention:Normal: No  Attention: Unable to Concentrate  Thought Processes: Blocking  Thought Content:Normal: No  Thought Content: Poverty of Content  Hallucinations: None  Delusions: No  Memory:Normal: Yes  Memory: Poor Recent, Poor Remote  Insight and Judgment: No  Insight and Judgment: Poor Judgment, Poor Insight  Present Suicidal Ideation: No  Present Homicidal Ideation: No      Metabolic Screening:    Lab Results   Component Value Date    LABA1C 5.2 03/09/2020       Lab Results   Component Value Date    CHOL 83 03/09/2020    CHOL 70 07/03/2018    CHOL 107 09/23/2015    CHOL 120 03/10/2013     Lab Results   Component Value Date    TRIG 74 03/09/2020    TRIG 57 07/03/2018    TRIG 101 09/23/2015    TRIG 110 03/10/2013     Lab Results   Component Value Date    HDL 27 (L) 03/09/2020    HDL 22 (L) 07/03/2018    HDL 12 (L) 09/23/2015    HDL 29 (L) 03/10/2013     No components found for: LDLCAL  No results found for: Gianni Ferris RN

## 2020-03-10 NOTE — BH NOTE
Patient given tobacco quitline number 11030383940 at this time, refusing to call at this time, states \" I just dont want to quit now\"- patient given information as to the dangers of long term tobacco use. Continue to reinforce the importance of tobacco cessation.

## 2020-03-10 NOTE — DISCHARGE SUMMARY
DISCHARGE SUMMARY  Patient ID:  Vidhay Whyte  [id number]  39 y.o.  1965    Admit date: 3/8/2020    Discharge date and time: 3/10/2020  11:03 AM     Admitting Physician: Shadi Felder MD     Discharge Physician:  JILLIAN Regan CNP    Admission Diagnoses: Bipolar disorder ([address] [F31.9]  Bipolar disorder ([address] [F31.9]  Bipolar 1 disorder ([address] [F31.9]    Discharge Diagnoses:   Bipolar 1 disorder ([address]     Patient Active Problem List   Diagnosis Code    Hepatitis C B19.20    Psoriasis L40.9    HTN (hypertension) I10    Chest pain R07.9    Uncontrolled hypertension I10    Smoker F17.200    Non-intractable vomiting with nausea R11.2    Sinus bradycardia R00.1    Opioid type dependence, continuous ([address] F11.20    Unstable angina ([address] I20.0    Bipolar disorder ([address] F31.9    Bipolar 1 disorder ([address] F31.9        Admission Condition: poor    Discharged Condition: stable    Indication for Admission: threat to self    History of Present Illnes (present tense wording indicates findings from admission exam on 3/8/2020 and are not necessarily indicative of current findings): Hospital Course:   Upon admission, Vidhya Whyte was provided a safe secure environment, introduced to unit milieu. Patient participated in groups and individual therapies. Meds were adjusted. After few days of hospital care, patient began to feel improvement. Depression lifted, thoughts to harm self ceased. Sleep improved, appetite was good. On morning rounds 3/10/2020, patient endorsed feeling ready for discharge. Patient denies suicidal or homicidal ideations, denies hallucinations or delusions. Denies SE's from meds.   It was decided that pt had achieved maximum benefit from hospital care and can be discharged     Consults:   None    Significant Diagnostic Studies: Routine labs and diagnostics    Treatments: Psychotropic medications, therapy with group, milieu, and 1:1 with nurses, social workers, YESY/CNP, and Attending physician. Discharge Medications:  Current Discharge Medication List      START taking these medications    Details   hydrOXYzine (ATARAX) 25 MG tablet Take 1 tablet by mouth 3 times daily as needed for Anxiety  Qty: 30 tablet, Refills: 0      cloNIDine (CATAPRES) 0.1 MG tablet Take 1 tablet by mouth 3 times daily as needed for Other (withdrawal)  Qty: 14 tablet, Refills: 0      folic acid (FOLVITE) 1 MG tablet Take 1 tablet by mouth daily  Qty: 14 tablet, Refills: 0      vitamin B-1 100 MG tablet Take 1 tablet by mouth daily  Qty: 14 tablet, Refills: 0         CONTINUE these medications which have CHANGED    Details   traZODone (DESYREL) 50 MG tablet Take 1 tablet by mouth nightly as needed for Sleep  Qty: 14 tablet, Refills: 0      QUEtiapine (SEROQUEL) 200 MG tablet Take 1 tablet by mouth nightly  Qty: 14 tablet, Refills: 0         CONTINUE these medications which have NOT CHANGED    Details   metoprolol tartrate (LOPRESSOR) 25 MG tablet Take 25 mg by mouth 2 times daily      citalopram (CELEXA) 20 MG tablet Take 1 tablet by mouth daily  Qty: 30 tablet, Refills: 0      amLODIPine (NORVASC) 10 MG tablet TAKE 1 TABLET BY MOUTH DAILY FOR 30 DAYS.   Qty: 30 tablet, Refills: 0         STOP taking these medications       buprenorphine-naloxone (SUBOXONE) 8-2 MG FILM SL film Comments:   Reason for Stopping:         hydrOXYzine (VISTARIL) 25 MG capsule Comments:   Reason for Stopping:         Wound Dressings (ADAPTIC NON-ADHERING DRESSING) PADS Comments:   Reason for Stopping:         Gauze Bandages (QC STRETCH GAUZE BDG 2\"X2YD) MISC Comments:   Reason for Stopping:                  Core Measures statement:   Not applicable    Discharge Exam:  Level of consciousness:  Within normal limits  Appearance: Street clothes, seated, with good grooming  Behavior/Motor: No abnormalities noted  Attitude toward examiner:  Cooperative, attentive, good eye contact  Speech:  spontaneous, normal rate, normal volume and well articulated  Mood:  euthymic  Affect:  mood congruent  Thought processes:  linear, goal directed and coherent  Thought content:  Homocidal ideation denies  Suicidal Ideation:  denies suicidal ideation  Delusions:  no evidence of delusions  Perceptual Disturbance:  denies any perceptual disturbance  Cognition:  In tact  Memory: age appropriate  Insight & Judgement: fair  Medication side effects:  denies     Disposition: home    Patient Instructions: Activity: activity as tolerated    Follow-up as scheduled with [facility]     Time Spent: 35 minutes    Engagement: Patient displayed a good level of engagement with the treatments offered during this admission. Discharge planning, findings, and recommendations were discussed with the patient and treatment team.    Signed:  Paige Nguyen CNP  3/10/2020  11:03 AM  Dragon voice recognition software used in portions of this document.

## 2020-03-10 NOTE — GROUP NOTE
Group Therapy Note    Date: 3/10/2020    Group Start Time: 1100  Group End Time: 1140  Group Topic: Cognitive Skills    CLARKE Gordon        Group Therapy Note    Attendees: 6/11         Patient's Goal:  To increase interpersonal interaction     Notes:      Status After Intervention:  Improved    Participation Level:  Active Listener and Interactive    Participation Quality: Appropriate, Attentive and Sharing      Speech:  normal      Thought Process/Content: Logical  Linear      Affective Functioning: Congruent      Mood: euthymic      Level of consciousness:  Alert, Oriented x4 and Attentive      Response to Learning: Able to verbalize current knowledge/experience, Able to verbalize/acknowledge new learning, Able to retain information and Progressing to goal      Endings: None Reported    Modes of Intervention: Education, Support, Socialization, Exploration, Clarifying, Problem-solving and Activity      Discipline Responsible: Psychoeducational Specialist      Signature:  Nataliya Gordon

## 2020-03-10 NOTE — PLAN OF CARE
Problem: Altered Mood, Depressive Behavior:  Goal: Ability to disclose and discuss suicidal ideas will improve  Description: Ability to disclose and discuss suicidal ideas will improve  Outcome: Completed     Problem: Substance Abuse:  Goal: Absence of drug withdrawal signs and symptoms  Description: Absence of drug withdrawal signs and symptoms  3/10/2020 1104 by Salo Jj RN  Outcome: Completed  3/10/2020 0917 by Salo Jj RN  Outcome: Ongoing  Note: Pt's BP was 153/92 this morning. He was anxious. Pt requested librium for withdrawal symptoms. Pt is resting now. He is calm and cooperative with treatment. Safety checks maintained q15 min and irregular rounding maintained. Problem: Pain:  Goal: Pain level will decrease  Description: Pain level will decrease  Outcome: Completed  Goal: Control of acute pain  Description: Control of acute pain  3/10/2020 1104 by Salo Jj RN  Outcome: Completed  3/10/2020 0917 by Salo Jj RN  Outcome: Ongoing  Note: Pt denies pain at this time. Goal: Control of chronic pain  Description: Control of chronic pain  3/10/2020 1104 by Salo Jj RN  Outcome: Completed  3/10/2020 0917 by Salo Jj RN  Outcome: Ongoing  Note: Pt denies pain at this time.       Problem: Tobacco Use:  Goal: Inpatient tobacco use cessation counseling participation  Description: Inpatient tobacco use cessation counseling participation  Outcome: Completed

## 2020-03-22 ENCOUNTER — HOSPITAL ENCOUNTER (EMERGENCY)
Age: 55
Discharge: HOME | End: 2020-03-22
Attending: EMERGENCY MEDICINE
Payer: MEDICARE

## 2020-03-22 VITALS
BODY MASS INDEX: 26.52 KG/M2 | WEIGHT: 175 LBS | TEMPERATURE: 97.5 F | DIASTOLIC BLOOD PRESSURE: 101 MMHG | OXYGEN SATURATION: 95 % | HEART RATE: 79 BPM | RESPIRATION RATE: 17 BRPM | HEIGHT: 68 IN | SYSTOLIC BLOOD PRESSURE: 151 MMHG

## 2020-03-22 PROCEDURE — 99284 EMERGENCY DEPT VISIT MOD MDM: CPT

## 2020-03-22 ASSESSMENT — ENCOUNTER SYMPTOMS
COUGH: 0
ANAL BLEEDING: 1
NAUSEA: 0
CHEST TIGHTNESS: 0
VOMITING: 0
ABDOMINAL PAIN: 0
SHORTNESS OF BREATH: 0
WHEEZING: 0

## 2020-03-22 ASSESSMENT — PAIN DESCRIPTION - PAIN TYPE: TYPE: CHRONIC PAIN

## 2020-03-22 ASSESSMENT — PAIN SCALES - GENERAL: PAINLEVEL_OUTOF10: 5

## 2020-03-23 NOTE — ED PROVIDER NOTES
[address] [facility]  ED  Emergency Department Encounter  Emergency Medicine Resident     Pt Name: Kassie Weaver  MRN: [id number]  [place] 1965  Date of evaluation: 3/22/20  PCP:  Ashly Rivera MD    CHIEF COMPLAINT       Chief Complaint   Patient presents with    Hernia       HISTORY OFPRESENT ILLNESS  (Location/Symptom, Timing/Onset, Context/Setting, Quality, Duration, Modifying Factors,Severity.)      Kassie Weaver is a 48 yo male who presents with chronic hernia and rectal bleeding. Patient states that for the past few days he has been having bright red blood per rectum as well as with wiping but denies any pain. He notes that a few weeks ago he was having some epigastric pain however this is subsided and he is asymptomatic now. He notes he has a chronic left inguinal hernia but it is currently reduced and only pops out when he coughs. Denies any fevers, chills, chest pain, shortness of breath, abdominal pain, nausea, vomiting at this time and denies any other symptoms. PAST MEDICAL / SURGICAL / SOCIAL / FAMILY HISTORY      has a past medical history of Bipolar 1 disorder ([facility] [address] Chronic mental illness, Depression, Hepatitis C, Hypertension, and Psoriasis. has no past surgical history on file. Social History     Socioeconomic History    Marital status: Single     Spouse name: Not on file    Number of children: Not on file    Years of education: Not on file    Highest education level: Not on file   Occupational History    Not on file   Social Needs    Financial resource strain: Not on file    Food insecurity     Worry: Not on file     Inability: Not on file    Transportation needs     Medical: Not on file     Non-medical: Not on file   Tobacco Use    Smoking status: Current Every Day Smoker     Packs/day: 1.00     Years: 15.00     Pack years: 15.00     Types: Cigarettes    Smokeless tobacco: Never Used   Substance and Sexual Activity    Alcohol use:  No Alcohol/week: 0.0 standard drinks     Comment: social     Drug use: Yes     Types: Opiates      Comment: reports hisory of heroin abuse, currently receiving treatment with suboxone.  Sexual activity: Not on file   Lifestyle    Physical activity     Days per week: Not on file     Minutes per session: Not on file    Stress: Not on file   Relationships    Social connections     Talks on phone: Not on file     Gets together: Not on file     Attends Sikh service: Not on file     Active member of club or organization: Not on file     Attends meetings of clubs or organizations: Not on file     Relationship status: Not on file    Intimate partner violence     Fear of current or ex partner: Not on file     Emotionally abused: Not on file     Physically abused: Not on file     Forced sexual activity: Not on file   Other Topics Concern    Not on file   Social History Narrative    Not on file       Family History   Problem Relation Age of Onset    Cancer Father     Cancer Child         Allergies:  Haldol [haloperidol] and Latuda [lurasidone hcl]    Home Medications:  Prior to Admission medications    Medication Sig Start Date End Date Taking?  Authorizing Provider   traZODone (DESYREL) 50 MG tablet Take 1 tablet by mouth nightly as needed for Sleep 3/10/20   Sondra Ill, APRN - CNP   cloNIDine (CATAPRES) 0.1 MG tablet Take 1 tablet by mouth 3 times daily as needed for Other (withdrawal) 3/10/20   Sondra Ill, APRN - CNP   QUEtiapine (SEROQUEL) 200 MG tablet Take 1 tablet by mouth nightly 3/10/20   Sondra Ill, APRN - CNP   folic acid (FOLVITE) 1 MG tablet Take 1 tablet by mouth daily 3/11/20   Sondra Ill, APRN - CNP   vitamin B-1 100 MG tablet Take 1 tablet by mouth daily 3/11/20   Sondra Ill, APRN - CNP   metoprolol tartrate (LOPRESSOR) 25 MG tablet Take 25 mg by mouth 2 times daily    Historical Provider, MD   citalopram (CELEXA) 20 MG tablet Take 1 tablet by mouth daily 7/5/18   Adis Peña MD amLODIPine (NORVASC) 10 MG tablet TAKE 1 TABLET BY MOUTH DAILY FOR 30 DAYS. 11/23/15   Gemma Burnett MD   omeprazole (PRILOSEC OTC) 20 MG tablet Take 1 tablet by mouth daily. 2/10/15 7/1/15  Jean Duran MD       REVIEW OFSYSTEMS    (2-9 systems for level 4, 10 or more for level 5)      Review of Systems   Constitutional: Negative for chills and fever. Respiratory: Negative for cough, chest tightness, shortness of breath and wheezing. Cardiovascular: Negative for chest pain, palpitations and leg swelling. Gastrointestinal: Positive for anal bleeding. Negative for abdominal pain, nausea and vomiting. Chronic inguinal hernia. Skin: Negative for rash and wound. Neurological: Negative for syncope, weakness, light-headedness and numbness. PHYSICAL EXAM   (up to 7 for level 4, 8 or more forlevel 5)      INITIAL VITALS:   Vitals:    03/22/20 2128 03/22/20 2144   BP: (!) 151/101    Pulse: 79    Resp: 17    Temp:  97.5 °F (36.4 °C)   SpO2: 95%    Weight: 175 lb (79.4 kg)    Height: 5' 8\" (1.727 m)            Physical Exam  Vitals signs and nursing note reviewed. Constitutional:       General: He is not in acute distress. Appearance: Normal appearance. He is not ill-appearing, toxic-appearing or diaphoretic. HENT:      Head: Normocephalic and atraumatic. Cardiovascular:      Rate and Rhythm: Normal rate and regular rhythm. Heart sounds: No murmur. No gallop. Pulmonary:      Effort: Pulmonary effort is normal.      Breath sounds: Normal breath sounds. Abdominal:      General: There is no distension. Palpations: Abdomen is soft. There is no mass. Tenderness: There is no abdominal tenderness. There is no guarding. Comments: Left inguinal hernia currently reduced, nothing palpated at this time. No abdominal tenderness whatsoever. Genitourinary:     Comments: Guaiac negative rectal exam.  There are external and internal hemorrhoids.   There is currently a prolapsed

## 2020-03-23 NOTE — ED NOTES
Mode of arrival (squad #, walk in, police, etc) : walk in         Chief complaint(s): hernia pain        Arrival Note (brief scenario, treatment PTA, etc). : Pt was neem evaluated for a hernia in the past, was given follow up information for a surgeon. Pt states he has never followed up but feels as though the hernia is getting worse. Pt ambulates with a steady gait. GCS 15         C= \"Have you ever felt that you should Cut down on your drinking? \"  No  A= \"Have people Annoyed you by criticizing your drinking? \"  No  G= \"Have you ever felt bad or Guilty about your drinking? \"  No  E= \"Have you ever had a drink as an Eye-opener first thing in the morning to steady your nerves or to help a hangover? \"  No      Deferred []      Reason for deferring: N/A    *If yes to two or more: probable alcohol abuse. Kalpesh Mcarthur RN  03/22/20 3890

## 2020-06-19 ENCOUNTER — APPOINTMENT (OUTPATIENT)
Dept: GENERAL RADIOLOGY | Age: 55
DRG: 817 | End: 2020-06-19
Payer: MEDICARE

## 2020-06-19 ENCOUNTER — HOSPITAL ENCOUNTER (INPATIENT)
Age: 55
LOS: 3 days | Discharge: PSYCHIATRIC OR SUBSTANCE USE FACILITY | DRG: 817 | End: 2020-06-22
Attending: EMERGENCY MEDICINE | Admitting: FAMILY MEDICINE
Payer: MEDICARE

## 2020-06-19 PROBLEM — T50.902A DRUG OVERDOSE, INTENTIONAL SELF-HARM, INITIAL ENCOUNTER (HCC): Status: ACTIVE | Noted: 2020-06-19

## 2020-06-19 LAB
-: NORMAL
ABSOLUTE EOS #: 0 K/UL (ref 0–0.4)
ABSOLUTE IMMATURE GRANULOCYTE: 0 K/UL (ref 0–0.3)
ABSOLUTE LYMPH #: 1.64 K/UL (ref 1–4.8)
ABSOLUTE MONO #: 1.09 K/UL (ref 0.1–0.8)
ACETAMINOPHEN LEVEL: <5 UG/ML (ref 10–30)
ALBUMIN SERPL-MCNC: 3.2 G/DL (ref 3.5–5.2)
ALBUMIN/GLOBULIN RATIO: 0.8 (ref 1–2.5)
ALP BLD-CCNC: 60 U/L (ref 40–129)
ALT SERPL-CCNC: 29 U/L (ref 5–41)
AMORPHOUS: NORMAL
AMPHETAMINE SCREEN URINE: NEGATIVE
ANION GAP SERPL CALCULATED.3IONS-SCNC: 16 MMOL/L (ref 9–17)
AST SERPL-CCNC: 35 U/L
BACTERIA: NORMAL
BARBITURATE SCREEN URINE: NEGATIVE
BASOPHILS # BLD: 0 % (ref 0–2)
BASOPHILS ABSOLUTE: 0 K/UL (ref 0–0.2)
BENZODIAZEPINE SCREEN, URINE: POSITIVE
BILIRUB SERPL-MCNC: 0.44 MG/DL (ref 0.3–1.2)
BILIRUBIN URINE: NEGATIVE
BUN BLDV-MCNC: 25 MG/DL (ref 6–20)
BUN/CREAT BLD: ABNORMAL (ref 9–20)
BUPRENORPHINE URINE: ABNORMAL
CALCIUM SERPL-MCNC: 8.8 MG/DL (ref 8.6–10.4)
CANNABINOID SCREEN URINE: NEGATIVE
CASTS UA: NORMAL /LPF (ref 0–8)
CHLORIDE BLD-SCNC: 108 MMOL/L (ref 98–107)
CO2: 20 MMOL/L (ref 20–31)
COCAINE METABOLITE, URINE: POSITIVE
COLOR: YELLOW
COMMENT UA: ABNORMAL
CREAT SERPL-MCNC: 0.97 MG/DL (ref 0.7–1.2)
CRYSTALS, UA: NORMAL /HPF
DIFFERENTIAL TYPE: ABNORMAL
EOSINOPHILS RELATIVE PERCENT: 0 % (ref 1–4)
EPITHELIAL CELLS UA: NORMAL /HPF (ref 0–5)
ETHANOL PERCENT: <0.01 %
ETHANOL: <10 MG/DL
GFR AFRICAN AMERICAN: >60 ML/MIN
GFR NON-AFRICAN AMERICAN: >60 ML/MIN
GFR SERPL CREATININE-BSD FRML MDRD: ABNORMAL ML/MIN/{1.73_M2}
GFR SERPL CREATININE-BSD FRML MDRD: ABNORMAL ML/MIN/{1.73_M2}
GLUCOSE BLD-MCNC: 119 MG/DL (ref 70–99)
GLUCOSE URINE: NEGATIVE
HCT VFR BLD CALC: 41.8 % (ref 40.7–50.3)
HEMOGLOBIN: 13.7 G/DL (ref 13–17)
IMMATURE GRANULOCYTES: 0 %
INR BLD: 1.1
KETONES, URINE: NEGATIVE
LACTIC ACID, SEPSIS WHOLE BLOOD: 2 MMOL/L (ref 0.5–1.9)
LACTIC ACID, SEPSIS: ABNORMAL MMOL/L (ref 0.5–1.9)
LEUKOCYTE ESTERASE, URINE: NEGATIVE
LYMPHOCYTES # BLD: 9 % (ref 24–44)
MAGNESIUM: 1.7 MG/DL (ref 1.6–2.6)
MCH RBC QN AUTO: 29.7 PG (ref 25.2–33.5)
MCHC RBC AUTO-ENTMCNC: 32.8 G/DL (ref 28.4–34.8)
MCV RBC AUTO: 90.5 FL (ref 82.6–102.9)
MDMA URINE: ABNORMAL
METHADONE SCREEN, URINE: POSITIVE
METHAMPHETAMINE, URINE: ABNORMAL
MONOCYTES # BLD: 6 % (ref 1–7)
MORPHOLOGY: NORMAL
MUCUS: NORMAL
NITRITE, URINE: NEGATIVE
NRBC AUTOMATED: 0 PER 100 WBC
OPIATES, URINE: NEGATIVE
OTHER OBSERVATIONS UA: NORMAL
OXYCODONE SCREEN URINE: NEGATIVE
PARTIAL THROMBOPLASTIN TIME: 27.1 SEC (ref 20.5–30.5)
PDW BLD-RTO: 13.8 % (ref 11.8–14.4)
PH UA: 6 (ref 5–8)
PHENCYCLIDINE, URINE: NEGATIVE
PLATELET # BLD: 222 K/UL (ref 138–453)
PLATELET ESTIMATE: ABNORMAL
PMV BLD AUTO: 10.1 FL (ref 8.1–13.5)
POTASSIUM SERPL-SCNC: 4.3 MMOL/L (ref 3.7–5.3)
PROPOXYPHENE, URINE: ABNORMAL
PROTEIN UA: ABNORMAL
PROTHROMBIN TIME: 11.1 SEC (ref 9–12)
RBC # BLD: 4.62 M/UL (ref 4.21–5.77)
RBC # BLD: ABNORMAL 10*6/UL
RBC UA: NORMAL /HPF (ref 0–4)
RENAL EPITHELIAL, UA: NORMAL /HPF
SALICYLATE LEVEL: <1 MG/DL (ref 3–10)
SEG NEUTROPHILS: 85 % (ref 36–66)
SEGMENTED NEUTROPHILS ABSOLUTE COUNT: 15.47 K/UL (ref 1.8–7.7)
SODIUM BLD-SCNC: 144 MMOL/L (ref 135–144)
SPECIFIC GRAVITY UA: 1.02 (ref 1–1.03)
TEST INFORMATION: ABNORMAL
TOTAL PROTEIN: 7.2 G/DL (ref 6.4–8.3)
TOXIC TRICYCLIC SC,BLOOD: NEGATIVE
TRICHOMONAS: NORMAL
TRICYCLIC ANTIDEPRESSANTS, UR: ABNORMAL
TROPONIN INTERP: NORMAL
TROPONIN T: NORMAL NG/ML
TROPONIN, HIGH SENSITIVITY: 8 NG/L (ref 0–22)
TURBIDITY: CLEAR
URINE HGB: NEGATIVE
UROBILINOGEN, URINE: NORMAL
WBC # BLD: 18.2 K/UL (ref 3.5–11.3)
WBC # BLD: ABNORMAL 10*3/UL
WBC UA: NORMAL /HPF (ref 0–5)
YEAST: NORMAL

## 2020-06-19 PROCEDURE — 80307 DRUG TEST PRSMV CHEM ANLYZR: CPT

## 2020-06-19 PROCEDURE — 2060000000 HC ICU INTERMEDIATE R&B

## 2020-06-19 PROCEDURE — 87150 DNA/RNA AMPLIFIED PROBE: CPT

## 2020-06-19 PROCEDURE — 6360000002 HC RX W HCPCS: Performed by: STUDENT IN AN ORGANIZED HEALTH CARE EDUCATION/TRAINING PROGRAM

## 2020-06-19 PROCEDURE — 85610 PROTHROMBIN TIME: CPT

## 2020-06-19 PROCEDURE — 83605 ASSAY OF LACTIC ACID: CPT

## 2020-06-19 PROCEDURE — 99285 EMERGENCY DEPT VISIT HI MDM: CPT

## 2020-06-19 PROCEDURE — 87086 URINE CULTURE/COLONY COUNT: CPT

## 2020-06-19 PROCEDURE — 83735 ASSAY OF MAGNESIUM: CPT

## 2020-06-19 PROCEDURE — 84484 ASSAY OF TROPONIN QUANT: CPT

## 2020-06-19 PROCEDURE — 87205 SMEAR GRAM STAIN: CPT

## 2020-06-19 PROCEDURE — 71045 X-RAY EXAM CHEST 1 VIEW: CPT

## 2020-06-19 PROCEDURE — 85025 COMPLETE CBC W/AUTO DIFF WBC: CPT

## 2020-06-19 PROCEDURE — 85730 THROMBOPLASTIN TIME PARTIAL: CPT

## 2020-06-19 PROCEDURE — 87040 BLOOD CULTURE FOR BACTERIA: CPT

## 2020-06-19 PROCEDURE — 80053 COMPREHEN METABOLIC PANEL: CPT

## 2020-06-19 PROCEDURE — 93005 ELECTROCARDIOGRAM TRACING: CPT | Performed by: STUDENT IN AN ORGANIZED HEALTH CARE EDUCATION/TRAINING PROGRAM

## 2020-06-19 PROCEDURE — 2580000003 HC RX 258: Performed by: STUDENT IN AN ORGANIZED HEALTH CARE EDUCATION/TRAINING PROGRAM

## 2020-06-19 PROCEDURE — 81001 URINALYSIS AUTO W/SCOPE: CPT

## 2020-06-19 PROCEDURE — G0480 DRUG TEST DEF 1-7 CLASSES: HCPCS

## 2020-06-19 RX ORDER — LORAZEPAM 2 MG/ML
1 INJECTION INTRAMUSCULAR ONCE
Status: COMPLETED | OUTPATIENT
Start: 2020-06-19 | End: 2020-06-19

## 2020-06-19 RX ORDER — AMLODIPINE BESYLATE 10 MG/1
1 TABLET ORAL DAILY
Status: CANCELLED | OUTPATIENT
Start: 2020-06-20

## 2020-06-19 RX ORDER — 0.9 % SODIUM CHLORIDE 0.9 %
1000 INTRAVENOUS SOLUTION INTRAVENOUS ONCE
Status: COMPLETED | OUTPATIENT
Start: 2020-06-19 | End: 2020-06-19

## 2020-06-19 RX ORDER — MAGNESIUM SULFATE 1 G/100ML
2 INJECTION INTRAVENOUS ONCE
Status: COMPLETED | OUTPATIENT
Start: 2020-06-19 | End: 2020-06-19

## 2020-06-19 RX ORDER — METOPROLOL TARTRATE 50 MG/1
25 TABLET, FILM COATED ORAL 2 TIMES DAILY
Status: CANCELLED | OUTPATIENT
Start: 2020-06-19

## 2020-06-19 RX ADMIN — LORAZEPAM 1 MG: 2 INJECTION INTRAMUSCULAR; INTRAVENOUS at 20:51

## 2020-06-19 RX ADMIN — SODIUM CHLORIDE 1000 ML: 9 INJECTION, SOLUTION INTRAVENOUS at 19:34

## 2020-06-19 RX ADMIN — LORAZEPAM 1 MG: 2 INJECTION INTRAMUSCULAR; INTRAVENOUS at 22:06

## 2020-06-19 RX ADMIN — MAGNESIUM SULFATE HEPTAHYDRATE 2 G: 1 INJECTION, SOLUTION INTRAVENOUS at 21:10

## 2020-06-19 RX ADMIN — SODIUM CHLORIDE 1000 ML: 9 INJECTION, SOLUTION INTRAVENOUS at 21:12

## 2020-06-19 ASSESSMENT — ENCOUNTER SYMPTOMS
BACK PAIN: 0
NAUSEA: 0
SHORTNESS OF BREATH: 0
TROUBLE SWALLOWING: 0
ABDOMINAL PAIN: 0
VOMITING: 0

## 2020-06-19 NOTE — ED PROVIDER NOTES
[address] [facility]     Emergency Department     Faculty Attestation    I performed a history and physical examination of the patient and discussed management with the resident. I reviewed the residents note and agree with the documented findings and plan of care. Any areas of disagreement are noted on the chart. I was personally present for the key portions of any procedures. I have documented in the chart those procedures where I was not present during the key portions. I have reviewed the emergency nurses triage note. I agree with the chief complaint, past medical history, past surgical history, allergies, medications, social and family history as documented unless otherwise noted below. For Physician Assistant/ Nurse Practitioner cases/documentation I have personally evaluated this patient and have completed at least one if not all key elements of the E/M (history, physical exam, and MDM). Additional findings are as noted. I have personally seen and evaluated the patient. I find the patient's history and physical exam are consistent with the NP/PA documentation. I agree with the care provided, treatment rendered, disposition and follow-up plan. . Medications today in a form of overdose the patient had to be moderately tachypneic as well as hypoxic here is clearly altered and does not respond to questions in an appropriate fashion      Critical Care     Flores Becerra M.D.   Attending Emergency  Physician              Gerda Sanchez MD  06/19/20 7238

## 2020-06-19 NOTE — ED PROVIDER NOTES
Never Used   Substance and Sexual Activity    Alcohol use: No     Alcohol/week: 0.0 standard drinks     Comment: social     Drug use: Yes     Types: Opiates      Comment: reports hisory of heroin abuse, currently receiving treatment with suboxone.  Sexual activity: Not on file   Lifestyle    Physical activity     Days per week: Not on file     Minutes per session: Not on file    Stress: Not on file   Relationships    Social connections     Talks on phone: Not on file     Gets together: Not on file     Attends Yazdanism service: Not on file     Active member of club or organization: Not on file     Attends meetings of clubs or organizations: Not on file     Relationship status: Not on file    Intimate partner violence     Fear of current or ex partner: Not on file     Emotionally abused: Not on file     Physically abused: Not on file     Forced sexual activity: Not on file   Other Topics Concern    Not on file   Social History Narrative    Not on file       I counseled the patient against using tobacco products. Family History   Problem Relation Age of Onset    Cancer Father     Cancer Child      No other pertinent FamHx on review with patient/guardian. Allergies:  Haldol [haloperidol] and Latuda [lurasidone hcl]    Home Medications:  Prior to Admission medications    Medication Sig Start Date End Date Taking?  Authorizing Provider   traZODone (DESYREL) 50 MG tablet Take 1 tablet by mouth nightly as needed for Sleep 3/10/20   Bulmaro Seip, APRN - CNP   cloNIDine (CATAPRES) 0.1 MG tablet Take 1 tablet by mouth 3 times daily as needed for Other (withdrawal) 3/10/20   Bulmaro Seip, APRN - CNP   QUEtiapine (SEROQUEL) 200 MG tablet Take 1 tablet by mouth nightly 3/10/20   Bulmaro Seip, APRN - CNP   folic acid (FOLVITE) 1 MG tablet Take 1 tablet by mouth daily 3/11/20   Bulmaro Seomar, APRN - CNP   vitamin B-1 100 MG tablet Take 1 tablet by mouth daily 3/11/20   Bulmaro Seomar, APRN - CNP   metoprolol tartrate (LOPRESSOR) 25 MG tablet Take 25 mg by mouth 2 times daily    Historical Provider, MD   citalopram (CELEXA) 20 MG tablet Take 1 tablet by mouth daily 7/5/18   Barrett CROWLEY MD   amLODIPine (NORVASC) 10 MG tablet TAKE 1 TABLET BY MOUTH DAILY FOR 30 DAYS. 11/23/15   Karlie Elizabeth MD   omeprazole (PRILOSEC OTC) 20 MG tablet Take 1 tablet by mouth daily. 2/10/15 7/1/15  Yumiko Alvarez MD       REVIEW OF SYSTEMS    (2-9 systems for level 4, 10 ormore for level 5)      Review of Systems   Constitutional: Negative for fever. HENT: Negative for trouble swallowing. Respiratory: Negative for shortness of breath. Cardiovascular: Negative for chest pain. Gastrointestinal: Negative for abdominal pain, nausea and vomiting. Genitourinary: Negative for dysuria. Musculoskeletal: Negative for back pain and neck pain. Neurological: Negative for weakness, numbness and headaches. Hematological: Does not bruise/bleed easily. Psychiatric/Behavioral: Positive for suicidal ideas. Negative for confusion and self-injury. PHYSICAL EXAM   (up to 7 for level 4, 8 or more for level 5)      INITIAL VITALS:   BP (!) 195/159   Pulse 94   Temp 98.6 °F (37 °C) (Axillary)   Resp (!) 33   SpO2 95%     Physical Exam  Constitutional:       General: He is not in acute distress. Appearance: He is not diaphoretic. Comments: Patient oriented to self and place, intermittently somnolent but arousable, answering questions appropriately and following commands. HENT:      Head: Normocephalic and atraumatic. Right Ear: External ear normal.      Left Ear: External ear normal.      Nose: Nose normal. No rhinorrhea. Mouth/Throat:      Mouth: Mucous membranes are moist.      Pharynx: Oropharynx is clear. Eyes:      Conjunctiva/sclera: Conjunctivae normal.      Pupils: Pupils are equal, round, and reactive to light.       Comments: 2 mm, reactive and equal   Neck:      Musculoskeletal: No neck rigidity or muscular tenderness. Cardiovascular:      Rate and Rhythm: Normal rate. Pulses: Normal pulses. Heart sounds: No murmur. Pulmonary:      Breath sounds: Rales (Diffuse upper airway sounds) present. No wheezing. Comments: Tachypneic  Abdominal:      General: There is no distension. Tenderness: There is no abdominal tenderness. Musculoskeletal: Normal range of motion. General: No tenderness. Skin:     General: Skin is warm and dry. Neurological:      General: No focal deficit present. Mental Status: He is alert. Comments: Sensation and muscle strength intact to extremities and face, no obvious focal deficits.          DIFFERENTIAL  DIAGNOSIS     PLAN (LABS / IMAGING / EKG):  Orders Placed This Encounter   Procedures    Culture, Blood 1    Culture, Blood 1    Culture, Urine    XR CHEST PORTABLE    CBC Auto Differential    Troponin    Urinalysis Reflex to Culture    TOX SCR, BLD, ED    Urine Drug Screen    Comprehensive Metabolic Panel    MAGNESIUM    Lactate, Sepsis    PREVIOUS SPECIMEN    Troponin    Protime-INR    APTT    Microscopic Urinalysis    Troponin    Hemoglobin and hematocrit, blood    SODIUM (POC)    POTASSIUM (POC)    CHLORIDE (POC)    CALCIUM, IONIC (POC)    Sitter at bedside   [employer] nursing order (specify)    Inpatient consult to Hospitalist    Venous Blood Gas, POC    Creatinine W/GFR Point of Care    Lactic Acid, POC    POCT Glucose    Anion Gap (Calc) POC    EKG 12 Lead    PATIENT STATUS (FROM ED OR OR/PROCEDURAL) Inpatient    PATIENT STATUS (FROM ED OR OR/PROCEDURAL) Inpatient       MEDICATIONS ORDERED:  Orders Placed This Encounter   Medications    0.9 % sodium chloride bolus    magnesium sulfate 1 g in dextrose 5% 100 mL IVPB    LORazepam (ATIVAN) injection 1 mg    0.9 % sodium chloride bolus    LORazepam (ATIVAN) injection 1 mg    DISCONTD: LORazepam (ATIVAN) injection 2 mg    LORazepam (ATIVAN) injection 2 mg  LORazepam (ATIVAN) injection 2 mg    cloNIDine (CATAPRES) 0.1 MG/24HR 1 patch    LORazepam (ATIVAN) injection 2 mg    DISCONTD: LORazepam (ATIVAN) injection 2 mg    enoxaparin (LOVENOX) injection 1 mg/kg           DIAGNOSTIC RESULTS / EMERGENCY DEPARTMENT COURSE / MDM     LABS:  Results for orders placed or performed during the hospital encounter of 06/19/20   Culture, Blood 1   Result Value Ref Range    Specimen Description . BLOOD     Special Requests LT FA  1ML     Culture NO GROWTH 3 HOURS    Culture, Blood 1   Result Value Ref Range    Specimen Description . BLOOD     Special Requests LEFT HAND 10MLS     Culture NO GROWTH 3 HOURS    CBC Auto Differential   Result Value Ref Range    WBC 18.2 (H) 3.5 - 11.3 k/uL    RBC 4.62 4.21 - 5.77 m/uL    Hemoglobin 13.7 13.0 - 17.0 g/dL    Hematocrit 41.8 40.7 - 50.3 %    MCV 90.5 82.6 - 102.9 fL    MCH 29.7 25.2 - 33.5 pg    MCHC 32.8 28.4 - 34.8 g/dL    RDW 13.8 11.8 - 14.4 %    Platelets 931 139 - 539 k/uL    MPV 10.1 8.1 - 13.5 fL    NRBC Automated 0.0 0.0 per 100 WBC    Differential Type NOT REPORTED     WBC Morphology NOT REPORTED     RBC Morphology NOT REPORTED     Platelet Estimate NOT REPORTED     Immature Granulocytes 0 0 %    Seg Neutrophils 85 (H) 36 - 66 %    Lymphocytes 9 (L) 24 - 44 %    Monocytes 6 1 - 7 %    Eosinophils % 0 (L) 1 - 4 %    Basophils 0 0 - 2 %    Absolute Immature Granulocyte 0.00 0.00 - 0.30 k/uL    Segs Absolute 15.47 (H) 1.8 - 7.7 k/uL    Absolute Lymph # 1.64 1.0 - 4.8 k/uL    Absolute Mono # 1.09 (H) 0.1 - 0.8 k/uL    Absolute Eos # 0.00 0.0 - 0.4 k/uL    Basophils Absolute 0.00 0.0 - 0.2 k/uL    Morphology Normal    Troponin   Result Value Ref Range    Troponin, High Sensitivity 8 0 - 22 ng/L    Troponin T NOT REPORTED <0.03 ng/mL    Troponin Interp NOT REPORTED    Urinalysis Reflex to Culture   Result Value Ref Range    Color, UA YELLOW YELLOW    Turbidity UA CLEAR CLEAR    Glucose, Ur NEGATIVE NEGATIVE    Bilirubin Urine NEGATIVE NEGATIVE    Ketones, Urine NEGATIVE NEGATIVE    Specific Gravity, UA 1.019 1.005 - 1.030    Urine Hgb NEGATIVE NEGATIVE    pH, UA 6.0 5.0 - 8.0    Protein, UA TRACE (A) NEGATIVE    Urobilinogen, Urine Normal Normal    Nitrite, Urine NEGATIVE NEGATIVE    Leukocyte Esterase, Urine NEGATIVE NEGATIVE    Urinalysis Comments NOT REPORTED    TOX SCR, BLD, ED   Result Value Ref Range    Acetaminophen Level <5 (L) 10 - 30 ug/mL    Ethanol <10 <10 mg/dL    Ethanol percent <6.284 <9.921 %    Salicylate Lvl <1 (L) 3 - 10 mg/dL    Toxic Tricyclic Sc,Blood NEGATIVE NEGATIVE   Urine Drug Screen   Result Value Ref Range    Amphetamine Screen, Ur NEGATIVE NEGATIVE    Barbiturate Screen, Ur NEGATIVE NEGATIVE    Benzodiazepine Screen, Urine POSITIVE (A) NEGATIVE    Cocaine Metabolite, Urine POSITIVE (A) NEGATIVE    Methadone Screen, Urine POSITIVE (A) NEGATIVE    Opiates, Urine NEGATIVE NEGATIVE    Phencyclidine, Urine NEGATIVE NEGATIVE    Propoxyphene, Urine NOT REPORTED NEGATIVE    Cannabinoid Scrn, Ur NEGATIVE NEGATIVE    Oxycodone Screen, Ur NEGATIVE NEGATIVE    Methamphetamine, Urine NOT REPORTED NEGATIVE    Tricyclic Antidepressants, Urine NOT REPORTED NEGATIVE    MDMA, Urine NOT REPORTED NEGATIVE    Buprenorphine Urine NOT REPORTED NEGATIVE    Test Information       Assay provides medical screening only. The absence of expected drug(s) and/or metabolite(s) may indicate diluted or adulterated urine, limitations of testing or timing of collection.    Comprehensive Metabolic Panel   Result Value Ref Range    Glucose 119 (H) 70 - 99 mg/dL    BUN 25 (H) 6 - 20 mg/dL    CREATININE 0.97 0.70 - 1.20 mg/dL    Bun/Cre Ratio NOT REPORTED 9 - 20    Calcium 8.8 8.6 - 10.4 mg/dL    Sodium 144 135 - 144 mmol/L    Potassium 4.3 3.7 - 5.3 mmol/L    Chloride 108 (H) 98 - 107 mmol/L    CO2 20 20 - 31 mmol/L    Anion Gap 16 9 - 17 mmol/L    Alkaline Phosphatase 60 40 - 129 U/L    ALT 29 5 - 41 U/L    AST 35 <40 U/L    Total Bilirubin 0.44 1.33 mmol/L   Venous Blood Gas, POC   Result Value Ref Range    pH, [place] 7.426 7.320 - 7.430    pCO2, [place] 36.4 (L) 41.0 - 51.0 mm Hg    pO2, [place] 51.0 (H) 30.0 - 50.0 mm Hg    HCO3, Venous 23.9 22.0 - 29.0 mmol/L    Total CO2, Venous 25 23.0 - 30.0 mmol/L    Negative Base Excess, [place] NOT REPORTED 0.0 - 2.0    Positive Base Excess, [place] 0 0.0 - 3.0    O2 Sat, [place] 87 (H) 60.0 - 85.0 %    O2 Device/Flow/% NOT REPORTED     Wong Test NOT REPORTED     Sample Site NOT REPORTED     Mode NOT REPORTED     FIO2 NOT REPORTED     Pt Temp NOT REPORTED     POC pH Temp NOT REPORTED     POC pCO2 Temp NOT REPORTED mm Hg    POC pO2 Temp NOT REPORTED mm Hg   Creatinine W/GFR Point of Care   Result Value Ref Range    POC Creatinine 0.84 0.51 - 1.19 mg/dL    GFR Comment >60 >60 mL/min    GFR Non-African American >60 >60 mL/min    GFR Comment         Lactic Acid, POC   Result Value Ref Range    POC Lactic Acid 1.05 0.56 - 1.39 mmol/L   POCT Glucose   Result Value Ref Range    POC Glucose 144 (H) 74 - 100 mg/dL   Anion Gap (Calc) POC   Result Value Ref Range    Anion Gap 10 7 - 16 mmol/L         IMPRESSION/MDM/ED COURSE:  49 y.o. male presented with suspected Seroquel overdose per friends who dropped him off. Patient states took 4-5 of his Seroquel, unknown intent, previous history of suicidal ideation and overdose. No external signs of trauma. Patient arrives tachypneic, hypoxic 88% improving to 96% on 2 L nasal cannula. Intermittently somnolent but arousable and answering questions appropriately and following commands. Rales throughout, no focal deficits. No signs to suggest ICH. Patient hypertensive in the 180s over 110s, afebrile. Leukocytosis of 18, added sepsis work-up. Spoke with poison control who advised given Ativan given hypertension, normal magnesium level but replacing with 2 g due to QTC prolongation 559 MS. Will admit to stepdown Inter[facility] with sitter.     Sepsis Times and Checklist  Vital Signs: BP: (!) 195/159

## 2020-06-20 PROBLEM — R94.31 PROLONGED Q-T INTERVAL ON ECG: Status: ACTIVE | Noted: 2020-06-20

## 2020-06-20 PROBLEM — F14.10 COCAINE ABUSE (HCC): Status: ACTIVE | Noted: 2020-06-20

## 2020-06-20 LAB
ABSOLUTE EOS #: <0.03 K/UL (ref 0–0.44)
ABSOLUTE IMMATURE GRANULOCYTE: 0.1 K/UL (ref 0–0.3)
ABSOLUTE LYMPH #: 1.7 K/UL (ref 1.1–3.7)
ABSOLUTE MONO #: 1.46 K/UL (ref 0.1–1.2)
ALBUMIN SERPL-MCNC: 3 G/DL (ref 3.5–5.2)
ALBUMIN/GLOBULIN RATIO: 0.8 (ref 1–2.5)
ALLEN TEST: ABNORMAL
ALP BLD-CCNC: 62 U/L (ref 40–129)
ALT SERPL-CCNC: 25 U/L (ref 5–41)
ANION GAP SERPL CALCULATED.3IONS-SCNC: 13 MMOL/L (ref 9–17)
ANION GAP: 10 MMOL/L (ref 7–16)
AST SERPL-CCNC: 31 U/L
BASOPHILS # BLD: 0 % (ref 0–2)
BASOPHILS ABSOLUTE: 0.06 K/UL (ref 0–0.2)
BILIRUB SERPL-MCNC: 0.33 MG/DL (ref 0.3–1.2)
BUN BLDV-MCNC: 18 MG/DL (ref 6–20)
BUN/CREAT BLD: ABNORMAL (ref 9–20)
CALCIUM IONIZED: 1.22 MMOL/L (ref 1.13–1.33)
CALCIUM SERPL-MCNC: 8.5 MG/DL (ref 8.6–10.4)
CHLORIDE BLD-SCNC: 110 MMOL/L (ref 98–107)
CO2: 19 MMOL/L (ref 20–31)
CREAT SERPL-MCNC: 0.76 MG/DL (ref 0.7–1.2)
CULTURE: NO GROWTH
D-DIMER QUANTITATIVE: 0.79 MG/L FEU
DIFFERENTIAL TYPE: ABNORMAL
EKG ATRIAL RATE: 93 BPM
EKG P AXIS: 72 DEGREES
EKG P-R INTERVAL: 168 MS
EKG Q-T INTERVAL: 450 MS
EKG QRS DURATION: 84 MS
EKG QTC CALCULATION (BAZETT): 559 MS
EKG R AXIS: 50 DEGREES
EKG T AXIS: 50 DEGREES
EKG VENTRICULAR RATE: 93 BPM
EOSINOPHILS RELATIVE PERCENT: 0 % (ref 1–4)
FERRITIN: 148 UG/L (ref 30–400)
FIO2: ABNORMAL
GFR AFRICAN AMERICAN: >60 ML/MIN
GFR NON-AFRICAN AMERICAN: >60 ML/MIN
GFR NON-AFRICAN AMERICAN: >60 ML/MIN
GFR SERPL CREATININE-BSD FRML MDRD: >60 ML/MIN
GFR SERPL CREATININE-BSD FRML MDRD: ABNORMAL ML/MIN/{1.73_M2}
GFR SERPL CREATININE-BSD FRML MDRD: ABNORMAL ML/MIN/{1.73_M2}
GFR SERPL CREATININE-BSD FRML MDRD: NORMAL ML/MIN/{1.73_M2}
GLUCOSE BLD-MCNC: 133 MG/DL (ref 70–99)
GLUCOSE BLD-MCNC: 144 MG/DL (ref 74–100)
HCO3 VENOUS: 23.9 MMOL/L (ref 22–29)
HCT VFR BLD CALC: 42.7 % (ref 40.7–50.3)
HEMOGLOBIN: 13.6 G/DL (ref 13–17)
IMMATURE GRANULOCYTES: 1 %
INR BLD: 1.1
LACTIC ACID, SEPSIS WHOLE BLOOD: 1.2 MMOL/L (ref 0.5–1.9)
LACTIC ACID, SEPSIS: NORMAL MMOL/L (ref 0.5–1.9)
LACTIC ACID, WHOLE BLOOD: 1.4 MMOL/L (ref 0.7–2.1)
LACTIC ACID, WHOLE BLOOD: 1.5 MMOL/L (ref 0.7–2.1)
LACTIC ACID: NORMAL MMOL/L
LYMPHOCYTES # BLD: 10 % (ref 24–43)
Lab: NORMAL
MAGNESIUM: 2.1 MG/DL (ref 1.6–2.6)
MCH RBC QN AUTO: 29.8 PG (ref 25.2–33.5)
MCHC RBC AUTO-ENTMCNC: 31.9 G/DL (ref 28.4–34.8)
MCV RBC AUTO: 93.6 FL (ref 82.6–102.9)
MODE: ABNORMAL
MONOCYTES # BLD: 9 % (ref 3–12)
NEGATIVE BASE EXCESS, VEN: ABNORMAL (ref 0–2)
NRBC AUTOMATED: 0 PER 100 WBC
O2 DEVICE/FLOW/%: ABNORMAL
O2 SAT, VEN: 87 % (ref 60–85)
PATIENT TEMP: ABNORMAL
PCO2, VEN: 36.4 MM HG (ref 41–51)
PDW BLD-RTO: 14.2 % (ref 11.8–14.4)
PH VENOUS: 7.43 (ref 7.32–7.43)
PHOSPHORUS: 2.7 MG/DL (ref 2.5–4.5)
PLATELET # BLD: 175 K/UL (ref 138–453)
PLATELET ESTIMATE: ABNORMAL
PMV BLD AUTO: 10.2 FL (ref 8.1–13.5)
PO2, VEN: 51 MM HG (ref 30–50)
POC CHLORIDE: 113 MMOL/L (ref 98–107)
POC CREATININE: 0.84 MG/DL (ref 0.51–1.19)
POC HEMATOCRIT: 39 % (ref 41–53)
POC HEMOGLOBIN: 13.3 G/DL (ref 13.5–17.5)
POC IONIZED CALCIUM: 1.22 MMOL/L (ref 1.15–1.33)
POC LACTIC ACID: 1.05 MMOL/L (ref 0.56–1.39)
POC PCO2 TEMP: ABNORMAL MM HG
POC PH TEMP: ABNORMAL
POC PO2 TEMP: ABNORMAL MM HG
POC POTASSIUM: 4.3 MMOL/L (ref 3.5–4.5)
POC SODIUM: 147 MMOL/L (ref 138–146)
POSITIVE BASE EXCESS, VEN: 0 (ref 0–3)
POTASSIUM SERPL-SCNC: 4.4 MMOL/L (ref 3.7–5.3)
PROCALCITONIN: 0.74 NG/ML
PROTHROMBIN TIME: 11.2 SEC (ref 9–12)
RBC # BLD: 4.56 M/UL (ref 4.21–5.77)
RBC # BLD: ABNORMAL 10*6/UL
SAMPLE SITE: ABNORMAL
SEG NEUTROPHILS: 80 % (ref 36–65)
SEGMENTED NEUTROPHILS ABSOLUTE COUNT: 13.37 K/UL (ref 1.5–8.1)
SODIUM BLD-SCNC: 142 MMOL/L (ref 135–144)
SPECIMEN DESCRIPTION: NORMAL
TOTAL CO2, VENOUS: 25 MMOL/L (ref 23–30)
TOTAL PROTEIN: 6.9 G/DL (ref 6.4–8.3)
TROPONIN INTERP: NORMAL
TROPONIN T: NORMAL NG/ML
TROPONIN, HIGH SENSITIVITY: 10 NG/L (ref 0–22)
WBC # BLD: 16.7 K/UL (ref 3.5–11.3)
WBC # BLD: ABNORMAL 10*3/UL

## 2020-06-20 PROCEDURE — 6360000002 HC RX W HCPCS: Performed by: NURSE PRACTITIONER

## 2020-06-20 PROCEDURE — 2500000003 HC RX 250 WO HCPCS: Performed by: FAMILY MEDICINE

## 2020-06-20 PROCEDURE — 83605 ASSAY OF LACTIC ACID: CPT

## 2020-06-20 PROCEDURE — 6360000002 HC RX W HCPCS: Performed by: GENERAL PRACTICE

## 2020-06-20 PROCEDURE — 82330 ASSAY OF CALCIUM: CPT

## 2020-06-20 PROCEDURE — 84295 ASSAY OF SERUM SODIUM: CPT

## 2020-06-20 PROCEDURE — 36415 COLL VENOUS BLD VENIPUNCTURE: CPT

## 2020-06-20 PROCEDURE — 87040 BLOOD CULTURE FOR BACTERIA: CPT

## 2020-06-20 PROCEDURE — 2580000003 HC RX 258: Performed by: NURSE PRACTITIONER

## 2020-06-20 PROCEDURE — 84145 PROCALCITONIN (PCT): CPT

## 2020-06-20 PROCEDURE — 80053 COMPREHEN METABOLIC PANEL: CPT

## 2020-06-20 PROCEDURE — 83735 ASSAY OF MAGNESIUM: CPT

## 2020-06-20 PROCEDURE — 2580000003 HC RX 258: Performed by: FAMILY MEDICINE

## 2020-06-20 PROCEDURE — 82803 BLOOD GASES ANY COMBINATION: CPT

## 2020-06-20 PROCEDURE — 6370000000 HC RX 637 (ALT 250 FOR IP): Performed by: NURSE PRACTITIONER

## 2020-06-20 PROCEDURE — 82728 ASSAY OF FERRITIN: CPT

## 2020-06-20 PROCEDURE — 82947 ASSAY GLUCOSE BLOOD QUANT: CPT

## 2020-06-20 PROCEDURE — 2060000000 HC ICU INTERMEDIATE R&B

## 2020-06-20 PROCEDURE — 93010 ELECTROCARDIOGRAM REPORT: CPT | Performed by: INTERNAL MEDICINE

## 2020-06-20 PROCEDURE — 85025 COMPLETE CBC W/AUTO DIFF WBC: CPT

## 2020-06-20 PROCEDURE — 82435 ASSAY OF BLOOD CHLORIDE: CPT

## 2020-06-20 PROCEDURE — 82565 ASSAY OF CREATININE: CPT

## 2020-06-20 PROCEDURE — APPSS60 APP SPLIT SHARED TIME 46-60 MINUTES: Performed by: NURSE PRACTITIONER

## 2020-06-20 PROCEDURE — 85014 HEMATOCRIT: CPT

## 2020-06-20 PROCEDURE — 6360000002 HC RX W HCPCS: Performed by: FAMILY MEDICINE

## 2020-06-20 PROCEDURE — 6360000002 HC RX W HCPCS: Performed by: STUDENT IN AN ORGANIZED HEALTH CARE EDUCATION/TRAINING PROGRAM

## 2020-06-20 PROCEDURE — 85379 FIBRIN DEGRADATION QUANT: CPT

## 2020-06-20 PROCEDURE — 99223 1ST HOSP IP/OBS HIGH 75: CPT | Performed by: FAMILY MEDICINE

## 2020-06-20 PROCEDURE — 84100 ASSAY OF PHOSPHORUS: CPT

## 2020-06-20 PROCEDURE — 85610 PROTHROMBIN TIME: CPT

## 2020-06-20 PROCEDURE — 84132 ASSAY OF SERUM POTASSIUM: CPT

## 2020-06-20 PROCEDURE — 84484 ASSAY OF TROPONIN QUANT: CPT

## 2020-06-20 RX ORDER — SODIUM CHLORIDE 0.9 % (FLUSH) 0.9 %
10 SYRINGE (ML) INJECTION EVERY 12 HOURS SCHEDULED
Status: DISCONTINUED | OUTPATIENT
Start: 2020-06-20 | End: 2020-06-23 | Stop reason: HOSPADM

## 2020-06-20 RX ORDER — AMLODIPINE BESYLATE 10 MG/1
10 TABLET ORAL DAILY
Status: DISCONTINUED | OUTPATIENT
Start: 2020-06-20 | End: 2020-06-23 | Stop reason: HOSPADM

## 2020-06-20 RX ORDER — FOLIC ACID 1 MG/1
1 TABLET ORAL DAILY
Status: DISCONTINUED | OUTPATIENT
Start: 2020-06-20 | End: 2020-06-23 | Stop reason: HOSPADM

## 2020-06-20 RX ORDER — ACETAMINOPHEN 325 MG/1
650 TABLET ORAL EVERY 6 HOURS PRN
Status: DISCONTINUED | OUTPATIENT
Start: 2020-06-20 | End: 2020-06-23 | Stop reason: HOSPADM

## 2020-06-20 RX ORDER — ONDANSETRON 2 MG/ML
4 INJECTION INTRAMUSCULAR; INTRAVENOUS EVERY 6 HOURS PRN
Status: DISCONTINUED | OUTPATIENT
Start: 2020-06-20 | End: 2020-06-23 | Stop reason: HOSPADM

## 2020-06-20 RX ORDER — LORAZEPAM 2 MG/ML
2 INJECTION INTRAMUSCULAR ONCE
Status: DISCONTINUED | OUTPATIENT
Start: 2020-06-20 | End: 2020-06-20

## 2020-06-20 RX ORDER — PROMETHAZINE HYDROCHLORIDE 25 MG/1
12.5 TABLET ORAL EVERY 6 HOURS PRN
Status: DISCONTINUED | OUTPATIENT
Start: 2020-06-20 | End: 2020-06-23 | Stop reason: HOSPADM

## 2020-06-20 RX ORDER — LORAZEPAM 2 MG/ML
2 INJECTION INTRAMUSCULAR ONCE
Status: COMPLETED | OUTPATIENT
Start: 2020-06-20 | End: 2020-06-20

## 2020-06-20 RX ORDER — HYDRALAZINE HYDROCHLORIDE 20 MG/ML
10 INJECTION INTRAMUSCULAR; INTRAVENOUS EVERY 6 HOURS PRN
Status: DISCONTINUED | OUTPATIENT
Start: 2020-06-20 | End: 2020-06-23 | Stop reason: HOSPADM

## 2020-06-20 RX ORDER — SODIUM CHLORIDE 0.9 % (FLUSH) 0.9 %
10 SYRINGE (ML) INJECTION PRN
Status: DISCONTINUED | OUTPATIENT
Start: 2020-06-20 | End: 2020-06-23 | Stop reason: HOSPADM

## 2020-06-20 RX ORDER — LORAZEPAM 2 MG/ML
1 INJECTION INTRAMUSCULAR ONCE
Status: COMPLETED | OUTPATIENT
Start: 2020-06-20 | End: 2020-06-20

## 2020-06-20 RX ORDER — LORAZEPAM 2 MG/ML
1 INJECTION INTRAMUSCULAR EVERY 4 HOURS PRN
Status: DISCONTINUED | OUTPATIENT
Start: 2020-06-20 | End: 2020-06-23 | Stop reason: HOSPADM

## 2020-06-20 RX ORDER — SODIUM CHLORIDE 9 MG/ML
INJECTION, SOLUTION INTRAVENOUS CONTINUOUS
Status: DISCONTINUED | OUTPATIENT
Start: 2020-06-20 | End: 2020-06-23 | Stop reason: HOSPADM

## 2020-06-20 RX ORDER — CLONIDINE 0.1 MG/24H
1 PATCH, EXTENDED RELEASE TRANSDERMAL WEEKLY
Status: DISCONTINUED | OUTPATIENT
Start: 2020-06-20 | End: 2020-06-23 | Stop reason: HOSPADM

## 2020-06-20 RX ORDER — THIAMINE MONONITRATE (VIT B1) 100 MG
100 TABLET ORAL DAILY
Status: DISCONTINUED | OUTPATIENT
Start: 2020-06-20 | End: 2020-06-23 | Stop reason: HOSPADM

## 2020-06-20 RX ORDER — ACETAMINOPHEN 650 MG/1
650 SUPPOSITORY RECTAL EVERY 6 HOURS PRN
Status: DISCONTINUED | OUTPATIENT
Start: 2020-06-20 | End: 2020-06-23 | Stop reason: HOSPADM

## 2020-06-20 RX ORDER — NICOTINE 21 MG/24HR
1 PATCH, TRANSDERMAL 24 HOURS TRANSDERMAL DAILY PRN
Status: DISCONTINUED | OUTPATIENT
Start: 2020-06-20 | End: 2020-06-23 | Stop reason: HOSPADM

## 2020-06-20 RX ADMIN — LORAZEPAM 2 MG: 2 INJECTION INTRAMUSCULAR; INTRAVENOUS at 01:00

## 2020-06-20 RX ADMIN — THIAMINE HYDROCHLORIDE: 100 INJECTION, SOLUTION INTRAMUSCULAR; INTRAVENOUS at 16:56

## 2020-06-20 RX ADMIN — HYDRALAZINE HYDROCHLORIDE 10 MG: 20 INJECTION INTRAMUSCULAR; INTRAVENOUS at 17:16

## 2020-06-20 RX ADMIN — LORAZEPAM 2 MG: 2 INJECTION INTRAMUSCULAR; INTRAVENOUS at 03:18

## 2020-06-20 RX ADMIN — LORAZEPAM 1 MG: 2 INJECTION INTRAMUSCULAR; INTRAVENOUS at 22:12

## 2020-06-20 RX ADMIN — SODIUM CHLORIDE: 9 INJECTION, SOLUTION INTRAVENOUS at 05:48

## 2020-06-20 RX ADMIN — LORAZEPAM 2 MG: 2 INJECTION INTRAMUSCULAR; INTRAVENOUS at 02:31

## 2020-06-20 RX ADMIN — Medication 10 ML: at 16:38

## 2020-06-20 RX ADMIN — AMLODIPINE BESYLATE 10 MG: 10 TABLET ORAL at 09:36

## 2020-06-20 RX ADMIN — Medication 10 ML: at 17:16

## 2020-06-20 RX ADMIN — ENOXAPARIN SODIUM 80 MG: 80 INJECTION SUBCUTANEOUS at 05:09

## 2020-06-20 RX ADMIN — FOLIC ACID 1 MG: 1 TABLET ORAL at 09:36

## 2020-06-20 RX ADMIN — Medication 100 MG: at 09:36

## 2020-06-20 RX ADMIN — LORAZEPAM 2 MG: 2 INJECTION INTRAMUSCULAR; INTRAVENOUS at 04:47

## 2020-06-20 RX ADMIN — CEFTRIAXONE SODIUM 1 G: 1 INJECTION, POWDER, FOR SOLUTION INTRAMUSCULAR; INTRAVENOUS at 16:58

## 2020-06-20 RX ADMIN — LORAZEPAM 1 MG: 2 INJECTION INTRAMUSCULAR at 16:37

## 2020-06-20 NOTE — ED NOTES
Pt continues to move all extremities in bed, pt continues to mumble incomprehensible words while communicating with staff, pt is in direct & constant view of nurse's station and staff, continue to monitor     [employer], [facility]  06/19/20 5367

## 2020-06-20 NOTE — ED NOTES
Khushbu Washington at bedside, report given to her, bilateral soft wrists restraints to arms released, no change in pt's status     [facility]  06/20/20 0388

## 2020-06-20 NOTE — ED NOTES
Unsuccessful IV starts x 2 to right AC & right FA, no change in pt's status     [facility] Tejinder Wyatt, [address]  06/19/20 2036

## 2020-06-20 NOTE — ED NOTES
Dr Jonna Richardson at bedside, updated with loss of IV access, pt continues to not follow commands, mumbles incomprehensible words, pulling at lines & attempting to get out of bed, pt incontinent of urine, belia care done, adult brief changed     [facility], [address] [facility]  06/20/20 5200

## 2020-06-20 NOTE — CARE COORDINATION
close relationship with patient.  Plan is Home vs [facility]- await psych consult        Electronically signed by Bora Ruiz RN on 6/20/20 at 10:28 AM EDT

## 2020-06-20 NOTE — H&P
[facility]    HISTORY AND PHYSICAL EXAMINATION            Date:   6/19/2020  Patient name:  Cynthia Don  Date of admission:  6/19/2020  6:50 PM  MRN:   [id number]  Account:  [de-identified]  YOB: 1965  PCP:    Angel Castro MD  Room:   14/14  Code Status:    Prior    Chief Complaint:     Chief Complaint   Patient presents with    Drug Overdose       History Obtained From:     electronic medical record, reason patient could not give history:  altered mental status and intoxication impairment    History of Present Illness:     Cynthia Don is a 49 y.o. Non-/non  male with a past medical history bipolar disorder, essential hypertension, and opioid dependency who presents with Drug Overdose   and is admitted to the hospital for the management of drug overdose. Patient was dropped off in the emergency room triage with his persons that dropped him off stating he took \"a bunch of Seroquel today\"-Per ER documentation. Upon arrival patient was tachypneic hypoxic's with 80% oxygenation mumbling with intermittent somnolence and incontinent of urine. ER was able to get from him that he took 4-5 Seroquel and would not answer if it was intentional with thoughts of suicide. His work-up in the emergency room revealed a metabolic panel with a sodium of 144, potassium 4.3, chloride 108, CO2 20, BUN 25 creatinine 0.97. Dasha Ziebach His lactic acid was 2.0. His high sensitive troponin was normal at 8. His GI liver panel was unremarkable, his hemogram showed a significant leukocytosis with a white blood cell count of 18.2 but no other acute anemias, his bleeding times were normal, his urinalysis was negative with the exception of trace protein. His urine tox screen was positive for benzodiazepines cocaine and methadone.     His chest x-ray was unremarkable-despite his lung sounds on exam.            Past Medical History:     Past NRBC Automated 0.0 0.0 per 100 WBC    Differential Type NOT REPORTED     WBC Morphology NOT REPORTED     RBC Morphology NOT REPORTED     Platelet Estimate NOT REPORTED     Immature Granulocytes 0 0 %    Seg Neutrophils 85 (H) 36 - 66 %    Lymphocytes 9 (L) 24 - 44 %    Monocytes 6 1 - 7 %    Eosinophils % 0 (L) 1 - 4 %    Basophils 0 0 - 2 %    Absolute Immature Granulocyte 0.00 0.00 - 0.30 k/uL    Segs Absolute 15.47 (H) 1.8 - 7.7 k/uL    Absolute Lymph # 1.64 1.0 - 4.8 k/uL    Absolute Mono # 1.09 (H) 0.1 - 0.8 k/uL    Absolute Eos # 0.00 0.0 - 0.4 k/uL    Basophils Absolute 0.00 0.0 - 0.2 k/uL    Morphology Normal    Troponin    Collection Time: 06/19/20  7:33 PM   Result Value Ref Range    Troponin, High Sensitivity 8 0 - 22 ng/L    Troponin T NOT REPORTED <0.03 ng/mL    Troponin Interp NOT REPORTED    TOX SCR, BLD, ED    Collection Time: 06/19/20  7:33 PM   Result Value Ref Range    Acetaminophen Level <5 (L) 10 - 30 ug/mL    Ethanol <10 <10 mg/dL    Ethanol percent <4.666 <3.798 %    Salicylate Lvl <1 (L) 3 - 10 mg/dL    Toxic Tricyclic Sc,Blood NEGATIVE NEGATIVE   Comprehensive Metabolic Panel    Collection Time: 06/19/20  7:33 PM   Result Value Ref Range    Glucose 119 (H) 70 - 99 mg/dL    BUN 25 (H) 6 - 20 mg/dL    CREATININE 0.97 0.70 - 1.20 mg/dL    Bun/Cre Ratio NOT REPORTED 9 - 20    Calcium 8.8 8.6 - 10.4 mg/dL    Sodium 144 135 - 144 mmol/L    Potassium 4.3 3.7 - 5.3 mmol/L    Chloride 108 (H) 98 - 107 mmol/L    CO2 20 20 - 31 mmol/L    Anion Gap 16 9 - 17 mmol/L    Alkaline Phosphatase 60 40 - 129 U/L    ALT 29 5 - 41 U/L    AST 35 <40 U/L    Total Bilirubin 0.44 0.3 - 1.2 mg/dL    Total Protein 7.2 6.4 - 8.3 g/dL    Alb 3.2 (L) 3.5 - 5.2 g/dL    Albumin/Globulin Ratio 0.8 (L) 1.0 - 2.5    GFR Non-African American >60 >60 mL/min    GFR African American >60 >60 mL/min    GFR Comment          GFR Staging NOT REPORTED    MAGNESIUM    Collection Time: 06/19/20  7:33 PM   Result Value Ref Range Collection Time: 06/19/20  9:12 PM   Result Value Ref Range    -          WBC, UA 0 TO 2 0 - 5 /HPF    RBC, UA None 0 - 4 /HPF    Casts UA  0 - 8 /LPF     2 TO 5 HYALINE Reference range defined for non-centrifuged specimen. Crystals, UA NOT REPORTED None /HPF    Epithelial Cells UA 0 TO 2 0 - 5 /HPF    Renal Epithelial, UA NOT REPORTED 0 /HPF    Bacteria, UA NOT REPORTED None    Mucus, UA NOT REPORTED None    Trichomonas, UA NOT REPORTED None    Amorphous, UA NOT REPORTED None    Other Observations UA NOT REPORTED NOT REQ. Yeast, UA NOT REPORTED None       Imaging/Diagnostics:  Xr Chest Portable    Result Date: 6/19/2020  No acute pulmonary process. Assessment :      Hospital Problems           Last Modified POA    * (Principal) Drug overdose, intentional self-harm, initial encounter ([facility] 6/20/2020 Yes    Uncontrolled hypertension 6/20/2020 Yes    Bipolar disorder ([facility] 6/20/2020 Yes    Cocaine abuse ([facility] 6/20/2020 Yes    Essential hypertension 6/20/2020 Yes    Opioid type dependence, continuous ([facility] (Chronic) 6/20/2020 Yes          Plan:     Patient status inpatient in the Progressive Unit/Step down    1. Given the fact the drug of choice for overdose was Seroquel will continue to monitor EKG and watch for QT prolongation, avoid medications that prolong QT. In addition patient was positive for cocaine. Will watch cardiac markers and EKG. hold Lopressor. 2. Uncontrolled hypertension- patient ingested cocaine, I have concern that his elevated blood pressure may also be secondary to skipped doses of his home clonidine doses, I do not feel it is safe to give him oral medications at this time, therefore will start a patch, since IV access has been lost 3 times. 3. Bipolar disorder- psych consult, seraquel on hold given the potential overdose. 4. Cocaine abuse- patient on bb and will hold given the cocaine use. 5. GI proph  6.  DVT proph    Consultations:   IP CONSULT TO HOSPITALIST  IP CONSULT

## 2020-06-20 NOTE — ED PROVIDER NOTES
[facility] ED  Emergency Department  Emergency Medicine Resident Sign-out     Care of Katharine Stringer was assumed from Dr. Concepción Chilel and is being seen for Drug Overdose   . The patient's initial evaluation and plan have been discussed with the prior provider who initially evaluated the patient.      EMERGENCY DEPARTMENT COURSE / MEDICAL DECISION MAKING:       MEDICATIONS GIVEN:  Orders Placed This Encounter   Medications    0.9 % sodium chloride bolus    magnesium sulfate 1 g in dextrose 5% 100 mL IVPB    LORazepam (ATIVAN) injection 1 mg    0.9 % sodium chloride bolus    LORazepam (ATIVAN) injection 1 mg    DISCONTD: LORazepam (ATIVAN) injection 2 mg    LORazepam (ATIVAN) injection 2 mg    LORazepam (ATIVAN) injection 2 mg    cloNIDine (CATAPRES) 0.1 MG/24HR 1 patch    LORazepam (ATIVAN) injection 2 mg    DISCONTD: LORazepam (ATIVAN) injection 2 mg    enoxaparin (LOVENOX) injection 1 mg/kg       LABS / RADIOLOGY:     Labs Reviewed   CBC WITH AUTO DIFFERENTIAL - Abnormal; Notable for the following components:       Result Value    WBC 18.2 (*)     Seg Neutrophils 85 (*)     Lymphocytes 9 (*)     Eosinophils % 0 (*)     Segs Absolute 15.47 (*)     Absolute Mono # 1.09 (*)     All other components within normal limits   URINE RT REFLEX TO CULTURE - Abnormal; Notable for the following components:    Protein, UA TRACE (*)     All other components within normal limits   TOX SCR, BLD, ED - Abnormal; Notable for the following components:    Acetaminophen Level <5 (*)     Salicylate Lvl <1 (*)     All other components within normal limits   URINE DRUG SCREEN - Abnormal; Notable for the following components:    Benzodiazepine Screen, Urine POSITIVE (*)     Cocaine Metabolite, Urine POSITIVE (*)     Methadone Screen, Urine POSITIVE (*)     All other components within normal limits   COMPREHENSIVE METABOLIC PANEL - Abnormal; Notable for the following components:    Glucose 119 (*)     BUN 25 (*)

## 2020-06-21 ENCOUNTER — APPOINTMENT (OUTPATIENT)
Dept: GENERAL RADIOLOGY | Age: 55
DRG: 817 | End: 2020-06-21
Payer: MEDICARE

## 2020-06-21 LAB
ABSOLUTE EOS #: <0.03 K/UL (ref 0–0.44)
ABSOLUTE IMMATURE GRANULOCYTE: 0.06 K/UL (ref 0–0.3)
ABSOLUTE LYMPH #: 1.87 K/UL (ref 1.1–3.7)
ABSOLUTE MONO #: 1.01 K/UL (ref 0.1–1.2)
BASOPHILS # BLD: 0 % (ref 0–2)
BASOPHILS ABSOLUTE: 0.04 K/UL (ref 0–0.2)
CALCIUM IONIZED: 1.16 MMOL/L (ref 1.13–1.33)
DIFFERENTIAL TYPE: ABNORMAL
EOSINOPHILS RELATIVE PERCENT: 0 % (ref 1–4)
HCT VFR BLD CALC: 44.2 % (ref 40.7–50.3)
HEMOGLOBIN: 14.4 G/DL (ref 13–17)
IMMATURE GRANULOCYTES: 0 %
INR BLD: 1
LACTIC ACID, SEPSIS WHOLE BLOOD: 1 MMOL/L (ref 0.5–1.9)
LACTIC ACID, SEPSIS WHOLE BLOOD: 2.8 MMOL/L (ref 0.5–1.9)
LACTIC ACID, SEPSIS: ABNORMAL MMOL/L (ref 0.5–1.9)
LACTIC ACID, SEPSIS: NORMAL MMOL/L (ref 0.5–1.9)
LYMPHOCYTES # BLD: 14 % (ref 24–43)
MAGNESIUM: 1.9 MG/DL (ref 1.6–2.6)
MCH RBC QN AUTO: 29.5 PG (ref 25.2–33.5)
MCHC RBC AUTO-ENTMCNC: 32.6 G/DL (ref 28.4–34.8)
MCV RBC AUTO: 90.6 FL (ref 82.6–102.9)
MONOCYTES # BLD: 7 % (ref 3–12)
NRBC AUTOMATED: 0 PER 100 WBC
PDW BLD-RTO: 13.7 % (ref 11.8–14.4)
PHOSPHORUS: 2.4 MG/DL (ref 2.5–4.5)
PLATELET # BLD: 227 K/UL (ref 138–453)
PLATELET ESTIMATE: ABNORMAL
PMV BLD AUTO: 10.5 FL (ref 8.1–13.5)
PROTHROMBIN TIME: 10.1 SEC (ref 9–12)
RBC # BLD: 4.88 M/UL (ref 4.21–5.77)
RBC # BLD: ABNORMAL 10*6/UL
SEG NEUTROPHILS: 79 % (ref 36–65)
SEGMENTED NEUTROPHILS ABSOLUTE COUNT: 10.84 K/UL (ref 1.5–8.1)
WBC # BLD: 13.8 K/UL (ref 3.5–11.3)
WBC # BLD: ABNORMAL 10*3/UL

## 2020-06-21 PROCEDURE — 36415 COLL VENOUS BLD VENIPUNCTURE: CPT

## 2020-06-21 PROCEDURE — 2580000003 HC RX 258: Performed by: NURSE PRACTITIONER

## 2020-06-21 PROCEDURE — 87040 BLOOD CULTURE FOR BACTERIA: CPT

## 2020-06-21 PROCEDURE — 2580000003 HC RX 258: Performed by: FAMILY MEDICINE

## 2020-06-21 PROCEDURE — 71045 X-RAY EXAM CHEST 1 VIEW: CPT

## 2020-06-21 PROCEDURE — 99232 SBSQ HOSP IP/OBS MODERATE 35: CPT | Performed by: FAMILY MEDICINE

## 2020-06-21 PROCEDURE — 2060000000 HC ICU INTERMEDIATE R&B

## 2020-06-21 PROCEDURE — 83735 ASSAY OF MAGNESIUM: CPT

## 2020-06-21 PROCEDURE — 83605 ASSAY OF LACTIC ACID: CPT

## 2020-06-21 PROCEDURE — 84100 ASSAY OF PHOSPHORUS: CPT

## 2020-06-21 PROCEDURE — 82330 ASSAY OF CALCIUM: CPT

## 2020-06-21 PROCEDURE — 6370000000 HC RX 637 (ALT 250 FOR IP): Performed by: NURSE PRACTITIONER

## 2020-06-21 PROCEDURE — 85610 PROTHROMBIN TIME: CPT

## 2020-06-21 PROCEDURE — 85025 COMPLETE CBC W/AUTO DIFF WBC: CPT

## 2020-06-21 PROCEDURE — 6360000002 HC RX W HCPCS: Performed by: FAMILY MEDICINE

## 2020-06-21 PROCEDURE — 6360000002 HC RX W HCPCS: Performed by: NURSE PRACTITIONER

## 2020-06-21 RX ORDER — SODIUM CHLORIDE 0.9 % (FLUSH) 0.9 %
10 SYRINGE (ML) INJECTION PRN
Status: DISCONTINUED | OUTPATIENT
Start: 2020-06-21 | End: 2020-06-23 | Stop reason: HOSPADM

## 2020-06-21 RX ORDER — SODIUM CHLORIDE 0.9 % (FLUSH) 0.9 %
10 SYRINGE (ML) INJECTION EVERY 12 HOURS SCHEDULED
Status: DISCONTINUED | OUTPATIENT
Start: 2020-06-21 | End: 2020-06-23 | Stop reason: HOSPADM

## 2020-06-21 RX ORDER — LOPERAMIDE HYDROCHLORIDE 2 MG/1
2 CAPSULE ORAL 4 TIMES DAILY PRN
Status: DISCONTINUED | OUTPATIENT
Start: 2020-06-21 | End: 2020-06-23 | Stop reason: HOSPADM

## 2020-06-21 RX ADMIN — SODIUM CHLORIDE: 9 INJECTION, SOLUTION INTRAVENOUS at 03:12

## 2020-06-21 RX ADMIN — HYDRALAZINE HYDROCHLORIDE 10 MG: 20 INJECTION INTRAMUSCULAR; INTRAVENOUS at 08:48

## 2020-06-21 RX ADMIN — SODIUM CHLORIDE, PRESERVATIVE FREE 10 ML: 5 INJECTION INTRAVENOUS at 08:33

## 2020-06-21 RX ADMIN — Medication 100 MG: at 08:45

## 2020-06-21 RX ADMIN — LORAZEPAM 1 MG: 2 INJECTION INTRAMUSCULAR at 09:10

## 2020-06-21 RX ADMIN — ENOXAPARIN SODIUM 40 MG: 40 INJECTION SUBCUTANEOUS at 08:45

## 2020-06-21 RX ADMIN — AMLODIPINE BESYLATE 10 MG: 10 TABLET ORAL at 08:45

## 2020-06-21 RX ADMIN — FOLIC ACID 1 MG: 1 TABLET ORAL at 08:45

## 2020-06-21 ASSESSMENT — ENCOUNTER SYMPTOMS
CHOKING: 0
VOICE CHANGE: 0
WHEEZING: 0
RHINORRHEA: 0
CONSTIPATION: 0
VOMITING: 0
DIARRHEA: 1
BACK PAIN: 0
SINUS PRESSURE: 0
SHORTNESS OF BREATH: 0
ABDOMINAL PAIN: 0
COUGH: 0
CHEST TIGHTNESS: 0
NAUSEA: 0

## 2020-06-21 ASSESSMENT — PAIN SCALES - GENERAL: PAINLEVEL_OUTOF10: 0

## 2020-06-21 NOTE — PROGRESS NOTES
[facility]      Daily Progress Note     Admit Date: 6/19/2020  Bed/Room No.  2024/2024-01  Admitting Physician : Bhavana Powers MD  Code Status :[address] Day:  LOS: 2 days   Chief Complaint:     Chief Complaint   Patient presents with    Drug Overdose     Principal Problem:    Intentional drug overdose ([address]  Active Problems:    Essential hypertension    Hypertension    Opioid type dependence, continuous ([address]    Bipolar disorder ([address]    Cocaine abuse ([address]    Prolonged Q-T interval on ECG    Hypoxia    Intentional benzodiazepine overdose ([address]  Resolved Problems:    * No resolved hospital problems. *    Subjective : Interval History/Significant events :  06/21/20    Patient is alert, oriented. He does not have any agitation. Patient is having loose bowel movements and diarrhea. He denies any abdominal pain, nausea, vomiting, fever, chills, breathing difficulty. Vitals - Stable afebrile  Labs -elevated lactic acid 2.8. Nursing notes , Consults notes reviewed. Overnight events and updates discussed with Nursing staff . Background History:         Jann Aguilar is 49 y.o. male  Who was admitted to the hospital on 6/19/2020 for treatment of Intentional drug overdose ([address] Patient came to emergency room with altered mental status. Patient reportedly had taken 10 tablets of Seroquel 300 mg. He reported that he has been abusing medication for last 3 years. Patient reports history of chronic insomnia. He also has history of PTSD and bipolar 1 disorder. Patient is on prescription Seroquel at home. He reported that he was out of medications for few days as his partners/roommates were stealing Seroquel and abusing it. He took extra Seroquel as he was having \"withdrawal symptoms \". He was initially hypoxic on arrival with saturation of 80%. Patient was also hypertensive with blood pressure 154/99. Blood pressure increased to 211/101.   He became tachypneic, tachycardic and temperature was 99.1. Patient has history of hep C, heroin abuse, bipolar disorder, hypertension. He also uses cocaine, and had been on methadone, Suboxone before. Patient reports history of heroin abuse and used 3 days before. .  Patient has son but he does not get along with his son or parents. Lab work showed normal electrolytes, kidney function, lactic acid 2.0, leukocytosis 18.2. Drug screen was positive for cocaine, methadone, benzodiazepine. He has known history of alcohol abuse. Alcohol level was negative at admission. PMH:  Past Medical History:   Diagnosis Date    Bipolar 1 disorder ([facility]     Chronic mental illness     Depression     Hepatitis C     Hypertension     Psoriasis       Allergies: Allergies   Allergen Reactions    Haldol [Haloperidol]     Latuda [Lurasidone Hcl] Other (See Comments)     Pt reports muscle spasms and fatigue      Medications :  sodium chloride flush, 10 mL, Intravenous, 2 times per day  amLODIPine, 10 mg, Oral, Daily  folic acid, 1 mg, Oral, Daily  thiamine, 100 mg, Oral, Daily  sodium chloride flush, 10 mL, Intravenous, 2 times per day  enoxaparin, 40 mg, Subcutaneous, Daily  cloNIDine, 1 patch, Transdermal, Weekly  cefTRIAXone (ROCEPHIN) IV, 1 g, Intravenous, Q24H        Review of Systems   Review of Systems   Constitutional: Negative for activity change, appetite change, fatigue, fever and unexpected weight change. HENT: Negative for congestion, nosebleeds, rhinorrhea, sinus pressure, sneezing and voice change. Respiratory: Negative for cough, choking, chest tightness, shortness of breath and wheezing. Cardiovascular: Negative for chest pain, palpitations and leg swelling. Gastrointestinal: Positive for diarrhea. Negative for abdominal pain, constipation, nausea and vomiting. Genitourinary: Negative for difficulty urinating, discharge, dysuria, frequency and testicular pain. Musculoskeletal: Negative for back pain.    Skin: mis-transcribed.)      Morenita Tomlin MD  6/21/2020

## 2020-06-21 NOTE — PLAN OF CARE
Problem: Falls - Risk of:  Goal: Will remain free from falls  Description: Will remain free from falls  6/21/2020 1727 by Jagruti Avalos RN  Outcome: Met This Shift  6/21/2020 1120 by Jagruti Avalos RN  Outcome: Ongoing  Goal: Absence of physical injury  Description: Absence of physical injury  6/21/2020 1727 by Jagruti Avalos RN  Outcome: Met This Shift  6/21/2020 1120 by Jagruti Avalos RN  Outcome: Ongoing     Problem: Injury - Risk of, Physical Injury:  Goal: Will remain free from falls  Description: Will remain free from falls  6/21/2020 1727 by Jagruti Avalos RN  Outcome: Met This Shift  6/21/2020 1120 by Jagruti Avalos RN  Outcome: Ongoing  Goal: Absence of physical injury  Description: Absence of physical injury  6/21/2020 1727 by Jagruti Avalos RN  Outcome: Met This Shift  6/21/2020 1120 by Jagruti Avalos RN  Outcome: Ongoing    Pt remained free of falls this shift. Bed remains in lowest position, with 2/4 side rails up and all wheels locked. Non skid socks on. Bedside table and call light within reach. Pt calls out appropriately. Will continue to monitor.     Electronically signed by Jagruti Avalos RN on 6/21/2020 at 5:28 PM

## 2020-06-22 VITALS
RESPIRATION RATE: 14 BRPM | SYSTOLIC BLOOD PRESSURE: 167 MMHG | WEIGHT: 158.95 LBS | BODY MASS INDEX: 24.17 KG/M2 | TEMPERATURE: 97.7 F | DIASTOLIC BLOOD PRESSURE: 99 MMHG | OXYGEN SATURATION: 95 % | HEART RATE: 51 BPM

## 2020-06-22 LAB
ABSOLUTE EOS #: 0.03 K/UL (ref 0–0.44)
ABSOLUTE IMMATURE GRANULOCYTE: 0.09 K/UL (ref 0–0.3)
ABSOLUTE LYMPH #: 2.08 K/UL (ref 1.1–3.7)
ABSOLUTE MONO #: 0.96 K/UL (ref 0.1–1.2)
BASOPHILS # BLD: 0 % (ref 0–2)
BASOPHILS ABSOLUTE: 0.05 K/UL (ref 0–0.2)
CALCIUM IONIZED: 1.22 MMOL/L (ref 1.13–1.33)
CULTURE: ABNORMAL
DIFFERENTIAL TYPE: ABNORMAL
EOSINOPHILS RELATIVE PERCENT: 0 % (ref 1–4)
HCT VFR BLD CALC: 45.1 % (ref 40.7–50.3)
HEMOGLOBIN: 14.9 G/DL (ref 13–17)
IMMATURE GRANULOCYTES: 1 %
INR BLD: 1
LYMPHOCYTES # BLD: 14 % (ref 24–43)
Lab: ABNORMAL
MAGNESIUM: 2 MG/DL (ref 1.6–2.6)
MCH RBC QN AUTO: 29.3 PG (ref 25.2–33.5)
MCHC RBC AUTO-ENTMCNC: 33 G/DL (ref 28.4–34.8)
MCV RBC AUTO: 88.6 FL (ref 82.6–102.9)
MONOCYTES # BLD: 7 % (ref 3–12)
NRBC AUTOMATED: 0 PER 100 WBC
PDW BLD-RTO: 13.4 % (ref 11.8–14.4)
PHOSPHORUS: 2.9 MG/DL (ref 2.5–4.5)
PLATELET # BLD: 273 K/UL (ref 138–453)
PLATELET ESTIMATE: ABNORMAL
PMV BLD AUTO: 10.3 FL (ref 8.1–13.5)
PROTHROMBIN TIME: 10.4 SEC (ref 9–12)
RBC # BLD: 5.09 M/UL (ref 4.21–5.77)
RBC # BLD: ABNORMAL 10*6/UL
SARS-COV-2, PCR: NORMAL
SARS-COV-2, RAPID: NOT DETECTED
SARS-COV-2: NORMAL
SEG NEUTROPHILS: 78 % (ref 36–65)
SEGMENTED NEUTROPHILS ABSOLUTE COUNT: 11.39 K/UL (ref 1.5–8.1)
SOURCE: NORMAL
SPECIMEN DESCRIPTION: ABNORMAL
WBC # BLD: 14.6 K/UL (ref 3.5–11.3)
WBC # BLD: ABNORMAL 10*3/UL

## 2020-06-22 PROCEDURE — 85610 PROTHROMBIN TIME: CPT

## 2020-06-22 PROCEDURE — 85025 COMPLETE CBC W/AUTO DIFF WBC: CPT

## 2020-06-22 PROCEDURE — 83735 ASSAY OF MAGNESIUM: CPT

## 2020-06-22 PROCEDURE — 90792 PSYCH DIAG EVAL W/MED SRVCS: CPT | Performed by: NURSE PRACTITIONER

## 2020-06-22 PROCEDURE — 84100 ASSAY OF PHOSPHORUS: CPT

## 2020-06-22 PROCEDURE — 6360000002 HC RX W HCPCS: Performed by: FAMILY MEDICINE

## 2020-06-22 PROCEDURE — 36415 COLL VENOUS BLD VENIPUNCTURE: CPT

## 2020-06-22 PROCEDURE — 2580000003 HC RX 258: Performed by: FAMILY MEDICINE

## 2020-06-22 PROCEDURE — 6370000000 HC RX 637 (ALT 250 FOR IP): Performed by: NURSE PRACTITIONER

## 2020-06-22 PROCEDURE — 99232 SBSQ HOSP IP/OBS MODERATE 35: CPT | Performed by: FAMILY MEDICINE

## 2020-06-22 PROCEDURE — U0002 COVID-19 LAB TEST NON-CDC: HCPCS

## 2020-06-22 PROCEDURE — 82330 ASSAY OF CALCIUM: CPT

## 2020-06-22 RX ORDER — IBUPROFEN 800 MG/1
800 TABLET ORAL EVERY 8 HOURS PRN
Status: CANCELLED | OUTPATIENT
Start: 2020-06-22

## 2020-06-22 RX ORDER — ACETAMINOPHEN 325 MG/1
650 TABLET ORAL EVERY 4 HOURS PRN
Status: CANCELLED | OUTPATIENT
Start: 2020-06-22

## 2020-06-22 RX ORDER — ONDANSETRON 4 MG/1
4 TABLET, FILM COATED ORAL
Status: CANCELLED | OUTPATIENT
Start: 2020-06-22 | End: 2020-06-22

## 2020-06-22 RX ORDER — DIPHENHYDRAMINE HYDROCHLORIDE 50 MG/ML
50 INJECTION INTRAMUSCULAR; INTRAVENOUS ONCE
Status: COMPLETED | OUTPATIENT
Start: 2020-06-22 | End: 2020-06-22

## 2020-06-22 RX ORDER — LORAZEPAM 1 MG/1
1 TABLET ORAL EVERY 6 HOURS PRN
Status: CANCELLED | OUTPATIENT
Start: 2020-06-22

## 2020-06-22 RX ORDER — HALOPERIDOL 5 MG/ML
1 INJECTION INTRAMUSCULAR ONCE
Status: DISCONTINUED | OUTPATIENT
Start: 2020-06-22 | End: 2020-06-22

## 2020-06-22 RX ORDER — NICOTINE 21 MG/24HR
1 PATCH, TRANSDERMAL 24 HOURS TRANSDERMAL DAILY
Status: CANCELLED | OUTPATIENT
Start: 2020-06-23

## 2020-06-22 RX ORDER — CYCLOBENZAPRINE HCL 10 MG
10 TABLET ORAL 3 TIMES DAILY PRN
Status: CANCELLED | OUTPATIENT
Start: 2020-06-22

## 2020-06-22 RX ORDER — LORAZEPAM 2 MG/ML
1 INJECTION INTRAMUSCULAR EVERY 4 HOURS PRN
Status: DISCONTINUED | OUTPATIENT
Start: 2020-06-22 | End: 2020-06-23 | Stop reason: HOSPADM

## 2020-06-22 RX ORDER — TRAZODONE HYDROCHLORIDE 50 MG/1
50 TABLET ORAL NIGHTLY PRN
Status: CANCELLED | OUTPATIENT
Start: 2020-06-23

## 2020-06-22 RX ORDER — LOPERAMIDE HYDROCHLORIDE 2 MG/1
2 CAPSULE ORAL 4 TIMES DAILY PRN
Status: CANCELLED | OUTPATIENT
Start: 2020-06-22

## 2020-06-22 RX ORDER — MAGNESIUM HYDROXIDE/ALUMINUM HYDROXICE/SIMETHICONE 120; 1200; 1200 MG/30ML; MG/30ML; MG/30ML
30 SUSPENSION ORAL EVERY 6 HOURS PRN
Status: CANCELLED | OUTPATIENT
Start: 2020-06-22

## 2020-06-22 RX ORDER — DIPHENHYDRAMINE HCL 25 MG
50 TABLET ORAL EVERY 6 HOURS PRN
Status: CANCELLED | OUTPATIENT
Start: 2020-06-22

## 2020-06-22 RX ORDER — LORAZEPAM 2 MG/ML
0.5 INJECTION INTRAMUSCULAR EVERY 4 HOURS PRN
Status: DISCONTINUED | OUTPATIENT
Start: 2020-06-22 | End: 2020-06-23 | Stop reason: HOSPADM

## 2020-06-22 RX ORDER — LORAZEPAM 2 MG/ML
1 INJECTION INTRAMUSCULAR ONCE
Status: COMPLETED | OUTPATIENT
Start: 2020-06-22 | End: 2020-06-22

## 2020-06-22 RX ORDER — BENZTROPINE MESYLATE 1 MG/ML
2 INJECTION INTRAMUSCULAR; INTRAVENOUS 2 TIMES DAILY PRN
Status: CANCELLED | OUTPATIENT
Start: 2020-06-22

## 2020-06-22 RX ORDER — LORAZEPAM 2 MG/ML
2 INJECTION INTRAMUSCULAR ONCE
Status: COMPLETED | OUTPATIENT
Start: 2020-06-22 | End: 2020-06-22

## 2020-06-22 RX ORDER — DICYCLOMINE HYDROCHLORIDE 10 MG/1
20 CAPSULE ORAL 4 TIMES DAILY PRN
Status: CANCELLED | OUTPATIENT
Start: 2020-06-22

## 2020-06-22 RX ADMIN — SODIUM CHLORIDE, PRESERVATIVE FREE 10 ML: 5 INJECTION INTRAVENOUS at 21:01

## 2020-06-22 RX ADMIN — LORAZEPAM 1 MG: 2 INJECTION INTRAMUSCULAR at 17:47

## 2020-06-22 RX ADMIN — LORAZEPAM 2 MG: 2 INJECTION INTRAMUSCULAR at 18:44

## 2020-06-22 RX ADMIN — FOLIC ACID 1 MG: 1 TABLET ORAL at 08:28

## 2020-06-22 RX ADMIN — Medication 100 MG: at 08:28

## 2020-06-22 RX ADMIN — AMLODIPINE BESYLATE 10 MG: 10 TABLET ORAL at 08:28

## 2020-06-22 RX ADMIN — DIPHENHYDRAMINE HYDROCHLORIDE 50 MG: 50 INJECTION INTRAMUSCULAR; INTRAVENOUS at 18:44

## 2020-06-22 RX ADMIN — LORAZEPAM 1 MG: 2 INJECTION INTRAMUSCULAR at 08:24

## 2020-06-22 RX ADMIN — DIPHENHYDRAMINE HYDROCHLORIDE 50 MG: 50 INJECTION, SOLUTION INTRAMUSCULAR; INTRAVENOUS at 13:39

## 2020-06-22 RX ADMIN — LORAZEPAM 1 MG: 2 INJECTION INTRAMUSCULAR; INTRAVENOUS at 13:39

## 2020-06-22 RX ADMIN — LORAZEPAM 1 MG: 2 INJECTION INTRAMUSCULAR at 02:00

## 2020-06-22 RX ADMIN — LORAZEPAM 1 MG: 2 INJECTION INTRAMUSCULAR at 12:15

## 2020-06-22 ASSESSMENT — ENCOUNTER SYMPTOMS
SINUS PRESSURE: 0
CHEST TIGHTNESS: 0
BACK PAIN: 0
CONSTIPATION: 0
WHEEZING: 0
NAUSEA: 0
CHOKING: 0
VOICE CHANGE: 0
RHINORRHEA: 0
ABDOMINAL PAIN: 0
SHORTNESS OF BREATH: 0
DIARRHEA: 1
VOMITING: 0
COUGH: 0

## 2020-06-22 NOTE — PROGRESS NOTES
[facility]      Daily Progress Note     Admit Date: 6/19/2020  Bed/Room No.  2024/2024-01  Admitting Physician : Neli Gottlieb MD  Code Status :[address] Day:  LOS: 3 days   Chief Complaint:     Chief Complaint   Patient presents with    Drug Overdose     Principal Problem:    Intentional drug overdose ([address]  Active Problems:    Essential hypertension    Hypertension    Opioid type dependence, continuous ([address]    Bipolar disorder ([address]    Cocaine abuse ([address]    Prolonged Q-T interval on ECG    Hypoxia    Intentional benzodiazepine overdose ([address]  Resolved Problems:    * No resolved hospital problems. *    Subjective : Interval History/Significant events :  06/22/20    Patient is alert, oriented. He is feeling anxious to get out of the hospital. He says that he did not sleep last night as he uses seroquel at home for insomnia . Patient says that he needs to get out of the hospital as he has dog at home alone and he does not want the dog to die. He is eating and drinking OK . Vitals - Stable afebrile  Labs - improved white count,     Nursing notes , Consults notes reviewed. Overnight events and updates discussed with Nursing staff . Background History:         Samy Ingram is 49 y.o. male  Who was admitted to the hospital on 6/19/2020 for treatment of Intentional drug overdose ([address] Patient came to emergency room with altered mental status. Patient reportedly had taken 10 tablets of Seroquel 300 mg. He reported that he has been abusing medication for last 3 years. Patient reports history of chronic insomnia. He also has history of PTSD and bipolar 1 disorder. Patient is on prescription Seroquel at home. He reported that he was out of medications for few days as his partners/roommates were stealing Seroquel and abusing it. He took extra Seroquel as he was having \"withdrawal symptoms \". He was initially hypoxic on arrival with saturation of 80%.   Patient was also hypertensive with blood pressure 154/99. Blood pressure increased to 211/101. He became tachypneic, tachycardic and temperature was 99.1. Patient has history of hep C, heroin abuse, bipolar disorder, hypertension. He also uses cocaine, and had been on methadone, Suboxone before. Patient reports history of heroin abuse and used 3 days before. .  Patient has son but he does not get along with his son or parents. Lab work showed normal electrolytes, kidney function, lactic acid 2.0, leukocytosis 18.2. Drug screen was positive for cocaine, methadone, benzodiazepine. He has known history of alcohol abuse. Alcohol level was negative at admission. PMH:  Past Medical History:   Diagnosis Date    B[facility] 1 disorder ([facility]     Chronic mental illness     Depression     Hepatitis C     Hypertension     Psoriasis       Allergies: Allergies   Allergen Reactions    Haldol [Haloperidol]     Latuda [Lurasidone Hcl] Other (See Comments)     Pt reports muscle spasms and fatigue      Medications :  sodium chloride flush, 10 mL, Intravenous, 2 times per day  amLODIPine, 10 mg, Oral, Daily  folic acid, 1 mg, Oral, Daily  thiamine, 100 mg, Oral, Daily  sodium chloride flush, 10 mL, Intravenous, 2 times per day  enoxaparin, 40 mg, Subcutaneous, Daily  cloNIDine, 1 patch, Transdermal, Weekly  cefTRIAXone (ROCEPHIN) IV, 1 g, Intravenous, Q24H        Review of Systems   Review of Systems   Constitutional: Negative for activity change, appetite change, fatigue, fever and unexpected weight change. HENT: Negative for congestion, nosebleeds, rhinorrhea, sinus pressure, sneezing and voice change. Respiratory: Negative for cough, choking, chest tightness, shortness of breath and wheezing. Cardiovascular: Negative for chest pain, palpitations and leg swelling. Gastrointestinal: Positive for diarrhea. Negative for abdominal pain, constipation, nausea and vomiting.    Genitourinary: Negative for difficulty urinating, discharge, dysuria, frequency and testicular pain. Musculoskeletal: Negative for back pain. Skin: Negative for rash. Neurological: Negative for dizziness, weakness, light-headedness and headaches. Hematological: Does not bruise/bleed easily. Psychiatric/Behavioral: Negative for agitation, behavioral problems, confusion, self-injury, sleep disturbance and suicidal ideas. Objective :      Current Vitals : Temp: 97.7 °F (36.5 °C),  Pulse: 61, Resp: 14, BP: (!) 149/95, SpO2: 95 %  Last 24 Hrs Vitals   Patient Vitals for the past 24 hrs:   BP Temp Temp src Pulse Resp SpO2   06/22/20 0747 (!) 149/95 97.7 °F (36.5 °C) Oral 61 14 95 %   06/22/20 0539 -- -- -- 57 -- --   06/22/20 0530 (!) 152/91 98.1 °F (36.7 °C) Oral 54 19 --   06/21/20 1950 (!) 154/93 98 °F (36.7 °C) Oral 65 18 --   06/21/20 1553 (!) 155/97 97.7 °F (36.5 °C) Oral 69 19 98 %   06/21/20 1200 130/80 98.2 °F (36.8 °C) Oral 65 20 98 %     Intake / output   06/21 0701 - 06/22 0700  In: 2120.7 [P.O.:550; I.V.:1570.7]  Out: 0   Physical Exam:  Physical Exam  Vitals signs and nursing note reviewed. Constitutional:       General: He is not in acute distress. Appearance: He is not diaphoretic. HENT:      Head: Normocephalic and atraumatic. Nose:      Right Sinus: No maxillary sinus tenderness or frontal sinus tenderness. Left Sinus: No maxillary sinus tenderness or frontal sinus tenderness. Mouth/Throat:      Pharynx: No oropharyngeal exudate. Eyes:      General: No scleral icterus. Conjunctiva/sclera: Conjunctivae normal.      Pupils: Pupils are equal, round, and reactive to light. Neck:      Musculoskeletal: Full passive range of motion without pain and neck supple. Thyroid: No thyromegaly. Vascular: No JVD. Cardiovascular:      Rate and Rhythm: Normal rate and regular rhythm. Pulses:           Dorsalis pedis pulses are 2+ on the right side and 2+ on the left side.       Heart sounds: Normal

## 2020-06-22 NOTE — FLOWSHEET NOTE
Post conversation between campus Police and patient regarding [address], patient is agreeable to take IV Ativan and Benadryl ordered as one time order per Dr. Mike Ferguson. Patient made Lewis Vergara aware that Haldol has induced seizures in the past and patient can not take this. Allergy list updated and Haldol order discontinued.

## 2020-06-22 NOTE — FLOWSHEET NOTE
Nurse spoke with Dr. Gasper Chavis from [facility] unit.   After review of patient's case, Dr. Fuad Mccormack is willing to take this patient as a transfer to I unit at [facility].

## 2020-06-22 NOTE — FLOWSHEET NOTE
Security advised that patient has a \"Pink Slip\" in place and that Dr. Lakesha Angela was waiting to speak with Psychiatric provider to see if patient could follow-up outpatient vs. Being involuntarily transferred to [facility] unit at [address] made author aware to notify them if patient attempts to leave room/unit.

## 2020-06-22 NOTE — VIRTUAL HEALTH
Consults  Patient Location:  [place] [address]    [facility]      Department of Psychiatry  Consult Service  Nurse Practitioner Psychiatric Assessment      Thank you very much for allowing us to participate in the care of this patient. Reason for Consult:  Intentional Drug Overdose      History obtained from:  patient, electronic medical record    HISTORY OF PRESENT ILLNESS:          The patient is a 49 y.o. male with significant past medical history of Bipolar Disorder with Depression, Opioid Dependence, HTN, Hepatitis C and multiple drug overdoses who is admitted medically for treatment of intentional Seroquel overdose. Patient is seen via tele psych. Patient's last admission to the [facility] was on 3/8/2020. He was dropped off at the [address] [place] [address] [facility] after ingesting four to five Seroquel; while in the ER, he refused to reply if the act was accidental or intentional self-harm. Patient did show signs of voiding self, tachypnia and hypoxia denying other symptoms. He has a history of [facility] admissions, non-compliance with medications and follow-up, homelessness and substance abuse. Patient's urine was positive for benzos, cocaine and methadone. Patient is seen lying in his hospital bed without clothes, draped in a blanket. He stated that he takes Seroquel 100mg and 200mg and that he accidentally took extra Seroquel trying to go to sleep. He then reported that he took ten 100mg Seroquel because he wanted to get some sleep. He reported that he is having issues with his Son and it was his birthday so he was upset because they do not have a relationship. Also, he stated that his roommate called EMS because he was afraid that he was being evicted by [employer] [facility]. Patient has two dogs that he states have been home alone because he did lock his roommate out of the apartment.  He denied SI, HI, hallucinations and depression endorsing PTSD and anxiety related to

## 2020-06-22 NOTE — FLOWSHEET NOTE
Message sent to Dr. Tere Monahan, making provider aware that Tele-Psych session was completed and that per note in patient's chart, Psych recommended \"Pink Slipping\" patient to [facility] unit once medically cleared by provider. Provider also made aware of patient recently being released from four point restraints on 6/21/20 at 1600 and history of verbal threats towards staff while in restraints. Patient continues to ask when he will be released from hospital, stating, \"I have two dogs at home that have been locked up since Saturday, I am leaving today. \"  This statement was made after Jordan Arredondo suggested that  be notified to help take care of the dogs while the patient was in hospital.  No response received from Dr. Tere Monahan from message sent at [phone].

## 2020-06-22 NOTE — PROGRESS NOTES
RAPID Covid 19 swab taken from right nare, labeled, placed in red dot bag, and handed off to second healthcare worker outside of room for transport to laboratory per hospital policy and procedure. Patient tolerated procedure well. 2

## 2020-06-22 NOTE — FLOWSHEET NOTE
Author received call from  in ED. Caller states she attempted to contact patient via telephone in 2024, but was unable to get through. Caller made aware patient is on suicide precautions, and could not have a telephone in room.  made author aware that she will be up to see patient once second  is on ED unit.

## 2020-06-22 NOTE — FLOWSHEET NOTE
Reviewed with Dr. Isac Evans patient's treatment plan. Received instructions from Dr. Tessa Magdaleno to continue to proceed with transfer to [facility] per Psychiatric recommendation.

## 2020-06-23 ENCOUNTER — HOSPITAL ENCOUNTER (INPATIENT)
Age: 55
LOS: 1 days | Discharge: HOME | DRG: 753 | End: 2020-06-24
Attending: PSYCHIATRY & NEUROLOGY | Admitting: PSYCHIATRY & NEUROLOGY
Payer: MEDICARE

## 2020-06-23 PROCEDURE — 6370000000 HC RX 637 (ALT 250 FOR IP): Performed by: INTERNAL MEDICINE

## 2020-06-23 PROCEDURE — 1240000000 HC EMOTIONAL WELLNESS R&B

## 2020-06-23 PROCEDURE — 90792 PSYCH DIAG EVAL W/MED SRVCS: CPT | Performed by: NURSE PRACTITIONER

## 2020-06-23 PROCEDURE — 99253 IP/OBS CNSLTJ NEW/EST LOW 45: CPT | Performed by: INTERNAL MEDICINE

## 2020-06-23 PROCEDURE — 6370000000 HC RX 637 (ALT 250 FOR IP): Performed by: NURSE PRACTITIONER

## 2020-06-23 RX ORDER — HYDROCHLOROTHIAZIDE 25 MG/1
25 TABLET ORAL DAILY
Status: DISCONTINUED | OUTPATIENT
Start: 2020-06-23 | End: 2020-06-24 | Stop reason: HOSPADM

## 2020-06-23 RX ORDER — DICYCLOMINE HCL 20 MG
20 TABLET ORAL 4 TIMES DAILY PRN
Status: DISCONTINUED | OUTPATIENT
Start: 2020-06-23 | End: 2020-06-24 | Stop reason: HOSPADM

## 2020-06-23 RX ORDER — IBUPROFEN 800 MG/1
800 TABLET ORAL EVERY 8 HOURS PRN
Status: DISCONTINUED | OUTPATIENT
Start: 2020-06-23 | End: 2020-06-24 | Stop reason: HOSPADM

## 2020-06-23 RX ORDER — BENZTROPINE MESYLATE 1 MG/ML
2 INJECTION INTRAMUSCULAR; INTRAVENOUS 2 TIMES DAILY PRN
Status: DISCONTINUED | OUTPATIENT
Start: 2020-06-23 | End: 2020-06-24 | Stop reason: HOSPADM

## 2020-06-23 RX ORDER — CITALOPRAM 20 MG/1
20 TABLET ORAL DAILY
Status: DISCONTINUED | OUTPATIENT
Start: 2020-06-23 | End: 2020-06-24 | Stop reason: HOSPADM

## 2020-06-23 RX ORDER — TRAZODONE HYDROCHLORIDE 50 MG/1
50 TABLET ORAL NIGHTLY PRN
Status: DISCONTINUED | OUTPATIENT
Start: 2020-06-23 | End: 2020-06-24 | Stop reason: HOSPADM

## 2020-06-23 RX ORDER — ACETAMINOPHEN 325 MG/1
650 TABLET ORAL EVERY 4 HOURS PRN
Status: DISCONTINUED | OUTPATIENT
Start: 2020-06-23 | End: 2020-06-24 | Stop reason: HOSPADM

## 2020-06-23 RX ORDER — LORAZEPAM 1 MG/1
1 TABLET ORAL EVERY 6 HOURS PRN
Status: DISCONTINUED | OUTPATIENT
Start: 2020-06-23 | End: 2020-06-24 | Stop reason: HOSPADM

## 2020-06-23 RX ORDER — ONDANSETRON 4 MG/1
4 TABLET, FILM COATED ORAL
Status: ACTIVE | OUTPATIENT
Start: 2020-06-23 | End: 2020-06-23

## 2020-06-23 RX ORDER — QUETIAPINE FUMARATE 200 MG/1
400 TABLET, FILM COATED ORAL NIGHTLY
Status: DISCONTINUED | OUTPATIENT
Start: 2020-06-23 | End: 2020-06-24 | Stop reason: HOSPADM

## 2020-06-23 RX ORDER — CYCLOBENZAPRINE HCL 10 MG
10 TABLET ORAL 3 TIMES DAILY PRN
Status: DISCONTINUED | OUTPATIENT
Start: 2020-06-23 | End: 2020-06-24 | Stop reason: HOSPADM

## 2020-06-23 RX ORDER — DIPHENHYDRAMINE HCL 25 MG
50 TABLET ORAL EVERY 6 HOURS PRN
Status: DISCONTINUED | OUTPATIENT
Start: 2020-06-23 | End: 2020-06-24 | Stop reason: HOSPADM

## 2020-06-23 RX ORDER — LOPERAMIDE HYDROCHLORIDE 2 MG/1
2 CAPSULE ORAL 4 TIMES DAILY PRN
Status: DISCONTINUED | OUTPATIENT
Start: 2020-06-23 | End: 2020-06-24 | Stop reason: HOSPADM

## 2020-06-23 RX ORDER — NICOTINE 21 MG/24HR
1 PATCH, TRANSDERMAL 24 HOURS TRANSDERMAL DAILY
Status: DISCONTINUED | OUTPATIENT
Start: 2020-06-23 | End: 2020-06-24 | Stop reason: HOSPADM

## 2020-06-23 RX ORDER — AMLODIPINE BESYLATE 10 MG/1
10 TABLET ORAL DAILY
Status: DISCONTINUED | OUTPATIENT
Start: 2020-06-23 | End: 2020-06-24 | Stop reason: HOSPADM

## 2020-06-23 RX ORDER — MAGNESIUM HYDROXIDE/ALUMINUM HYDROXICE/SIMETHICONE 120; 1200; 1200 MG/30ML; MG/30ML; MG/30ML
30 SUSPENSION ORAL EVERY 6 HOURS PRN
Status: DISCONTINUED | OUTPATIENT
Start: 2020-06-23 | End: 2020-06-24 | Stop reason: HOSPADM

## 2020-06-23 RX ADMIN — DIPHENHYDRAMINE HCL 50 MG: 25 TABLET ORAL at 00:42

## 2020-06-23 RX ADMIN — LORAZEPAM 1 MG: 1 TABLET ORAL at 00:42

## 2020-06-23 RX ADMIN — CYCLOBENZAPRINE 10 MG: 10 TABLET, FILM COATED ORAL at 20:52

## 2020-06-23 RX ADMIN — CYCLOBENZAPRINE 10 MG: 10 TABLET, FILM COATED ORAL at 00:43

## 2020-06-23 RX ADMIN — DICYCLOMINE HYDROCHLORIDE 20 MG: 20 TABLET ORAL at 00:42

## 2020-06-23 RX ADMIN — AMLODIPINE BESYLATE 10 MG: 10 TABLET ORAL at 12:24

## 2020-06-23 RX ADMIN — DIPHENHYDRAMINE HCL 50 MG: 25 TABLET ORAL at 20:52

## 2020-06-23 RX ADMIN — QUETIAPINE FUMARATE 400 MG: 200 TABLET ORAL at 20:52

## 2020-06-23 RX ADMIN — TRAZODONE HYDROCHLORIDE 50 MG: 50 TABLET ORAL at 20:52

## 2020-06-23 ASSESSMENT — SLEEP AND FATIGUE QUESTIONNAIRES
DIFFICULTY ARISING: NO
DIFFICULTY FALLING ASLEEP: YES
DIFFICULTY STAYING ASLEEP: YES
SLEEP PATTERN: DIFFICULTY FALLING ASLEEP
DO YOU HAVE DIFFICULTY SLEEPING: YES
RESTFUL SLEEP: NO

## 2020-06-23 ASSESSMENT — LIFESTYLE VARIABLES: HISTORY_ALCOHOL_USE: COMMENT

## 2020-06-23 ASSESSMENT — PATIENT HEALTH QUESTIONNAIRE - PHQ9: SUM OF ALL RESPONSES TO PHQ QUESTIONS 1-9: 15

## 2020-06-23 NOTE — ED NOTES
Writer spoke with Officer Madison Huggins of [facility] [employer] & relayed patient's statement that dog has been without food & water for 3 days & that patient refused Basim's offers to have several people assist dog. Officer Madison Huggins stated he'll speak with patient's  & remove the dog.       Denver Quinn, MARTHA, LSW  06/23/20 5557

## 2020-06-23 NOTE — PROGRESS NOTES
BHI Biopsychosocial Assessment    Current Level of Psychosocial Functioning     Independent X   Dependent    Minimal Assist     Comments:    Psychosocial High Risk Factors (check all that apply)    Unable to obtain meds   Chronic illness/pain    Substance abuse X   Lack of Family Support   Financial stress   Isolation   Inadequate Community Resources  Suicide attempt(s)  Not taking medications   Victim of crime   Developmental Delay  Unable to manage personal needs    Age 73 or older   Homeless  No transportation   Readmission within 30 days  Unemployment X   Traumatic Event    Comments:   Psychiatric Advanced Directives:  N/A   Family to Involve in Treatment:   No BRENDA for family signed. Sexual Orientation:    Heterosexual   Patient Strengths:  Pt is independent in self-care activities. Patient Barriers:   Pt is unmotivated. Opiate Education Provided:  N/A   CMHC/mental health history:  [facility] for mental health, [address] [place] [address] for MAT   Plan of Care   medication management, group/individual therapies, family meetings, psycho -education, treatment team meetings to assist with stabilization    Initial Discharge Plan:    Pt is planning to return home and continue with [facility] for mental health, [facility] for MAT. Clinical Summary:    Pt is a 49 y.o.  male who presented to the ED after overdosing on Seroquel. Pt reports that he took 10 Seroquel to sleep and was not intending to harm himself. Pt denied current SI, HI, and hallucinations. Pt was oriented to time, place, person, and situation. Pt was evasive during assessment. Pt is linked with [facility] and [address] [place] [address]. Pt has a supportive father and brother. Pt's mother is  and his adult son is estranged from him. Pt denied trauma hx. When asked about AOD use pt reported he is \"myrtle\" sober and that he \"don't wanna talk about it\". Legal issues denied. Pt is planning to return home and continue with [facility] for mental health, [address] [place] [address] for MAT.

## 2020-06-23 NOTE — ED NOTES
Writer contacted [facility] [employer], left voicemail with patient's address/dog information & asked for return call. Message left with cruelty officer Ehsan Mcguire.       MARTHA Todd, [place]  06/23/20 [id number]

## 2020-06-23 NOTE — PLAN OF CARE
Problem: Altered Mood, Depressive Behavior:  Goal: Able to verbalize and/or display a decrease in depressive symptoms  Description: Able to verbalize and/or display a decrease in depressive symptoms  6/23/2020 1641 by Mike Morris RN  Outcome: Ongoing     Patient denies suicidal and homicidal ideations. Patient denies hallucinations. Patient remains in behavioral control and compliant with medications. Patient is irritable at times. Patient consuming meals, reports adequate sleep, and has not attended any groups this shift. Patient is social with select groups and has appropriate interactions with staff. Patient has remained safe and free from injury/harm. Will continue safety checks per provider order.

## 2020-06-23 NOTE — BH NOTE
On call provider, Seth Rodriguez NP, notified of best practice advisory suggesting to place patient on suicide precautions. Provider to discontinue the order as patient does not meet criteria for suicide precautions at this time. Continue to observe patient on every 15 minute checks.

## 2020-06-23 NOTE — BH NOTE
`[facility]  Admission Note     Admission Type:   Admission Type: Involuntary    Reason for admission:  Reason for Admission: Patient overdosed on Seroquel. He said it was unintentional and just wanted to sleep.  He is denying suicidal thoughts    PATIENT STRENGTHS:  Strengths: No significant Physical Illness    Patient Strengths and Limitations:  Limitations: Inappropriate/potentially harmful leisure interests    Addictive Behavior:   Addictive Behavior  In the past 3 months, have you felt or has someone told you that you have a problem with:  : None  Do you have a history of Chemical Use?: No  Do you have a history of Alcohol Use?: (unknown)  Do you have a history of Street Drug Abuse?: Yes  Histroy of Prescripton Drug Abuse?: (unknown)    Medical Problems:   Past Medical History:   Diagnosis Date    B[facility] disorder ([facility] [address] [facility]     Chronic mental illness     Depression     Hepatitis C     Hypertension     Psoriasis        Status EXAM:  Status and Exam  Normal: No  Facial Expression: Avoids Gaze, Flat  Affect: Blunt  Level of Consciousness: Somnolent  Mood:Normal: No  Mood: Depressed, Irritable  Motor Activity:Normal: No  Motor Activity: Decreased  Interview Behavior: Irritable(refused to answer interview tonight)  Preception: [place] to Person, [place] to Place, [place] to Situation  Attention:Normal: No  Attention: Distractible, Unable to Concentrate  Thought Processes: Circumstantial  Thought Content:Normal: Yes  Delusions: No  Memory:Normal: No  Memory: Poor Recent, Poor Remote  Insight and Judgment: No  Insight and Judgment: Poor Judgment, Poor Insight, Unmotivated  Present Suicidal Ideation: No  Present Homicidal Ideation: No    Pt admitted with followings belongings:  Dentures: None  Vision - Corrective Lenses: None  Hearing Aid: None  Jewelry: None  Body Piercings Removed: N/A  Clothing: Pants, Shirt(hospital gown, pants)  Were All Patient Medications Collected?: Not Applicable  Other Valuables: None     Belongings left at [facility], security to brought to unit after admission. Belongings bagged due to bed bugs. Patient oriented to surroundings and program expectations and copy of patient rights given. Received admission packet. Consents reviewed,  refused. Patient verbalized understanding. Patient overdosed on 1,000mg Seroquel. Pt reports it was not intentional suicide attempt, he just wanted to sleep. Depressed over his son's birthday that he does not talk to. Daily alcohol and heroin use.  Last Admit 3/2020                 Carroll Guzman RN

## 2020-06-24 VITALS
HEIGHT: 68 IN | RESPIRATION RATE: 16 BRPM | HEART RATE: 67 BPM | WEIGHT: 155 LBS | SYSTOLIC BLOOD PRESSURE: 140 MMHG | BODY MASS INDEX: 23.49 KG/M2 | TEMPERATURE: 97.2 F | DIASTOLIC BLOOD PRESSURE: 86 MMHG

## 2020-06-24 PROCEDURE — 6370000000 HC RX 637 (ALT 250 FOR IP): Performed by: NURSE PRACTITIONER

## 2020-06-24 PROCEDURE — 99238 HOSP IP/OBS DSCHRG MGMT 30/<: CPT | Performed by: NURSE PRACTITIONER

## 2020-06-24 PROCEDURE — 6370000000 HC RX 637 (ALT 250 FOR IP): Performed by: INTERNAL MEDICINE

## 2020-06-24 RX ORDER — CITALOPRAM 20 MG/1
20 TABLET ORAL DAILY
Qty: 30 TABLET | Refills: 0 | Status: SHIPPED | OUTPATIENT
Start: 2020-06-24 | End: 2021-08-18 | Stop reason: SDUPTHER

## 2020-06-24 RX ORDER — TRAZODONE HYDROCHLORIDE 50 MG/1
50 TABLET ORAL NIGHTLY PRN
Qty: 30 TABLET | Refills: 0 | Status: SHIPPED | OUTPATIENT
Start: 2020-06-24 | End: 2021-08-18 | Stop reason: SDUPTHER

## 2020-06-24 RX ORDER — QUETIAPINE FUMARATE 400 MG/1
400 TABLET, FILM COATED ORAL NIGHTLY
Qty: 30 TABLET | Refills: 0 | Status: SHIPPED | OUTPATIENT
Start: 2020-06-24 | End: 2021-08-18

## 2020-06-24 RX ORDER — AMLODIPINE BESYLATE 10 MG/1
TABLET ORAL
Qty: 30 TABLET | Refills: 0 | Status: SHIPPED | OUTPATIENT
Start: 2020-06-24 | End: 2021-08-18 | Stop reason: SDUPTHER

## 2020-06-24 RX ORDER — HYDROCHLOROTHIAZIDE 25 MG/1
25 TABLET ORAL DAILY
Qty: 30 TABLET | Refills: 0 | Status: SHIPPED | OUTPATIENT
Start: 2020-06-25 | End: 2022-10-06

## 2020-06-24 RX ADMIN — AMLODIPINE BESYLATE 10 MG: 10 TABLET ORAL at 08:30

## 2020-06-24 RX ADMIN — HYDROCHLOROTHIAZIDE 25 MG: 25 TABLET ORAL at 08:30

## 2020-06-24 RX ADMIN — CITALOPRAM HYDROBROMIDE 20 MG: 20 TABLET ORAL at 08:30

## 2020-06-24 NOTE — PLAN OF CARE
Problem: Altered Mood, Depressive Behavior:  Goal: Able to verbalize and/or display a decrease in depressive symptoms  Description: Able to verbalize and/or display a decrease in depressive symptoms  6/23/2020 2126 by Hi Erwin RN  Outcome: Ongoing  Note: Patient presented calm and cooperative. He took a shower and stated he felt better. Will continue to monitor and prove q15 min safety checks. Problem: Substance Abuse:  Goal: Absence of drug withdrawal signs and symptoms  Description: Absence of drug withdrawal signs and symptoms  6/23/2020 2126 by Hi Erwin RN  Outcome: Ongoing     Problem: Falls - Risk of:  Goal: Will remain free from falls  Description: Will remain free from falls  6/23/2020 2126 by Hi Erwin RN  Outcome: Ongoing     Problem: Depressive Behavior With or Without Suicide Precautions:  Goal: Ability to disclose and discuss suicidal ideas will improve  Description: Ability to disclose and discuss suicidal ideas will improve  6/23/2020 2126 by Hi Erwin RN  Outcome: Ongoing  Note: Patient denied and Suicidal ideations. Out and social I n the breakroom.  Will continue to monitor and provide q15 min safety checks

## 2020-06-24 NOTE — H&P
PAST MEDICAL HISTORY     Past Medical History:   Diagnosis Date    Bipolar 1 disorder ([facility] [address]     Chronic mental illness     Depression     Hepatitis C     Hypertension     Psoriasis      Pt denies any history of Diabetes mellitus type 2, stroke, heart disease, COPD, Asthma, GERD, HLD, Cancer, Seizures,Thyroid disease, Kidney Disease, TB.    SURGICAL HISTORY       Past Surgical History:   Procedure Laterality Date    BRAIN SURGERY      at age 37       FAMILY HISTORY       Family History   Problem Relation Age of Onset    Cancer Father     Cancer Child        SOCIAL HISTORY       Social History     Socioeconomic History    Marital status: Single     Spouse name: None    Number of children: None    Years of education: None    Highest education level: None   Occupational History    None   Social Needs    Financial resource strain: None    Food insecurity     Worry: None     Inability: None    Transportation needs     Medical: None     Non-medical: None   Tobacco Use    Smoking status: Current Every Day Smoker     Packs/day: 1.00     Years: 15.00     Pack years: 15.00     Types: Cigarettes    Smokeless tobacco: Never Used   Substance and Sexual Activity    Alcohol use: No     Alcohol/week: 0.0 standard drinks     Comment: social     Drug use: Yes     Types: Opiates      Comment: reports hisory of heroin abuse, currently receiving treatment with suboxone.      Sexual activity: None   Lifestyle    Physical activity     Days per week: None     Minutes per session: None    Stress: None   Relationships    Social connections     Talks on phone: None     Gets together: None     Attends [employer] service: None     Active member of club or organization: None     Attends meetings of clubs or organizations: None     Relationship status: None    Intimate partner violence     Fear of current or ex partner: None     Emotionally abused: None     Physically abused: None     Forced sexual activity: None

## 2020-06-24 NOTE — CONSULTS
H&P done.   MARIUM WAYNE, JILLIAN - CNP on 6/24/2020 at 2:08 PM
[facility] [F31.9] 03/08/2020    Essential hypertension [I10] 04/23/2014    Hepatitis C [B19.20]        Plan:     1. Hypertension, controlled, will start home dose of Norvasc 10 mg, will not start Lopressor, with patient history of cocaine abuse, will add hydrochlorothiazide 25  2. Low-salt diet  3. Polysubstance abuse chronic alcoholism, likely attributed to uncontrolled hypertension  4. Patient need to quit cocaine    Consultations:   IP CONSULT TO HOSPITALIST  IP CONSULT TO INTERNAL MEDICINE      Chico Harvey MD  6/23/2020  6:17 PM    Copy sent to Dr. Jacquelin Huang MD    Please note that this chart was generated using voice recognition Dragon dictation software. Although every effort was made to ensure the accuracy of this automated transcription, some errors in transcription may have occurred.

## 2020-06-24 NOTE — BH NOTE
Patient given tobacco quitline number 21553636385 at this time, refusing to call at this time, states \" I just dont want to quit now\"- patient given information as to the dangers of long term tobacco use. Continue to reinforce the importance of tobacco cessation.

## 2020-06-24 NOTE — PLAN OF CARE
Problem: Depressive Behavior With or Without Suicide Precautions:  Goal: Ability to disclose and discuss suicidal ideas will improve  Description: Ability to disclose and discuss suicidal ideas will improve  Outcome: Ongoing     Problem: Falls - Risk of:  Goal: Will remain free from falls  Description: Will remain free from falls  Outcome: Ongoing  Patient denies thoughts of self harm and is agreeable to seeking out staff should thoughts of self harm arise. Safe environment maintained. Pt remains free of falls and verbalizes understanding of individual fall risks. Pt wearing non skid footwear and encouraged to seek out staff for any assistance needed. 15 minute checks for safety continued per unit policy. Will continue to monitor for safety and provide support and reassurance as needed.

## 2020-06-25 LAB
CULTURE: NORMAL
Lab: NORMAL
SPECIMEN DESCRIPTION: NORMAL

## 2020-06-25 NOTE — CARE COORDINATION
Name: Slime Alfaro    : 1965    Discharge Date: 20    Primary Auth/Cert #:[id number]    Discharged home     Discharge Medications:      Medication List      START taking these medications    hydroCHLOROthiazide 25 MG tablet  Commonly known as:  HYDRODIURIL  Take 1 tablet by mouth daily  Notes to patient: For Blood Pressure        CHANGE how you take these medications    QUEtiapine 400 MG tablet  Commonly known as:  SEROQUEL  Take 1 tablet by mouth nightly  What changed:    · medication strength  · how much to take  Notes to patient:  Mood Stabilizer        CONTINUE taking these medications    amLODIPine 10 MG tablet  Commonly known as:  NORVASC  TAKE 1 TABLET BY MOUTH DAILY FOR 30 DAYS. Notes to patient: For Blood Pressure     citalopram 20 MG tablet  Commonly known as:  CeleXA  Take 1 tablet by mouth daily  Notes to patient:  Mood Stabilizer     folic acid 1 MG tablet  Commonly known as:  FOLVITE  Take 1 tablet by mouth daily  Notes to patient:  vitamin     thiamine 100 MG tablet  Take 1 tablet by mouth daily  Notes to patient:  Vitamin     traZODone 50 MG tablet  Commonly known as:  DESYREL  Take 1 tablet by mouth nightly as needed for Sleep  Notes to patient: For Sleep        STOP taking these medications    cloNIDine 0.1 MG tablet  Commonly known as:  CATAPRES  Notes to patient:  For Heart     metoprolol tartrate 25 MG tablet  Commonly known as:  LOPRESSOR  Notes to patient:   For Blood pressure           Where to Get Your Medications      These medications were sent to [facility], [address] [facility],  [address] [address]    Phone:  [phone]   · amLODIPine 10 MG tablet  · citalopram 20 MG tablet  · hydroCHLOROthiazide 25 MG tablet  · QUEtiapine 400 MG tablet  · traZODone 50 MG tablet         Follow Up Appointment: MD Tom Segura [id number] [facility]

## 2020-06-26 LAB
CULTURE: NORMAL
CULTURE: NORMAL
Lab: NORMAL
Lab: NORMAL
SPECIMEN DESCRIPTION: NORMAL
SPECIMEN DESCRIPTION: NORMAL

## 2020-06-26 NOTE — DISCHARGE SUMMARY
temperature was 99.1.  Patient has history of hep C, heroin abuse, bipolar disorder, hypertension.  He also uses cocaine, and had been on methadone, Suboxone before. Patient reports history of heroin abuse and used 3 days before. Patrick Roche has son but he does not get along with his son or parents. Daisy Lerner work showed normal electrolytes, kidney function, lactic acid 2.0, leukocytosis 18. 2.  Drug screen was positive for cocaine, methadone, benzodiazepine. Steve Rosen has known history of alcohol abuse.  Alcohol level was negative at admission. Mental status improved in 24 hrs. patient was evaluated by Psych and recommended inpatient admission to Psychiatric unit for furthrt treatment of depression. He was transferred to [facility] at  [facility]. Significant therapeutic interventions:   1. Drug overdose of unknown intent -  Seroquel, trazodone, Celexa held . Psychiatric consultation and recommend inpatient Psych treatment for Overdose. Patient is medically cleared and transferred to [facility] involuntarily with pink slip. 2. Toxic encephalopathy secondary to Seroquel use. CT head pending. 3. Alcohol abuse with acute withdrawal symptoms. Ativan as needed. 4. Heroin abuse. 5. Polysubstance abuse -monitor for withdrawal symptoms. 6. Uncontrolled hypertension -Ativan as needed  7. Chronic hep C  8. Bipolar disorder -psychiatry consult. 9. Leukocytosis -empiric antibiotic stop antibiotics . 10. SIRS -empiric antibiotic, Rocephin. 6. Diarrhea-Imodium as needed.     Significant Diagnostic Studies:   Labs / Micro:/Radiology  Recent Labs     06/22/20  0502 06/21/20  0452 06/20/20  0637   WBC 14.6* 13.8* 16.7*   HGB 14.9 14.4 13.6   HCT 45.1 44.2 42.7   MCV 88.6 90.6 93.6    227 175     Labs Renal Latest Ref Rng & Units 6/20/2020 6/19/2020 3/9/2020 12/13/2019 11/20/2018   BUN 6 - 20 mg/dL 18 25(H) 25(H) 10 30(H)   Cr 0.70 - 1.20 mg/dL 0.76 0.97 0.88 0.72 0.68(L)   K 3.7 - 5.3 mmol/L 4.4 4.3 4.9 4.1 4.2   Na 135 - 144 mmol/L 142 144 143 139 142     Lab Results   Component Value Date    ALT 25 06/20/2020    AST 31 06/20/2020    ALKPHOS 62 06/20/2020    BILITOT 0.33 06/20/2020     Lab Results   Component Value Date    TSH 0.50 03/09/2020     Lab Results   Component Value Date    HEPAIGM NONREACTIVE 01/11/2018    HEPBIGM NONREACTIVE 01/11/2018    HEPCAB REACTIVE (A) 01/11/2018     Lab Results   Component Value Date    COLORU YELLOW 06/19/2020    NITRU NEGATIVE 06/19/2020    GLUCOSEU NEGATIVE 06/19/2020    GLUCOSEU NEGATIVE 12/04/2011    KETUA NEGATIVE 06/19/2020    UROBILINOGEN Normal 06/19/2020    BILIRUBINUR NEGATIVE 06/19/2020    BILIRUBINUR NEGATIVE 12/04/2011     Lab Results   Component Value Date    LABA1C 5.2 03/09/2020     Lab Results   Component Value Date     03/09/2020     Lab Results   Component Value Date    INR 1.0 06/22/2020    INR 1.0 06/21/2020    INR 1.1 06/20/2020    PROTIME 10.4 06/22/2020    PROTIME 10.1 06/21/2020    PROTIME 11.2 06/20/2020       Xr Chest (single View Frontal)    Result Date: 6/21/2020  No acute findings. Xr Chest Portable    Result Date: 6/19/2020  No acute pulmonary process. Consultations:    Consults:     Final Specialist Recommendations/Findings:   IP CONSULT TO HOSPITALIST  IP CONSULT TO PSYCHIATRY  IP CONSULT TO SOCIAL WORK      The patient was seen and examined on day of discharge and this discharge summary is in conjunction with any daily progress note from day of discharge. Discharge plan:     Disposition: BHI    Physician Follow Up:     Ashlee Alves MD  [address]  [phone]             Requiring Further Evaluation/Follow Up POST HOSPITALIZATION/Incidental Findings: f/u with psychiatry     Diet: regular diet    Activity: As tolerated. Instructions to Patient: follow up with Psych.      Discharge Medications:      Medication List      CONTINUE taking these medications    folic acid 1 MG tablet  Commonly known as:  FOLVITE  Take 1 tablet

## 2020-06-27 LAB
CULTURE: NORMAL
Lab: NORMAL
SPECIMEN DESCRIPTION: NORMAL

## 2020-10-11 ENCOUNTER — HOSPITAL ENCOUNTER (INPATIENT)
Age: 55
LOS: 1 days | Discharge: LEFT BEFORE DISCHARGE | DRG: 816 | End: 2020-10-12
Attending: EMERGENCY MEDICINE | Admitting: INTERNAL MEDICINE
Payer: MEDICARE

## 2020-10-11 ENCOUNTER — APPOINTMENT (OUTPATIENT)
Dept: CT IMAGING | Age: 55
DRG: 816 | End: 2020-10-11
Payer: MEDICARE

## 2020-10-11 ENCOUNTER — APPOINTMENT (OUTPATIENT)
Dept: GENERAL RADIOLOGY | Age: 55
DRG: 816 | End: 2020-10-11
Payer: MEDICARE

## 2020-10-11 PROBLEM — E87.29 INCREASED ANION GAP METABOLIC ACIDOSIS: Status: ACTIVE | Noted: 2020-10-11

## 2020-10-11 PROBLEM — E87.20 METABOLIC ACIDOSIS: Status: ACTIVE | Noted: 2020-10-11

## 2020-10-11 LAB
-: NORMAL
ABSOLUTE EOS #: 0 K/UL (ref 0–0.4)
ABSOLUTE IMMATURE GRANULOCYTE: 0.24 K/UL (ref 0–0.3)
ABSOLUTE LYMPH #: 2.67 K/UL (ref 1–4.8)
ABSOLUTE MONO #: 1.22 K/UL (ref 0.1–0.8)
ACETAMINOPHEN LEVEL: <5 UG/ML (ref 10–30)
ALBUMIN SERPL-MCNC: 4.6 G/DL (ref 3.5–5.2)
ALBUMIN/GLOBULIN RATIO: 0.8 (ref 1–2.5)
ALLEN TEST: ABNORMAL
ALLEN TEST: ABNORMAL
ALP BLD-CCNC: 143 U/L (ref 40–129)
ALT SERPL-CCNC: 80 U/L (ref 5–41)
AMORPHOUS: NORMAL
ANION GAP SERPL CALCULATED.3IONS-SCNC: 20 MMOL/L (ref 9–17)
ANION GAP: 8 MMOL/L (ref 7–16)
AST SERPL-CCNC: 75 U/L
ATYPICAL LYMPHOCYTE ABSOLUTE COUNT: 0.24 K/UL
ATYPICAL LYMPHOCYTES: 1 %
BACTERIA: NORMAL
BASOPHILS # BLD: 0 % (ref 0–2)
BASOPHILS ABSOLUTE: 0 K/UL (ref 0–0.2)
BILIRUB SERPL-MCNC: 0.5 MG/DL (ref 0.3–1.2)
BILIRUBIN URINE: NEGATIVE
BNP INTERPRETATION: ABNORMAL
BUN BLDV-MCNC: 42 MG/DL (ref 6–20)
BUN/CREAT BLD: ABNORMAL (ref 9–20)
CALCIUM SERPL-MCNC: 11.2 MG/DL (ref 8.6–10.4)
CARBOXYHEMOGLOBIN: 2.3 % (ref 0–5)
CASTS UA: NORMAL /LPF (ref 0–8)
CHLORIDE BLD-SCNC: 95 MMOL/L (ref 98–107)
CO2: 18 MMOL/L (ref 20–31)
COLOR: ABNORMAL
COMMENT UA: ABNORMAL
CREAT SERPL-MCNC: 2.95 MG/DL (ref 0.7–1.2)
CRYSTALS, UA: NORMAL /HPF
DIFFERENTIAL TYPE: ABNORMAL
EOSINOPHILS RELATIVE PERCENT: 0 % (ref 1–4)
EPITHELIAL CELLS UA: NORMAL /HPF (ref 0–5)
ETHANOL PERCENT: <0.01 %
ETHANOL: <10 MG/DL
FIO2: ABNORMAL
FIO2: ABNORMAL
GFR AFRICAN AMERICAN: 27 ML/MIN
GFR NON-AFRICAN AMERICAN: 18 ML/MIN
GFR NON-AFRICAN AMERICAN: 22 ML/MIN
GFR SERPL CREATININE-BSD FRML MDRD: 21 ML/MIN
GFR SERPL CREATININE-BSD FRML MDRD: ABNORMAL ML/MIN/{1.73_M2}
GLUCOSE BLD-MCNC: 175 MG/DL (ref 74–100)
GLUCOSE BLD-MCNC: 182 MG/DL (ref 70–99)
GLUCOSE URINE: NEGATIVE
HCO3 VENOUS: 20 MMOL/L (ref 22–29)
HCO3 VENOUS: 21.8 MMOL/L (ref 24–30)
HCT VFR BLD CALC: 54.5 % (ref 40.7–50.3)
HEMOGLOBIN: 17.8 G/DL (ref 13–17)
HIV AG/AB: NONREACTIVE
IMMATURE GRANULOCYTES: 1 %
INR BLD: 1
KETONES, URINE: NEGATIVE
LACTIC ACID, SEPSIS WHOLE BLOOD: 1.9 MMOL/L (ref 0.5–1.9)
LACTIC ACID, SEPSIS: NORMAL MMOL/L (ref 0.5–1.9)
LACTIC ACID, WHOLE BLOOD: 3.2 MMOL/L (ref 0.7–2.1)
LACTIC ACID: ABNORMAL MMOL/L
LEUKOCYTE ESTERASE, URINE: NEGATIVE
LIPASE: 64 U/L (ref 13–60)
LYMPHOCYTES # BLD: 11 % (ref 24–44)
MAGNESIUM: 2.5 MG/DL (ref 1.6–2.6)
MCH RBC QN AUTO: 27.9 PG (ref 25.2–33.5)
MCHC RBC AUTO-ENTMCNC: 32.7 G/DL (ref 28.4–34.8)
MCV RBC AUTO: 85.4 FL (ref 82.6–102.9)
METHEMOGLOBIN: ABNORMAL % (ref 0–1.5)
MODE: ABNORMAL
MODE: ABNORMAL
MONOCYTES # BLD: 5 % (ref 1–7)
MORPHOLOGY: NORMAL
MUCUS: NORMAL
NEGATIVE BASE EXCESS, VEN: 3.8 MMOL/L (ref 0–2)
NEGATIVE BASE EXCESS, VEN: 4 (ref 0–2)
NITRITE, URINE: NEGATIVE
NOTIFICATION TIME: ABNORMAL
NOTIFICATION: ABNORMAL
NRBC AUTOMATED: 0 PER 100 WBC
O2 DEVICE/FLOW/%: ABNORMAL
O2 DEVICE/FLOW/%: ABNORMAL
O2 SAT, VEN: 50.6 % (ref 60–85)
O2 SAT, VEN: 75 % (ref 60–85)
OTHER OBSERVATIONS UA: NORMAL
OXYHEMOGLOBIN: ABNORMAL % (ref 95–98)
PARTIAL THROMBOPLASTIN TIME: 30.1 SEC (ref 20.5–30.5)
PATIENT TEMP: 37
PATIENT TEMP: ABNORMAL
PCO2, VEN, TEMP ADJ: ABNORMAL MMHG (ref 39–55)
PCO2, VEN: 33.1 MM HG (ref 41–51)
PCO2, VEN: 43.2 (ref 39–55)
PDW BLD-RTO: 14.6 % (ref 11.8–14.4)
PEEP/CPAP: ABNORMAL
PH UA: 6.5 (ref 5–8)
PH VENOUS: 7.32 (ref 7.32–7.42)
PH VENOUS: 7.39 (ref 7.32–7.43)
PH, VEN, TEMP ADJ: ABNORMAL (ref 7.32–7.42)
PLATELET # BLD: 491 K/UL (ref 138–453)
PLATELET ESTIMATE: ABNORMAL
PMV BLD AUTO: 9.8 FL (ref 8.1–13.5)
PO2, VEN, TEMP ADJ: ABNORMAL MMHG (ref 30–50)
PO2, VEN: 32.2 (ref 30–50)
PO2, VEN: 40.2 MM HG (ref 30–50)
POC CHLORIDE: 112 MMOL/L (ref 98–107)
POC CREATININE: 3.64 MG/DL (ref 0.51–1.19)
POC HEMATOCRIT: 63 % (ref 41–53)
POC HEMOGLOBIN: 21.4 G/DL (ref 13.5–17.5)
POC IONIZED CALCIUM: 1.17 MMOL/L (ref 1.15–1.33)
POC LACTIC ACID: 2.69 MMOL/L (ref 0.56–1.39)
POC PCO2 TEMP: ABNORMAL MM HG
POC PH TEMP: ABNORMAL
POC PO2 TEMP: ABNORMAL MM HG
POC POTASSIUM: 4.2 MMOL/L (ref 3.5–4.5)
POC SODIUM: 140 MMOL/L (ref 138–146)
POSITIVE BASE EXCESS, VEN: ABNORMAL (ref 0–3)
POSITIVE BASE EXCESS, VEN: ABNORMAL MMOL/L (ref 0–2)
POTASSIUM SERPL-SCNC: 4.7 MMOL/L (ref 3.7–5.3)
PRO-BNP: 1252 PG/ML
PROTEIN UA: ABNORMAL
PROTHROMBIN TIME: 10.8 SEC (ref 9–12)
PSV: ABNORMAL
PT. POSITION: ABNORMAL
RBC # BLD: 6.38 M/UL (ref 4.21–5.77)
RBC # BLD: ABNORMAL 10*6/UL
RBC UA: NORMAL /HPF (ref 0–4)
RENAL EPITHELIAL, UA: NORMAL /HPF
RESPIRATORY RATE: ABNORMAL
SALICYLATE LEVEL: <1 MG/DL (ref 3–10)
SAMPLE SITE: ABNORMAL
SAMPLE SITE: ABNORMAL
SEG NEUTROPHILS: 82 % (ref 36–66)
SEGMENTED NEUTROPHILS ABSOLUTE COUNT: 19.93 K/UL (ref 1.8–7.7)
SERUM OSMOLALITY: 312 MOSM/KG (ref 275–295)
SET RATE: ABNORMAL
SODIUM BLD-SCNC: 133 MMOL/L (ref 135–144)
SPECIFIC GRAVITY UA: 1.02 (ref 1–1.03)
TEXT FOR RESPIRATORY: ABNORMAL
TOTAL CO2, VENOUS: 21 MMOL/L (ref 23–30)
TOTAL HB: ABNORMAL G/DL (ref 12–16)
TOTAL PROTEIN: 10.7 G/DL (ref 6.4–8.3)
TOTAL RATE: ABNORMAL
TRICHOMONAS: NORMAL
TROPONIN INTERP: ABNORMAL
TROPONIN INTERP: ABNORMAL
TROPONIN T: ABNORMAL NG/ML
TROPONIN T: ABNORMAL NG/ML
TROPONIN, HIGH SENSITIVITY: 28 NG/L (ref 0–22)
TROPONIN, HIGH SENSITIVITY: 28 NG/L (ref 0–22)
TURBIDITY: CLEAR
URINE HGB: NEGATIVE
UROBILINOGEN, URINE: NORMAL
VT: ABNORMAL
WBC # BLD: 24.3 K/UL (ref 3.5–11.3)
WBC # BLD: ABNORMAL 10*3/UL
WBC UA: NORMAL /HPF (ref 0–5)
YEAST: NORMAL

## 2020-10-11 PROCEDURE — G0480 DRUG TEST DEF 1-7 CLASSES: HCPCS

## 2020-10-11 PROCEDURE — 84295 ASSAY OF SERUM SODIUM: CPT

## 2020-10-11 PROCEDURE — 87086 URINE CULTURE/COLONY COUNT: CPT

## 2020-10-11 PROCEDURE — 85730 THROMBOPLASTIN TIME PARTIAL: CPT

## 2020-10-11 PROCEDURE — 82803 BLOOD GASES ANY COMBINATION: CPT

## 2020-10-11 PROCEDURE — 82947 ASSAY GLUCOSE BLOOD QUANT: CPT

## 2020-10-11 PROCEDURE — 82435 ASSAY OF BLOOD CHLORIDE: CPT

## 2020-10-11 PROCEDURE — 80074 ACUTE HEPATITIS PANEL: CPT

## 2020-10-11 PROCEDURE — 86704 HEP B CORE ANTIBODY TOTAL: CPT

## 2020-10-11 PROCEDURE — 84484 ASSAY OF TROPONIN QUANT: CPT

## 2020-10-11 PROCEDURE — 85610 PROTHROMBIN TIME: CPT

## 2020-10-11 PROCEDURE — 84132 ASSAY OF SERUM POTASSIUM: CPT

## 2020-10-11 PROCEDURE — 6360000002 HC RX W HCPCS: Performed by: EMERGENCY MEDICINE

## 2020-10-11 PROCEDURE — 82805 BLOOD GASES W/O2 SATURATION: CPT

## 2020-10-11 PROCEDURE — 2580000003 HC RX 258: Performed by: STUDENT IN AN ORGANIZED HEALTH CARE EDUCATION/TRAINING PROGRAM

## 2020-10-11 PROCEDURE — 2580000003 HC RX 258: Performed by: EMERGENCY MEDICINE

## 2020-10-11 PROCEDURE — 99284 EMERGENCY DEPT VISIT MOD MDM: CPT

## 2020-10-11 PROCEDURE — 2500000003 HC RX 250 WO HCPCS: Performed by: STUDENT IN AN ORGANIZED HEALTH CARE EDUCATION/TRAINING PROGRAM

## 2020-10-11 PROCEDURE — 71045 X-RAY EXAM CHEST 1 VIEW: CPT

## 2020-10-11 PROCEDURE — 86317 IMMUNOASSAY INFECTIOUS AGENT: CPT

## 2020-10-11 PROCEDURE — 6360000002 HC RX W HCPCS: Performed by: STUDENT IN AN ORGANIZED HEALTH CARE EDUCATION/TRAINING PROGRAM

## 2020-10-11 PROCEDURE — 83880 ASSAY OF NATRIURETIC PEPTIDE: CPT

## 2020-10-11 PROCEDURE — 83690 ASSAY OF LIPASE: CPT

## 2020-10-11 PROCEDURE — 96366 THER/PROPH/DIAG IV INF ADDON: CPT

## 2020-10-11 PROCEDURE — 85014 HEMATOCRIT: CPT

## 2020-10-11 PROCEDURE — 82330 ASSAY OF CALCIUM: CPT

## 2020-10-11 PROCEDURE — 83930 ASSAY OF BLOOD OSMOLALITY: CPT

## 2020-10-11 PROCEDURE — 83605 ASSAY OF LACTIC ACID: CPT

## 2020-10-11 PROCEDURE — 87389 HIV-1 AG W/HIV-1&-2 AB AG IA: CPT

## 2020-10-11 PROCEDURE — 82565 ASSAY OF CREATININE: CPT

## 2020-10-11 PROCEDURE — 80307 DRUG TEST PRSMV CHEM ANLYZR: CPT

## 2020-10-11 PROCEDURE — 96365 THER/PROPH/DIAG IV INF INIT: CPT

## 2020-10-11 PROCEDURE — 74176 CT ABD & PELVIS W/O CONTRAST: CPT

## 2020-10-11 PROCEDURE — 80053 COMPREHEN METABOLIC PANEL: CPT

## 2020-10-11 PROCEDURE — 2000000000 HC ICU R&B

## 2020-10-11 PROCEDURE — 36415 COLL VENOUS BLD VENIPUNCTURE: CPT

## 2020-10-11 PROCEDURE — 87641 MR-STAPH DNA AMP PROBE: CPT

## 2020-10-11 PROCEDURE — 06HY33Z INSERTION OF INFUSION DEVICE INTO LOWER VEIN, PERCUTANEOUS APPROACH: ICD-10-PCS | Performed by: EMERGENCY MEDICINE

## 2020-10-11 PROCEDURE — 81001 URINALYSIS AUTO W/SCOPE: CPT

## 2020-10-11 PROCEDURE — 96367 TX/PROPH/DG ADDL SEQ IV INF: CPT

## 2020-10-11 PROCEDURE — 99253 IP/OBS CNSLTJ NEW/EST LOW 45: CPT | Performed by: INTERNAL MEDICINE

## 2020-10-11 PROCEDURE — 83735 ASSAY OF MAGNESIUM: CPT

## 2020-10-11 PROCEDURE — 96368 THER/DIAG CONCURRENT INF: CPT

## 2020-10-11 PROCEDURE — 93005 ELECTROCARDIOGRAM TRACING: CPT | Performed by: EMERGENCY MEDICINE

## 2020-10-11 PROCEDURE — 87040 BLOOD CULTURE FOR BACTERIA: CPT

## 2020-10-11 PROCEDURE — 85025 COMPLETE CBC W/AUTO DIFF WBC: CPT

## 2020-10-11 RX ORDER — HEPARIN SODIUM 1000 [USP'U]/ML
1900 INJECTION, SOLUTION INTRAVENOUS; SUBCUTANEOUS PRN
Status: DISCONTINUED | OUTPATIENT
Start: 2020-10-11 | End: 2020-10-12 | Stop reason: HOSPADM

## 2020-10-11 RX ORDER — FOMEPIZOLE 1 G/ML
15 INJECTION, SOLUTION INTRAVENOUS ONCE
Status: DISCONTINUED | OUTPATIENT
Start: 2020-10-11 | End: 2020-10-11 | Stop reason: SDUPTHER

## 2020-10-11 RX ORDER — SODIUM CHLORIDE, SODIUM LACTATE, POTASSIUM CHLORIDE, AND CALCIUM CHLORIDE .6; .31; .03; .02 G/100ML; G/100ML; G/100ML; G/100ML
30 INJECTION, SOLUTION INTRAVENOUS ONCE
Status: COMPLETED | OUTPATIENT
Start: 2020-10-11 | End: 2020-10-11

## 2020-10-11 RX ORDER — HEPARIN SODIUM 1000 [USP'U]/ML
1400 INJECTION, SOLUTION INTRAVENOUS; SUBCUTANEOUS ONCE
Status: DISCONTINUED | OUTPATIENT
Start: 2020-10-11 | End: 2020-10-11

## 2020-10-11 RX ORDER — HEPARIN SODIUM 1000 [USP'U]/ML
1500 INJECTION, SOLUTION INTRAVENOUS; SUBCUTANEOUS ONCE
Status: DISCONTINUED | OUTPATIENT
Start: 2020-10-11 | End: 2020-10-11

## 2020-10-11 RX ORDER — HEPARIN SODIUM 1000 [USP'U]/ML
1600 INJECTION, SOLUTION INTRAVENOUS; SUBCUTANEOUS PRN
Status: DISCONTINUED | OUTPATIENT
Start: 2020-10-11 | End: 2020-10-12 | Stop reason: HOSPADM

## 2020-10-11 RX ADMIN — SODIUM CHLORIDE, POTASSIUM CHLORIDE, SODIUM LACTATE AND CALCIUM CHLORIDE 2382 ML: 600; 310; 30; 20 INJECTION, SOLUTION INTRAVENOUS at 17:18

## 2020-10-11 RX ADMIN — PIPERACILLIN AND TAZOBACTAM 3.38 G: 3; .375 INJECTION, POWDER, LYOPHILIZED, FOR SOLUTION INTRAVENOUS at 17:18

## 2020-10-11 RX ADMIN — Medication 1250 MG: at 18:54

## 2020-10-11 RX ADMIN — THIAMINE HYDROCHLORIDE: 100 INJECTION, SOLUTION INTRAMUSCULAR; INTRAVENOUS at 18:05

## 2020-10-11 RX ADMIN — FOMEPIZOLE 1190 MG: 1 INJECTION, SOLUTION INTRAVENOUS at 17:00

## 2020-10-11 ASSESSMENT — ENCOUNTER SYMPTOMS
CHOKING: 0
SHORTNESS OF BREATH: 0
BLOOD IN STOOL: 0
FACIAL SWELLING: 0
COUGH: 0
NAUSEA: 0
ABDOMINAL PAIN: 0
BACK PAIN: 0
APNEA: 0
COLOR CHANGE: 0
EYE REDNESS: 0
DIARRHEA: 1
EYE DISCHARGE: 0
PHOTOPHOBIA: 0
VOMITING: 0
SORE THROAT: 0
CHEST TIGHTNESS: 0
ABDOMINAL DISTENTION: 0
TACHYPNEA: 1

## 2020-10-11 ASSESSMENT — PAIN SCALES - GENERAL
PAINLEVEL_OUTOF10: 0
PAINLEVEL_OUTOF10: 0

## 2020-10-11 NOTE — ED NOTES
Pt states that he was working on a car with antifreeze and that it spilled in his face and mouth, but states that it has been over a week since that happened.       Paras Liu RN  10/11/20 6429

## 2020-10-11 NOTE — ED PROVIDER NOTES
[facility]     Emergency Department     Faculty Note/ Attestation      Pt Name: Jessica Louise                                       MRN: [id number]  [place] 1965  Date of evaluation: 10/11/2020    Patients PCP:    Kenyatta Rivera MD    Attestation  I performed a history and physical examination of the patient and discussed management with the resident. I reviewed the residents note and agree with the documented findings and plan of care. Any areas of disagreement are noted on the chart. I was personally present for the key portions of any procedures. I have documented in the chart those procedures where I was not present during the key portions. I have reviewed the emergency nurses triage note. I agree with the chief complaint, past medical history, past surgical history, allergies, medications, social and family history as documented unless otherwise noted below. For Physician Assistant/ Nurse Practitioner cases/documentation I have personally evaluated this patient and have completed at least one if not all key elements of the E/M (history, physical exam, and MDM). Additional findings are as noted. Initial Screens:        Leonel Coma Scale  Eye Opening: Spontaneous  Best Verbal Response: Oriented  Best Motor Response: Obeys commands  Leonel Coma Scale Score: 15    Vitals:    Vitals:    10/11/20 1646 10/11/20 1718 10/11/20 1746 10/11/20 1747   BP: (!) 132/92 (!) 146/92 (!) 156/91    Pulse: 80 82 84 80   Resp:       Temp:       TempSrc:       SpO2: 97% 97%  97%   Weight:       Height:           CHIEF COMPLAINT       Chief Complaint   Patient presents with    Diarrhea     started last night    Emesis    Fatigue       The pt is a 48 YO M who is a known IVDU. He last used today. He typically uses 2 times a day. Diarrhea watery not bloody no sick contacts no antibioitc use. No exposure that he knows of to [place]. The pt has no pain at this time but is very fatigued.   The pt has no other sx now. The pt has lost some wt recently but no fevers night sweats or cough. DIAGNOSTIC RESULTS     RADIOLOGY:   CT ABDOMEN PELVIS WO CONTRAST Additional Contrast? None   Final Result   Bowel findings described above likely reflect an acute nonspecific enteritis   or enterocolitis. No evidence of bowel obstruction. The stomach is   moderately distended by low-attenuation fluid and gas and nasogastric tube   placement may be helpful. Concentric thickening of the wall of the distal esophagus, possibly reflux   esophagitis. Clinical correlation recommended. XR CHEST PORTABLE   Final Result   Unchanged appearance of the chest without acute airspace disease identified.            EKG Interpretation    Interpreted by emergency department physician    Rhythm: normal sinus   Rate: normal  Axis: normal  Ectopy: none  Conduction: Prolonged QTc  ST Segments: nonspecific changes  T Waves: non specific changes  Q Waves: nonspecific    EKG  Impression: non-specific EKG      LABS:  Labs Reviewed   CBC WITH AUTO DIFFERENTIAL - Abnormal; Notable for the following components:       Result Value    WBC 24.3 (*)     RBC 6.38 (*)     Hemoglobin 17.8 (*)     Hematocrit 54.5 (*)     RDW 14.6 (*)     Platelets 094 (*)     Immature Granulocytes 1 (*)     Seg Neutrophils 82 (*)     Lymphocytes 11 (*)     Eosinophils % 0 (*)     Segs Absolute 19.93 (*)     Absolute Mono # 1.22 (*)     All other components within normal limits   OSMOLALITY - Abnormal; Notable for the following components:    Serum Osmolality 312 (*)     All other components within normal limits   BLOOD GAS, VENOUS - Abnormal; Notable for the following components:    HCO3, Venous 21.8 (*)     Negative Base Excess, Davie 3.8 (*)     O2 Sat, Davie 50.6 (*)     All other components within normal limits   LACTIC ACID, PLASMA - Abnormal; Notable for the following components:    Lactic Acid, Whole Blood 3.2 (*)     All other components within normal limits   COMPREHENSIVE METABOLIC PANEL W/ REFLEX TO MG FOR LOW K - Abnormal; Notable for the following components:    Glucose 182 (*)     BUN 42 (*)     CREATININE 2.95 (*)     Calcium 11.2 (*)     Sodium 133 (*)     Chloride 95 (*)     CO2 18 (*)     Anion Gap 20 (*)     Alkaline Phosphatase 143 (*)     ALT 80 (*)     AST 75 (*)     Total Protein 10.7 (*)     Albumin/Globulin Ratio 0.8 (*)     GFR Non- 22 (*)     GFR  27 (*)     All other components within normal limits   TROPONIN - Abnormal; Notable for the following components:    Troponin, High Sensitivity 28 (*)     All other components within normal limits   BRAIN NATRIURETIC PEPTIDE - Abnormal; Notable for the following components:    Pro-BNP 1,252 (*)     All other components within normal limits   LIPASE - Abnormal; Notable for the following components:    Lipase 64 (*)     All other components within normal limits   HGB/HCT - Abnormal; Notable for the following components:    POC Hemoglobin 21.4 (*)     POC Hematocrit 63 (*)     All other components within normal limits   CHLORIDE (POC) - Abnormal; Notable for the following components:    POC Chloride 112 (*)     All other components within normal limits   VENOUS BLOOD GAS, POINT OF CARE - Abnormal; Notable for the following components:    pCO2, Davie 33.1 (*)     HCO3, Venous 20.0 (*)     Total CO2, Venous 21 (*)     Negative Base Excess, Davie 4 (*)     All other components within normal limits   CREATININE W/GFR POINT OF CARE - Abnormal; Notable for the following components:    POC Creatinine 3.64 (*)     GFR Comment 21 (*)     GFR Non- 18 (*)     All other components within normal limits   LACTIC ACID,POINT OF CARE - Abnormal; Notable for the following components:    POC Lactic Acid 2.69 (*)     All other components within normal limits   POCT GLUCOSE - Abnormal; Notable for the following components:    POC Glucose 175 (*)     All other components within normal limits   CULTURE, URINE   CULTURE, BLOOD 1   CULTURE, BLOOD 1   PROTIME-INR   APTT   HIV SCREEN   ETHANOL   SODIUM (POC)   POTASSIUM (POC)   CALCIUM, IONIC (POC)   URINALYSIS   LACTATE, SEPSIS   LACTATE, SEPSIS   LACTATE, SEPSIS   LACTATE, SEPSIS   LACTATE, SEPSIS   LACTATE, SEPSIS   TROPONIN   ANION GAP (CALC) POC   POC BLOOD GAS AND CHEMISTRY   POC BLOOD GAS AND CHEMISTRY   POC BLOOD GAS AND CHEMISTRY   POC BLOOD GAS AND CHEMISTRY   POC BLOOD GAS AND CHEMISTRY   POC BLOOD GAS AND CHEMISTRY       EMERGENCY DEPARTMENT COURSE:     -------------------------  BP: (!) 156/91, Temp: 97 °F (36.1 °C), Pulse: 80, Resp: 23  Physical Exam  Constitutional:       Appearance: He is well-developed. He is not diaphoretic. Comments: Unkempt ill but not toxic appearing sleepy but easily arousable. Thin appearing   HENT:      Head: Normocephalic and atraumatic. Right Ear: External ear normal.      Left Ear: External ear normal.   Eyes:      General: No scleral icterus. Right eye: No discharge. Left eye: No discharge. Neck:      Musculoskeletal: Normal range of motion. Trachea: No tracheal deviation. Cardiovascular:      Rate and Rhythm: Normal rate. Pulmonary:      Effort: Respiratory distress present. Breath sounds: No stridor. Comments: Tachypnea pt breathing over 30 times a minute. The pt has no ronchii and or rhales  Abdominal:      General: Abdomen is flat. There is no distension. Tenderness: There is no abdominal tenderness. There is no guarding. Musculoskeletal: Normal range of motion. Skin:     General: Skin is warm and dry. Capillary Refill: Capillary refill takes less than 2 seconds. Neurological:      Mental Status: He is alert and oriented to person, place, and time.       Coordination: Coordination normal.   Psychiatric:         Behavior: Behavior normal.         Comments    Patient presents mostly concerned with diarrhea however he is pale thin 85.4   MCH 27.9   MCHC 32.7   RDW 14.6(!)   Platelet Count 502(!)   MPV 9.8   NRBC Automated 0.0   Differential Type NOT REPORTED   WBC Morphology NOT REPORTED   RBC Morphology NOT REPORTED   Platelet Estimate NOT REPORTED   Immature Granulocytes 1(!)   Seg Neutrophils 82(!)   Lymphocytes 11(!)   Atypical Lymphocytes 1   Monocytes 5   Eosinophils % 0(!)   Basophils 0   Ab. .. 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 Denying taking it at this time      [WK]   1748 Ethanol back both anion gap and osmolar gap. [WK]   1802 Explained to the pt that he needs to provide urine. However based on labs and CT concerns for Sepsis from Enteritis, but more concerning is endocarditis at this time. Also concerning is ethylene glycol overdose given hx of Suicide attempts. [WK]   1804 CT ABDOMEN PELVIS WO CONTRAST Additional Contrast? None [MG]   1804 XR CHEST PORTABLE [MG]   1857 The pt is not understanding our concers appears to be still slurring words and does not have capacity to make decisions at this time will need admission and if needed he will need a pink slip should he try to leave    [WK]   1903 Cannot woodslamp the urine as pt has not provided but based on the lack of ethanol, the anion gap metabolic acidosis, the osmolar gap, and the renal insufficiency all are concerning but the pt denies.   He has a hx of SI and attempts in past and may be trying to deceive me given current state cannot leave and is a medical / psychiatric hold until his mental state improves cannot make an informed decision to leave AMA    [WK]   1940 Pt urine obtained pt ambulatory and more agreeable to stay at this point.    [WK]      ED Course User Index  [MG] Jolly Martinez MD  [WK] Lilliana Brooks DO       CRITICAL CARE: There was a high probability of clinically significant/life threatening deterioration in this patient's condition which required my urgent intervention. Total critical care time was 37 minutes. This excludes any time for separately reportable procedures. Carole Sinclair,, , RDMS.   Attending Emergency Physician          Carole Sinclair DO  10/11/20 [address] [place] [address]  10/11/20 2003

## 2020-10-11 NOTE — ED PROVIDER NOTES
[facility] ED  Emergency Department Encounter  EmergencyMedicine Resident     Pt Haris Naik  MRN: [id number]  [place] 1965  Date of evaluation: 10/11/20  PCP:  Stanley Durbin MD    [address] [place] [address]       Chief Complaint   Patient presents with    Diarrhea     started last night    Emesis    Fatigue        HISTORY OF PRESENT ILLNESS  (Location/Symptom, Timing/Onset, Context/Setting, Quality, Duration, Modifying Factors, Severity.)      Geo Woodson is a 49 y.o. male with known h/o IV drug abuse now presented with c/o loose stools, started last evening, watery, not accompanied by abdominal pain/ cramping and no h/o passage of blood in stools. Denies h/o nausea, vomiting, fever or any urinary/ bowel complaints. Last meal consisted of peanut butter sandwich last evening. No recent h/o exposure to anyone with similar complaints. Denies recent antibiotic use. No exposure to COVID +ve or suspect, although pt is poor historian. Pt reported that he uses IV drugs and his last drug usage   In the ER, pt looks sick, dehydrated, breathing fast, afebrile with temporal wasting. Concerns for Suicidal ingestion of Ethylene Glycol, given his past h/o suicide attempts. Also concerns about endocarditis given his h/o IV drug abuse and h/o hep C. PAST MEDICAL / SURGICAL / SOCIAL / FAMILY HISTORY      has a past medical history of Bipolar 1 disorder ([address] Chronic mental illness, Depression, Hepatitis C, Hypertension, and Psoriasis. has a past surgical history that includes brain surgery.       Social History     Socioeconomic History    Marital status: Single     Spouse name: Not on file    Number of children: Not on file    Years of education: Not on file    Highest education level: Not on file   Occupational History    Not on file   Social Needs    Financial resource strain: Not on file    Food insecurity     Worry: Not on file     Inability: Not on file   Cabrera Naylor CNP   hydroCHLOROthiazide (HYDRODIURIL) 25 MG tablet Take 1 tablet by mouth daily 6/25/20   Meño Grijalva, APRN - CNP   folic acid (FOLVITE) 1 MG tablet Take 1 tablet by mouth daily 3/11/20   Debi Juan APRN - CNP   vitamin B-1 100 MG tablet Take 1 tablet by mouth daily 3/11/20   Debi Juan, APRN - CNP   omeprazole (PRILOSEC OTC) 20 MG tablet Take 1 tablet by mouth daily. 2/10/15 7/1/15  tSeve Ngo MD       REVIEW OF SYSTEMS    (2-9 systems for level 4, 10 or more for level 5)      Review of Systems   Constitutional: Negative for appetite change and fever. HENT: Negative for ear discharge, ear pain, facial swelling and sore throat. Eyes: Negative for photophobia, discharge and redness. Respiratory: Negative for apnea, cough, choking, chest tightness and shortness of breath. Cardiovascular: Negative for chest pain, palpitations and leg swelling. Gastrointestinal: Positive for diarrhea. Negative for abdominal distention, abdominal pain, blood in stool, nausea and vomiting. Genitourinary: Negative for decreased urine volume, difficulty urinating, flank pain, hematuria, penile pain and urgency. Musculoskeletal: Negative for arthralgias, back pain, joint swelling, myalgias and neck pain. Skin: Negative for color change, pallor, rash and wound. Neurological: Negative for dizziness, tremors, facial asymmetry, speech difficulty, light-headedness and numbness. Psychiatric/Behavioral: Negative for agitation, hallucinations and self-injury.         PHYSICAL EXAM   (up to 7 for level 4, 8 or more for level 5)      INITIAL VITALS:   BP (!) 156/91   Pulse 80   Temp 97 °F (36.1 °C) (Temporal)   Resp 23   Ht 5' 8\" (1.727 m)   Wt 175 lb (79.4 kg)   SpO2 97%   BMI 26.61 kg/m²      Vitals:    10/11/20 1646 10/11/20 1718 10/11/20 1746 10/11/20 1747   BP: (!) 132/92 (!) 146/92 (!) 156/91    Pulse: 80 82 84 80   Resp:       Temp:       TempSrc:       SpO2: 97% 97%  97%   Weight:       Height: Physical Exam  Vitals signs reviewed. Constitutional:       Appearance: He is ill-appearing and toxic-appearing. HENT:      Head: Atraumatic. Nose: Nose normal. No rhinorrhea. Mouth/Throat:      Mouth: Mucous membranes are dry. Pharynx: No oropharyngeal exudate or posterior oropharyngeal erythema. Eyes:      General: No scleral icterus. Extraocular Movements: Extraocular movements intact. Neck:      Musculoskeletal: Normal range of motion. No neck rigidity or muscular tenderness. Cardiovascular:      Rate and Rhythm: Normal rate and regular rhythm. Pulses: Normal pulses. Heart sounds: Normal heart sounds. Pulmonary:      Breath sounds: Normal breath sounds. Abdominal:      General: There is no distension. Palpations: Abdomen is soft. There is no mass. Tenderness: There is no abdominal tenderness. Musculoskeletal: Normal range of motion. General: No swelling or tenderness. Skin:     General: Skin is dry. Findings: No bruising or erythema. Comments: Cold     Neurological:      Sensory: No sensory deficit. Motor: No weakness.        DIFFERENTIAL  DIAGNOSIS     PLAN (LABS / IMAGING / EKG):  Orders Placed This Encounter   Procedures    Culture, Urine    Culture, Blood 1    Culture, Blood 1    XR CHEST PORTABLE    CT ABDOMEN PELVIS WO CONTRAST Additional Contrast? None    CBC Auto Differential    OSMOLALITY    Blood Gas, Venous    Lactic Acid, Plasma    Comprehensive Metabolic Panel w/ Reflex to MG    Troponin    Brain Natriuretic Peptide    Protime-INR    APTT    Urinalysis, reflex to microscopic    Lipase    HIV Screen    Lactate, Sepsis    Ethanol    Hemoglobin and hematocrit, blood    SODIUM (POC)    POTASSIUM (POC)    CHLORIDE (POC)    CALCIUM, IONIC (POC)    Troponin    MAGNESIUM    Telemetry monitoring    Pharmacy to Dose: Vancomycin    Inpatient consult to Internal Medicine    POC Blood Gas and Chemistry    Venous Blood Gas, POC    Creatinine W/GFR Point of Care    Lactic Acid, POC    POCT Glucose    Anion Gap (Calc) POC       MEDICATIONS ORDERED:  Orders Placed This Encounter   Medications    lactated ringers bolus    folic acid 1 mg, thiamine (B-1) 100 mg in dextrose 5 % 50 mL IVPB    piperacillin-tazobactam (ZOSYN) 3.375 g in dextrose 5 % 50 mL IVPB extended infusion (mini-bag)     Order Specific Question:   Antimicrobial Indications     Answer:   Sepsis of Unknown Etiology    vancomycin (VANCOCIN) intermittent dosing (placeholder)     Order Specific Question:   Antimicrobial Indications     Answer:   Sepsis of Unknown Etiology    vancomycin (VANCOCIN) 1250 mg in dextrose 5 % 250 mL IVPB     Order Specific Question:   Antimicrobial Indications     Answer:   Sepsis of Unknown Etiology    DISCONTD: fomepizole (ANTIZOL) injection 1.19 g    fomepizole (ANTIZOL) 1,190 mg in sodium chloride 0.9 % 100 mL IVPB       DIAGNOSTIC RESULTS / EMERGENCY DEPARTMENT COURSE / MDM   LAB RESULTS:  Results for orders placed or performed during the hospital encounter of 10/11/20   CBC Auto Differential   Result Value Ref Range    WBC 24.3 (H) 3.5 - 11.3 k/uL    RBC 6.38 (H) 4.21 - 5.77 m/uL    Hemoglobin 17.8 (H) 13.0 - 17.0 g/dL    Hematocrit 54.5 (H) 40.7 - 50.3 %    MCV 85.4 82.6 - 102.9 fL    MCH 27.9 25.2 - 33.5 pg    MCHC 32.7 28.4 - 34.8 g/dL    RDW 14.6 (H) 11.8 - 14.4 %    Platelets 803 (H) 884 - 453 k/uL    MPV 9.8 8.1 - 13.5 fL    NRBC Automated 0.0 0.0 per 100 WBC    Differential Type NOT REPORTED     WBC Morphology NOT REPORTED     RBC Morphology NOT REPORTED     Platelet Estimate NOT REPORTED     Immature Granulocytes 1 (H) 0 %    Seg Neutrophils 82 (H) 36 - 66 %    Lymphocytes 11 (L) 24 - 44 %    Atypical Lymphocytes 1 %    Monocytes 5 1 - 7 %    Eosinophils % 0 (L) 1 - 4 %    Basophils 0 0 - 2 %    Absolute Immature Granulocyte 0.24 0.00 - 0.30 k/uL    Segs Absolute 19.93 (H) 1.8 - 7.7 k/uL Phosphatase 143 (H) 40 - 129 U/L    ALT 80 (H) 5 - 41 U/L    AST 75 (H) <40 U/L    Total Bilirubin 0.50 0.3 - 1.2 mg/dL    Total Protein 10.7 (H) 6.4 - 8.3 g/dL    Alb 4.6 3.5 - 5.2 g/dL    Albumin/Globulin Ratio 0.8 (L) 1.0 - 2.5    GFR Non-African American 22 (L) >60 mL/min    GFR  27 (L) >60 mL/min    GFR Comment          GFR Staging NOT REPORTED    Troponin   Result Value Ref Range    Troponin, High Sensitivity 28 (H) 0 - 22 ng/L    Troponin T NOT REPORTED <0.03 ng/mL    Troponin Interp NOT REPORTED    Brain Natriuretic Peptide   Result Value Ref Range    Pro-BNP 1,252 (H) <300 pg/mL    BNP Interpretation Pro-BNP Reference Range:    Protime-INR   Result Value Ref Range    Protime 10.8 9.0 - 12.0 sec    INR 1.0    APTT   Result Value Ref Range    PTT 30.1 20.5 - 30.5 sec   Lipase   Result Value Ref Range    Lipase 64 (H) 13 - 60 U/L   HIV Screen   Result Value Ref Range    HIV Ag/Ab NONREACTIVE NONREACTIVE   Lactate, Sepsis   Result Value Ref Range    Lactic Acid, Sepsis NOT REPORTED 0.5 - 1.9 mmol/L    Lactic Acid, Sepsis, Whole Blood 1.9 0.5 - 1.9 mmol/L   Ethanol   Result Value Ref Range    Ethanol <10 <10 mg/dL    Ethanol percent <0.010 <0.010 %   Hemoglobin and hematocrit, blood   Result Value Ref Range    POC Hemoglobin 21.4 (H) 13.5 - 17.5 g/dL    POC Hematocrit 63 (H) 41 - 53 %   SODIUM (POC)   Result Value Ref Range    POC Sodium 140 138 - 146 mmol/L   POTASSIUM (POC)   Result Value Ref Range    POC Potassium 4.2 3.5 - 4.5 mmol/L   CHLORIDE (POC)   Result Value Ref Range    POC Chloride 112 (H) 98 - 107 mmol/L   CALCIUM, IONIC (POC)   Result Value Ref Range    POC Ionized Calcium 1.17 1.15 - 1.33 mmol/L   Venous Blood Gas, POC   Result Value Ref Range    pH, [place] 7.389 7.320 - 7.430    pCO2, [place] 33.1 (L) 41.0 - 51.0 mm Hg    pO2, [place] 40.2 30.0 - 50.0 mm Hg    HCO3, Venous 20.0 (L) 22.0 - 29.0 mmol/L    Total CO2, Venous 21 (L) 23.0 - 30.0 mmol/L    Negative Base Excess, [place] 4 (H) 0.0 - 2.0 Positive Base Excess, [place] NOT REPORTED 0.0 - 3.0    O2 Sat, [address] 75 60.0 - 85.0 %    O2 Device/Flow/% NOT REPORTED     Wong Test NOT REPORTED     Sample Site NOT REPORTED     Mode NOT REPORTED     FIO2 NOT REPORTED     Pt Temp NOT REPORTED     POC pH Temp NOT REPORTED     POC pCO2 Temp NOT REPORTED mm Hg    POC pO2 Temp NOT REPORTED mm Hg   Creatinine W/GFR Point of Care   Result Value Ref Range    POC Creatinine 3.64 (H) 0.51 - 1.19 mg/dL    GFR Comment 21 (L) >60 mL/min    GFR Non- 18 (L) >60 mL/min    GFR Comment         Lactic Acid, POC   Result Value Ref Range    POC Lactic Acid 2.69 (H) 0.56 - 1.39 mmol/L   POCT Glucose   Result Value Ref Range    POC Glucose 175 (H) 74 - 100 mg/dL   Anion Gap (Calc) POC   Result Value Ref Range    Anion Gap 8 7 - 16 mmol/L       IMPRESSION:   Sepsis: Pt look sick and h/o IV drug abuse. Labs show WBC 24.3, Lactic acid 2.69   HIV: H/o IV drug abuse, HIV test ordered, came back negative. Gastroenteritis: Pt has h/o diarrhea last evening. Labs show leukocytosis. CT finding likely reflect an acute nonspecific enteritis or enterocolitis. Could not rule Endocarditis. Could not rule out Ethylene Glycol poisoning. RADIOLOGY:  CT ABDOMEN PELVIS WO CONTRAST Additional Contrast? None   Final Result   Bowel findings described above likely reflect an acute nonspecific enteritis   or enterocolitis. No evidence of bowel obstruction. The stomach is   moderately distended by low-attenuation fluid and gas and nasogastric tube   placement may be helpful. Concentric thickening of the wall of the distal esophagus, possibly reflux   esophagitis. Clinical correlation recommended. XR CHEST PORTABLE   Final Result   Unchanged appearance of the chest without acute airspace disease identified.                 EMERGENCY DEPARTMENT COURSE & MDM:    ED Course as of Oct 11 1917   Marina Reyms Oct 11, 2020   1605 Severe WBC elevation with concerns for sepsis will initiate Sepsis labs. Pt already on the bolus of fluids    [WK]   4790 Metabolic acidosis with minimal respiratory compensation    [WK]   1611 X ray no free air or obvious consolidation    [WK]   1624 Pt has trop elevavation and BNP elevation concern for end organ damage from sepsis. X ray official read no pneumonia    [WK]   1629 Pre renal azotemia with aniongap metabolic acidosis. At this time normal serum osmolar gap    [WK]   1646 Correction elevated osmolar gap at 20 will start fomepizile in case of ethylene glycol    [WK]   1659 Pt has liver function elevation concern for Multi system organ dysfunction.    [WK]   1712 Ethanol [MG]   1713 Brain Natriuretic Peptide(!):    Pro-BNP 1,252(!)   BNP Interpretation Pro-BNP Reference Range: [MG]   1713 Lipase(!):    Lipase 64(!) [MG]   1713 Comprehensive Metabolic Panel w/ Reflex to MG(!):    Glucose 182(!)   BUN 42(!)   Creatinine 2.95(!)   Bun/Cre Ratio NOT REPORTED   Calcium 11.2(!)   Sodium 133(!)   Potassium 4.7   Chloride 95(!)   CO2 18(!)   Anion Gap 20(!)   Alk Phos 143(!)   ALT 80(!)   AST 75(!)   Bilirubin 0.50   Total Protein 10.7(!)   Albumin 4.6   Albumin/Globulin Ratio 0.8(!)   GFR Non- 22(!)   GFR  27(!)   GFR Comment        GFR Staging NOT REPORTED [MG]   1713 Troponin(!):    Troponin, High Sensitivity 28(!)   Troponin T NOT REPORTED   Troponin Interp NOT REPORTED [MG]   1713 CBC Auto Differential(!):    WBC 24.3(!)   RBC 6.38(!)   Hemoglobin Quant 17.8(!)   Hematocrit 54.5(!)   MCV 85.4   MCH 27.9   MCHC 32.7   RDW 14.6(!)   Platelet Count 882(!)   MPV 9.8   NRBC Automated 0.0   Differential Type NOT REPORTED   WBC Morphology NOT REPORTED   RBC Morphology NOT REPORTED   Platelet Estimate NOT REPORTED   Immature Granulocytes 1(!)   Seg Neutrophils 82(!)   Lymphocytes 11(!)   Atypical Lymphocytes 1   Monocytes 5   Eosinophils % 0(!)   Basophils 0   Ab. .. 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 Denying taking it at this time      [WK]   1748 Ethanol back both anion gap and osmolar gap. [WK]   1802 Explained to the pt that he needs to provide urine. However based on labs and CT concerns for Sepsis from Enteritis, but more concerning is endocarditis at this time. Also concerning is ethylene glycol overdose given hx of Suicide attempts. [WK]   1804 CT ABDOMEN PELVIS WO CONTRAST Additional Contrast? None [MG]   1804 XR CHEST PORTABLE [MG]   1857 The pt is not understanding our concers appears to be still slurring words and does not have capacity to make decisions at this time will need admission and if needed he will need a pink slip should he try to leave    [WK]   1903 Cannot woodslamp the urine as pt has not provided but based on the lack of ethanol, the anion gap metabolic acidosis, the osmolar gap, and the renal insufficiency all are concerning but the pt denies. He has a hx of SI and attempts in past and may be trying to deceive me given current state cannot leave and is a medical / psychiatric hold until his mental state improves cannot make an informed decision to leave AMA    [WK]      ED Course User Index  [MG] Sumanth Morales MD  [WK] Yessenia Covington, DO     ED Course as of Oct 11 1917   Sun Oct 11, 2020   1605 Severe WBC elevation with concerns for sepsis will initiate Sepsis labs. Pt already on the bolus of fluids    [WK]   5342 Metabolic acidosis with minimal respiratory compensation    [WK]   1611 X ray no free air or obvious consolidation    [WK]   1624 Pt has trop elevavation and BNP elevation concern for end organ damage from sepsis. X ray official read no pneumonia    [WK]   1629 Pre renal azotemia with aniongap metabolic acidosis.  At this time normal serum osmolar gap    [WK]   1646 Correction elevated osmolar gap at 20 will start fomepizile in case of ethylene glycol    [WK]   1659 Pt has liver function elevation concern for Multi system organ dysfunction.    [WK]   1712 Ethanol [MG]   1713 Brain Natriuretic Peptide(!):    Pro-BNP 1,252(!)   BNP Interpretation Pro-BNP Reference Range: [MG]   1713 Lipase(!):    Lipase 64(!) [MG]   1713 Comprehensive Metabolic Panel w/ Reflex to MG(!):    Glucose 182(!)   BUN 42(!)   Creatinine 2.95(!)   Bun/Cre Ratio NOT REPORTED   Calcium 11.2(!)   Sodium 133(!)   Potassium 4.7   Chloride 95(!)   CO2 18(!)   Anion Gap 20(!)   Alk Phos 143(!)   ALT 80(!)   AST 75(!)   Bilirubin 0.50   Total Protein 10.7(!)   Albumin 4.6   Albumin/Globulin Ratio 0.8(!)   GFR Non- 22(!)   GFR  27(!)   GFR Comment        GFR Staging NOT REPORTED [MG]   1713 Troponin(!):    Troponin, High Sensitivity 28(!)   Troponin T NOT REPORTED   Troponin Interp NOT REPORTED [MG]   1713 CBC Auto Differential(!):    WBC 24.3(!)   RBC 6.38(!)   Hemoglobin Quant 17.8(!)   Hematocrit 54.5(!)   MCV 85.4   MCH 27.9   MCHC 32.7   RDW 14.6(!)   Platelet Count 534(!)   MPV 9.8   NRBC Automated 0.0   Differential Type NOT REPORTED   WBC Morphology NOT REPORTED   RBC Morphology NOT REPORTED   Platelet Estimate NOT REPORTED   Immature Granulocytes 1(!)   Seg Neutrophils 82(!)   Lymphocytes 11(!)   Atypical Lymphocytes 1   Monocytes 5   Eosinophils % 0(!)   Basophils 0   Ab. .. 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 Denying taking it at this time      [WK]   1748 Ethanol back both anion gap and osmolar gap. [WK]   1802 Explained to the pt that he needs to provide urine. However based on labs and CT concerns for Sepsis from Enteritis, but more concerning is endocarditis at this time.   Also concerning is ethylene glycol overdose given hx of Suicide attempts. [WK]   1804 CT ABDOMEN PELVIS WO CONTRAST Additional Contrast? None [MG]   1804 XR CHEST PORTABLE [MG]   1857 The pt is not understanding our concers appears to be still slurring words and does not have capacity to make decisions at this time will need admission and if needed he will need a pink slip should he try to leave    [WK]   1903 Cannot woodslamp the urine as pt has not provided but based on the lack of ethanol, the anion gap metabolic acidosis, the osmolar gap, and the renal insufficiency all are concerning but the pt denies. He has a hx of SI and attempts in past and may be trying to deceive me given current state cannot leave and is a medical / psychiatric hold until his mental state improves cannot make an informed decision to leave AMA    [WK]      ED Course User Index  [MG] Westley Pino MD  [WK] Norman Nolasco, DO      CT show distended bladder, pt did not pass the urine since he came in the ER. Could not perform Urinalysis and test for ethylene glycol. Pt denied getting Melo cath. PROCEDURES:  None    CONSULTS:  PHARMACY TO DOSE VANCOMYCIN  IP CONSULT TO INTERNAL MEDICINE    CRITICAL CARE:  Please see attending note    FINAL IMPRESSION      1. Metabolic acidosis    2. Metabolic acidosis, increased anion gap    3. High serum osmolar gap    4. Septicemia ([address]    5. Enteritis    6. IV drug user    7. Hyponatremia    8. Acute renal failure, unspecified acute renal failure type ([address]          DISPOSITION / PLAN     DISPOSITION    Decision to Admit. PATIENT REFERRED TO:  No follow-up provider specified.     DISCHARGE MEDICATIONS:  Current Discharge Medication List          Westley Pino MD  Emergency Medicine Resident    (Please note that portions of thisnote were completed with a voice recognition program.  Efforts were made to edit the dictations but occasionally words are mis-transcribed.)       Westley Pino MD  Resident  10/11/20 3301

## 2020-10-11 NOTE — ED NOTES
Pt to room 28 with c/o generalized fatigue, diarrhea and emesis. Pt reports that symptoms started last night. Pt reports no blood in stool. Pt placed on continuous cardiac monitor, bp and pulse ox. EKG completed, IV established, blood work drawn. Pt alert and oriented x4, talking in complete sentences, respirations even and unlabored. Pt acting age appropriate. White board updated, will continue to monitor.        Patti Mukherjee RN  10/11/20 [id number]

## 2020-10-12 VITALS
WEIGHT: 163.8 LBS | HEART RATE: 78 BPM | OXYGEN SATURATION: 95 % | DIASTOLIC BLOOD PRESSURE: 93 MMHG | SYSTOLIC BLOOD PRESSURE: 125 MMHG | TEMPERATURE: 98.8 F | RESPIRATION RATE: 26 BRPM | BODY MASS INDEX: 24.83 KG/M2 | HEIGHT: 68 IN

## 2020-10-12 LAB
ABSOLUTE EOS #: 0 K/UL (ref 0–0.44)
ABSOLUTE IMMATURE GRANULOCYTE: 0.32 K/UL (ref 0–0.3)
ABSOLUTE LYMPH #: 3.84 K/UL (ref 1.1–3.7)
ABSOLUTE MONO #: 2.56 K/UL (ref 0.1–1.2)
ACETONE BLOOD: NORMAL
ANION GAP SERPL CALCULATED.3IONS-SCNC: 17 MMOL/L (ref 9–17)
BASOPHILS # BLD: 0 % (ref 0–2)
BASOPHILS ABSOLUTE: 0 K/UL (ref 0–0.2)
BUN BLDV-MCNC: 23 MG/DL (ref 6–20)
BUN/CREAT BLD: ABNORMAL (ref 9–20)
C DIFF AG + TOXIN: NEGATIVE
CALCIUM SERPL-MCNC: 8.2 MG/DL (ref 8.6–10.4)
CAMPYLOBACTER PCR: NORMAL
CHLORIDE BLD-SCNC: 98 MMOL/L (ref 98–107)
CO2: 19 MMOL/L (ref 20–31)
CREAT SERPL-MCNC: 1.49 MG/DL (ref 0.7–1.2)
CULTURE: NO GROWTH
DIFFERENTIAL TYPE: ABNORMAL
DIRECT EXAM: NORMAL
E COLI ENTEROTOXIGENIC PCR: NORMAL
EKG ATRIAL RATE: 83 BPM
EKG P AXIS: 46 DEGREES
EKG P-R INTERVAL: 154 MS
EKG Q-T INTERVAL: 432 MS
EKG QRS DURATION: 88 MS
EKG QTC CALCULATION (BAZETT): 507 MS
EKG R AXIS: 68 DEGREES
EKG T AXIS: 52 DEGREES
EKG VENTRICULAR RATE: 83 BPM
EOSINOPHILS RELATIVE PERCENT: 0 % (ref 1–4)
ETHANOL: NORMAL (ref 0–0.08)
ETHYLENE GLYCOL: NORMAL
GFR AFRICAN AMERICAN: 60 ML/MIN
GFR NON-AFRICAN AMERICAN: 49 ML/MIN
GFR SERPL CREATININE-BSD FRML MDRD: ABNORMAL ML/MIN/{1.73_M2}
GFR SERPL CREATININE-BSD FRML MDRD: ABNORMAL ML/MIN/{1.73_M2}
GLUCOSE BLD-MCNC: 127 MG/DL (ref 70–99)
HAV IGM SER IA-ACNC: NONREACTIVE
HBV SURFACE AB TITR SER: <3.5 MIU/ML
HCT VFR BLD CALC: 43.8 % (ref 40.7–50.3)
HEMOGLOBIN: 15.2 G/DL (ref 13–17)
HEPATITIS B CORE IGM ANTIBODY: NONREACTIVE
HEPATITIS B CORE TOTAL ANTIBODY: NONREACTIVE
HEPATITIS B SURFACE ANTIGEN: NONREACTIVE
HEPATITIS C ANTIBODY: REACTIVE
IMMATURE GRANULOCYTES: 1 %
ISOPROPANOL BLOOD: NORMAL
LACTIC ACID, WHOLE BLOOD: 1.8 MMOL/L (ref 0.7–2.1)
LACTOFERRIN, QUAL: NORMAL
LYMPHOCYTES # BLD: 12 % (ref 24–43)
Lab: NORMAL
MAGNESIUM: 1.9 MG/DL (ref 1.6–2.6)
MCH RBC QN AUTO: 28.4 PG (ref 25.2–33.5)
MCHC RBC AUTO-ENTMCNC: 34.7 G/DL (ref 28.4–34.8)
MCV RBC AUTO: 81.7 FL (ref 82.6–102.9)
METHANOL BLOOD: NORMAL
MICRO OVA & PARASITES: NORMAL
MONOCYTES # BLD: 8 % (ref 3–12)
MORPHOLOGY: ABNORMAL
NRBC AUTOMATED: 0 PER 100 WBC
PDW BLD-RTO: 14.4 % (ref 11.8–14.4)
PLATELET # BLD: 361 K/UL (ref 138–453)
PLATELET ESTIMATE: ABNORMAL
PLESIOMONAS SHIGELLOIDES PCR: NORMAL
PMV BLD AUTO: 9.3 FL (ref 8.1–13.5)
POTASSIUM SERPL-SCNC: 2.9 MMOL/L (ref 3.7–5.3)
RBC # BLD: 5.36 M/UL (ref 4.21–5.77)
RBC # BLD: ABNORMAL 10*6/UL
SALMONELLA PCR: NORMAL
SEG NEUTROPHILS: 79 % (ref 36–65)
SEGMENTED NEUTROPHILS ABSOLUTE COUNT: 25.28 K/UL (ref 1.5–8.1)
SHIGATOXIN GENE PCR: NORMAL
SHIGELLA SP PCR: NORMAL
SODIUM BLD-SCNC: 134 MMOL/L (ref 135–144)
SPECIMEN DESCRIPTION: NORMAL
VIBRIO PCR: NORMAL
WBC # BLD: 32 K/UL (ref 3.5–11.3)
WBC # BLD: ABNORMAL 10*3/UL
YERSINIA ENTEROCOLITICA PCR: NORMAL

## 2020-10-12 PROCEDURE — 83605 ASSAY OF LACTIC ACID: CPT

## 2020-10-12 PROCEDURE — 6370000000 HC RX 637 (ALT 250 FOR IP): Performed by: STUDENT IN AN ORGANIZED HEALTH CARE EDUCATION/TRAINING PROGRAM

## 2020-10-12 PROCEDURE — 90935 HEMODIALYSIS ONE EVALUATION: CPT

## 2020-10-12 PROCEDURE — 5A1D70Z PERFORMANCE OF URINARY FILTRATION, INTERMITTENT, LESS THAN 6 HOURS PER DAY: ICD-10-PCS | Performed by: INTERNAL MEDICINE

## 2020-10-12 PROCEDURE — 85025 COMPLETE CBC W/AUTO DIFF WBC: CPT

## 2020-10-12 PROCEDURE — 83630 LACTOFERRIN FECAL (QUAL): CPT

## 2020-10-12 PROCEDURE — 84600 ASSAY OF VOLATILES: CPT

## 2020-10-12 PROCEDURE — 2580000003 HC RX 258: Performed by: STUDENT IN AN ORGANIZED HEALTH CARE EDUCATION/TRAINING PROGRAM

## 2020-10-12 PROCEDURE — 6360000002 HC RX W HCPCS: Performed by: STUDENT IN AN ORGANIZED HEALTH CARE EDUCATION/TRAINING PROGRAM

## 2020-10-12 PROCEDURE — 6360000002 HC RX W HCPCS: Performed by: INTERNAL MEDICINE

## 2020-10-12 PROCEDURE — 87209 SMEAR COMPLEX STAIN: CPT

## 2020-10-12 PROCEDURE — 87324 CLOSTRIDIUM AG IA: CPT

## 2020-10-12 PROCEDURE — 87506 IADNA-DNA/RNA PROBE TQ 6-11: CPT

## 2020-10-12 PROCEDURE — 87210 SMEAR WET MOUNT SALINE/INK: CPT

## 2020-10-12 PROCEDURE — 87328 CRYPTOSPORIDIUM AG IA: CPT

## 2020-10-12 PROCEDURE — 87329 GIARDIA AG IA: CPT

## 2020-10-12 PROCEDURE — 87449 NOS EACH ORGANISM AG IA: CPT

## 2020-10-12 PROCEDURE — 93010 ELECTROCARDIOGRAM REPORT: CPT | Performed by: INTERNAL MEDICINE

## 2020-10-12 PROCEDURE — 83735 ASSAY OF MAGNESIUM: CPT

## 2020-10-12 PROCEDURE — 36415 COLL VENOUS BLD VENIPUNCTURE: CPT

## 2020-10-12 PROCEDURE — 87015 SPECIMEN INFECT AGNT CONCNTJ: CPT

## 2020-10-12 PROCEDURE — 80048 BASIC METABOLIC PNL TOTAL CA: CPT

## 2020-10-12 RX ORDER — UREA 10 %
3 LOTION (ML) TOPICAL ONCE
Status: COMPLETED | OUTPATIENT
Start: 2020-10-12 | End: 2020-10-12

## 2020-10-12 RX ORDER — HEPARIN SODIUM 1000 [USP'U]/ML
1900 INJECTION, SOLUTION INTRAVENOUS; SUBCUTANEOUS PRN
Status: DISCONTINUED | OUTPATIENT
Start: 2020-10-12 | End: 2020-10-12 | Stop reason: HOSPADM

## 2020-10-12 RX ORDER — POLYETHYLENE GLYCOL 3350 17 G/17G
17 POWDER, FOR SOLUTION ORAL DAILY PRN
Status: DISCONTINUED | OUTPATIENT
Start: 2020-10-12 | End: 2020-10-12 | Stop reason: HOSPADM

## 2020-10-12 RX ORDER — SODIUM CHLORIDE 9 MG/ML
INJECTION, SOLUTION INTRAVENOUS CONTINUOUS
Status: DISCONTINUED | OUTPATIENT
Start: 2020-10-12 | End: 2020-10-12 | Stop reason: HOSPADM

## 2020-10-12 RX ORDER — SODIUM CHLORIDE 0.9 % (FLUSH) 0.9 %
10 SYRINGE (ML) INJECTION EVERY 12 HOURS SCHEDULED
Status: DISCONTINUED | OUTPATIENT
Start: 2020-10-12 | End: 2020-10-12 | Stop reason: HOSPADM

## 2020-10-12 RX ORDER — SODIUM CHLORIDE 0.9 % (FLUSH) 0.9 %
10 SYRINGE (ML) INJECTION PRN
Status: DISCONTINUED | OUTPATIENT
Start: 2020-10-12 | End: 2020-10-12 | Stop reason: HOSPADM

## 2020-10-12 RX ORDER — SODIUM CHLORIDE, SODIUM LACTATE, POTASSIUM CHLORIDE, CALCIUM CHLORIDE 600; 310; 30; 20 MG/100ML; MG/100ML; MG/100ML; MG/100ML
INJECTION, SOLUTION INTRAVENOUS CONTINUOUS
Status: DISCONTINUED | OUTPATIENT
Start: 2020-10-12 | End: 2020-10-12

## 2020-10-12 RX ORDER — PROMETHAZINE HYDROCHLORIDE 12.5 MG/1
12.5 TABLET ORAL EVERY 6 HOURS PRN
Status: DISCONTINUED | OUTPATIENT
Start: 2020-10-12 | End: 2020-10-12 | Stop reason: HOSPADM

## 2020-10-12 RX ORDER — ONDANSETRON 2 MG/ML
4 INJECTION INTRAMUSCULAR; INTRAVENOUS EVERY 6 HOURS PRN
Status: DISCONTINUED | OUTPATIENT
Start: 2020-10-12 | End: 2020-10-12 | Stop reason: HOSPADM

## 2020-10-12 RX ORDER — POTASSIUM CHLORIDE 20 MEQ/1
40 TABLET, EXTENDED RELEASE ORAL ONCE
Status: DISCONTINUED | OUTPATIENT
Start: 2020-10-12 | End: 2020-10-12 | Stop reason: HOSPADM

## 2020-10-12 RX ORDER — ACETAMINOPHEN 325 MG/1
650 TABLET ORAL EVERY 6 HOURS PRN
Status: DISCONTINUED | OUTPATIENT
Start: 2020-10-12 | End: 2020-10-12 | Stop reason: HOSPADM

## 2020-10-12 RX ORDER — CIPROFLOXACIN 2 MG/ML
400 INJECTION, SOLUTION INTRAVENOUS EVERY 24 HOURS
Status: DISCONTINUED | OUTPATIENT
Start: 2020-10-12 | End: 2020-10-12 | Stop reason: HOSPADM

## 2020-10-12 RX ORDER — POTASSIUM CHLORIDE 20 MEQ/1
40 TABLET, EXTENDED RELEASE ORAL ONCE
Status: COMPLETED | OUTPATIENT
Start: 2020-10-12 | End: 2020-10-12

## 2020-10-12 RX ORDER — HEPARIN SODIUM 1000 [USP'U]/ML
1600 INJECTION, SOLUTION INTRAVENOUS; SUBCUTANEOUS PRN
Status: DISCONTINUED | OUTPATIENT
Start: 2020-10-12 | End: 2020-10-12 | Stop reason: HOSPADM

## 2020-10-12 RX ORDER — HEPARIN SODIUM 5000 [USP'U]/ML
5000 INJECTION, SOLUTION INTRAVENOUS; SUBCUTANEOUS EVERY 8 HOURS SCHEDULED
Status: DISCONTINUED | OUTPATIENT
Start: 2020-10-12 | End: 2020-10-12 | Stop reason: HOSPADM

## 2020-10-12 RX ORDER — ACETAMINOPHEN 650 MG/1
650 SUPPOSITORY RECTAL EVERY 6 HOURS PRN
Status: DISCONTINUED | OUTPATIENT
Start: 2020-10-12 | End: 2020-10-12 | Stop reason: HOSPADM

## 2020-10-12 RX ADMIN — HEPARIN SODIUM 5000 UNITS: 5000 INJECTION INTRAVENOUS; SUBCUTANEOUS at 05:38

## 2020-10-12 RX ADMIN — ONDANSETRON 4 MG: 2 INJECTION INTRAMUSCULAR; INTRAVENOUS at 06:45

## 2020-10-12 RX ADMIN — HEPARIN SODIUM 1600 UNITS: 1000 INJECTION INTRAVENOUS; SUBCUTANEOUS at 05:00

## 2020-10-12 RX ADMIN — Medication 3 MG: at 04:40

## 2020-10-12 RX ADMIN — SODIUM CHLORIDE: 9 INJECTION, SOLUTION INTRAVENOUS at 04:59

## 2020-10-12 RX ADMIN — HEPARIN SODIUM 1900 UNITS: 1000 INJECTION INTRAVENOUS; SUBCUTANEOUS at 05:00

## 2020-10-12 RX ADMIN — POTASSIUM CHLORIDE 40 MEQ: 1500 TABLET, EXTENDED RELEASE ORAL at 06:43

## 2020-10-12 ASSESSMENT — PAIN SCALES - GENERAL
PAINLEVEL_OUTOF10: 0

## 2020-10-12 NOTE — H&P
Critical Care - History and Physical Examination    Patient's name:  Merari Evans  Medical Record Number: [id number]  Patient's account/billing number: [de-identified]  Patient's YOB: 1965  Age: 49 y.o. Date of Admission: 10/11/2020  2:37 PM  Reason of ICU admission:   Date of History and Physical Examination: 10/12/2020      Primary Care Physician: Cm Campbell MD  Attending Physician:    Code Status: Prior    Chief complaint: Diarrhea    Reason for ICU admission:   Ethylene glycol poisoning      History Of Present Illness:   History was obtained from the patient. Merari Evans is a 49 y.o. with history of hypertension, bipolar disorder, depression patient presented with a chief complaint of diarrhea. Patient reported that he had a little 20 bowel movements last night, which were loose, watery. Reported he did not have any episodes of diarrhea since morning. Has nausea, 2 episodes of nonbloody nonbilious emesis, mild epigastric abdominal pain. He denies any fever, chills, chest pain, shortness of breath, cough, no recent antibiotic usage, no recent travel history. He reported that he ingested pop, as per him his friend mixed something in the pop which he suspects is antifreeze. Patient has history of IV drug abuse, admits using IV heroin this morning    In the ED he was afebrile,113/82, labs showed SERGE with creatinine 2.95(  BL- 0.8), anion gap metabolic acidosis 20, bicarb 18, lactic acid 3.2, glucose 182, [id number], 28 mildly elevated LFTs alk phos 143, ALT 80, AST 75,  Leukocytosis with WBC 24.3, hemoglobin 17.8,    CT abd pelvis-acute nonspecific enteritis or enterocolitis. No evidence of bowel obstruction. Stomach is moderately distended by low-attenuation fluid and gas and nasogastric placement may be helpful      Patient received 1 dose of omeprazole in the ER. Nephrology consulted, patient to undergo dialysis.       Past Medical History:        Diagnosis Date    Bipolar 1 disorder ([facility] [address]     Chronic mental illness     Depression     Hepatitis C     Hypertension     Psoriasis        Past Surgical History:        Procedure Laterality Date    BRAIN SURGERY      at age 37       Allergies: Allergies   Allergen Reactions    Haldol [Haloperidol] Other (See Comments)     Seizure activity per patient report. Zannie Cowden Latuda [Lurasidone Hcl] Other (See Comments)     Pt reports muscle spasms and fatigue         Home Medications:   Prior to Admission medications    Medication Sig Start Date End Date Taking? Authorizing Provider   citalopram (CELEXA) 20 MG tablet Take 1 tablet by mouth daily 6/24/20   JILLIAN Willson CNP   traZODone (DESYREL) 50 MG tablet Take 1 tablet by mouth nightly as needed for Sleep 6/24/20   JILLIAN Willson CNP   QUEtiapine (SEROQUEL) 400 MG tablet Take 1 tablet by mouth nightly 6/24/20   JILLIAN Willson CNP   amLODIPine (NORVASC) 10 MG tablet TAKE 1 TABLET BY MOUTH DAILY FOR 30 DAYS. 6/24/20   JILLIAN Willson CNP   hydroCHLOROthiazide (HYDRODIURIL) 25 MG tablet Take 1 tablet by mouth daily 6/25/20   JILLIAN Willson CNP   folic acid (FOLVITE) 1 MG tablet Take 1 tablet by mouth daily 3/11/20   JILLIAN Connor CNP   vitamin B-1 100 MG tablet Take 1 tablet by mouth daily 3/11/20   JILLIAN Connor CNP   omeprazole (PRILOSEC OTC) 20 MG tablet Take 1 tablet by mouth daily. 2/10/15 7/1/15  Shayan Mclean MD       Social History:   TOBACCO:   reports that he has been smoking cigarettes. He has a 15.00 pack-year smoking history. He has never used smokeless tobacco.  ETOH:   reports no history of alcohol use. DRUGS:  reports current drug use. Drug: Opiates .   OCCUPATION:      Family History:       Problem Relation Age of Onset    Cancer Father     Cancer Child            REVIEW OF SYSTEMS (ROS):  Review of Systems - Negative except mentioned in HPI   General ROS: negative  Psychological ROS: negative  Ophthalmic ROS: negative  ENT: negative  Hematological and Lymphatic ROS: negative  Endocrine ROS: negative  Breast ROS: negative  Respiratory ROS: no cough, shortness of breath, or wheezing  Cardiovascular ROS: no chest pain or dyspnea on exertion  Gastrointestinal ROS: Nausea, emesis, mild abdominal pain, diarrhea  Genito-Urinary ROS: negative  Musculoskeletal ROS: negative  Neurological ROS: negative  Dermatological ROS: negative      Physical Exam:    Vitals: BP (!) 131/93   Pulse 79   Temp 97.5 °F (36.4 °C) (Axillary)   Resp (!) 39   Ht 5' 8\" (1.727 m)   Wt 175 lb (79.4 kg)   SpO2 97%   BMI 26.61 kg/m²     Body weight:   Wt Readings from Last 3 Encounters:   10/11/20 175 lb (79.4 kg)   06/21/20 158 lb 15.2 oz (72.1 kg)   03/22/20 175 lb (79.4 kg)       Body Mass Index : Body mass index is 26.61 kg/m². PHYSICAL EXAMINATION :  Constitutional: Appears well, in no distress  EENT: PERRLA, EOMI, sclera clear, anicteric, oropharynx clear, no lesions, neck supple with midline trachea. Neck: Supple, symmetrical, trachea midline, no adenopathy, thyroid symmetric, no jvd skin normal  Respiratory: clear to auscultation, no wheezes or rales and unlabored breathing. No intercostal tenderness  Cardiovascular: regular rate and rhythm, normal S1, S2, no murmur noted and 2+ pulses throughout  Abdomen: soft, nontender, nondistended, no masses or organomegaly  Neurological: Alert, oriented, no sensorimotor deficits  Extremities:  peripheral pulses normal, no pedal edema, no clubbing or cyanosis      Laboratory findings:-    CBC:   Recent Labs     10/11/20  1542   WBC 24.3*   HGB 17.8*   *     BMP:    Recent Labs     10/11/20  1542 10/11/20  1713   *  --    K 4.7  --    CL 95*  --    CO2 18*  --    BUN 42*  --    CREATININE 2.95* 3.64*   GLUCOSE 182*  --      S. Calcium:  Recent Labs     10/11/20  1542   CALCIUM 11.2*     S. Ionized Calcium:No results for input(s): IONCA in the last 72 hours.   S. Magnesium:  Recent Labs     10/11/20  1809   MG 2.5     S. Phosphorus:No results for input(s): PHOS in the last 72 hours. S. Glucose:  Recent Labs     10/11/20  1713   POCGLU 175*     Glycosylated hemoglobin A1C: No results for input(s): LABA1C in the last 72 hours. INR:   Recent Labs     10/11/20  1542   INR 1.0     Hepatic functions:   Recent Labs     10/11/20  1542   ALKPHOS 143*   ALT 80*   AST 75*   PROT 10.7*   BILITOT 0.50   LABALBU 4.6     Pancreatic functions:  Recent Labs     10/11/20  1542   LACTA NOT REPORTED     S. Lactic Acid:   Recent Labs     10/11/20  1542   LACTA NOT REPORTED     Cardiac enzymes:No results for input(s): CKTOTAL, CKMB, CKMBINDEX, TROPONINI in the last 72 hours. BNP:No results for input(s): BNP in the last 72 hours. Lipid profile: No results for input(s): CHOL, TRIG, HDL, LDLCALC in the last 72 hours.     Invalid input(s): LDL  Blood Gases: No results found for: PH, PCO2, PO2, HCO3, O2SAT  Thyroid functions:   Lab Results   Component Value Date    TSH 0.50 03/09/2020        Urinalysis:     Microbiology:  Cultures during this admission:     Blood cultures:                 [] None drawn      [] Negative             []  Positive (Details:  )  Urine Culture:                   [] None drawn      [] Negative             []  Positive (Details:  )  Sputum Culture:               [] None drawn       [] Negative             []  Positive (Details:  )   Endotracheal aspirate:     [] None drawn       [] Negative             []  Positive (Details:  )         -----------------------------------------------------------------         Assessment and Plan     Patient Active Problem List   Diagnosis    Hepatitis C    Psoriasis    Essential hypertension    Chest pain    Hypertension    Smoker    Non-intractable vomiting with nausea    Sinus bradycardia    Opioid type dependence, continuous ([facility] [address]    Unstable angina ([facility] [address]    Bipolar disorder ([facility] [address]    Bipolar 1 disorder ([facility] [address]    Intentional drug overdose ([facility] [address]    Cocaine abuse ([facility] [address]    Prolonged Q-T interval on ECG    Hypoxia    Intentional benzodiazepine overdose (HCC)    Increased anion gap metabolic acidosis    SERGE (acute kidney injury) (HCC)    Ethylene glycol poisoning    Lactic acidosis    Hyponatremia    High serum osmolar gap    Hypercalcemia    IV drug abuse ([facility] [address]    History of hepatitis C               Plan:    Suspected ethylene glycol poisoning-anion gap metabolic acidosis, osmolar gap 20  Received 1 dose of fomepizole  To undergo hemodialysis. Sepsis-likely from enterocolitis  Received dose of vancomycin and Zosyn in the ED. We will start on ciprofloxacin and Flagyl. Stool studies - stool for ova& parasite, leucoferrin, C. difficile toxin, stool culture  Follow-up on blood culture, urine culture  Monitor WBC, serial abdominal examination. Received 2.3 L of LR. [email or web address]    History of hypertension-on Norvasc and HCTZ. We will hold the blood pressures for now, restart as needed. SERGE- from ethylene glycol poisoning. Currently undergoing hemodialysis.   Monitor creatinine  Continue IV hydration    F/u Utox    Lactic acidosis  Continue IV hydration, trend lactic acid        Celia Ngo MD  Critical care resident  [facility]             10/12/2020, 12:34 AM

## 2020-10-12 NOTE — PROGRESS NOTES
Pharmacy Note     Renal Dose Adjustment    Keyshawn Espinoza is a 49 y.o. male. Pharmacist assessment of renally cleared medications. Recent Labs     10/11/20  1542   BUN 42*       Recent Labs     10/11/20  1542 10/11/20  1713   CREATININE 2.95* 3.64*       Estimated Creatinine Clearance: 23 mL/min (A) (based on SCr of 3.64 mg/dL (H)). Estimated CrCl using Ideal Body Weight: 23 mL/min (based on IBW 74 kg)    Height:   Ht Readings from Last 1 Encounters:   10/12/20 5' 8\" (1.727 m)     Weight:  Wt Readings from Last 1 Encounters:   10/12/20 163 lb 12.8 oz (74.3 kg)       The following medication dose has been adjusted based upon renal function per P&T Guidelines:             Ciprofloxacin 400 mg IV every 12 hours changed to ciprofloxacin 400 mg IV every 24 hours.

## 2020-10-12 NOTE — CONSULTS
Renal Consult Note    Patient :  Meliza Elmore; 49 y.o. MRN# [id number]  Location:  0127/0127-01  Attending:  Toro Lieberman DO  Admit Date:  10/11/2020   Hospital Day: 0    Reason for Consult:     Asked by Dr Toro Lieberman DO to see for SERGE/Elevated Creatinine. History Obtained From:     patient, electronic medical record    History of Present Illness:     Meliza Elmore; 49 y.o. male with past medical history of IV drug abuse, does heroin, bipolar disorder, hepatitis C, history of hypertension, history of suicide attempts in the past, daily current smoker smokes about 1 pack/day presented to the hospital as he was not feeling well at all and has been having diarrhea since last 1 or 2 days. Patient mentioned that he has been having multiple episodes of diarrhea around 10 times and was feeling quite tired and fatigue. He also reports that he might have drank ethylene glycol (antifreeze) as someone he knows might have slipped it in his drink. Patient states that he does not drink alcohol that often and he was drinking pop and he feels like it was mixed in his pop. He is not a good historian. Looks disheveled. In the ER lab work was done which showed creatinine of 2.95 mg/DL with BUN of 42, sodium 133, potassium 4.7, chloride 95 and bicarb 18 with calcium of 11.2 and glucose 182. Anion gap 20. His magnesium level is okay at 2.5. Lactic acid was elevated at 3.2 had elevated LFTs with alk phos 143, AST 75 and ALT 80 with lipase 64. His ethanol level was <10. Elevated white count of 24.3, hemoglobin 17.8 and platelets of 794. His serum osmolarity was 312. Osmolar gap was 20. Patient refused Melo catheter in the ER. They did straight cath him and got 100 cc of urine. UA was essentially negative. Blood gas shows initial pH of 7.32, PCO2 43.2, PO2 32.2 and bicarb of 21.8. CT abdomen pelvis done 10/11/2020 showed bowel finding reflect an acute nonspecific enteritis or enterocolitis.   No evidence of bowel obstruction. Stomach is moderately distended with low attenuation fluid and gas and NG tube placement will be helpful. Concentric thickening of wall of distal esophagus, possibly reflux esophagitis. Home medications include Celexa, trazodone, Seroquel, Norvasc, hydrochlorothiazide and folic acid and vitamin B. Patient did receive Ringer's lactate bolus 2 L in the ER and also was initiated on omeprazole. Nephrology is consulted due to acute kidney injury and due to ethylene glycol toxicity. Past History/Allergies? Social History:     Past Medical History:   Diagnosis Date    Bipolar 1 disorder ([facility] [address]     Chronic mental illness     Depression     Hepatitis C     Hypertension     Psoriasis        Allergies   Allergen Reactions    Haldol [Haloperidol] Other (See Comments)     Seizure activity per patient report.  Latuda [Lurasidone Hcl] Other (See Comments)     Pt reports muscle spasms and fatigue       Social History     Socioeconomic History    Marital status: Single     Spouse name: Not on file    Number of children: Not on file    Years of education: Not on file    Highest education level: Not on file   Occupational History    Not on file   Social Needs    Financial resource strain: Not on file    Food insecurity     Worry: Not on file     Inability: Not on file    Transportation needs     Medical: Not on file     Non-medical: Not on file   Tobacco Use    Smoking status: Current Every Day Smoker     Packs/day: 1.00     Years: 15.00     Pack years: 15.00     Types: Cigarettes    Smokeless tobacco: Never Used   Substance and Sexual Activity    Alcohol use: No     Alcohol/week: 0.0 standard drinks     Comment: social     Drug use: Yes     Types: Opiates      Comment: reports hisory of heroin abuse, currently receiving treatment with suboxone.      Sexual activity: Not on file   Lifestyle    Physical activity     Days per week: Not on file     Minutes per session: Not on file    hematuria, no pyuria, no dysuria, no flank pain. Extremities:  No swelling or joint pains. ROS was otherwise negative except as mentioned in the [address] Input/Output:       No intake/output data recorded. Vital Signs:   Temperature:  Temp: 97.5 °F (36.4 °C)  TMax:   Temp (24hrs), Av.3 °F (36.3 °C), Min:97 °F (36.1 °C), Max:97.5 °F (36.4 °C)    Respirations:  Resp: 27  Pulse:   Pulse: 74  BP:    BP: (!) 145/99  BP Range: Systolic (01NTS), ULO:203 , Min:113 , UZV:541       Diastolic (11NAG), UUJ:06, Min:72, Max:101      Physical Examination:     General:  AAO x 3, speaking in full sentences, no accessory muscle use. Looks disheveled. HEENT: Atraumatic, normocephalic, no throat congestion, dry mucosa. Eyes:   Pupils equal, round and reactive to light, EOMI. Neck:   Supple  Chest:   Bilateral vesicular breath sounds, no rales or wheezes. Cardiac:  S1 S2 RR, no murmurs, gallops or rubs. Abdomen: Soft, non-tender, no masses or organomegaly, BS audible. :   No suprapubic or flank tenderness. Neuro:  AAO x 3, No FND. SKIN:  No rashes, good skin turgor. Extremities:  No edema.     Labs:       Recent Labs     10/11/20  1542   WBC 24.3*   RBC 6.38*   HGB 17.8*   HCT 54.5*   MCV 85.4   MCH 27.9   MCHC 32.7   RDW 14.6*   *   MPV 9.8      BMP:   Recent Labs     10/11/20  1542 10/11/20  1713   *  --    K 4.7  --    CL 95*  --    CO2 18*  --    BUN 42*  --    CREATININE 2.95* 3.64*   GLUCOSE 182*  --    CALCIUM 11.2*  --       Magnesium:    Recent Labs     10/11/20  1809   MG 2.5     Albumin:    Recent Labs     10/11/20  1542   LABALBU 4.6     SPEP:  Lab Results   Component Value Date    PROT 10.7 10/11/2020     Hep BsAg:         Lab Results   Component Value Date    HEPBSAG NONREACTIVE 2018     Hep C AB:          Lab Results   Component Value Date    HEPCAB REACTIVE 2018       Urinalysis/Chemistries:      Lab Results   Component Value Date    NITRU NEGATIVE 10/11/2020    COLORU DARK drugs/contrast exposure. Thank you for the consultation. Please do not hesitate to contact us for any further questions/concerns. We will continue to follow along with you. Gurjit Santos MD  [facility]     This note is created with the assistance of a speech-recognition program. While intending to generate a document that actually reflects the content of the visit, no guarantees can be provided that every mistake has been identified and corrected by editing.

## 2020-10-12 NOTE — ED NOTES
Jermaine Palma RN updated with POC, pt refusing telemetry, VS and gown, pt taken to ICU in wheelchair by RN with Dr Trinity Weldon, JULISSA  10/11/20 [phone]

## 2020-10-12 NOTE — ED PROVIDER NOTES
[facility]  Emergency Department  Faculty Sign-Out Addendum     Care of Keyshawn Espinoza was assumed from previous attending and is being seen for Diarrhea (started last night); Emesis; and Fatigue  . The patient's initial evaluation and plan have been discussed with the prior provider who initially evaluated the patient. I reviewed the residents note and agree with the documented findings and plan of care. Any areas of disagreement are noted on the chart. I was personally present for the key portions of any procedures. I have documented in the chart those procedures where I was not present during the key portions. I have reviewed the emergency nurses triage note. I agree with the chief complaint, past medical history, past surgical history, allergies, medications, social and family history as documented unless otherwise noted below.       EMERGENCY DEPARTMENT COURSE / MEDICAL DECISION MAKING:       MEDICATIONS GIVEN:  Orders Placed This Encounter   Medications    lactated ringers bolus    folic acid 1 mg, thiamine (B-1) 100 mg in dextrose 5 % 50 mL IVPB    piperacillin-tazobactam (ZOSYN) 3.375 g in dextrose 5 % 50 mL IVPB extended infusion (mini-bag)     Order Specific Question:   Antimicrobial Indications     Answer:   Sepsis of Unknown Etiology    DISCONTD: vancomycin (VANCOCIN) intermittent dosing (placeholder)     Order Specific Question:   Antimicrobial Indications     Answer:   Sepsis of Unknown Etiology    vancomycin (VANCOCIN) 1250 mg in dextrose 5 % 250 mL IVPB     Order Specific Question:   Antimicrobial Indications     Answer:   Sepsis of Unknown Etiology    DISCONTD: fomepizole (ANTIZOL) injection 1.19 g    fomepizole (ANTIZOL) 1,190 mg in sodium chloride 0.9 % 100 mL IVPB    DISCONTD: heparin (porcine) injection 1,400 Units    DISCONTD: heparin (porcine) injection 1,500 Units    heparin (porcine) injection 1,600 Units    heparin (porcine) injection 1,900 Units    sodium chloride flush 0.9 % injection 10 mL    sodium chloride flush 0.9 % injection 10 mL    OR Linked Order Group     acetaminophen (TYLENOL) tablet 650 mg     acetaminophen (TYLENOL) suppository 650 mg    polyethylene glycol (GLYCOLAX) packet 17 g    OR Linked Order Group     promethazine (PHENERGAN) tablet 12.5 mg     ondansetron (ZOFRAN) injection 4 mg    heparin (porcine) injection 5,000 Units    heparin (porcine) injection 1,900 Units    heparin (porcine) injection 1,600 Units       LABS / RADIOLOGY:     Labs Reviewed   CBC WITH AUTO DIFFERENTIAL - Abnormal; Notable for the following components:       Result Value    WBC 24.3 (*)     RBC 6.38 (*)     Hemoglobin 17.8 (*)     Hematocrit 54.5 (*)     RDW 14.6 (*)     Platelets 601 (*)     Immature Granulocytes 1 (*)     Seg Neutrophils 82 (*)     Lymphocytes 11 (*)     Eosinophils % 0 (*)     Segs Absolute 19.93 (*)     Absolute Mono # 1.22 (*)     All other components within normal limits   OSMOLALITY - Abnormal; Notable for the following components:    Serum Osmolality 312 (*)     All other components within normal limits   BLOOD GAS, VENOUS - Abnormal; Notable for the following components:    HCO3, Venous 21.8 (*)     Negative Base Excess, Davie 3.8 (*)     O2 Sat, [place]ie 50.6 (*)     All other components within normal limits   LACTIC ACID, PLASMA - Abnormal; Notable for the following components:    Lactic Acid, Whole Blood 3.2 (*)     All other components within normal limits   COMPREHENSIVE METABOLIC PANEL W/ REFLEX TO MG FOR LOW K - Abnormal; Notable for the following components:    Glucose 182 (*)     BUN 42 (*)     CREATININE 2.95 (*)     Calcium 11.2 (*)     Sodium 133 (*)     Chloride 95 (*)     CO2 18 (*)     Anion Gap 20 (*)     Alkaline Phosphatase 143 (*)     ALT 80 (*)     AST 75 (*)     Total Protein 10.7 (*)     Albumin/Globulin Ratio 0.8 (*)     GFR Non- 22 (*)     GFR  27 (*)     All other components within normal limits   TROPONIN - Abnormal; Notable for the following components:    Troponin, High Sensitivity 28 (*)     All other components within normal limits   BRAIN NATRIURETIC PEPTIDE - Abnormal; Notable for the following components:    Pro-BNP 1,252 (*)     All other components within normal limits   URINALYSIS - Abnormal; Notable for the following components:    Color, UA DARK YELLOW (*)     Protein, UA TRACE (*)     All other components within normal limits   LIPASE - Abnormal; Notable for the following components:    Lipase 64 (*)     All other components within normal limits   HGB/HCT - Abnormal; Notable for the following components:    POC Hemoglobin 21.4 (*)     POC Hematocrit 63 (*)     All other components within normal limits   CHLORIDE (POC) - Abnormal; Notable for the following components:    POC Chloride 112 (*)     All other components within normal limits   TROPONIN - Abnormal; Notable for the following components:    Troponin, High Sensitivity 28 (*)     All other components within normal limits   ACETAMINOPHEN LEVEL - Abnormal; Notable for the following components:    Acetaminophen Level <5 (*)     All other components within normal limits   SALICYLATE LEVEL - Abnormal; Notable for the following components:    Salicylate Lvl <1 (*)     All other components within normal limits   HEPATITIS PANEL, ACUTE - Abnormal; Notable for the following components:    Hepatitis C Ab REACTIVE (*)     All other components within normal limits   VENOUS BLOOD GAS, POINT OF CARE - Abnormal; Notable for the following components:    pCO2, Davie 33.1 (*)     HCO3, Venous 20.0 (*)     Total CO2, Venous 21 (*)     Negative Base Excess, Davie 4 (*)     All other components within normal limits   CREATININE W/GFR POINT OF CARE - Abnormal; Notable for the following components:    POC Creatinine 3.64 (*)     GFR Comment 21 (*)     GFR Non- 18 (*)     All other components within normal limits   LACTIC ACID,POINT OF

## 2020-10-12 NOTE — PROGRESS NOTES
This patient was assigned to me by error. This patient was never interviewed nor examined by me. I did not participate in the care of this patient.     Benji Ji MD  Emergency Medicine Resident  PGY-2

## 2020-10-12 NOTE — ED NOTES
Pt sleeping, easily awakened, voices no complaints at this time, pt refusing causey catheter, continue to monitor     Rusty Arango RN  10/11/20 2055

## 2020-10-12 NOTE — ED NOTES
Dr Akash Peralta nephrology at bedside, pt did get up to use bathroom and refuses urinal, pt states that he urinated & flushed the toilet, pt continues to refuse telemetry and gown     Rusty Arango RN  10/11/20 1455

## 2020-10-12 NOTE — PROGRESS NOTES
Dialysis Post Treatment Note  Vitals:    10/12/20 0508   BP: (!) 145/86   Pulse: 76   Resp: (!) 32   Temp: 97.5 °F (36.4 °C)   SpO2: 97%     Pre-Weight = 94.3  Post-weight = Weight: 163 lb 12.8 oz (74.3 kg)  Total Liters Processed = Total Liters Processed (l/min): 78 l/min  Rinseback Volume (mL) = Rinseback Volume (ml): 350 ml  Net Removal (mL) = NET Removed (ml): 0 ml  Patient's dry weight=not est  Type of access used=fem cath  Length of treatment=240    Pt tolerated 1st tx, pt was restless and anxious t/out the 4hr tx.

## 2020-10-12 NOTE — PLAN OF CARE
Patient wants to leave AMA. We discussed the risks of leaving AMA including death if he leaves now. Patient is alert oriented x4 and understands all the risks of leaving AMA including death. Patient states that he is an heroin addict and has no plans of quitting. He does not want any help. I did explain to him that we can help with this withdrawals and start him back on his home Seroquel, however patient states that he would not wait at any cost.    Even after detailed discussion, patient wants to go. Patient signed Lul Kemp paperwork. Uziel catheter to be removed before he goes.     Juan Antonio Morris MD  INTERNAL MEDICINE RESIDENT, PGY-3  [id number] [place] [address]  10/12/2020,7:54 AM

## 2020-10-12 NOTE — ED NOTES
Pt alert at this time, speaking on phone with family, pt took off telemetry patches stating that he does not have a heart problem, pt updated with POC, pt instructed to stay in bed & use call light that is within reach, Critical Care team at bedside     [facility], JULISSA  10/11/20 [id number]

## 2020-10-12 NOTE — PLAN OF CARE
Problem: Falls - Risk of:  Goal: Will remain free from falls  Description: Will remain free from falls  Outcome: Met This Shift     Problem: Falls - Risk of:  Goal: Absence of physical injury  Description: Absence of physical injury  Outcome: Met This Shift     Problem: Skin Integrity:  Goal: Will show no infection signs and symptoms  Description: Will show no infection signs and symptoms  Outcome: Met This Shift     Problem: Skin Integrity:  Goal: Absence of new skin breakdown  Description: Absence of new skin breakdown  Outcome: Met This Shift     Problem: Skin Integrity:  Goal: Risk for impaired skin integrity will decrease  Description: Risk for impaired skin integrity will decrease  Outcome: Met This Shift     Problem: Skin Integrity:  Goal: Demonstration of wound healing without infection will improve  Description: Demonstration of wound healing without infection will improve  Outcome: Met This Shift     Problem: Pain:  Goal: Pain level will decrease  Description: Pain level will decrease  Outcome: Ongoing     Problem: Pain:  Goal: Control of acute pain  Description: Control of acute pain  Outcome: Ongoing     Problem: Pain:  Goal: Control of chronic pain  Description: Control of chronic pain  Outcome: Ongoing     Problem: Bowel/Gastric:  Goal: Control of bowel function will improve  Description: Control of bowel function will improve  Outcome: Ongoing     Problem:  Bowel/Gastric:  Goal: Ability to achieve a regular elimination pattern will improve  Description: Ability to achieve a regular elimination pattern will improve  Outcome: Ongoing     Problem: Nutritional:  Goal: Ability to follow a diet with enough fiber (20 to 30 grams) for normal bowel function will improve  Description: Ability to follow a diet with enough fiber (20 to 30 grams) for normal bowel function will improve  Outcome: Ongoing     Problem: Nutritional:  Goal: Nutritional status will improve  Description: Nutritional status will improve  Outcome: Ongoing     Problem: Physical Regulation:  Goal: Diagnostic test results will improve  Description: Diagnostic test results will improve  Outcome: Ongoing     Problem: Physical Regulation:  Goal: Will remain free from infection  Description: Will remain free from infection  Outcome: Ongoing     Problem: Physical Regulation:  Goal: Ability to maintain vital signs within normal range will improve  Description: Ability to maintain vital signs within normal range will improve  Outcome: Ongoing     Problem: Respiratory:  Goal: Ability to maintain normal respiratory secretions will improve  Description: Ability to maintain normal respiratory secretions will improve  Outcome: Ongoing

## 2020-10-12 NOTE — PROGRESS NOTES
At 0743 Dr. Fany Roberson and I went into pt.'s room d/t him expressing that he wants to leave AMA. After a detailed conversation pt. Still demanded he leave. See Dr. Sandoval  note for conversation had. AMA paper signed by myself, Dr. Fany Roberson and the Pt. Zeke Rene line removed at [phone], pressure held with no bleeding and dressing placed. x2 PIV's removed at 0800, pressure held with no bleeding and dressings placed. Pt. Taken off monitor and changed into his clothes. States he has a ride that is on its way. Pt. Left unit at [facility].

## 2020-10-20 NOTE — DISCHARGE SUMMARY
[address] [place] [address]     Department of Internal Medicine - Staff Internal Medicine Service    INPATIENT DISCHARGE SUMMARY        Patient Identification:  Derrick Kurtz is a 49 y.o. male. :  1965  MRN: [id number]     Acct: [de-identified]   Admit Date:  10/11/2020  Discharge date and time: 10/12/2020  8:05 AM   Attending Provider: No att. providers found                                     Admission Diagnoses:   Metabolic acidosis [N79.7]    Discharge Diagnoses: Active Problems:    Increased anion gap metabolic acidosis    SERGE (acute kidney injury) (HCC)    Ethylene glycol poisoning    Lactic acidosis    Hyponatremia    High serum osmolar gap    Hypercalcemia    IV drug abuse ([facility] [address]    History of hepatitis C  Resolved Problems:    * No resolved hospital problems. *       Consults:   nephrology    Brief Inpatient course:     Derrick Kurtz is a 49 y.o. with history of hypertension, bipolar disorder, depression patient presented with a chief complaint of diarrhea. Patient reported that he had a little 20 bowel movements last night, which were loose, watery. Reported he did not have any episodes of diarrhea since morning. Has nausea, 2 episodes of nonbloody nonbilious emesis, mild epigastric abdominal pain. He denies any fever, chills, chest pain, shortness of breath, cough, no recent antibiotic usage, no recent travel history. He reported that he ingested pop, as per him his friend mixed something in the pop which he suspects is antifreeze. Patient has history of IV drug abuse, admits using IV heroin this morning     In the ED he was afebrile,113/82, labs showed SERGE with creatinine 2.95(  BL- 0.8), anion gap metabolic acidosis 20, bicarb 18, lactic acid 3.2, glucose 182, [id number], 28 mildly elevated LFTs alk phos 143, ALT 80, AST 75,  Leukocytosis with WBC 24.3, hemoglobin 17.8,    CT abd pelvis-acute nonspecific enteritis or enterocolitis. No evidence of bowel obstruction.   Stomach is

## 2020-12-01 NOTE — CONSULTS
H&P done.   MARIUM WAYNE, JILLIAN - CNP on 3/9/2020 at 3:08 PM Click to Modify Medication Indication on Note Save

## 2021-01-09 ENCOUNTER — APPOINTMENT (OUTPATIENT)
Dept: CT IMAGING | Age: 56
End: 2021-01-09
Payer: MEDICARE

## 2021-01-09 ENCOUNTER — HOSPITAL ENCOUNTER (EMERGENCY)
Age: 56
Discharge: HOME | End: 2021-01-09
Attending: EMERGENCY MEDICINE
Payer: MEDICARE

## 2021-01-09 VITALS
WEIGHT: 175 LBS | HEIGHT: 68 IN | OXYGEN SATURATION: 96 % | HEART RATE: 84 BPM | DIASTOLIC BLOOD PRESSURE: 85 MMHG | TEMPERATURE: 99 F | SYSTOLIC BLOOD PRESSURE: 156 MMHG | BODY MASS INDEX: 26.52 KG/M2

## 2021-01-09 DIAGNOSIS — L03.119 CELLULITIS OF UPPER EXTREMITY, UNSPECIFIED LATERALITY: Primary | ICD-10-CM

## 2021-01-09 LAB
ABSOLUTE EOS #: 0.29 K/UL (ref 0–0.44)
ABSOLUTE IMMATURE GRANULOCYTE: 0.03 K/UL (ref 0–0.3)
ABSOLUTE LYMPH #: 1.95 K/UL (ref 1.1–3.7)
ABSOLUTE MONO #: 0.79 K/UL (ref 0.1–1.2)
ALBUMIN SERPL-MCNC: 3.2 G/DL (ref 3.5–5.2)
ALBUMIN/GLOBULIN RATIO: 0.8 (ref 1–2.5)
ALP BLD-CCNC: 84 U/L (ref 40–129)
ALT SERPL-CCNC: 52 U/L (ref 5–41)
ANION GAP SERPL CALCULATED.3IONS-SCNC: 10 MMOL/L (ref 9–17)
AST SERPL-CCNC: 53 U/L
BASOPHILS # BLD: 1 % (ref 0–2)
BASOPHILS ABSOLUTE: 0.05 K/UL (ref 0–0.2)
BILIRUB SERPL-MCNC: 0.19 MG/DL (ref 0.3–1.2)
BUN BLDV-MCNC: 7 MG/DL (ref 6–20)
BUN/CREAT BLD: ABNORMAL (ref 9–20)
C-REACTIVE PROTEIN: 25.5 MG/L (ref 0–5)
CALCIUM SERPL-MCNC: 8.4 MG/DL (ref 8.6–10.4)
CHLORIDE BLD-SCNC: 97 MMOL/L (ref 98–107)
CO2: 23 MMOL/L (ref 20–31)
CREAT SERPL-MCNC: 0.6 MG/DL (ref 0.7–1.2)
DIFFERENTIAL TYPE: ABNORMAL
EOSINOPHILS RELATIVE PERCENT: 4 % (ref 1–4)
GFR AFRICAN AMERICAN: >60 ML/MIN
GFR NON-AFRICAN AMERICAN: >60 ML/MIN
GFR SERPL CREATININE-BSD FRML MDRD: ABNORMAL ML/MIN/{1.73_M2}
GFR SERPL CREATININE-BSD FRML MDRD: ABNORMAL ML/MIN/{1.73_M2}
GLUCOSE BLD-MCNC: 149 MG/DL (ref 70–99)
HCT VFR BLD CALC: 36.5 % (ref 40.7–50.3)
HEMOGLOBIN: 11.6 G/DL (ref 13–17)
IMMATURE GRANULOCYTES: 0 %
LYMPHOCYTES # BLD: 24 % (ref 24–43)
MCH RBC QN AUTO: 25.6 PG (ref 25.2–33.5)
MCHC RBC AUTO-ENTMCNC: 31.8 G/DL (ref 28.4–34.8)
MCV RBC AUTO: 80.4 FL (ref 82.6–102.9)
MONOCYTES # BLD: 10 % (ref 3–12)
NRBC AUTOMATED: 0 PER 100 WBC
PDW BLD-RTO: 17.2 % (ref 11.8–14.4)
PLATELET # BLD: 279 K/UL (ref 138–453)
PLATELET ESTIMATE: ABNORMAL
PMV BLD AUTO: 8.7 FL (ref 8.1–13.5)
POTASSIUM SERPL-SCNC: 3.9 MMOL/L (ref 3.7–5.3)
RBC # BLD: 4.54 M/UL (ref 4.21–5.77)
RBC # BLD: ABNORMAL 10*6/UL
SEDIMENTATION RATE, ERYTHROCYTE: 62 MM (ref 0–20)
SEG NEUTROPHILS: 61 % (ref 36–65)
SEGMENTED NEUTROPHILS ABSOLUTE COUNT: 4.95 K/UL (ref 1.5–8.1)
SODIUM BLD-SCNC: 130 MMOL/L (ref 135–144)
TOTAL PROTEIN: 7.3 G/DL (ref 6.4–8.3)
TROPONIN INTERP: ABNORMAL
TROPONIN T: ABNORMAL NG/ML
TROPONIN, HIGH SENSITIVITY: 29 NG/L (ref 0–22)
WBC # BLD: 8.1 K/UL (ref 3.5–11.3)
WBC # BLD: ABNORMAL 10*3/UL

## 2021-01-09 PROCEDURE — 71275 CT ANGIOGRAPHY CHEST: CPT

## 2021-01-09 PROCEDURE — 93005 ELECTROCARDIOGRAM TRACING: CPT | Performed by: STUDENT IN AN ORGANIZED HEALTH CARE EDUCATION/TRAINING PROGRAM

## 2021-01-09 PROCEDURE — 84484 ASSAY OF TROPONIN QUANT: CPT

## 2021-01-09 PROCEDURE — 99282 EMERGENCY DEPT VISIT SF MDM: CPT

## 2021-01-09 PROCEDURE — 86140 C-REACTIVE PROTEIN: CPT

## 2021-01-09 PROCEDURE — 85652 RBC SED RATE AUTOMATED: CPT

## 2021-01-09 PROCEDURE — 80053 COMPREHEN METABOLIC PANEL: CPT

## 2021-01-09 PROCEDURE — 85025 COMPLETE CBC W/AUTO DIFF WBC: CPT

## 2021-01-09 PROCEDURE — 6360000004 HC RX CONTRAST MEDICATION: Performed by: STUDENT IN AN ORGANIZED HEALTH CARE EDUCATION/TRAINING PROGRAM

## 2021-01-09 RX ORDER — DOXYCYCLINE HYCLATE 100 MG
100 TABLET ORAL 2 TIMES DAILY
Qty: 20 TABLET | Refills: 0 | Status: SHIPPED | OUTPATIENT
Start: 2021-01-09 | End: 2021-01-19

## 2021-01-09 RX ADMIN — IOPAMIDOL 75 ML: 755 INJECTION, SOLUTION INTRAVENOUS at 19:36

## 2021-01-09 ASSESSMENT — PAIN DESCRIPTION - LOCATION: LOCATION: ARM

## 2021-01-09 ASSESSMENT — ENCOUNTER SYMPTOMS
NAUSEA: 0
DIARRHEA: 0
COLOR CHANGE: 1
SHORTNESS OF BREATH: 0
VOMITING: 0
COUGH: 0

## 2021-01-09 ASSESSMENT — PAIN DESCRIPTION - ORIENTATION: ORIENTATION: RIGHT;LEFT;LOWER

## 2021-01-09 NOTE — ED NOTES
Pt arrived to ED with bilateral swelling in forearms x 2 days, pt reports swelling is caused from IV drug use, pt states he last use \"china white\" last night around midnight. Pt reports hx of hypertension and reports taking BP medication, pt reports pain in bilateral arms/hands, Pt A&Ox4, RR even/unlabored, NAD noted. call light within reach, will cont to monitor.          Erasto Mckeon RN  01/09/21 [id number]

## 2021-01-09 NOTE — ED PROVIDER NOTES
[facility]     Emergency Department     Faculty Attestation    I performed a history and physical examination of the patient and discussed management with the resident. I reviewed the residents note and agree with the documented findings and plan of care. Any areas of disagreement are noted on the chart. I was personally present for the key portions of any procedures. I have documented in the chart those procedures where I was not present during the key portions. I have reviewed the emergency nurses triage note. I agree with the chief complaint, past medical history, past surgical history, allergies, medications, social and family history as documented unless otherwise noted below. For Physician Assistant/ Nurse Practitioner cases/documentation I have personally evaluated this patient and have completed at least one if not all key elements of the E/M (history, physical exam, and MDM). Additional findings are as noted. I have personally seen and evaluated the patient. I find the patient's history and physical exam are consistent with the NP/PA documentation. I agree with the care provided, treatment rendered, disposition and follow-up plan. Lateral forearm swelling the arm forearms are noted bilaterally to be very indurated and hard although he has no vascular or neuro compromise distally at this time he does admit to subcutaneous injections of the forearm this could represent underlying infection less likely compartment syndrome or venous thrombosis      Critical Care     Antonio Whitfield M.D.   Attending Emergency  Physician              Bora Mcgraw MD  01/09/21 5277

## 2021-01-10 NOTE — ED NOTES
Pt resting comfortably on cot, pt denies any needs currently, NAD noted, call light within reach, will cont to monitor     Rene Ramírez RN  01/09/21 2053

## 2021-01-10 NOTE — ED PROVIDER NOTES
used \"china white\" last night 01/08/2021    Sexual activity: Not on file   Lifestyle    Physical activity     Days per week: Not on file     Minutes per session: Not on file    Stress: Not on file   Relationships    Social connections     Talks on phone: Not on file     Gets together: Not on file     Attends Christian service: Not on file     Active member of club or organization: Not on file     Attends meetings of clubs or organizations: Not on file     Relationship status: Not on file    Intimate partner violence     Fear of current or ex partner: Not on file     Emotionally abused: Not on file     Physically abused: Not on file     Forced sexual activity: Not on file   Other Topics Concern    Not on file   Social History Narrative    Not on file       Family History   Problem Relation Age of Onset    Cancer Father     Cancer Child        Allergies:  Haldol [haloperidol] and Latuda [lurasidone hcl]    Home Medications:  Prior to Admission medications    Medication Sig Start Date End Date Taking? Authorizing Provider   doxycycline hyclate (VIBRA-TABS) 100 MG tablet Take 1 tablet by mouth 2 times daily for 10 days 1/9/21 1/19/21 Yes Lyly Brennan DO   citalopram (CELEXA) 20 MG tablet Take 1 tablet by mouth daily 6/24/20   Romero Better, APRN - CNP   traZODone (DESYREL) 50 MG tablet Take 1 tablet by mouth nightly as needed for Sleep 6/24/20   [place] Better, APRN - CNP   QUEtiapine (SEROQUEL) 400 MG tablet Take 1 tablet by mouth nightly 6/24/20   Art[place]ia Better, APRN - CNP   amLODIPine (NORVASC) 10 MG tablet TAKE 1 TABLET BY MOUTH DAILY FOR 30 DAYS.  6/24/20   Romero Better, APRN - CNP   hydroCHLOROthiazide (HYDRODIURIL) 25 MG tablet Take 1 tablet by mouth daily 6/25/20   Romero Better, APRN - CNP   folic acid (FOLVITE) 1 MG tablet Take 1 tablet by mouth daily 3/11/20   Jessica Peterson, APRN - CNP   vitamin B-1 100 MG tablet Take 1 tablet by mouth daily 3/11/20   Jessica Peterson, APRN - CNP omeprazole (PRILOSEC OTC) 20 MG tablet Take 1 tablet by mouth daily. 2/10/15 7/1/15  Geovany Jauregui MD       REVIEW OF SYSTEMS    (2-9 systems for level 4, 10 or more for level 5)      Review of Systems   Constitutional: Negative for fatigue and fever. Respiratory: Negative for cough and shortness of breath. Cardiovascular: Negative for chest pain and leg swelling. Gastrointestinal: Negative for diarrhea, nausea and vomiting. Genitourinary: Negative for dysuria and flank pain. Musculoskeletal: Positive for myalgias. Skin: Positive for color change and rash. Neurological: Negative for weakness, numbness and headaches. Psychiatric/Behavioral: Negative for confusion. PHYSICAL EXAM   (up to 7 for level 4, 8 or more for level 5)      INITIAL VITALS:   BP (!) 156/85   Pulse 84   Temp 99 °F (37.2 °C) (Infrared)   Ht 5' 8\" (1.727 m)   Wt 175 lb (79.4 kg)   SpO2 96%   BMI 26.61 kg/m²     Physical Exam  Constitutional:       General: He is not in acute distress. Appearance: He is not toxic-appearing. HENT:      Head: Normocephalic and atraumatic. Neck:      Musculoskeletal: No neck rigidity. Cardiovascular:      Rate and Rhythm: Normal rate. Pulmonary:      Effort: Pulmonary effort is normal. No respiratory distress. Musculoskeletal:         General: Swelling (Swelling of bilateral forearms) present. No deformity. Lymphadenopathy:      Cervical: No cervical adenopathy. Skin:     Capillary Refill: Capillary refill less than 2 seconds in all fingers bilaterally     Comments: Indurated, erythematous, nodular skin of bilateral forearms mostly overlying dorsal aspect extending from elbow distally to wrist on the left arm, extends from elbow distally to dorsum of right hand on right arm. Palpable lymphadenopathy of bilateral forearms, no obvious palpable fluctuance or abscess   Neurological:      Mental Status: He is alert. Sensory: No sensory deficit. Motor: No weakness. Range    Glucose 149 (H) 70 - 99 mg/dL    BUN 7 6 - 20 mg/dL    CREATININE 0.60 (L) 0.70 - 1.20 mg/dL    Bun/Cre Ratio NOT REPORTED 9 - 20    Calcium 8.4 (L) 8.6 - 10.4 mg/dL    Sodium 130 (L) 135 - 144 mmol/L    Potassium 3.9 3.7 - 5.3 mmol/L    Chloride 97 (L) 98 - 107 mmol/L    CO2 23 20 - 31 mmol/L    Anion Gap 10 9 - 17 mmol/L    Alkaline Phosphatase 84 40 - 129 U/L    ALT 52 (H) 5 - 41 U/L    AST 53 (H) <40 U/L    Total Bilirubin 0.19 (L) 0.3 - 1.2 mg/dL    Total Protein 7.3 6.4 - 8.3 g/dL    Alb 3.2 (L) 3.5 - 5.2 g/dL    Albumin/Globulin Ratio 0.8 (L) 1.0 - 2.5    GFR Non-African American >60 >60 mL/min    GFR African American >60 >60 mL/min    GFR Comment          GFR Staging NOT REPORTED    C-Reactive Protein   Result Value Ref Range    CRP 25.5 (H) 0.0 - 5.0 mg/L   Sedimentation Rate   Result Value Ref Range    Sed Rate 62 (H) 0 - 20 mm   EKG 12 Lead   Result Value Ref Range    Ventricular Rate 74 BPM    Atrial Rate 74 BPM    P-R Interval 166 ms    QRS Duration 102 ms    Q-T Interval 398 ms    QTc Calculation (Bazett) 441 ms    P Axis 23 degrees    R Axis 27 degrees    T Axis 26 degrees       IMPRESSION: 29-year-old male in no acute distress presenting with swelling, tenderness and redness of bilateral forearms which he states began last night. Patient with long history of IV drug abuse. Unclear etiology of bilateral arm swelling, possible cellulitis, lymphadenopathy versus SVC syndrome. Plan for basic labs including CBC, BMP, ESR, CRP. Will get CTA chest to evaluate for possible proximal clot. RADIOLOGY:  Cta Chest W Contrast    Result Date: 1/9/2021  EXAMINATION: CTA OF THE CHEST 1/9/2021 6:32 pm TECHNIQUE: CTA of the chest was performed after the administration of intravenous contrast.  Multiplanar reformatted images are provided for review. MIP images are provided for review.  Dose modulation, iterative reconstruction, and/or weight based adjustment of the mA/kV was utilized to reduce the radiation dose to as low as reasonably achievable. COMPARISON: Chest x-ray dated October 11, 2020, prior CT chest dated May 13, 2007 HISTORY: ORDERING SYSTEM PROVIDED HISTORY: Bilateral arm swelling and induration, IV drug user, concern for proximal clot / svc syndrome TECHNOLOGIST PROVIDED HISTORY: Bilateral arm swelling and induration, IV drug user, concern for proximal clot / svc syndrome Reason for Exam: Bilateral arm swelling and induration, IV drug user, concern for proximal clot / svc syndrome Acuity: Acute Type of Exam: Initial FINDINGS: Pulmonary Arteries: Pulmonary arteries are suboptimally opacified for evaluation. No evidence of intraluminal filling defect to suggest pulmonary embolism. Main pulmonary artery is normal in caliber. Mediastinum: No evidence of mediastinal lymphadenopathy. The heart and pericardium demonstrate no acute abnormality. Coronary arterial calcifications are present. There is no acute abnormality of the thoracic aorta. Left upper extremity injection was performed. The left axillary and subclavian vein appears normal.  In flow artifact is present within the proximal left subclavian vein, related to blood flow flow on the internal jugular vein. Flow artifact is identified within the superior vena cava, secondary to unopacified blood from the right upper extremity. No extrinsic compression of the superior vena cava is present. The flow artifact does limit evaluation for the presence of nonocclusive clot. Lungs/pleura: Linear opacities are present within the upper and lower lobes bilaterally, most prominent within the right upper lobe, and lingula, suggesting areas of scarring or atelectasis. No focal area of consolidation or pneumothorax is present. Faint ground-glass opacities are present within the posterior segment of the right upper lobe, and may represent early pneumonia versus atelectasis.   3-4 mm nodule is again visualized within the left upper lobe, image number 114, unchanged no pleural effusion is present. Upper Abdomen: A cyst is again identified within the mid to upper posterior left kidney, noted on the prior CT scan of the abdomen from October 2020. Soft Tissues/Bones: Bilateral axillary adenopathy is present. No acute osseous abnormality is present. 1. Flow artifact within the subclavian vein and superior vena cava, without definitive evidence of thrombus 2. No extrinsic compression of the superior vena cava 3. Bilateral axillary adenopathy, which may be reactive in etiology 4. Scattered linear opacities bilaterally within the lungs, consistent with scarring or atelectasis 5. Faint opacities within the posterior segment of the right upper lobe, at the site of extensive airspace disease present on a prior CT scan of the chest from May 2007. Changes likely due to fibrosis in this area       EMERGENCY DEPARTMENT COURSE:  Evaluated patient bedside, vital signs stable, afebrile. Patient with indurated lesions bilateral forearms, pitting edema bilateral forearms as well. Patient with significant history of frequent IV drug abuse and states that he injects in both forearms. Labs largely unremarkable, no leukocytosis, no acute electrolyte abnormalities with exception of mild hyponatremia of 130. Elevated CRP and ESR. Troponin 29 which is consistent with patient's baseline, no acute changes on EKG, no evidence of obvious ischemia. CTA chest was ordered to rule out SVC syndrome, proximal clot causing bilateral swelling. CTA chest with evidence of multiple chronic changes including bilateral atelectasis and scarring/fibrotic tissue of lungs. No evidence of obvious thrombosis. Bedside ultrasound of bilateral forearms with cobblestone appearance, no obvious drainable abscess.   Compartment syndrome highly unlikely as patient is neurovascularly intact with normal capillary refill in all fingers, no loss of sensation, no pain out of proportion to examination, no pain with passive range of motion of bilateral hands/wrists. Possible bilateral cellulitis secondary to IV drug abuse. Patient informed that he should take antibiotics and follow-up with his primary care physician or return to the emergency department for reevaluation within the next 2 days. Patient was written prescription for p.o. doxycycline. When patient was to be discharged home he was unable to be found in the room, likely eloped from emergency department prior to receiving discharge paperwork or prescription for doxycycline. PROCEDURES:  None    CONSULTS:  None    CRITICAL CARE:  Please see attending note    FINAL IMPRESSION      1.  Cellulitis of upper extremity, unspecified laterality          DISPOSITION / PLAN     DISPOSITION Decision To Discharge 01/09/2021 09:06:18 PM      PATIENT REFERRED TO:  Ana Lizarraga MD  [address] [id number] [phone]    Schedule an appointment as soon as possible for a visit in 2 days  For re-evaluation      DISCHARGE MEDICATIONS:  Discharge Medication List as of 1/9/2021  9:07 PM      START taking these medications    Details   doxycycline hyclate (VIBRA-TABS) 100 MG tablet Take 1 tablet by mouth 2 times daily for 10 days, Disp-20 tablet, R-0Print             Adriano Tran DO  Emergency Medicine Residentg  [email or web address]    (Please note that portions of thisnote were completed with a voice recognition program.  Efforts were made to edit the dictations but occasionally words are mis-transcribed.)       Adriano Tran DO  Resident  01/09/21 [id number]

## 2021-01-11 LAB
EKG ATRIAL RATE: 74 BPM
EKG P AXIS: 23 DEGREES
EKG P-R INTERVAL: 166 MS
EKG Q-T INTERVAL: 398 MS
EKG QRS DURATION: 102 MS
EKG QTC CALCULATION (BAZETT): 441 MS
EKG R AXIS: 27 DEGREES
EKG T AXIS: 26 DEGREES
EKG VENTRICULAR RATE: 74 BPM

## 2021-01-11 PROCEDURE — 93010 ELECTROCARDIOGRAM REPORT: CPT | Performed by: INTERNAL MEDICINE

## 2021-02-19 ENCOUNTER — HOSPITAL ENCOUNTER (INPATIENT)
Age: 56
LOS: 1 days | Discharge: HOME | DRG: 383 | End: 2021-02-20
Attending: EMERGENCY MEDICINE | Admitting: INTERNAL MEDICINE
Payer: MEDICARE

## 2021-02-19 ENCOUNTER — APPOINTMENT (OUTPATIENT)
Dept: GENERAL RADIOLOGY | Age: 56
DRG: 383 | End: 2021-02-19
Payer: MEDICARE

## 2021-02-19 DIAGNOSIS — R07.9 EXERTIONAL CHEST PAIN: ICD-10-CM

## 2021-02-19 DIAGNOSIS — L02.91 ABSCESS: Primary | ICD-10-CM

## 2021-02-19 PROBLEM — L03.90 CELLULITIS: Status: ACTIVE | Noted: 2021-02-19

## 2021-02-19 LAB
ABSOLUTE EOS #: 0.32 K/UL (ref 0–0.44)
ABSOLUTE IMMATURE GRANULOCYTE: <0.03 K/UL (ref 0–0.3)
ABSOLUTE LYMPH #: 2.4 K/UL (ref 1.1–3.7)
ABSOLUTE MONO #: 0.91 K/UL (ref 0.1–1.2)
ALBUMIN SERPL-MCNC: 2.8 G/DL (ref 3.5–5.2)
ALBUMIN SERPL-MCNC: 2.9 G/DL (ref 3.5–5.2)
ALBUMIN/GLOBULIN RATIO: 0.7 (ref 1–2.5)
ALBUMIN/GLOBULIN RATIO: 0.7 (ref 1–2.5)
ALP BLD-CCNC: 55 U/L (ref 40–129)
ALP BLD-CCNC: 63 U/L (ref 40–129)
ALT SERPL-CCNC: 24 U/L (ref 5–41)
ALT SERPL-CCNC: 26 U/L (ref 5–41)
ANION GAP SERPL CALCULATED.3IONS-SCNC: 12 MMOL/L (ref 9–17)
ANION GAP SERPL CALCULATED.3IONS-SCNC: 20 MMOL/L (ref 9–17)
AST SERPL-CCNC: 34 U/L
AST SERPL-CCNC: 39 U/L
BASOPHILS # BLD: 1 % (ref 0–2)
BASOPHILS ABSOLUTE: 0.05 K/UL (ref 0–0.2)
BILIRUB SERPL-MCNC: 0.15 MG/DL (ref 0.3–1.2)
BILIRUB SERPL-MCNC: 0.16 MG/DL (ref 0.3–1.2)
BUN BLDV-MCNC: 10 MG/DL (ref 6–20)
BUN BLDV-MCNC: 12 MG/DL (ref 6–20)
BUN/CREAT BLD: ABNORMAL (ref 9–20)
BUN/CREAT BLD: ABNORMAL (ref 9–20)
CALCIUM SERPL-MCNC: 7.3 MG/DL (ref 8.6–10.4)
CALCIUM SERPL-MCNC: 8.4 MG/DL (ref 8.6–10.4)
CHLORIDE BLD-SCNC: 102 MMOL/L (ref 98–107)
CHLORIDE BLD-SCNC: 88 MMOL/L (ref 98–107)
CO2: 21 MMOL/L (ref 20–31)
CO2: 22 MMOL/L (ref 20–31)
CREAT SERPL-MCNC: 0.66 MG/DL (ref 0.7–1.2)
CREAT SERPL-MCNC: 0.66 MG/DL (ref 0.7–1.2)
DIFFERENTIAL TYPE: ABNORMAL
EOSINOPHILS RELATIVE PERCENT: 3 % (ref 1–4)
GFR AFRICAN AMERICAN: >60 ML/MIN
GFR AFRICAN AMERICAN: >60 ML/MIN
GFR NON-AFRICAN AMERICAN: >60 ML/MIN
GFR NON-AFRICAN AMERICAN: >60 ML/MIN
GFR SERPL CREATININE-BSD FRML MDRD: ABNORMAL ML/MIN/{1.73_M2}
GLUCOSE BLD-MCNC: 117 MG/DL (ref 70–99)
GLUCOSE BLD-MCNC: 119 MG/DL (ref 75–110)
GLUCOSE BLD-MCNC: 574 MG/DL (ref 70–99)
HCT VFR BLD CALC: 40 % (ref 40.7–50.3)
HEMOGLOBIN: 11.9 G/DL (ref 13–17)
IMMATURE GRANULOCYTES: 0 %
LYMPHOCYTES # BLD: 24 % (ref 24–43)
MCH RBC QN AUTO: 26.6 PG (ref 25.2–33.5)
MCHC RBC AUTO-ENTMCNC: 29.8 G/DL (ref 28.4–34.8)
MCV RBC AUTO: 89.5 FL (ref 82.6–102.9)
MONOCYTES # BLD: 9 % (ref 3–12)
NRBC AUTOMATED: 0 PER 100 WBC
PDW BLD-RTO: 16.8 % (ref 11.8–14.4)
PLATELET # BLD: 205 K/UL (ref 138–453)
PLATELET ESTIMATE: ABNORMAL
PMV BLD AUTO: 10 FL (ref 8.1–13.5)
POTASSIUM SERPL-SCNC: 3.1 MMOL/L (ref 3.7–5.3)
POTASSIUM SERPL-SCNC: 3.5 MMOL/L (ref 3.7–5.3)
RBC # BLD: 4.47 M/UL (ref 4.21–5.77)
RBC # BLD: ABNORMAL 10*6/UL
SEG NEUTROPHILS: 63 % (ref 36–65)
SEGMENTED NEUTROPHILS ABSOLUTE COUNT: 6.36 K/UL (ref 1.5–8.1)
SODIUM BLD-SCNC: 129 MMOL/L (ref 135–144)
SODIUM BLD-SCNC: 136 MMOL/L (ref 135–144)
TOTAL PROTEIN: 6.7 G/DL (ref 6.4–8.3)
TOTAL PROTEIN: 7.1 G/DL (ref 6.4–8.3)
TROPONIN INTERP: NORMAL
TROPONIN INTERP: NORMAL
TROPONIN T: NORMAL NG/ML
TROPONIN T: NORMAL NG/ML
TROPONIN, HIGH SENSITIVITY: 14 NG/L (ref 0–22)
TROPONIN, HIGH SENSITIVITY: 14 NG/L (ref 0–22)
WBC # BLD: 10.1 K/UL (ref 3.5–11.3)
WBC # BLD: ABNORMAL 10*3/UL

## 2021-02-19 PROCEDURE — 1200000000 HC SEMI PRIVATE

## 2021-02-19 PROCEDURE — 2580000003 HC RX 258: Performed by: STUDENT IN AN ORGANIZED HEALTH CARE EDUCATION/TRAINING PROGRAM

## 2021-02-19 PROCEDURE — 99223 1ST HOSP IP/OBS HIGH 75: CPT | Performed by: INTERNAL MEDICINE

## 2021-02-19 PROCEDURE — 2500000003 HC RX 250 WO HCPCS: Performed by: STUDENT IN AN ORGANIZED HEALTH CARE EDUCATION/TRAINING PROGRAM

## 2021-02-19 PROCEDURE — 73090 X-RAY EXAM OF FOREARM: CPT

## 2021-02-19 PROCEDURE — 71046 X-RAY EXAM CHEST 2 VIEWS: CPT

## 2021-02-19 PROCEDURE — 96375 TX/PRO/DX INJ NEW DRUG ADDON: CPT

## 2021-02-19 PROCEDURE — 80053 COMPREHEN METABOLIC PANEL: CPT

## 2021-02-19 PROCEDURE — 93005 ELECTROCARDIOGRAM TRACING: CPT | Performed by: STUDENT IN AN ORGANIZED HEALTH CARE EDUCATION/TRAINING PROGRAM

## 2021-02-19 PROCEDURE — 10060 I&D ABSCESS SIMPLE/SINGLE: CPT

## 2021-02-19 PROCEDURE — 96366 THER/PROPH/DIAG IV INF ADDON: CPT

## 2021-02-19 PROCEDURE — 99285 EMERGENCY DEPT VISIT HI MDM: CPT

## 2021-02-19 PROCEDURE — 85025 COMPLETE CBC W/AUTO DIFF WBC: CPT

## 2021-02-19 PROCEDURE — 82947 ASSAY GLUCOSE BLOOD QUANT: CPT

## 2021-02-19 PROCEDURE — 6360000002 HC RX W HCPCS: Performed by: STUDENT IN AN ORGANIZED HEALTH CARE EDUCATION/TRAINING PROGRAM

## 2021-02-19 PROCEDURE — 96367 TX/PROPH/DG ADDL SEQ IV INF: CPT

## 2021-02-19 PROCEDURE — 0H9DXZZ DRAINAGE OF RIGHT LOWER ARM SKIN, EXTERNAL APPROACH: ICD-10-PCS | Performed by: EMERGENCY MEDICINE

## 2021-02-19 PROCEDURE — 80307 DRUG TEST PRSMV CHEM ANLYZR: CPT

## 2021-02-19 PROCEDURE — 87040 BLOOD CULTURE FOR BACTERIA: CPT

## 2021-02-19 PROCEDURE — 6370000000 HC RX 637 (ALT 250 FOR IP): Performed by: STUDENT IN AN ORGANIZED HEALTH CARE EDUCATION/TRAINING PROGRAM

## 2021-02-19 PROCEDURE — 96365 THER/PROPH/DIAG IV INF INIT: CPT

## 2021-02-19 PROCEDURE — 84484 ASSAY OF TROPONIN QUANT: CPT

## 2021-02-19 RX ORDER — SODIUM CHLORIDE 0.9 % (FLUSH) 0.9 %
10 SYRINGE (ML) INJECTION PRN
Status: DISCONTINUED | OUTPATIENT
Start: 2021-02-19 | End: 2021-02-20 | Stop reason: HOSPADM

## 2021-02-19 RX ORDER — DOXYCYCLINE HYCLATE 100 MG
100 TABLET ORAL ONCE
Status: DISCONTINUED | OUTPATIENT
Start: 2021-02-19 | End: 2021-02-19

## 2021-02-19 RX ORDER — ACETAMINOPHEN 325 MG/1
650 TABLET ORAL EVERY 6 HOURS PRN
Status: DISCONTINUED | OUTPATIENT
Start: 2021-02-19 | End: 2021-02-20 | Stop reason: HOSPADM

## 2021-02-19 RX ORDER — AMLODIPINE BESYLATE 10 MG/1
10 TABLET ORAL DAILY
Status: DISCONTINUED | OUTPATIENT
Start: 2021-02-20 | End: 2021-02-20 | Stop reason: HOSPADM

## 2021-02-19 RX ORDER — NICOTINE 21 MG/24HR
1 PATCH, TRANSDERMAL 24 HOURS TRANSDERMAL DAILY
Status: DISCONTINUED | OUTPATIENT
Start: 2021-02-20 | End: 2021-02-20 | Stop reason: HOSPADM

## 2021-02-19 RX ORDER — FOLIC ACID 1 MG/1
1 TABLET ORAL DAILY
Status: DISCONTINUED | OUTPATIENT
Start: 2021-02-20 | End: 2021-02-20 | Stop reason: HOSPADM

## 2021-02-19 RX ORDER — ACETAMINOPHEN 325 MG/1
650 TABLET ORAL EVERY 4 HOURS PRN
Status: CANCELLED | OUTPATIENT
Start: 2021-02-19

## 2021-02-19 RX ORDER — CITALOPRAM 20 MG/1
20 TABLET ORAL DAILY
Status: DISCONTINUED | OUTPATIENT
Start: 2021-02-20 | End: 2021-02-20 | Stop reason: HOSPADM

## 2021-02-19 RX ORDER — SODIUM CHLORIDE 0.9 % (FLUSH) 0.9 %
10 SYRINGE (ML) INJECTION EVERY 12 HOURS SCHEDULED
Status: DISCONTINUED | OUTPATIENT
Start: 2021-02-20 | End: 2021-02-20 | Stop reason: HOSPADM

## 2021-02-19 RX ORDER — TRAZODONE HYDROCHLORIDE 50 MG/1
50 TABLET ORAL NIGHTLY PRN
Status: DISCONTINUED | OUTPATIENT
Start: 2021-02-20 | End: 2021-02-20 | Stop reason: HOSPADM

## 2021-02-19 RX ORDER — SODIUM CHLORIDE 0.9 % (FLUSH) 0.9 %
10 SYRINGE (ML) INJECTION EVERY 12 HOURS SCHEDULED
Status: CANCELLED | OUTPATIENT
Start: 2021-02-19

## 2021-02-19 RX ORDER — ASPIRIN 81 MG/1
324 TABLET, CHEWABLE ORAL ONCE
Status: COMPLETED | OUTPATIENT
Start: 2021-02-19 | End: 2021-02-19

## 2021-02-19 RX ORDER — SODIUM CHLORIDE 0.9 % (FLUSH) 0.9 %
10 SYRINGE (ML) INJECTION PRN
Status: CANCELLED | OUTPATIENT
Start: 2021-02-19

## 2021-02-19 RX ORDER — ACETAMINOPHEN 650 MG/1
650 SUPPOSITORY RECTAL EVERY 6 HOURS PRN
Status: DISCONTINUED | OUTPATIENT
Start: 2021-02-19 | End: 2021-02-20 | Stop reason: HOSPADM

## 2021-02-19 RX ORDER — LIDOCAINE HYDROCHLORIDE 10 MG/ML
20 INJECTION, SOLUTION INFILTRATION; PERINEURAL ONCE
Status: COMPLETED | OUTPATIENT
Start: 2021-02-19 | End: 2021-02-19

## 2021-02-19 RX ORDER — KETOROLAC TROMETHAMINE 15 MG/ML
15 INJECTION, SOLUTION INTRAMUSCULAR; INTRAVENOUS ONCE
Status: COMPLETED | OUTPATIENT
Start: 2021-02-19 | End: 2021-02-19

## 2021-02-19 RX ORDER — PROMETHAZINE HYDROCHLORIDE 12.5 MG/1
12.5 TABLET ORAL EVERY 6 HOURS PRN
Status: DISCONTINUED | OUTPATIENT
Start: 2021-02-19 | End: 2021-02-20 | Stop reason: HOSPADM

## 2021-02-19 RX ORDER — QUETIAPINE FUMARATE 200 MG/1
400 TABLET, FILM COATED ORAL NIGHTLY
Status: DISCONTINUED | OUTPATIENT
Start: 2021-02-19 | End: 2021-02-20 | Stop reason: HOSPADM

## 2021-02-19 RX ORDER — HYDROCHLOROTHIAZIDE 25 MG/1
25 TABLET ORAL DAILY
Status: DISCONTINUED | OUTPATIENT
Start: 2021-02-20 | End: 2021-02-19

## 2021-02-19 RX ORDER — POLYETHYLENE GLYCOL 3350 17 G/17G
17 POWDER, FOR SOLUTION ORAL DAILY PRN
Status: DISCONTINUED | OUTPATIENT
Start: 2021-02-19 | End: 2021-02-20 | Stop reason: HOSPADM

## 2021-02-19 RX ORDER — ONDANSETRON 2 MG/ML
4 INJECTION INTRAMUSCULAR; INTRAVENOUS EVERY 8 HOURS PRN
Status: CANCELLED | OUTPATIENT
Start: 2021-02-19

## 2021-02-19 RX ORDER — ONDANSETRON 2 MG/ML
4 INJECTION INTRAMUSCULAR; INTRAVENOUS EVERY 6 HOURS PRN
Status: DISCONTINUED | OUTPATIENT
Start: 2021-02-19 | End: 2021-02-20 | Stop reason: HOSPADM

## 2021-02-19 RX ADMIN — Medication 1500 MG: at 20:20

## 2021-02-19 RX ADMIN — KETOROLAC TROMETHAMINE 15 MG: 15 INJECTION, SOLUTION INTRAMUSCULAR; INTRAVENOUS at 19:31

## 2021-02-19 RX ADMIN — LIDOCAINE HYDROCHLORIDE 20 ML: 10 INJECTION, SOLUTION INFILTRATION; PERINEURAL at 19:32

## 2021-02-19 RX ADMIN — CEFTRIAXONE SODIUM 1000 MG: 1 INJECTION, POWDER, FOR SOLUTION INTRAMUSCULAR; INTRAVENOUS at 19:31

## 2021-02-19 RX ADMIN — ASPIRIN 324 MG: 81 TABLET, CHEWABLE ORAL at 19:31

## 2021-02-19 ASSESSMENT — PAIN SCALES - GENERAL
PAINLEVEL_OUTOF10: 8
PAINLEVEL_OUTOF10: 8

## 2021-02-19 NOTE — ED PROVIDER NOTES
[facility] ONC/MED SURG  Emergency Department Encounter  Emergency Medicine Resident     Pt Name: Chris Burks  MRN: [id number]  [place] 1965  Date ofevaluation: 2/19/21  PCP:  Ki Johnson MD    CHIEF COMPLAINT       Chief Complaint   Patient presents with    Chest Pain    Abscess     HISTORY OF PRESENT ILLNESS  (Location/Symptom, Timing/Onset, Context/Setting, Quality, Duration, Modifying Factors, Severity, Associated signs/symptoms)     Chris Burks is a 48 y.o. male who presents with an abscess and exertional chest pain. Patient reports that for last several days he had an increasing abscess over his right forearm. Reports that he has had abscesses in the past he does use IV drugs, and that this wound has been spontaneously draining for some time. Also reports that he is having having some exertional chest pain that began for the last several days. He noticed any particular when he was shoveling snow earlier tonight and currently rates his pain 8/10. No fevers, chills, shortness of breath, cough, nausea, vomiting, diarrhea, or other concerning symptoms. Has had a heart catheter in 2016 which showed minimal CAD. Does continue to use IV drugs. PAST MEDICAL / SURGICAL / SOCIAL / FAMILY HISTORY      has a past medical history of Bipolar 1 disorder ([facility] [address] Chronic mental illness, Depression, Hepatitis C, Hypertension, and Psoriasis. has a past surgical history that includes brain surgery.     Social History     Socioeconomic History    Marital status: Single     Spouse name: Not on file    Number of children: Not on file    Years of education: Not on file    Highest education level: Not on file   Occupational History    Not on file   Social Needs    Financial resource strain: Not on file    Food insecurity     Worry: Not on file     Inability: Not on file    Transportation needs     Medical: Not on file     Non-medical: Not on file   Tobacco Use    Smoking status: Current Every MG tablet Take 1 tablet by mouth daily 3/11/20   Carissa MurrayJILLIAN lyman - CNP   vitamin B-1 100 MG tablet Take 1 tablet by mouth daily 3/11/20   Carissa EstrellaJILLIAN - CNP   omeprazole (PRILOSEC OTC) 20 MG tablet Take 1 tablet by mouth daily. 2/10/15 7/1/15  Salvador Hernandez MD       REVIEW OF SYSTEMS    (2-9 systems for level 4, 10 or more for level 5)      Review of Systems   Constitutional: Negative for chills and fever. HENT: Negative for rhinorrhea and sore throat. Eyes: Negative for redness and itching. Respiratory: Negative for cough and shortness of breath. Cardiovascular: Positive for chest pain. Negative for leg swelling. Gastrointestinal: Negative for abdominal pain, diarrhea, nausea and vomiting. Skin: Positive for rash and wound. Allergic/Immunologic: Negative for environmental allergies and food allergies. Neurological: Negative for weakness and numbness. PHYSICAL EXAM   (up to 7 for level 4, 8 or more for level 5)      INITIAL VITALS:   /87   Pulse 67   Temp 98.2 °F (36.8 °C) (Tympanic)   Resp 18   Ht 5' 8\" (1.727 m)   Wt 175 lb (79.4 kg)   SpO2 94%   BMI 26.61 kg/m²     Physical Exam  Vitals signs and nursing note reviewed. Constitutional:       General: He is not in acute distress. Appearance: Normal appearance. He is not ill-appearing, toxic-appearing or diaphoretic. HENT:      Head: Normocephalic and atraumatic. Eyes:      General: No scleral icterus. Neck:      Musculoskeletal: Neck supple. Cardiovascular:      Rate and Rhythm: Normal rate and regular rhythm. Pulmonary:      Effort: Pulmonary effort is normal. No respiratory distress. Breath sounds: Normal breath sounds. No stridor. No wheezing, rhonchi or rales. Abdominal:      General: There is no distension. Palpations: Abdomen is soft. There is no mass. Tenderness: There is no abdominal tenderness. There is no guarding or rebound. Musculoskeletal:      Right lower leg: No edema. Left lower leg: No edema. Skin:     General: Skin is warm and dry. Coloration: Skin is not jaundiced. Comments: Large abscess over the right dorsal forearm just distal to the elbow. No streaking noted. Area is warm and fluctuant with surrounding cellulitis. Neurological:      General: No focal deficit present. Mental Status: He is alert and oriented to person, place, and time.    Psychiatric:         Mood and Affect: Mood normal.         Behavior: Behavior normal.         DIAGNOSTICS     PLAN (LABS / IMAGING / EKG):  Orders Placed This Encounter   Procedures    Culture, Blood 1    Culture, Blood 1    XR CHEST (2 VW)    XR RADIUS ULNA RIGHT (2 VIEWS)    CBC WITH AUTO DIFFERENTIAL    Troponin    Comprehensive Metabolic Panel    PREVIOUS SPECIMEN    Comprehensive Metabolic Panel    Basic Metabolic Panel w/ Reflex to MG    CBC auto differential    DRUG SCREEN MULTI URINE    Diet NPO, After Midnight    Telemetry monitoring    Telemetry Monitoring    Vital signs per unit routine    Notify physician    Up as tolerated    Intake and output    Neurovascular checks    Daily weights    Elevate Head of Bed     Elevate legs    Full Code    Pharmacy to Dose: Vancomycin    Inpatient consult to Internal Medicine    Inpatient consult to Case Management    OT eval and treat    PT evaluation and treat    Initiate Oxygen Therapy Protocol    POC Glucose Fingerstick    EKG 12 Lead    PATIENT STATUS (FROM ED OR OR/PROCEDURAL) Inpatient       MEDICATIONS ORDERED:  Orders Placed This Encounter   Medications    aspirin chewable tablet 324 mg    DISCONTD: doxycycline hyclate (VIBRA-TABS) tablet 100 mg     Order Specific Question:   Antimicrobial Indications     Answer:   Skin and Soft Tissue Infection    ketorolac (TORADOL) injection 15 mg    lidocaine 1 % injection 20 mL    cefTRIAXone (ROCEPHIN) 1000 mg IVPB in 50 mL D5W minibag     Order Specific Question:   Antimicrobial Indications     Answer:   Skin and Soft Tissue Infection    vancomycin (VANCOCIN) 1500 mg in dextrose 5 % 250 mL IVPB     Order Specific Question:   Antimicrobial Indications     Answer:   Skin and Soft Tissue Infection    insulin lispro (HUMALOG) injection vial 10 Units    vancomycin (VANCOCIN) intermittent dosing (placeholder)     Order Specific Question:   Antimicrobial Indications     Answer:   Skin and Soft Tissue Infection    vancomycin (VANCOCIN) 1250 mg in dextrose 5 % 250 mL IVPB     Order Specific Question:   Antimicrobial Indications     Answer:   Skin and Soft Tissue Infection    sodium chloride flush 0.9 % injection 10 mL    sodium chloride flush 0.9 % injection 10 mL    enoxaparin (LOVENOX) injection 40 mg    OR Linked Order Group     promethazine (PHENERGAN) tablet 12.5 mg     ondansetron (ZOFRAN) injection 4 mg    polyethylene glycol (GLYCOLAX) packet 17 g    OR Linked Order Group     acetaminophen (TYLENOL) tablet 650 mg     acetaminophen (TYLENOL) suppository 650 mg    nicotine (NICODERM CQ) 21 MG/24HR 1 patch    traZODone (DESYREL) tablet 50 mg    QUEtiapine (SEROQUEL) tablet 400 mg    DISCONTD: hydroCHLOROthiazide (HYDRODIURIL) tablet 25 mg    folic acid (FOLVITE) tablet 1 mg    citalopram (CELEXA) tablet 20 mg    amLODIPine (NORVASC) tablet 10 mg       DIAGNOSTIC RESULTS / EMERGENCYDEPARTMENT COURSE / MDM     LABS:  Results for orders placed or performed during the hospital encounter of 02/19/21   CBC WITH AUTO DIFFERENTIAL   Result Value Ref Range    WBC 10.1 3.5 - 11.3 k/uL    RBC 4.47 4.21 - 5.77 m/uL    Hemoglobin 11.9 (L) 13.0 - 17.0 g/dL    Hematocrit 40.0 (L) 40.7 - 50.3 %    MCV 89.5 82.6 - 102.9 fL    MCH 26.6 25.2 - 33.5 pg    MCHC 29.8 28.4 - 34.8 g/dL    RDW 16.8 (H) 11.8 - 14.4 %    Platelets 676 579 - 717 k/uL    MPV 10.0 8.1 - 13.5 fL    NRBC Automated 0.0 0.0 per 100 WBC    Differential Type NOT REPORTED     Seg Neutrophils 63 36 - 65 %    Lymphocytes 24 24 - 43 %    Monocytes 9 3 - 12 %    Eosinophils % 3 1 - 4 %    Basophils 1 0 - 2 %    Immature Granulocytes 0 0 %    Segs Absolute 6.36 1.50 - 8.10 k/uL    Absolute Lymph # 2.40 1.10 - 3.70 k/uL    Absolute Mono # 0.91 0.10 - 1.20 k/uL    Absolute Eos # 0.32 0.00 - 0.44 k/uL    Basophils Absolute 0.05 0.00 - 0.20 k/uL    Absolute Immature Granulocyte <0.03 0.00 - 0.30 k/uL    WBC Morphology NOT REPORTED     RBC Morphology ANISOCYTOSIS PRESENT     Platelet Estimate NOT REPORTED    Troponin   Result Value Ref Range    Troponin, High Sensitivity 14 0 - 22 ng/L    Troponin T NOT REPORTED <0.03 ng/mL    Troponin Interp NOT REPORTED    Troponin   Result Value Ref Range    Troponin, High Sensitivity 14 0 - 22 ng/L    Troponin T NOT REPORTED <0.03 ng/mL    Troponin Interp NOT REPORTED    Comprehensive Metabolic Panel   Result Value Ref Range    Glucose 574 (HH) 70 - 99 mg/dL    BUN 10 6 - 20 mg/dL    CREATININE 0.66 (L) 0.70 - 1.20 mg/dL    Bun/Cre Ratio NOT REPORTED 9 - 20    Calcium 7.3 (L) 8.6 - 10.4 mg/dL    Sodium 129 (L) 135 - 144 mmol/L    Potassium 3.1 (L) 3.7 - 5.3 mmol/L    Chloride 88 (L) 98 - 107 mmol/L    CO2 21 20 - 31 mmol/L    Anion Gap 20 (H) 9 - 17 mmol/L    Alkaline Phosphatase 55 40 - 129 U/L    ALT 24 5 - 41 U/L    AST 34 <40 U/L    Total Bilirubin 0.15 (L) 0.3 - 1.2 mg/dL    Total Protein 6.7 6.4 - 8.3 g/dL    Albumin 2.8 (L) 3.5 - 5.2 g/dL    Albumin/Globulin Ratio 0.7 (L) 1.0 - 2.5    GFR Non-African American >60 >60 mL/min    GFR African American >60 >60 mL/min    GFR Comment          GFR Staging NOT REPORTED    Comprehensive Metabolic Panel   Result Value Ref Range    Glucose 117 (H) 70 - 99 mg/dL    BUN 12 6 - 20 mg/dL    CREATININE 0.66 (L) 0.70 - 1.20 mg/dL    Bun/Cre Ratio NOT REPORTED 9 - 20    Calcium 8.4 (L) 8.6 - 10.4 mg/dL    Sodium 136 135 - 144 mmol/L    Potassium 3.5 (L) 3.7 - 5.3 mmol/L    Chloride 102 98 - 107 mmol/L    CO2 22 20 - 31 mmol/L    Anion Gap 12 9 - 17 mmol/L    Alkaline Phosphatase 63 40 - 129 U/L    ALT 26 5 - 41 U/L    AST 39 <40 U/L    Total Bilirubin 0.16 (L) 0.3 - 1.2 mg/dL    Total Protein 7.1 6.4 - 8.3 g/dL    Albumin 2.9 (L) 3.5 - 5.2 g/dL    Albumin/Globulin Ratio 0.7 (L) 1.0 - 2.5    GFR Non-African American >60 >60 mL/min    GFR African American >60 >60 mL/min    GFR Comment          GFR Staging NOT REPORTED    POC Glucose Fingerstick   Result Value Ref Range    POC Glucose 119 (H) 75 - 110 mg/dL       RADIOLOGY:  *  XR CHEST (2 VW)   Final Result   No acute process. XR RADIUS ULNA RIGHT (2 VIEWS)   Final Result   No acute osseous abnormality. Proximal soft tissue swelling. No subcutaneous gas or metallic foreign body. EKG  Rhythm: normal sinus   Rate: normal  Axis: normal  Ectopy: none  Conduction: normal  ST Segments: no acute change  T Waves: no acute change  Q Waves: none    Clinical Impression: When read EKG dated 1/9/2021, T wave is upright in V1 otherwise, no acute changes and non-specific EKG    Normal Interval Reference:  P-wave <110 ms  -200 ms  QRS <100 ms  QT <420 ms  QTc 330-470 ms    All EKG's are interpreted by the Emergency Department Physician who either signs or Co-signsthis chart in the absence of a cardiologist.    EMERGENCY DEPARTMENT COURSE:         MDM: 81-year-old male presenting with large abscess over his right forearm as well as exertional chest pain. On exam he is nontoxic-appearing no acute distress. Vitals unremarkable. Heart regular rate and rhythm, lungs are clear to auscultation bilaterally. Abdomen soft nontender. There is a large fluctuant erythematous area of his right forearm consistent with abscess and cellulitis. Was drained as below. Given the extent of abscess and cellulitis will start on IV Rocephin and vancomycin. Will be admitted to internal medicine for further evaluation and management.   There was concern for possible hyponatremia however apparently labs were drawn very close to the site were vancomycin was running which is currently running with dextrose as appears that this was likely a contaminated specimen. PROCEDURES:  PROCEDURE NOTE - INCISION and DRAINAGE    PATIENT NAME: Tiffani Rothman RECORD NO. [id number]  DATE: 2/20/2021  ATTENDING PHYSICIAN: Dr. Matha Phoenix DIAGNOSIS:  Abscess  POSTOPERATIVE DIAGNOSIS:  Same  PROCEDURE PERFORMED:   Incision and drainage  PERFORMING PHYSICIAN: Vicente Law DO      DISCUSSION:  Colette Jean is a 48y.o.-year-old male who requires an incision and drainage of a Abscess. The history and physical examination were reviewed and confirmed. CONSENT: The patient provided verbal consent for this procedure. PROCEDURE:  The patient was positioned appropriately and the skin over the incision site was prepped with chlorhexidine. Local anesthesia was obtained by infiltration using 1% Lidocaine without epinephrine. Two incisions were then made over the apex of the lesion and approximately 1 cc of purulent material was expressed. Loculations were broken up using a cotton swab, but no more material was returned. The drainage cavity was then irrigated. The patient tolerated the procedure well. COMPLICATIONS:  None     Vicente Law DO  12:18 AM, 2/20/21      CONSULTS:  PHARMACY TO DOSE VANCOMYCIN  IP CONSULT TO INTERNAL MEDICINE  IP CONSULT TO CASE MANAGEMENT    FINAL IMPRESSION      1. Abscess    2. Exertional chest pain          DISPOSITION / PLAN     DISPOSITION Admitted 02/19/2021 10:05:10 PM      PATIENT REFERRED TO:  No follow-up provider specified.     DISCHARGE MEDICATIONS:  Current Discharge Medication List          Vicente Law DO  Emergency Medicine Resident  [address]    (Please note that portions of this note were completed with a voice recognition program.  Efforts were made to edit thedictations but occasionally words are mis-transcribed.)       Vicente Law DO  Resident  02/20/21 0020

## 2021-02-20 VITALS
DIASTOLIC BLOOD PRESSURE: 93 MMHG | HEIGHT: 68 IN | WEIGHT: 175 LBS | TEMPERATURE: 97.5 F | BODY MASS INDEX: 26.52 KG/M2 | HEART RATE: 59 BPM | RESPIRATION RATE: 16 BRPM | OXYGEN SATURATION: 97 % | SYSTOLIC BLOOD PRESSURE: 168 MMHG

## 2021-02-20 LAB
ABSOLUTE EOS #: 0.6 K/UL (ref 0–0.44)
ABSOLUTE IMMATURE GRANULOCYTE: 0.04 K/UL (ref 0–0.3)
ABSOLUTE LYMPH #: 2.1 K/UL (ref 1.1–3.7)
ABSOLUTE MONO #: 0.76 K/UL (ref 0.1–1.2)
AMPHETAMINE SCREEN URINE: NEGATIVE
ANION GAP SERPL CALCULATED.3IONS-SCNC: 9 MMOL/L (ref 9–17)
BARBITURATE SCREEN URINE: NEGATIVE
BASOPHILS # BLD: 1 % (ref 0–2)
BASOPHILS ABSOLUTE: 0.07 K/UL (ref 0–0.2)
BENZODIAZEPINE SCREEN, URINE: POSITIVE
BUN BLDV-MCNC: 13 MG/DL (ref 6–20)
BUN/CREAT BLD: ABNORMAL (ref 9–20)
BUPRENORPHINE URINE: ABNORMAL
CALCIUM SERPL-MCNC: 8.6 MG/DL (ref 8.6–10.4)
CANNABINOID SCREEN URINE: NEGATIVE
CHLORIDE BLD-SCNC: 105 MMOL/L (ref 98–107)
CO2: 23 MMOL/L (ref 20–31)
COCAINE METABOLITE, URINE: POSITIVE
CREAT SERPL-MCNC: 0.8 MG/DL (ref 0.7–1.2)
DIFFERENTIAL TYPE: ABNORMAL
EOSINOPHILS RELATIVE PERCENT: 8 % (ref 1–4)
FERRITIN: 89 UG/L (ref 30–400)
GFR AFRICAN AMERICAN: >60 ML/MIN
GFR NON-AFRICAN AMERICAN: >60 ML/MIN
GFR SERPL CREATININE-BSD FRML MDRD: ABNORMAL ML/MIN/{1.73_M2}
GFR SERPL CREATININE-BSD FRML MDRD: ABNORMAL ML/MIN/{1.73_M2}
GLUCOSE BLD-MCNC: 119 MG/DL (ref 70–99)
HCT VFR BLD CALC: 37.4 % (ref 40.7–50.3)
HEMOGLOBIN: 11.5 G/DL (ref 13–17)
IMMATURE GRANULOCYTES: 1 %
IRON SATURATION: 7 % (ref 20–55)
IRON: 18 UG/DL (ref 59–158)
LYMPHOCYTES # BLD: 26 % (ref 24–43)
MCH RBC QN AUTO: 26.3 PG (ref 25.2–33.5)
MCHC RBC AUTO-ENTMCNC: 30.7 G/DL (ref 28.4–34.8)
MCV RBC AUTO: 85.6 FL (ref 82.6–102.9)
MDMA URINE: ABNORMAL
METHADONE SCREEN, URINE: NEGATIVE
METHAMPHETAMINE, URINE: ABNORMAL
MONOCYTES # BLD: 10 % (ref 3–12)
NRBC AUTOMATED: 0 PER 100 WBC
OPIATES, URINE: NEGATIVE
OXYCODONE SCREEN URINE: NEGATIVE
PDW BLD-RTO: 16.5 % (ref 11.8–14.4)
PHENCYCLIDINE, URINE: NEGATIVE
PLATELET # BLD: 239 K/UL (ref 138–453)
PLATELET ESTIMATE: ABNORMAL
PMV BLD AUTO: 10 FL (ref 8.1–13.5)
POTASSIUM SERPL-SCNC: 3.7 MMOL/L (ref 3.7–5.3)
PROPOXYPHENE, URINE: ABNORMAL
RBC # BLD: 4.37 M/UL (ref 4.21–5.77)
RBC # BLD: ABNORMAL 10*6/UL
SEG NEUTROPHILS: 54 % (ref 36–65)
SEGMENTED NEUTROPHILS ABSOLUTE COUNT: 4.45 K/UL (ref 1.5–8.1)
SODIUM BLD-SCNC: 137 MMOL/L (ref 135–144)
TEST INFORMATION: ABNORMAL
TOTAL IRON BINDING CAPACITY: 247 UG/DL (ref 250–450)
TRICYCLIC ANTIDEPRESSANTS, UR: ABNORMAL
UNSATURATED IRON BINDING CAPACITY: 229 UG/DL (ref 112–347)
WBC # BLD: 8 K/UL (ref 3.5–11.3)
WBC # BLD: ABNORMAL 10*3/UL

## 2021-02-20 PROCEDURE — 36415 COLL VENOUS BLD VENIPUNCTURE: CPT

## 2021-02-20 PROCEDURE — 83550 IRON BINDING TEST: CPT

## 2021-02-20 PROCEDURE — 83540 ASSAY OF IRON: CPT

## 2021-02-20 PROCEDURE — 85025 COMPLETE CBC W/AUTO DIFF WBC: CPT

## 2021-02-20 PROCEDURE — 80048 BASIC METABOLIC PNL TOTAL CA: CPT

## 2021-02-20 PROCEDURE — 97166 OT EVAL MOD COMPLEX 45 MIN: CPT

## 2021-02-20 PROCEDURE — 6370000000 HC RX 637 (ALT 250 FOR IP): Performed by: STUDENT IN AN ORGANIZED HEALTH CARE EDUCATION/TRAINING PROGRAM

## 2021-02-20 PROCEDURE — 6360000002 HC RX W HCPCS: Performed by: STUDENT IN AN ORGANIZED HEALTH CARE EDUCATION/TRAINING PROGRAM

## 2021-02-20 PROCEDURE — 2580000003 HC RX 258: Performed by: STUDENT IN AN ORGANIZED HEALTH CARE EDUCATION/TRAINING PROGRAM

## 2021-02-20 PROCEDURE — 82728 ASSAY OF FERRITIN: CPT

## 2021-02-20 PROCEDURE — 97535 SELF CARE MNGMENT TRAINING: CPT

## 2021-02-20 RX ORDER — DEXTROSE MONOHYDRATE 25 G/50ML
12.5 INJECTION, SOLUTION INTRAVENOUS PRN
Status: DISCONTINUED | OUTPATIENT
Start: 2021-02-20 | End: 2021-02-20 | Stop reason: HOSPADM

## 2021-02-20 RX ORDER — OXYCODONE HYDROCHLORIDE 5 MG/1
2.5 TABLET ORAL EVERY 6 HOURS PRN
Status: DISCONTINUED | OUTPATIENT
Start: 2021-02-20 | End: 2021-02-20 | Stop reason: HOSPADM

## 2021-02-20 RX ORDER — LORAZEPAM 2 MG/ML
1 INJECTION INTRAMUSCULAR EVERY 4 HOURS PRN
Status: DISCONTINUED | OUTPATIENT
Start: 2021-02-20 | End: 2021-02-20 | Stop reason: HOSPADM

## 2021-02-20 RX ORDER — CLONIDINE HYDROCHLORIDE 0.1 MG/1
0.1 TABLET ORAL 2 TIMES DAILY
Status: DISCONTINUED | OUTPATIENT
Start: 2021-02-20 | End: 2021-02-20 | Stop reason: HOSPADM

## 2021-02-20 RX ORDER — DOXYCYCLINE HYCLATE 100 MG
100 TABLET ORAL 2 TIMES DAILY
Qty: 28 TABLET | Refills: 0 | Status: SHIPPED | OUTPATIENT
Start: 2021-02-20 | End: 2021-03-06

## 2021-02-20 RX ORDER — OXYCODONE HYDROCHLORIDE 5 MG/1
10 TABLET ORAL EVERY 4 HOURS PRN
Status: DISCONTINUED | OUTPATIENT
Start: 2021-02-20 | End: 2021-02-20 | Stop reason: HOSPADM

## 2021-02-20 RX ORDER — NICOTINE POLACRILEX 4 MG
15 LOZENGE BUCCAL PRN
Status: DISCONTINUED | OUTPATIENT
Start: 2021-02-20 | End: 2021-02-20 | Stop reason: HOSPADM

## 2021-02-20 RX ORDER — DIPHENHYDRAMINE HYDROCHLORIDE 50 MG/ML
50 INJECTION INTRAMUSCULAR; INTRAVENOUS EVERY 8 HOURS PRN
Status: DISCONTINUED | OUTPATIENT
Start: 2021-02-20 | End: 2021-02-20 | Stop reason: HOSPADM

## 2021-02-20 RX ORDER — DEXTROSE MONOHYDRATE 50 MG/ML
100 INJECTION, SOLUTION INTRAVENOUS PRN
Status: DISCONTINUED | OUTPATIENT
Start: 2021-02-20 | End: 2021-02-20 | Stop reason: HOSPADM

## 2021-02-20 RX ADMIN — VANCOMYCIN HYDROCHLORIDE 1250 MG: 10 INJECTION, POWDER, LYOPHILIZED, FOR SOLUTION INTRAVENOUS at 08:56

## 2021-02-20 RX ADMIN — AMLODIPINE BESYLATE 10 MG: 10 TABLET ORAL at 08:39

## 2021-02-20 RX ADMIN — OXYCODONE HYDROCHLORIDE 10 MG: 5 TABLET ORAL at 09:05

## 2021-02-20 RX ADMIN — CLONIDINE HYDROCHLORIDE 0.1 MG: 0.1 TABLET ORAL at 08:39

## 2021-02-20 RX ADMIN — LORAZEPAM 1 MG: 2 INJECTION INTRAMUSCULAR; INTRAVENOUS at 08:41

## 2021-02-20 RX ADMIN — CITALOPRAM 20 MG: 20 TABLET, FILM COATED ORAL at 08:39

## 2021-02-20 RX ADMIN — FOLIC ACID 1 MG: 1 TABLET ORAL at 08:39

## 2021-02-20 RX ADMIN — QUETIAPINE FUMARATE 400 MG: 200 TABLET ORAL at 02:24

## 2021-02-20 RX ADMIN — SODIUM CHLORIDE, PRESERVATIVE FREE 10 ML: 5 INJECTION INTRAVENOUS at 08:56

## 2021-02-20 ASSESSMENT — ENCOUNTER SYMPTOMS
DIARRHEA: 0
EYE REDNESS: 0
EYE ITCHING: 0
NAUSEA: 0
SHORTNESS OF BREATH: 0
ABDOMINAL PAIN: 0
RHINORRHEA: 0
COUGH: 0
SORE THROAT: 0
VOMITING: 0

## 2021-02-20 ASSESSMENT — PAIN DESCRIPTION - LOCATION: LOCATION: ARM

## 2021-02-20 ASSESSMENT — PAIN SCALES - GENERAL: PAINLEVEL_OUTOF10: 7

## 2021-02-20 NOTE — ED NOTES
Internal Medicine residents at bedside     Breana Gunter, [facility]  02/19/21 [id number]
New green tube sent to lab.       Zaida Acharya RN  02/19/21 2023
Pt given box lunch     \[facility] [place] [facility]\""  02/19/21 2023
Pt returned from [facility], [address] [facility]  02/19/21 1912
.

## 2021-02-20 NOTE — H&P
[facility]     Department of Internal Medicine - Staff Internal Medicine Teaching Service          ADMISSION NOTE/HISTORY AND PHYSICAL EXAMINATION   Date: 2/19/2021  Patient Name: Shira Berger  Date of admission: 2/19/2021  6:32 PM  YOB: 1965  PCP: Delilah Parsons MD  History Obtained From:  patient    CHIEF COMPLAINT     Chief complaint: Right forearm swelling and redness    HISTORY OF PRESENTING ILLNESS     The patient is a pleasant 48 y.o. male with background history of hypertension, bipolar disorder, depression, IV drug abuse presented with a chief complaint of presents with a chief complaint of right forearm swelling and redness. According to the patient he started to have swelling and redness of the right forearm for the past 3 to 4 days. The right forearm was very warm to touch, tender to palpation. Patient denies any fever, chills, headache, nausea, vomiting, shortness of breath, abdominal pain, change in bowel habits, urinary symptoms. Patient also had some midsternal nonradiating chest pain with nausea that started when he was shoveling snow today but reported that he had some intermittent chest pain on exertion for the past few days. Patient is a intravenous drug abuser and uses heroin is a IV drug the last time he injected was today in the morning time. Patient was found to have abscess at the right forearm which was drained by the ED team and started on IV vancomycin and ceftriaxone.       Review of Systems:  General ROS: Completed and except as mentioned above were negative   HEENT ROS: Completed and except as mentioned above were negative   Allergy and Immunology ROS:  Completed and except as mentioned above were negative  Hematological and Lymphatic ROS:  Completed and except as mentioned above were negative  Respiratory ROS:  Completed and except as mentioned above were negative  Cardiovascular ROS:  Completed and except as mentioned above were negative  Gastrointestinal ROS: Completed and except as mentioned above were negative  Genito-Urinary ROS:  Completed and except as mentioned above were negative  Musculoskeletal ROS:  Completed and except as mentioned above were negative  Neurological ROS:  Completed and except as mentioned above were negative  Skin & Dermatological ROS:  Completed and except as mentioned above were negative  Psychological ROS:  Completed and except as mentioned above were negative    PAST MEDICAL HISTORY     Past Medical History:   Diagnosis Date    Bipolar 1 disorder ([facility] [address]     Chronic mental illness     Depression     Hepatitis C     Hypertension     Psoriasis        PAST SURGICAL HISTORY     Past Surgical History:   Procedure Laterality Date    BRAIN SURGERY      at age 37       ALLERGIES     Haldol [haloperidol] and Latuda [lurasidone hcl]    MEDICATIONS PRIOR TO ADMISSION     Prior to Admission medications    Medication Sig Start Date End Date Taking? Authorizing Provider   citalopram (CELEXA) 20 MG tablet Take 1 tablet by mouth daily 6/24/20   Romero Better, APRN - CNP   traZODone (DESYREL) 50 MG tablet Take 1 tablet by mouth nightly as needed for Sleep 6/24/20   Davis Better, APRN - CNP   QUEtiapine (SEROQUEL) 400 MG tablet Take 1 tablet by mouth nightly 6/24/20   Davis Better, APRN - CNP   amLODIPine (NORVASC) 10 MG tablet TAKE 1 TABLET BY MOUTH DAILY FOR 30 DAYS. 6/24/20   Romero Better, APRN - CNP   hydroCHLOROthiazide (HYDRODIURIL) 25 MG tablet Take 1 tablet by mouth daily 6/25/20   Romero Better, APRN - CNP   folic acid (FOLVITE) 1 MG tablet Take 1 tablet by mouth daily 3/11/20   JILLIAN Melo CNP   vitamin B-1 100 MG tablet Take 1 tablet by mouth daily 3/11/20   Jhony Varela, JILLIAN - CNP   omeprazole (PRILOSEC OTC) 20 MG tablet Take 1 tablet by mouth daily.  2/10/15 7/1/15  Ivana Long MD       SOCIAL HISTORY     Tobacco: Pack of cigarettes daily for the last 13 years  Alcohol: None  Illicits: Intravenous heroin  Occupation:     FAMILY HISTORY     Family History   Problem Relation Age of Onset    Cancer Father     Cancer Child        PHYSICAL EXAM     Vitals: /87   Pulse 67   Temp 98.2 °F (36.8 °C) (Tympanic)   Resp 18   Ht 5' 8\" (1.727 m)   Wt 175 lb (79.4 kg)   SpO2 94%   BMI 26.61 kg/m²   Tmax: Temp (24hrs), Av.2 °F (36.8 °C), Min:98.2 °F (36.8 °C), Max:98.2 °F (36.8 °C)    Last Body weight:   Wt Readings from Last 3 Encounters:   21 175 lb (79.4 kg)   21 175 lb (79.4 kg)   10/12/20 163 lb 12.8 oz (74.3 kg)     Body Mass Index : Body mass index is 26.61 kg/m². PHYSICAL EXAMINATION:  Constitutional: This is a well developed, well nourished, 25-29.9 - Overweight 48y.o. year old male who is alert, oriented, cooperative and in no apparent distress. Head:normocephalic and atraumatic. EENT:  PERRLA. No conjunctival injections. Septum was midline, mucosa was without erythema, exudates or cobblestoning. No thrush was noted. Neck: Supple without thyromegaly. No elevated JVP. Trachea was midline. Respiratory: Chest was symmetrical without dullness to percussion. Breath sounds bilaterally were clear to auscultation. There were no wheezes, rhonchi or rales. There is no intercostal retraction or use of accessory muscles. No egophony noted. Cardiovascular: S1 + S2 and pansystolic murmur at the mitral area  Abdomen: Slightly rounded and soft without organomegaly. No rebound, rigidity or guarding was appreciated. Lymphatic: No lymphadenopathy. Musculoskeletal: Normal curvature of the spine. No gross muscle weakness. Extremities:  No lower extremity edema, ulcerations, tenderness, varicosities or erythema. Muscle size, tone and strength are normal.  No involuntary movements are noted. Patient have cellulitis on both the forearms and there is incision and drainage sites on the right fore arm  Skin:  Warm and dry.   Good color, turgor and pigmentation. No lesions or scars.   No cyanosis or clubbing  Neurological/Psychiatric: The patient's general behavior, level of consciousness, thought content and emotional status is normal.          INVESTIGATIONS     Laboratory Testing:     Recent Results (from the past 24 hour(s))   CBC WITH AUTO DIFFERENTIAL    Collection Time: 02/19/21  7:24 PM   Result Value Ref Range    WBC 10.1 3.5 - 11.3 k/uL    RBC 4.47 4.21 - 5.77 m/uL    Hemoglobin 11.9 (L) 13.0 - 17.0 g/dL    Hematocrit 40.0 (L) 40.7 - 50.3 %    MCV 89.5 82.6 - 102.9 fL    MCH 26.6 25.2 - 33.5 pg    MCHC 29.8 28.4 - 34.8 g/dL    RDW 16.8 (H) 11.8 - 14.4 %    Platelets 429 988 - 293 k/uL    MPV 10.0 8.1 - 13.5 fL    NRBC Automated 0.0 0.0 per 100 WBC    Differential Type NOT REPORTED     Seg Neutrophils 63 36 - 65 %    Lymphocytes 24 24 - 43 %    Monocytes 9 3 - 12 %    Eosinophils % 3 1 - 4 %    Basophils 1 0 - 2 %    Immature Granulocytes 0 0 %    Segs Absolute 6.36 1.50 - 8.10 k/uL    Absolute Lymph # 2.40 1.10 - 3.70 k/uL    Absolute Mono # 0.91 0.10 - 1.20 k/uL    Absolute Eos # 0.32 0.00 - 0.44 k/uL    Basophils Absolute 0.05 0.00 - 0.20 k/uL    Absolute Immature Granulocyte <0.03 0.00 - 0.30 k/uL    WBC Morphology NOT REPORTED     RBC Morphology ANISOCYTOSIS PRESENT     Platelet Estimate NOT REPORTED    Troponin    Collection Time: 02/19/21  7:24 PM   Result Value Ref Range    Troponin, High Sensitivity 14 0 - 22 ng/L    Troponin T NOT REPORTED <0.03 ng/mL    Troponin Interp NOT REPORTED    Comprehensive Metabolic Panel    Collection Time: 02/19/21  8:13 PM   Result Value Ref Range    Glucose 574 (HH) 70 - 99 mg/dL    BUN 10 6 - 20 mg/dL    CREATININE 0.66 (L) 0.70 - 1.20 mg/dL    Bun/Cre Ratio NOT REPORTED 9 - 20    Calcium 7.3 (L) 8.6 - 10.4 mg/dL    Sodium 129 (L) 135 - 144 mmol/L    Potassium 3.1 (L) 3.7 - 5.3 mmol/L    Chloride 88 (L) 98 - 107 mmol/L    CO2 21 20 - 31 mmol/L    Anion Gap 20 (H) 9 - 17 mmol/L    Alkaline Phosphatase 55 40 - 129 U/L    ALT 24 5 - 41 U/L    AST 34 <40 U/L    Total Bilirubin 0.15 (L) 0.3 - 1.2 mg/dL    Total Protein 6.7 6.4 - 8.3 g/dL    Albumin 2.8 (L) 3.5 - 5.2 g/dL    Albumin/Globulin Ratio 0.7 (L) 1.0 - 2.5    GFR Non-African American >60 >60 mL/min    GFR African American >60 >60 mL/min    GFR Comment          GFR Staging NOT REPORTED    Troponin    Collection Time: 02/19/21  9:29 PM   Result Value Ref Range    Troponin, High Sensitivity 14 0 - 22 ng/L    Troponin T NOT REPORTED <0.03 ng/mL    Troponin Interp NOT REPORTED    Comprehensive Metabolic Panel    Collection Time: 02/19/21  9:29 PM   Result Value Ref Range    Glucose 117 (H) 70 - 99 mg/dL    BUN 12 6 - 20 mg/dL    CREATININE 0.66 (L) 0.70 - 1.20 mg/dL    Bun/Cre Ratio NOT REPORTED 9 - 20    Calcium 8.4 (L) 8.6 - 10.4 mg/dL    Sodium 136 135 - 144 mmol/L    Potassium 3.5 (L) 3.7 - 5.3 mmol/L    Chloride 102 98 - 107 mmol/L    CO2 22 20 - 31 mmol/L    Anion Gap 12 9 - 17 mmol/L    Alkaline Phosphatase 63 40 - 129 U/L    ALT 26 5 - 41 U/L    AST 39 <40 U/L    Total Bilirubin 0.16 (L) 0.3 - 1.2 mg/dL    Total Protein 7.1 6.4 - 8.3 g/dL    Albumin 2.9 (L) 3.5 - 5.2 g/dL    Albumin/Globulin Ratio 0.7 (L) 1.0 - 2.5    GFR Non-African American >60 >60 mL/min    GFR African American >60 >60 mL/min    GFR Comment          GFR Staging NOT REPORTED    POC Glucose Fingerstick    Collection Time: 02/19/21  9:29 PM   Result Value Ref Range    POC Glucose 119 (H) 75 - 110 mg/dL       Imaging:   Xr Chest (2 Vw)    Result Date: 2/19/2021  No acute process. Xr Radius Ulna Right (2 Views)    Result Date: 2/19/2021  No acute osseous abnormality. Proximal soft tissue swelling. No subcutaneous gas or metallic foreign body.        ASSESSMENT & PLAN     ASSESSMENT / PLAN:   Right forearm cellulitis with abscess on left forearm colitis:  -S/p I & D  - blood cultures x 2  -f/u on discharge cultures  -Continue vancomycin and ceftriaxone  -Patient also have pansystolic murmur at the mitral area  -Might need BRAXTON  -Continue to monitor    Hypokalemia:  Potassium is 3.5  -Replace  -Continue to monitor    Hyperglycemia:  -Patient have one reading of high blood sugar 574  -Repeat blood sugar is 117  -Hemoglobin A1c 5.2 done on 03/09/2020  -Seems to be 1 false reading  -Continue to monitor    Hypertension:  -Continue amlodipine 10 mg daily  -On hold hydrochlorothiazide at the moment    Intravenous heroin abuse:  -Keep an eye on respiratory rate and blood pressure  -Pupils bilaterally +2, reactive to light  -Urine and blood tox screen    Mild Pulmonary Hypertension:  -RVSP is 35 mmHg   -Monitor     Mild TR:  -probably due to [place]  -Continue to monitor    Chronic hepatitis C:  -Outpatient follow-up with gastroenterology    Bipolar disorder:  -Continue quetiapine 400 mg nightly, trazodone 50 mg nightly and escitalopram 20 mg daily    DVT ppx: Enoxaparin  GI ppx: Not needed    PT/OT/SW: Consulted  Discharge Planning:  to help with discharge of the patient    Svetlana Stanley MD  Internal Medicine Resident, PGY-1  [facility]; [place], [place]  2/19/2021, 11:13 PM    I have discussed the care of Chris Burks , including pertinent history and exam findings,    today with the resident. I have seen and examined the patient and the key elements of all parts of the encounter have been performed by me . I agree with the assessment, plan and orders as documented by the resident. Active Problems:    Cellulitis  Resolved Problems:    * No resolved hospital problems. *        Overall  course ;                                   are improving over time.         Patient seen and examined  Admitted with cellulitis of right upper extremity, underwent I&D in the emergency room  History of substance abuse  Started on pain medication, clonidine, Ativan  We will request orthopedic surgery consult, ID consult  Echo to rule out infective endocarditis            Electronically signed by [facility]

## 2021-02-20 NOTE — PROGRESS NOTES
Occupational Therapy   Occupational Therapy Initial Assessment  Date: 2021   Patient Name: Rodrigo Velez  MRN: [id number]     : 1965    Date of Service: 2021    Discharge Recommendations:No therapy recommended at discharge. Copied from Emergency Medicine: This is a 48 y.o. male who presents to the Emergency Department with complaint of abscess to right forearm patient has a history of skin popping narcotics. Has a significant history with past forearm abscesses. He denies any fevers. He also reports some midsternal nonradiating chest pain with nausea that starts after he was shoveling snow today. He is also had additional intermittent episodes with exertion over the past few days. Patient denied any shortness of breath, no leg swelling no vomiting, no dizziness. He does have a prior heart catheterization from 2016 that showed minimal CAD. He does have a history of smoking as well as hypertension. He denies any cocaine use. But states that most of the fentanyl that he uses may be laced with cocaine.     Patient on exam is well-appearing nontoxic, no active chest pain at this time he is nontachycardic, regular rate and rhythm no respiratory distress noted. No leg swelling noted to bilateral lower extremities no calf tenderness to palpation bilaterally his abdomen is soft without distention and is nontender to palpation all quadrants. Patient has a large erythematous area with induration noted to the dorsum of his right forearm, with a central area with purulent brown and foul-smelling discharge. Additional material was able to be expressed with pressure. No lymphangitic streaking noted. He does have a 2+ radial pulse, capillary refill of less than 2 seconds and sensation to light touch is intact. Additional scars noted to bilateral forearms from previous incisions and drainage.     Patient with exertional chest pain concerning for cardiac event.   We will plan on EKG troponin, full Forearm      Restrictions  Restrictions/Precautions  Restrictions/Precautions: Fall Risk, Up as Tolerated  Required Braces or Orthoses?: No  Position Activity Restriction  Other position/activity restrictions: Pt states starting to go through withdrawal (opiods)    Subjective   General  Patient assessed for rehabilitation services?: Yes  Family / Caregiver Present: No  Patient Currently in Pain: Yes  Pain Assessment  Pain Assessment: 0-10  Pain Level: 8  Pain Type: Acute pain  Pain Location: Arm  Pain Orientation: Right  Non-Pharmaceutical Pain Intervention(s): Elevation;Repositioned; Therapeutic presence;Distraction  Vital Signs  Patient Currently in Pain: Yes  Oxygen Therapy  O2 Device: None (Room air)  Social/Functional History  Social/Functional History  Lives With: Alone  Type of Home: Apartment  Home Layout: One level(2nd floor apt)  Home Access: Elevator, Level entry  Bathroom Shower/Tub: Tub/Shower unit, Curtain  Bathroom Toilet: Standard  Home Equipment: (no DME)  Receives Help From: Family(supportive father resides not too far)  ADL Assistance: Independent  Homemaking Assistance: Independent  Homemaking Responsibilities: Yes  Meal Prep Responsibility: Primary  Laundry Responsibility: Primary  Cleaning Responsibility: Primary  Bill Paying/Finance Responsibility: Primary  Shopping Responsibility: Primary  Ambulation Assistance: Independent  Transfer Assistance: Independent  Active : Yes  Mode of Transportation: Truck  Occupation: On disability  Type of occupation: Auto detailing  Leisure & Hobbies: Listen to [employer], draw     Objective   Vision: Within Functional Limits  Hearing: Within functional limits    Orientation  Overall Orientation Status: Within Functional Limits     Balance  Sitting Balance: Independent  Standing Balance: Contact guard assistance  Standing Balance  Time: ~4 min  Activity: static and dynamic  Functional Mobility  Functional - Mobility Device: No device  Activity: To/from bathroom  Assist Level: Contact guard assistance  Toilet Transfers  Toilet - Technique: Ambulating  Equipment Used: Standard toilet  Toilet Transfer: Contact guard assistance  ADL  Feeding: Independent;Setup;NPO(currently NPO for possible BRAXTON-ptis capable offeeding self based on AROM/Strength)  Grooming: Independent;Setup  UE Bathing: Minimal assistance;Setup(assist for back due to decreased AROM RUE and increased pain)  LE Bathing: Contact guard assistance;Setup; Increased time to complete  UE Dressing: Minimal assistance;Setup; Increased time to complete(assist to tie gown in back due to decreased AROM RUE and increased pain)  LE Dressing: Contact guard assistance;Setup; Increased time to complete  Toileting: Contact guard assistance;Setup  Additional Comments: assistance required due to decreased AROM RUE and increased pain  Tone RUE  RUE Tone: Normotonic  Tone LUE  LUE Tone: Normotonic  Coordination  Movements Are Fluid And Coordinated: No  Coordination and Movement description: Fine motor impairments;Gross motor impairments;Right UE;Decreased speed     Bed mobility  Rolling to Right: Supervision  Supine to Sit: Supervision  Sit to Supine: Supervision  Scooting: Supervision  Transfers  Stand Step Transfers: Contact guard assistance  Sit to stand: Contact guard assistance  Stand to sit: Stand by assistance  Transfer Comments: pt with slight light headedness, diaphoretic, slight impulsiveness with transfers/mobility. Pt ed on  with fair return. Pt reports starting to go through withdrawal     Cognition  Overall Cognitive Status: Exceptions  Following Commands:  Follows all commands without difficulty  Safety Judgement: Decreased awareness of need for safety  Insights: Decreased awareness of deficits     Sensation  Overall Sensation Status: WFL(denies numbness/tingling B hands)      LUE PROM (degrees)  LUE PROM: WFL  LUE AROM (degrees)  LUE AROM : WFL  Left Hand PROM (degrees)  Left Hand PROM: WFL  Left Hand AROM (degrees)  Left Hand AROM: WFL  RUE PROM (degrees)  RUE PROM: Exceptions  R Shoulder Flex  0-180: WFL  R Elbow Flexion 0-145: 0-110  R Elbow Extension 145-0: 110-0  RUE AROM (degrees)  RUE AROM : Exceptions  RUE General AROM: R forearm edematous  R Shoulder Flexion 0-180: WFL  R Elbow Flexion 0-145: 0-100  R Elbow Extension 145-0: 100-0  R Wrist Flexion 0-80: WFL  R Wrist Extension 0-70: WFL  Right Hand PROM (degrees)  Right Hand PROM: WFL  Right Hand AROM (degrees)  Right Hand AROM: WFL  LUE Strength  Gross LUE Strength: [facility]  L Hand General: 5/5  LUE Strength Comment: 5/5overall muscle strength  RUE Strength  Gross RUE Strength: Exceptions to [facility]  R Shoulder Flex: 4-/5  R Shoulder Ext: 4-/5  R Elbow Flex: 3-/5  R Elbow Ext: 3-/5  R Wrist Flex: 4-/5  R Wrist Ext: 4-/5  RUE Strength Comment: R elbow NT formally due to increased pain, cellulitus and abscess-grossly 3-/5      Plan   Plan  Times per week: 2-3 x week    AM-PAC Score  AM-[facility] Inpatient Daily Activity Raw Score: 21 (02/20/21 1010)  AM-PAC Inpatient ADL T-Scale Score : 44.27 (02/20/21 1010)  ADL Inpatient CMS 0-100% Score: 32.79 (02/20/21 1010)  ADL Inpatient CMS G-Code Modifier : CJ (02/20/21 1010)    Goals  Short term goals  Time Frame for Short term goals: Pt will, by discharge:  Short term goal 1: Dem I with adl performance  Short term goal 2: Dem I with functional transfers  Short term goal 3: Dem I with dynamic mobility for adl completion  Short term goal 4: Dem good safety awareness tech for all transfers/mobility       Therapy Time   Individual Concurrent Group Co-treatment   Time In 0800         Time Out 0831         Minutes 31         Timed Code Treatment Minutes: [address] [facility], OTR/L

## 2021-02-20 NOTE — ED PROVIDER NOTES
the fentanyl that he uses may be laced with cocaine. Patient on exam is well-appearing nontoxic, no active chest pain at this time he is nontachycardic, regular rate and rhythm no respiratory distress noted. No leg swelling noted to bilateral lower extremities no calf tenderness to palpation bilaterally his abdomen is soft without distention and is nontender to palpation all quadrants. Patient has a large erythematous area with induration noted to the dorsum of his right forearm, with a central area with purulent brown and foul-smelling discharge. Additional material was able to be expressed with pressure. No lymphangitic streaking noted. He does have a 2+ radial pulse, capillary refill of less than 2 seconds and sensation to light touch is intact. Additional scars noted to bilateral forearms from previous incisions and drainage. Patient with exertional chest pain concerning for cardiac event. We will plan on EKG troponin, full dose aspirin, telemetry IV, labs and admission for further cardiac evaluation. We will do serial EKGs. Patient also will benefit from I&D, and antibiotics. For abscess    EKG Interpretation     Interpreted by me    EKG today shows normal sinus rhythm with normal axis no ST elevations noted, T wave is upright in V1. Previous EKG noted in January 9 of 2021 shows similar EKG morphology, T wave is flattened in V1, and there was poor R wave progression on previous EKG, which appears improved on today's EKG.     Jesus Cadet D.O, M.P.H  Attending Emergency Medicine Physician         Jesus Cadet DO  02/19/21 1925

## 2021-02-20 NOTE — PROGRESS NOTES
[facility]  Internal Medicine Teaching Residency Program  Inpatient Daily Progress Note  ______________________________________________________________________________    Patient: Olena Dos Santos  YOB: 1965   NNQ:[id number]    Acct: [de-identified]     Room: [facility]9266-  Admit date: 2/19/2021  Today's date: 02/20/21  Number of days in the hospital: 1    SUBJECTIVE   Admitting Diagnosis: <principal problem not specified>  CC: Right forearm swelling and redness  Pt examined at bedside. Chart & results reviewed. -Patient had no acute issues over night  -As per the nurse the patient seems to be going into opioids with drawl  -Vitals are stable    ROS:  Constitutional:  negative for chills, fevers, sweats  Respiratory:  negative for cough, dyspnea on exertion, hemoptysis, shortness of breath, wheezing  Cardiovascular:  negative for chest pain, chest pressure/discomfort, lower extremity edema, palpitations  Gastrointestinal:  negative for abdominal pain, constipation, diarrhea, nausea, vomiting  Neurological:  negative for dizziness, headache  BRIEF HISTORY   The patient is a pleasant 48 y.o. male with background history of hypertension, bipolar disorder, depression, IV drug abuse presented with a chief complaint of presents with a chief complaint of right forearm swelling and redness. According to the patient he started to have swelling and redness of the right forearm for the past 3 to 4 days. The right forearm was very warm to touch, tender to palpation. Patient denies any fever, chills, headache, nausea, vomiting, shortness of breath, abdominal pain, change in bowel habits, urinary symptoms. Patient also had some midsternal nonradiating chest pain with nausea that started when he was shoveling snow today but reported that he had some intermittent chest pain on exertion for the past few days.   Patient is a intravenous drug abuser and uses heroin is a IV drug the last time he injected was today in the morning time. Patient was found to have abscess at the right forearm which was drained by the ED team and started on IV vancomycin and ceftriaxone. OBJECTIVE     Vital Signs:  /65   Pulse 62   Temp 98.1 °F (36.7 °C) (Oral)   Resp 15   Ht 5' 8\" (1.727 m)   Wt 175 lb (79.4 kg)   SpO2 94%   BMI 26.61 kg/m²     Temp (24hrs), Av.2 °F (36.8 °C), Min:98.1 °F (36.7 °C), Max:98.2 °F (36.8 °C)    No intake/output data recorded. Physical Exam:  Constitutional: This is a well developed, well nourished, 25-29.9 - Overweight 48y.o. year old male who is alert, oriented, cooperative and in no apparent distress. Head:normocephalic and atraumatic. EENT:  PERRLA. No conjunctival injections. Septum was midline, mucosa was without erythema, exudates or cobblestoning. No thrush was noted. Neck: Supple without thyromegaly. No elevated JVP. Trachea was midline. Respiratory: Chest was symmetrical without dullness to percussion. Breath sounds bilaterally were clear to auscultation. There were no wheezes, rhonchi or rales. There is no intercostal retraction or use of accessory muscles. No egophony noted. Cardiovascular: S1 + S2 and pansystolic murmur at the mitral area  Abdomen: Slightly rounded and soft without organomegaly. No rebound, rigidity or guarding was appreciated. Lymphatic: No lymphadenopathy. Musculoskeletal: Normal curvature of the spine. No gross muscle weakness. Extremities:  No lower extremity edema, ulcerations, tenderness, varicosities or erythema. Muscle size, tone and strength are normal.  No involuntary movements are noted. Patient have cellulitis on both the forearms and there is incision and drainage sites on the right fore arm  Skin:  Warm and dry. Good color, turgor and pigmentation. No lesions or scars.   No cyanosis or clubbing  Neurological/Psychiatric: The patient's general behavior, level of consciousness, thought content and emotional status is normal.           Medications:  Scheduled Medications:    cefTRIAXone (ROCEPHIN) IV  1,000 mg Intravenous Q24H    insulin lispro  10 Units Subcutaneous Once    vancomycin (VANCOCIN) intermittent dosing (placeholder)   Other RX Placeholder    vancomycin  1,250 mg Intravenous Q12H    sodium chloride flush  10 mL Intravenous 2 times per day    enoxaparin  40 mg Subcutaneous Daily    nicotine  1 patch Transdermal Daily    QUEtiapine  400 mg Oral Nightly    folic acid  1 mg Oral Daily    citalopram  20 mg Oral Daily    amLODIPine  10 mg Oral Daily     Continuous Infusions:   PRN Medications    sodium chloride flush, 10 mL, PRN      promethazine, 12.5 mg, Q6H PRN    Or      ondansetron, 4 mg, Q6H PRN      polyethylene glycol, 17 g, Daily PRN      acetaminophen, 650 mg, Q6H PRN    Or      acetaminophen, 650 mg, Q6H PRN      traZODone, 50 mg, Nightly PRN        Diagnostic Labs:  CBC:   Recent Labs     02/19/21 1924 02/20/21  0436   WBC 10.1 8.0   RBC 4.47 4.37   HGB 11.9* 11.5*   HCT 40.0* 37.4*   MCV 89.5 85.6   RDW 16.8* 16.5*    239     BMP:   Recent Labs     02/19/21 2013 02/19/21 2129 02/20/21  0436   * 136 137   K 3.1* 3.5* 3.7   CL 88* 102 105   CO2 21 22 23   BUN 10 12 13   CREATININE 0.66* 0.66* 0.80     BNP: No results for input(s): BNP in the last 72 hours. PT/INR: No results for input(s): PROTIME, INR in the last 72 hours. APTT: No results for input(s): APTT in the last 72 hours. CARDIAC ENZYMES: No results for input(s): CKMB, CKMBINDEX, TROPONINI in the last 72 hours.     Invalid input(s): CKTOTAL;3  FASTING LIPID PANEL:  Lab Results   Component Value Date    CHOL 83 03/09/2020    HDL 27 (L) 03/09/2020    TRIG 74 03/09/2020     LIVER PROFILE:   Recent Labs     02/19/21 2013 02/19/21 2129   AST 34 39   ALT 24 26   BILITOT 0.15* 0.16*   ALKPHOS 55 63      MICROBIOLOGY:   Lab Results   Component Value Date/Time    CULTURE NO GROWTH 1 HOUR 02/19/2021 10:18 PM       Imaging:    Xr Chest (2 Vw)    Result Date: 2/19/2021  No acute process. Xr Radius Ulna Right (2 Views)    Result Date: 2/19/2021  No acute osseous abnormality. Proximal soft tissue swelling. No subcutaneous gas or metallic foreign body. ASSESSMENT & PLAN     ASSESSMENT / PLAN:     Right forearm cellulitis with abscess on left forearm colitis:  -S/p I & D  -blood cultures x 2  -f/u on cultures from wound drainage   -Continue vancomycin and ceftriaxone  -Patient also have pansystolic murmur at the mitral area  -Might need BRAXTON  -Continue to monitor     Hypokalemia:  -Resolved  -Continue to monitor     Hyperglycemia:  -Patient have one reading of high blood sugar 574  -Repeat blood sugar is 117  -Hemoglobin A1c 5.2 done on 03/09/2020  -Seems to be 1 false reading  -Continue to monitor     Hypertension:  -Continue amlodipine 10 mg daily  -On hold hydrochlorothiazide at the moment due to normal blood pressure  -Will resume HCTZ if BP is high     Intravenous heroin abuse:  -Keep an eye on respiratory rate and blood pressure  -Pupils bilaterally +2, reactive to light  -Urine and blood tox screen     Mild Pulmonary Hypertension:  -RVSP is 35 mmHg   -Monitor    Mild TR:  -probably due to [place] [address]  -Continue to monitor    Chronic hepatitis C:  -Outpatient follow-up with gastroenterology     Bipolar disorder:  -Continue quetiapine 400 mg nightly, trazodone 50 mg nightly and escitalopram 20 mg daily      DVT ppx: Enoxaparin  GI ppx: Not needed     PT/OT/SW: Consulted  Discharge Planning:  to help with discharge of the patient  Raven Kelly MD  Internal Medicine Resident, PGY-1  [facility];  [place], [place]  2/20/2021, 5:52 AM

## 2021-02-20 NOTE — PROGRESS NOTES
Pharmacy Note  Vancomycin Consult    Trista Dunlap is a 48 y.o. male started on Vancomycin for cellulitis; consult received from Dr. Glen Navarro to manage therapy. Also receiving the following antibiotics: none. Patient Active Problem List   Diagnosis    Hepatitis C    Psoriasis    Chest pain    Hypertension    Smoker    Non-intractable vomiting with nausea    Sinus bradycardia    Opioid type dependence, continuous (HCC)    Unstable angina (HCC)    Bipolar disorder (HCC)    Bipolar 1 disorder (HCC)    Intentional drug overdose ([facility] [address]    Cocaine abuse ([facility] [address]    Prolonged Q-T interval on ECG    Hypoxia    Intentional benzodiazepine overdose (HCC)    Increased anion gap metabolic acidosis    SERGE (acute kidney injury) ([facility] [address]    Ethylene glycol poisoning    Lactic acidosis    Hyponatremia    High serum osmolar gap    Hypercalcemia    IV drug abuse ([facility] [address]    History of hepatitis C       Allergies:  Haldol [haloperidol] and Latuda [lurasidone hcl]     Temp max: 98.2    Recent Labs     02/19/21 2013   BUN 10       Recent Labs     02/19/21 2013   CREATININE 0.66*       Recent Labs     02/19/21  1924   WBC 10.1       No intake or output data in the 24 hours ending 02/19/21 2203    Culture Date      Source                       Results  pending    Ht Readings from Last 1 Encounters:   02/19/21 5' 8\" (1.727 m)        Wt Readings from Last 1 Encounters:   02/19/21 175 lb (79.4 kg)         Body mass index is 26.61 kg/m². Estimated Creatinine Clearance: 124 mL/min (A) (based on SCr of 0.66 mg/dL (L)). Goal Trough Level: 10-15 mcg/mL    Assessment/Plan:  Will initiate Vancomycin 1500 mg for one dose followed 1250 mg every 12 hours. Timing of trough level will be determined based on culture results, renal function, and clinical response. Thank you for the consult. Will continue to follow.     Kb Haji [address]  2/19/2021 10:06 PM

## 2021-02-22 NOTE — DISCHARGE SUMMARY
[address] [facility]     Department of Internal Medicine - Staff Internal Medicine Teaching Service    INPATIENT DISCHARGE SUMMARY      Patient Identification:  James Smith is a 48 y.o. male. :  1965  MRN: [id number]     Acct: [de-identified]   PCP: Aubree Gordon MD  Admit Date:  2021  Discharge date and time: 2021 11:04 AM   Attending Provider: No att. providers found                                     [address] Problem Lists:  Active Problems:    Hepatitis C    Hypertension    Smoker    Bipolar 1 disorder ([facility] [address]    Cocaine abuse ([facility] [address]    IV drug abuse ([facility] [address]    Cellulitis  Resolved Problems:    * No resolved hospital problems. [facility] STAY     Brief Inpatient course:   James Smith is a  pleasant 55 y. o. male with background history of hypertension, bipolar disorder, depression, IV drug abuse presented with a chief complaint of presents with a chief complaint of right forearm swelling and redness.  According to the patient he started to have swelling and redness of the right forearm for the past 3 to 4 days.  The right forearm was very warm to touch, tender to palpation.  Patient denies any fever, chills, headache, nausea, vomiting, shortness of breath, abdominal pain, change in bowel habits, urinary symptoms. Patient also had some midsternal nonradiating chest pain with nausea that started when he was shoveling snow today but reported that he had some intermittent chest pain on exertion for the past few days. Patient is a intravenous drug abuser and uses heroin is a IV drug the last time he injected was today in the morning time. Patient was found to have abscess at the right forearm which was drained by the ED team and started on IV vancomycin and ceftriaxone.    Patient was admitted and we consulted the Orthopedics team, ID team and also wanted to do the TTE. But patient did wanted to stay and left AMA.  However I sent the patient prescription of Doxycycline to his pharmacy and also left the voice mail to him. Procedures/ Significant Interventions:        Consults:     Consults:     Final Specialist Recommendations/Findings:   PHARMACY TO DOSE VANCOMYCIN  IP CONSULT TO INTERNAL MEDICINE  IP CONSULT TO CASE MANAGEMENT  IP CONSULT TO ORTHOPEDIC SURGERY  IP CONSULT TO INFECTIOUS DISEASES      Any Hospital Acquired Infections: none    Discharge Functional Status:  stable    DISCHARGE PLAN     Disposition: Patient left AMA    Patient Instructions:   Discharge Medication List as of 2/20/2021 11:04 AM      CONTINUE these medications which have NOT CHANGED    Details   citalopram (CELEXA) 20 MG tablet Take 1 tablet by mouth daily, Disp-30 tablet, R-0Normal      traZODone (DESYREL) 50 MG tablet Take 1 tablet by mouth nightly as needed for Sleep, Disp-30 tablet, R-0Normal      QUEtiapine (SEROQUEL) 400 MG tablet Take 1 tablet by mouth nightly, Disp-30 tablet, R-0Normal      amLODIPine (NORVASC) 10 MG tablet TAKE 1 TABLET BY MOUTH DAILY FOR 30 DAYS., Disp-30 tablet, R-0, DAWNormal      hydroCHLOROthiazide (HYDRODIURIL) 25 MG tablet Take 1 tablet by mouth daily, Disp-30 tablet, [id number]      folic acid (FOLVITE) 1 MG tablet Take 1 tablet by mouth daily, Disp-14 tablet, R-0Normal      vitamin B-1 100 MG tablet Take 1 tablet by mouth daily, Disp-14 tablet, R-0Normal             Activity: Patient left AMA    Diet:  Patient left AMA    Follow-up:    No follow-up provider specified. Patient Instructions: Please take doxycycline 100 mg BID for 10 days. Please return to us if there is any concern. Follow up labs: Follow up imaging:     Note that over 30 minutes was spent in preparing discharge papers, discussing discharge with patient, medication review, etc.      Esteban Márquez MD, MD  Internal Medicine Resident, PGY-1  [facility];  [facility]  2/22/2021, 2:14 PM

## 2021-02-25 LAB
CULTURE: NORMAL
Lab: NORMAL
SPECIMEN DESCRIPTION: NORMAL

## 2021-02-26 LAB
CULTURE: NORMAL
Lab: NORMAL
SPECIMEN DESCRIPTION: NORMAL

## 2021-03-08 LAB
EKG ATRIAL RATE: 68 BPM
EKG P AXIS: 29 DEGREES
EKG P-R INTERVAL: 164 MS
EKG Q-T INTERVAL: 400 MS
EKG QRS DURATION: 94 MS
EKG QTC CALCULATION (BAZETT): 425 MS
EKG R AXIS: 31 DEGREES
EKG T AXIS: 35 DEGREES
EKG VENTRICULAR RATE: 68 BPM

## 2021-06-17 ENCOUNTER — APPOINTMENT (OUTPATIENT)
Dept: GENERAL RADIOLOGY | Age: 56
End: 2021-06-17
Payer: MEDICARE

## 2021-06-17 ENCOUNTER — HOSPITAL ENCOUNTER (EMERGENCY)
Age: 56
Discharge: HOME | End: 2021-06-17
Attending: EMERGENCY MEDICINE
Payer: MEDICARE

## 2021-06-17 VITALS
HEART RATE: 58 BPM | SYSTOLIC BLOOD PRESSURE: 143 MMHG | TEMPERATURE: 96.6 F | RESPIRATION RATE: 18 BRPM | OXYGEN SATURATION: 99 % | DIASTOLIC BLOOD PRESSURE: 85 MMHG

## 2021-06-17 DIAGNOSIS — M79.5 FOREIGN BODY (FB) IN SOFT TISSUE: ICD-10-CM

## 2021-06-17 DIAGNOSIS — L02.91 ABSCESS: Primary | ICD-10-CM

## 2021-06-17 DIAGNOSIS — R10.30 LOWER ABDOMINAL PAIN: ICD-10-CM

## 2021-06-17 DIAGNOSIS — F19.90 INTRAVENOUS DRUG USER: ICD-10-CM

## 2021-06-17 LAB
-: ABNORMAL
ABSOLUTE EOS #: 0.41 K/UL (ref 0–0.44)
ABSOLUTE IMMATURE GRANULOCYTE: 0.03 K/UL (ref 0–0.3)
ABSOLUTE LYMPH #: 2.75 K/UL (ref 1.1–3.7)
ABSOLUTE MONO #: 0.77 K/UL (ref 0.1–1.2)
ALBUMIN SERPL-MCNC: 3.3 G/DL (ref 3.5–5.2)
ALBUMIN/GLOBULIN RATIO: 0.8 (ref 1–2.5)
ALP BLD-CCNC: 63 U/L (ref 40–129)
ALT SERPL-CCNC: 37 U/L (ref 5–41)
AMORPHOUS: ABNORMAL
ANION GAP SERPL CALCULATED.3IONS-SCNC: 9 MMOL/L (ref 9–17)
AST SERPL-CCNC: 44 U/L
BACTERIA: ABNORMAL
BASOPHILS # BLD: 1 % (ref 0–2)
BASOPHILS ABSOLUTE: 0.05 K/UL (ref 0–0.2)
BILIRUB SERPL-MCNC: 0.32 MG/DL (ref 0.3–1.2)
BILIRUBIN DIRECT: 0.11 MG/DL
BILIRUBIN URINE: ABNORMAL
BILIRUBIN, INDIRECT: 0.21 MG/DL (ref 0–1)
BUN BLDV-MCNC: 13 MG/DL (ref 6–20)
BUN/CREAT BLD: ABNORMAL (ref 9–20)
CALCIUM SERPL-MCNC: 8.7 MG/DL (ref 8.6–10.4)
CASTS UA: ABNORMAL /LPF (ref 0–2)
CASTS UA: ABNORMAL /LPF (ref 0–2)
CHLORIDE BLD-SCNC: 105 MMOL/L (ref 98–107)
CO2: 27 MMOL/L (ref 20–31)
COLOR: ABNORMAL
COMMENT UA: ABNORMAL
CREAT SERPL-MCNC: 0.64 MG/DL (ref 0.7–1.2)
CRYSTALS, UA: ABNORMAL /HPF
CRYSTALS, UA: ABNORMAL /HPF
DIFFERENTIAL TYPE: ABNORMAL
EOSINOPHILS RELATIVE PERCENT: 5 % (ref 1–4)
EPITHELIAL CELLS UA: ABNORMAL /HPF (ref 0–5)
GFR AFRICAN AMERICAN: >60 ML/MIN
GFR NON-AFRICAN AMERICAN: >60 ML/MIN
GFR SERPL CREATININE-BSD FRML MDRD: ABNORMAL ML/MIN/{1.73_M2}
GFR SERPL CREATININE-BSD FRML MDRD: ABNORMAL ML/MIN/{1.73_M2}
GLOBULIN: ABNORMAL G/DL (ref 1.5–3.8)
GLUCOSE BLD-MCNC: 119 MG/DL (ref 70–99)
GLUCOSE URINE: NEGATIVE
HCT VFR BLD CALC: 39.2 % (ref 40.7–50.3)
HEMOGLOBIN: 12.3 G/DL (ref 13–17)
IMMATURE GRANULOCYTES: 0 %
KETONES, URINE: ABNORMAL
LEUKOCYTE ESTERASE, URINE: NEGATIVE
LIPASE: 22 U/L (ref 13–60)
LYMPHOCYTES # BLD: 31 % (ref 24–43)
MCH RBC QN AUTO: 27.2 PG (ref 25.2–33.5)
MCHC RBC AUTO-ENTMCNC: 31.4 G/DL (ref 28.4–34.8)
MCV RBC AUTO: 86.5 FL (ref 82.6–102.9)
MONOCYTES # BLD: 9 % (ref 3–12)
MUCUS: ABNORMAL
NITRITE, URINE: NEGATIVE
NRBC AUTOMATED: 0 PER 100 WBC
OTHER OBSERVATIONS UA: ABNORMAL
PDW BLD-RTO: 16 % (ref 11.8–14.4)
PH UA: 5.5 (ref 5–8)
PLATELET # BLD: 140 K/UL (ref 138–453)
PLATELET ESTIMATE: ABNORMAL
PMV BLD AUTO: 10.8 FL (ref 8.1–13.5)
POTASSIUM SERPL-SCNC: 3.8 MMOL/L (ref 3.7–5.3)
PROTEIN UA: ABNORMAL
RBC # BLD: 4.53 M/UL (ref 4.21–5.77)
RBC # BLD: ABNORMAL 10*6/UL
RBC UA: ABNORMAL /HPF (ref 0–2)
RENAL EPITHELIAL, UA: ABNORMAL /HPF
SEG NEUTROPHILS: 54 % (ref 36–65)
SEGMENTED NEUTROPHILS ABSOLUTE COUNT: 4.87 K/UL (ref 1.5–8.1)
SODIUM BLD-SCNC: 141 MMOL/L (ref 135–144)
SPECIFIC GRAVITY UA: 1.03 (ref 1–1.03)
TOTAL PROTEIN: 7.2 G/DL (ref 6.4–8.3)
TRICHOMONAS: ABNORMAL
TURBIDITY: CLEAR
URINE HGB: NEGATIVE
UROBILINOGEN, URINE: NORMAL
WBC # BLD: 8.9 K/UL (ref 3.5–11.3)
WBC # BLD: ABNORMAL 10*3/UL
WBC UA: ABNORMAL /HPF (ref 0–5)
YEAST: ABNORMAL

## 2021-06-17 PROCEDURE — 99285 EMERGENCY DEPT VISIT HI MDM: CPT

## 2021-06-17 PROCEDURE — 80048 BASIC METABOLIC PNL TOTAL CA: CPT

## 2021-06-17 PROCEDURE — 73060 X-RAY EXAM OF HUMERUS: CPT

## 2021-06-17 PROCEDURE — 80076 HEPATIC FUNCTION PANEL: CPT

## 2021-06-17 PROCEDURE — 81001 URINALYSIS AUTO W/SCOPE: CPT

## 2021-06-17 PROCEDURE — 71045 X-RAY EXAM CHEST 1 VIEW: CPT

## 2021-06-17 PROCEDURE — 87086 URINE CULTURE/COLONY COUNT: CPT

## 2021-06-17 PROCEDURE — 93005 ELECTROCARDIOGRAM TRACING: CPT | Performed by: STUDENT IN AN ORGANIZED HEALTH CARE EDUCATION/TRAINING PROGRAM

## 2021-06-17 PROCEDURE — 6370000000 HC RX 637 (ALT 250 FOR IP): Performed by: STUDENT IN AN ORGANIZED HEALTH CARE EDUCATION/TRAINING PROGRAM

## 2021-06-17 PROCEDURE — 83690 ASSAY OF LIPASE: CPT

## 2021-06-17 PROCEDURE — 85025 COMPLETE CBC W/AUTO DIFF WBC: CPT

## 2021-06-17 RX ORDER — DOXYCYCLINE HYCLATE 100 MG
100 TABLET ORAL ONCE
Status: COMPLETED | OUTPATIENT
Start: 2021-06-17 | End: 2021-06-17

## 2021-06-17 RX ORDER — DOXYCYCLINE HYCLATE 100 MG
100 TABLET ORAL 2 TIMES DAILY
Qty: 14 TABLET | Refills: 0 | Status: SHIPPED | OUTPATIENT
Start: 2021-06-17 | End: 2021-06-24

## 2021-06-17 RX ADMIN — DOXYCYCLINE HYCLATE 100 MG: 100 TABLET, COATED ORAL at 22:48

## 2021-06-17 ASSESSMENT — ENCOUNTER SYMPTOMS
SHORTNESS OF BREATH: 0
RHINORRHEA: 0
COUGH: 0
NAUSEA: 0
EYE PAIN: 0
DIARRHEA: 0
ABDOMINAL PAIN: 1
VOMITING: 0
EYE DISCHARGE: 0
CONSTIPATION: 0

## 2021-06-17 ASSESSMENT — PAIN DESCRIPTION - DESCRIPTORS: DESCRIPTORS: SHARP

## 2021-06-17 ASSESSMENT — PAIN SCALES - GENERAL: PAINLEVEL_OUTOF10: 7

## 2021-06-17 ASSESSMENT — PAIN DESCRIPTION - ORIENTATION: ORIENTATION: LOWER;LEFT;RIGHT

## 2021-06-17 ASSESSMENT — PAIN DESCRIPTION - PAIN TYPE: TYPE: ACUTE PAIN

## 2021-06-17 ASSESSMENT — PAIN DESCRIPTION - FREQUENCY: FREQUENCY: CONTINUOUS

## 2021-06-17 ASSESSMENT — PAIN DESCRIPTION - LOCATION: LOCATION: ABDOMEN

## 2021-06-17 NOTE — ED NOTES
Bed: 09  Expected date:   Expected time:   Means of arrival:   Comments:     Ryder Moreira RN  06/17/21 1927

## 2021-06-17 NOTE — ED TRIAGE NOTES
Pt ambulated to triage with abscess on left upper arm from IV drug use. CO abd pain 5/10 with blood in urine. Pt respirations are even and unlabored, pt is oriented X 4, speaking in complete sentences. Will continue to monitor.

## 2021-06-18 LAB
CULTURE: NO GROWTH
EKG ATRIAL RATE: 55 BPM
EKG P AXIS: 63 DEGREES
EKG P-R INTERVAL: 180 MS
EKG Q-T INTERVAL: 420 MS
EKG QRS DURATION: 98 MS
EKG QTC CALCULATION (BAZETT): 401 MS
EKG R AXIS: 33 DEGREES
EKG T AXIS: 9 DEGREES
EKG VENTRICULAR RATE: 55 BPM
Lab: NORMAL
SPECIMEN DESCRIPTION: NORMAL

## 2021-06-18 NOTE — ED PROVIDER NOTES
[facility] ED  Emergency Department Encounter  Emergency Medicine Resident     Pt Name: Fina Leyva  MRN: [id number]  [place] 1965  Date of evaluation: 6/17/21  PCP:  uDy Tapia MD    [address] [place] [address]       Chief Complaint   Patient presents with    Abscess    Hematuria       HISTORY OFPRESENT ILLNESS  (Location/Symptom, Timing/Onset, Context/Setting, Quality, Duration, Modifying Factors,Severity.)      Fina Leyva is a 48 y. o.yo male who presents with complaint of hematuria and abscess. Patient is a known IV drug user. Patient last use this morning. Patient does inject into in his arm where he is complaining of the abscess. Patient states that he went to [facility] for opioid drug treatment and was told that he was too sick to receive treatment. States that they told him he had blood in his urine. Patient would like to be evaluated for his abscess in his hematuria. Patient also complaining of lower abdominal pain intermittently. Patient denies any nausea, vomiting, diarrhea or constipation. Review of systems does admit to intermittent chest pain and shortness of breath. Has not tried anything at home for his abdominal pain or chest pain or shortness of breath. Does state that he tried to drain his abscess on his own with minimal relief. Try any Tylenol or ibuprofen at home. Denies any radiation of abdominal pain or abscess pain. Has mid to IV drug use, does believe he has hepatitis C. PAST MEDICAL / SURGICAL / SOCIAL / FAMILY HISTORY      has a past medical history of Bipolar 1 disorder ([facility] [address] Chronic mental illness, Depression, Hepatitis C, Hypertension, and Psoriasis. has a past surgical history that includes brain surgery. Social:  reports that he has been smoking cigarettes. He has a 15.00 pack-year smoking history. He has never used smokeless tobacco. He reports current drug use. Drug: Opiates . He reports that he does not drink alcohol.     Family Hx: Family History   Problem Relation Age of Onset    Cancer Father     Cancer Child         Allergies:  Haldol [haloperidol] and Latuda [lurasidone hcl]    Home Medications:  Prior to Admission medications    Medication Sig Start Date End Date Taking? Authorizing Provider   doxycycline hyclate (VIBRA-TABS) 100 MG tablet Take 1 tablet by mouth 2 times daily for 7 days 6/17/21 6/24/21 Yes Lethea Mendieta, DO   citalopram (CELEXA) 20 MG tablet Take 1 tablet by mouth daily 6/24/20   Loyce Do, APRN - CNP   traZODone (DESYREL) 50 MG tablet Take 1 tablet by mouth nightly as needed for Sleep 6/24/20   Loyce Do, APRN - CNP   QUEtiapine (SEROQUEL) 400 MG tablet Take 1 tablet by mouth nightly 6/24/20   Loyce Do, APRN - CNP   amLODIPine (NORVASC) 10 MG tablet TAKE 1 TABLET BY MOUTH DAILY FOR 30 DAYS. 6/24/20   Loyce Do, APRN - CNP   hydroCHLOROthiazide (HYDRODIURIL) 25 MG tablet Take 1 tablet by mouth daily 6/25/20   Loyce Do, APRN - CNP   folic acid (FOLVITE) 1 MG tablet Take 1 tablet by mouth daily 3/11/20   Jerrica Pickett, APRN - CNP   vitamin B-1 100 MG tablet Take 1 tablet by mouth daily 3/11/20   Jerrica Pickett, APRN - CNP   omeprazole (PRILOSEC OTC) 20 MG tablet Take 1 tablet by mouth daily. 2/10/15 7/1/15  Dominga Chavez MD       REVIEW OFSYSTEMS    (2-9 systems for level 4, 10 or more for level 5)      Review of Systems   Constitutional: Positive for chills. Negative for fever. HENT: Negative for congestion, ear pain and rhinorrhea. Eyes: Negative for pain and discharge. Respiratory: Negative for cough and shortness of breath. Cardiovascular: Positive for chest pain. Negative for palpitations. Gastrointestinal: Positive for abdominal pain. Negative for constipation, diarrhea, nausea and vomiting. Genitourinary: Positive for hematuria. Negative for difficulty urinating and dysuria. Musculoskeletal: Negative for arthralgias and myalgias.    Skin: Positive for wound (abscess). Negative for rash. Neurological: Negative for light-headedness and headaches. PHYSICAL EXAM   (up to 7 for level 4, 8 or more forlevel 5)      INITIAL VITALS:   Vitals:    06/17/21 1814   BP: (!) 143/85   Pulse:    Resp:    Temp:    SpO2:         Physical Exam  Vitals and nursing note reviewed. Constitutional:       General: He is not in acute distress. Appearance: Normal appearance. He is normal weight. He is not ill-appearing, toxic-appearing or diaphoretic. HENT:      Head: Normocephalic and atraumatic. Nose: Nose normal.      Mouth/Throat:      Mouth: Mucous membranes are moist.      Pharynx: Oropharynx is clear. Eyes:      General:         Right eye: No discharge. Left eye: No discharge. Extraocular Movements: Extraocular movements intact. Pupils: Pupils are equal, round, and reactive to light. Cardiovascular:      Rate and Rhythm: Normal rate and regular rhythm. Heart sounds: No murmur heard. No friction rub. No gallop. Pulmonary:      Effort: Pulmonary effort is normal.      Breath sounds: Normal breath sounds. No wheezing. Abdominal:      General: Abdomen is flat. Palpations: Abdomen is soft. Tenderness: There is abdominal tenderness. Skin:     General: Skin is warm and dry. Neurological:      General: No focal deficit present. Mental Status: He is alert and oriented to person, place, and time. Psychiatric:         Mood and Affect: Mood normal.         Behavior: Behavior normal.         Thought Content: Thought content normal.         DIFFERENTIAL  DIAGNOSIS     Initial MDM/Plan: 48 y.o. male who presents with acute complaint of blood in his urine and abscess. Upon my initial examination patient has multiple complaints including abscess, chest pain, shortness of breath and blood in his urine. Patient attempted to receive treatment at [facility] for opioid drug use. Patient last use this morning.   Patient does inject into the site where he has an abscess. He is alert, oriented, answering all questions appropriately and nontoxic-appearing. To his history we will obtain a CBC to rule out signs of systemic infection, BMP and hepatic function test.  BMP and liver function within normal limits with mildly low albumin. ALT and AST are unremarkable. Urinalysis showing no blood within the urine but is significant for calcium oxalate crystals. Discussed follow-up with primary care about this. EKG and chest x-ray showing no acute abnormalities. X-ray of left upper extremity did show retained foreign body suspected needle tip. Informed patient that he should follow-up with primary care provider about this. Was able to aspirate 15 cc of bloody purulent drainage from abscess in the left upper extremity. Patient had immediate relief. Due to extensive induration no indication for further incision and drainage. Will allow to drain on its own. Given strict return precautions. Provided with 1 dose of doxycycline here in the emergency department and written prescription for doxycycline to be taken for 7 days twice daily. Patient discharged in stable condition after remaining vitally stable throughout emergency department stay.     DIAGNOSTIC RESULTS / EMERGENCYDEPARTMENT COURSE / MDM     LABS:  Labs Reviewed   CBC WITH AUTO DIFFERENTIAL - Abnormal; Notable for the following components:       Result Value    Hemoglobin 12.3 (*)     Hematocrit 39.2 (*)     RDW 16.0 (*)     Eosinophils % 5 (*)     All other components within normal limits   BASIC METABOLIC PANEL W/ REFLEX TO MG FOR LOW K - Abnormal; Notable for the following components:    Glucose 119 (*)     CREATININE 0.64 (*)     All other components within normal limits   HEPATIC FUNCTION PANEL - Abnormal; Notable for the following components:    Albumin 3.3 (*)     AST 44 (*)     Albumin/Globulin Ratio 0.8 (*)     All other components within normal limits URINALYSIS - Abnormal; Notable for the following components:    Color, UA DARK YELLOW (*)     Bilirubin Urine NEGATIVE  Verified by ictotest. (*)     Ketones, Urine TRACE (*)     Protein, UA TRACE (*)     All other components within normal limits   MICROSCOPIC URINALYSIS - Abnormal; Notable for the following components:    Crystals, UA CALCIUM OXALATE (*)     Crystals, UA MANY (*)     All other components within normal limits   CULTURE, URINE   LIPASE         RADIOLOGY:  XR HUMERUS LEFT (MIN 2 VIEWS)    Result Date: 6/17/2021  EXAMINATION: TWO XRAY VIEWS OF THE LEFT HUMERUS 6/17/2021 9:47 pm COMPARISON: None. HISTORY: ORDERING SYSTEM PROVIDED HISTORY: abcsess, IV drug use, r/o foreign bodies TECHNOLOGIST PROVIDED HISTORY: abcsess, IV drug use, r/o foreign bodies Reason for Exam: rule out foreign bodies FINDINGS: There is a linear radiopacity consistent with a needle fragment in the soft tissues of the left upper arm near the junction of the mid and distal thirds of the left humerus. There is no acute bone finding. Linear radiopaque foreign body most consistent with a needle fragment in the soft tissues of the left upper arm. XR CHEST PORTABLE    Result Date: 6/17/2021  EXAMINATION: ONE XRAY VIEW OF THE CHEST 6/17/2021 5:34 pm COMPARISON: 02/19/2021 HISTORY: ORDERING SYSTEM PROVIDED HISTORY: chest pain, known IV drug user TECHNOLOGIST PROVIDED HISTORY: chest pain, known IV drug user Reason for Exam: port upright FINDINGS: The cardiac size is normal. No acute infiltrates or pleural effusions are seen. Pulmonary vascularity appears normal. There is mild ectasia of the thoracic aorta. No acute bony abnormalities. The hilar structures are normal.     No acute cardiopulmonary disease         EKG  Rate bradycardic, normal sinus rhythm, Axis within normal limits no deviation noted, intervals within normal limits including AK, QRS, QT/QTc, no ST segment elevation, no ST segment depression, no T wave abnormalities. No acute abnormalities. All EKG's are interpreted by the Emergency Department Physicianwho either signs or Co-signs this chart in the absence of a cardiologist.    EMERGENCY DEPARTMENT COURSE:          PROCEDURES:  None    CONSULTS:  None      FINAL IMPRESSION      1. Abscess    2. Lower abdominal pain    3. Intravenous drug user    4.  Foreign body (FB) in soft tissue          DISPOSITION / PLAN     DISPOSITION Decision To Discharge 06/17/2021 10:36:42 PM      PATIENT REFERRED TO:  [facility] ED  [address] [facility]  [phone]    As needed, If symptoms worsen    [facility]  [address]  [phone]      As needed, If symptoms worsen    [address] [id number]    To establish care for [facility] therapy      DISCHARGE MEDICATIONS:  New Prescriptions    DOXYCYCLINE HYCLATE (VIBRA-TABS) 100 MG TABLET    Take 1 tablet by mouth 2 times daily for 7 days       Indiana Flores DO  Emergency Medicine Resident    (Please note that portions of this note were completed with a voice recognition program.Efforts were made to edit the dictations but occasionally words are mis-transcribed.)        Indiana Flores DO  Resident  06/17/21 5009

## 2021-06-18 NOTE — ED PROVIDER NOTES
[facility]     Emergency Department     Faculty Note/ Attestation      Pt Name: Katy Colón                                       MRN: [id number]  [place] 1965  Date of evaluation: 6/17/2021    Patients PCP:    Michelle Pink MD      Attestation  I performed a history and physical examination of the patient and discussed management with the resident. I reviewed the residents note and agree with the documented findings and plan of care. Any areas of disagreement are noted on the chart. I was personally present for the key portions of any procedures. I have documented in the chart those procedures where I was not present during the key portions. I have reviewed the emergency nurses triage note. I agree with the chief complaint, past medical history, past surgical history, allergies, medications, social and family history as documented unless otherwise noted below. For Physician Assistant/ Nurse Practitioner cases/documentation I have personally evaluated this patient and have completed at least one if not all key elements of the E/M (history, physical exam, and MDM). Additional findings are as noted.       Initial Screens:        Leonel Coma Scale  Eye Opening: Spontaneous  Best Verbal Response: Oriented  Best Motor Response: Obeys commands  Leonel Coma Scale Score: 15    Vitals:    Vitals:    06/17/21 1812 06/17/21 1814   BP:  (!) 143/85   Pulse: 58    Resp: 18    Temp: 96.6 °F (35.9 °C)    TempSrc: Oral    SpO2: 99%        CHIEF COMPLAINT       Chief Complaint   Patient presents with    Abscess    Hematuria             DIAGNOSTIC RESULTS             RADIOLOGY:   XR CHEST PORTABLE    (Results Pending)         LABS:  Labs Reviewed   CBC WITH AUTO DIFFERENTIAL - Abnormal; Notable for the following components:       Result Value    Hemoglobin 12.3 (*)     Hematocrit 39.2 (*)     RDW 16.0 (*)     Eosinophils % 5 (*)     All other components within normal limits   CULTURE, URINE BASIC METABOLIC PANEL W/ REFLEX TO MG FOR LOW K   LIPASE   HEPATIC FUNCTION PANEL   URINALYSIS         EMERGENCY DEPARTMENT COURSE:     -------------------------  BP: (!) 143/85, Temp: 96.6 °F (35.9 °C), Pulse: 58, Resp: 18      Comments    IVKAILAHS, last used yesterday  Went to rehab at [facility], blood in urine, was told they couldn't treat him  Here today with abd pain and diarrhea  Also abscess on R arm    States he has hepatitis, went to rehab and is still interested in rehab. He does have an abscess in the arm that is quite indurated, no obvious fluctuance. We will get an x-ray to make sure there is no needle or foreign bodies, ultrasound and potentially needle aspirate. Unless there is a large obvious pocket on ultrasound we will not I&D but discharged with antibiotics hopefully get him into rehab today.     10:45 PM EDT  Does have needle broken off into arm  Did have 15cc of pus aspirated from arm, still have significant cellulitis around, will d/c on doxy  Offered admission for substance abuse tx, declined    (Please note that portions of this note were completed with a voice recognition program.  Efforts were made to edit the dictations but occasionally words are mis-transcribed.)      Pancho Holder MD,, MD  Attending Emergency Physician         Pancho Holder MD  06/17/21 3271       Pancho Holder MD  06/17/21 2401

## 2021-06-20 ENCOUNTER — HOSPITAL ENCOUNTER (EMERGENCY)
Age: 56
Discharge: HOME | End: 2021-06-20
Attending: EMERGENCY MEDICINE
Payer: MEDICARE

## 2021-06-20 VITALS
WEIGHT: 170 LBS | OXYGEN SATURATION: 97 % | BODY MASS INDEX: 25.76 KG/M2 | SYSTOLIC BLOOD PRESSURE: 145 MMHG | DIASTOLIC BLOOD PRESSURE: 89 MMHG | TEMPERATURE: 96.8 F | RESPIRATION RATE: 16 BRPM | HEIGHT: 68 IN | HEART RATE: 64 BPM

## 2021-06-20 DIAGNOSIS — R10.9 ABDOMINAL CRAMPING: Primary | ICD-10-CM

## 2021-06-20 LAB
ABSOLUTE EOS #: 0.4 K/UL (ref 0–0.44)
ABSOLUTE IMMATURE GRANULOCYTE: 0.03 K/UL (ref 0–0.3)
ABSOLUTE LYMPH #: 2.02 K/UL (ref 1.1–3.7)
ABSOLUTE MONO #: 0.81 K/UL (ref 0.1–1.2)
ALBUMIN SERPL-MCNC: 3.9 G/DL (ref 3.5–5.2)
ALBUMIN/GLOBULIN RATIO: 0.9 (ref 1–2.5)
ALP BLD-CCNC: 81 U/L (ref 40–129)
ALT SERPL-CCNC: 42 U/L (ref 5–41)
ANION GAP SERPL CALCULATED.3IONS-SCNC: 10 MMOL/L (ref 9–17)
AST SERPL-CCNC: 51 U/L
BASOPHILS # BLD: 1 % (ref 0–2)
BASOPHILS ABSOLUTE: 0.05 K/UL (ref 0–0.2)
BILIRUB SERPL-MCNC: 0.39 MG/DL (ref 0.3–1.2)
BUN BLDV-MCNC: 11 MG/DL (ref 6–20)
BUN/CREAT BLD: ABNORMAL (ref 9–20)
CALCIUM SERPL-MCNC: 9.2 MG/DL (ref 8.6–10.4)
CHLORIDE BLD-SCNC: 100 MMOL/L (ref 98–107)
CO2: 28 MMOL/L (ref 20–31)
CREAT SERPL-MCNC: 0.74 MG/DL (ref 0.7–1.2)
DIFFERENTIAL TYPE: ABNORMAL
EOSINOPHILS RELATIVE PERCENT: 5 % (ref 1–4)
GFR AFRICAN AMERICAN: >60 ML/MIN
GFR NON-AFRICAN AMERICAN: >60 ML/MIN
GFR SERPL CREATININE-BSD FRML MDRD: ABNORMAL ML/MIN/{1.73_M2}
GFR SERPL CREATININE-BSD FRML MDRD: ABNORMAL ML/MIN/{1.73_M2}
GLUCOSE BLD-MCNC: 123 MG/DL (ref 70–99)
HCT VFR BLD CALC: 44 % (ref 40.7–50.3)
HEMOGLOBIN: 13.9 G/DL (ref 13–17)
IMMATURE GRANULOCYTES: 0 %
LIPASE: 23 U/L (ref 13–60)
LYMPHOCYTES # BLD: 25 % (ref 24–43)
MCH RBC QN AUTO: 27 PG (ref 25.2–33.5)
MCHC RBC AUTO-ENTMCNC: 31.6 G/DL (ref 28.4–34.8)
MCV RBC AUTO: 85.4 FL (ref 82.6–102.9)
MONOCYTES # BLD: 10 % (ref 3–12)
NRBC AUTOMATED: 0 PER 100 WBC
PDW BLD-RTO: 16.2 % (ref 11.8–14.4)
PLATELET # BLD: 136 K/UL (ref 138–453)
PLATELET ESTIMATE: ABNORMAL
PMV BLD AUTO: 9.8 FL (ref 8.1–13.5)
POTASSIUM SERPL-SCNC: 3.6 MMOL/L (ref 3.7–5.3)
RBC # BLD: 5.15 M/UL (ref 4.21–5.77)
RBC # BLD: ABNORMAL 10*6/UL
SEG NEUTROPHILS: 59 % (ref 36–65)
SEGMENTED NEUTROPHILS ABSOLUTE COUNT: 4.92 K/UL (ref 1.5–8.1)
SODIUM BLD-SCNC: 138 MMOL/L (ref 135–144)
TOTAL PROTEIN: 8.2 G/DL (ref 6.4–8.3)
WBC # BLD: 8.2 K/UL (ref 3.5–11.3)
WBC # BLD: ABNORMAL 10*3/UL

## 2021-06-20 PROCEDURE — 6370000000 HC RX 637 (ALT 250 FOR IP): Performed by: STUDENT IN AN ORGANIZED HEALTH CARE EDUCATION/TRAINING PROGRAM

## 2021-06-20 PROCEDURE — 96374 THER/PROPH/DIAG INJ IV PUSH: CPT

## 2021-06-20 PROCEDURE — 99282 EMERGENCY DEPT VISIT SF MDM: CPT

## 2021-06-20 PROCEDURE — 6360000002 HC RX W HCPCS: Performed by: STUDENT IN AN ORGANIZED HEALTH CARE EDUCATION/TRAINING PROGRAM

## 2021-06-20 PROCEDURE — 83690 ASSAY OF LIPASE: CPT

## 2021-06-20 PROCEDURE — 80053 COMPREHEN METABOLIC PANEL: CPT

## 2021-06-20 PROCEDURE — 85025 COMPLETE CBC W/AUTO DIFF WBC: CPT

## 2021-06-20 PROCEDURE — 96372 THER/PROPH/DIAG INJ SC/IM: CPT

## 2021-06-20 RX ORDER — KETOROLAC TROMETHAMINE 15 MG/ML
15 INJECTION, SOLUTION INTRAMUSCULAR; INTRAVENOUS ONCE
Status: COMPLETED | OUTPATIENT
Start: 2021-06-20 | End: 2021-06-20

## 2021-06-20 RX ORDER — DICYCLOMINE HYDROCHLORIDE 10 MG/1
10 CAPSULE ORAL EVERY 6 HOURS PRN
Qty: 20 CAPSULE | Refills: 0 | Status: ON HOLD | OUTPATIENT
Start: 2021-06-20 | End: 2022-03-03 | Stop reason: HOSPADM

## 2021-06-20 RX ORDER — DICYCLOMINE HYDROCHLORIDE 10 MG/ML
20 INJECTION INTRAMUSCULAR ONCE
Status: COMPLETED | OUTPATIENT
Start: 2021-06-20 | End: 2021-06-20

## 2021-06-20 RX ORDER — ACETAMINOPHEN 500 MG
1000 TABLET ORAL ONCE
Status: COMPLETED | OUTPATIENT
Start: 2021-06-20 | End: 2021-06-20

## 2021-06-20 RX ADMIN — DICYCLOMINE HYDROCHLORIDE 20 MG: 10 INJECTION INTRAMUSCULAR at 05:55

## 2021-06-20 RX ADMIN — ACETAMINOPHEN 1000 MG: 500 TABLET ORAL at 05:55

## 2021-06-20 RX ADMIN — KETOROLAC TROMETHAMINE 15 MG: 15 INJECTION, SOLUTION INTRAMUSCULAR; INTRAVENOUS at 05:54

## 2021-06-20 ASSESSMENT — PAIN DESCRIPTION - PAIN TYPE: TYPE: ACUTE PAIN

## 2021-06-20 ASSESSMENT — PAIN DESCRIPTION - FREQUENCY: FREQUENCY: CONTINUOUS

## 2021-06-20 ASSESSMENT — ENCOUNTER SYMPTOMS
ABDOMINAL PAIN: 1
EYE PAIN: 0
VOMITING: 1
COUGH: 0
RHINORRHEA: 0
WHEEZING: 0
NAUSEA: 1
ROS SKIN COMMENTS: ABSCESS
DIARRHEA: 0
SHORTNESS OF BREATH: 0
SORE THROAT: 0
APNEA: 0

## 2021-06-20 ASSESSMENT — PAIN DESCRIPTION - PROGRESSION: CLINICAL_PROGRESSION: GRADUALLY WORSENING

## 2021-06-20 ASSESSMENT — PAIN SCALES - GENERAL
PAINLEVEL_OUTOF10: 8
PAINLEVEL_OUTOF10: 8

## 2021-06-20 ASSESSMENT — PAIN DESCRIPTION - ONSET: ONSET: ON-GOING

## 2021-06-20 ASSESSMENT — PAIN DESCRIPTION - ORIENTATION: ORIENTATION: LOWER

## 2021-06-20 ASSESSMENT — PAIN DESCRIPTION - DESCRIPTORS: DESCRIPTORS: SHARP

## 2021-06-20 ASSESSMENT — PAIN DESCRIPTION - LOCATION: LOCATION: ABDOMEN

## 2021-06-20 NOTE — ED PROVIDER NOTES
[facility]  ED  Emergency Department Encounter  EmergencyMedicineResident     This patient was seen during the COVID-19 crisis. There were limited resources and those resources we did have had to be conserved for the sickest of patients. Pt Name: eKira Ordonez  MRN: [id number]  [place] 1965  Date of evaluation: 6/20/21  PCP: Norman Motta MD    [address] [place] [address]       Chief Complaint   Patient presents with    Abdominal Cramping     x 1 week    Abscess     left arm       HISTORY OF PRESENT ILLNESS  (Location/Symptom, Timing/Onset, Context/Setting, Quality, Duration, Modifying Factors, Severity.)      Keira Ordonez is a 48 y.o. male who presents to the emergency department with lower abdominal cramping that started about a week ago and has been gradually getting worse. Patient also complains of an abscess on his left upper extremity. Patient presented here about 3 days ago for the same complaints. He was started on doxycycline for the abscess on his upper extremity. X-ray showed retained needle tip from IV drug use. Abdominal cramping is intermittent. associated Mild nausea and vomiting. Patient denies chest pain, shortness of breath, fever, chills, headache, dizziness, lightheadedness, vision changes, dysuria, hematuria, changes in bowel movements. Patient is taking his doxycycline as prescribed. States that he had an issue with his roommate and had to move to his brother's house recently. PAST MEDICAL / SURGICAL /SOCIAL / FAMILY HISTORY      has a past medical history of Bipolar 1 disorder ([address] Chronic mental illness, Depression, Hepatitis C, Hypertension, and Psoriasis. has a past surgical history that includes brain surgery.       Social History     Socioeconomic History    Marital status: Single     Spouse name: Not on file    Number of children: Not on file    Years of education: Not on file    Highest education level: Not on file   Occupational days 6/17/21 6/24/21  Enriqueta Frye DO   citalopram (CELEXA) 20 MG tablet Take 1 tablet by mouth daily 6/24/20   JILLIAN Amaya CNP   traZODone (DESYREL) 50 MG tablet Take 1 tablet by mouth nightly as needed for Sleep 6/24/20   JILLIAN Amaya CNP   QUEtiapine (SEROQUEL) 400 MG tablet Take 1 tablet by mouth nightly 6/24/20   JILLIAN Amaya CNP   amLODIPine (NORVASC) 10 MG tablet TAKE 1 TABLET BY MOUTH DAILY FOR 30 DAYS. 6/24/20   JILLIAN Amaya CNP   hydroCHLOROthiazide (HYDRODIURIL) 25 MG tablet Take 1 tablet by mouth daily 6/25/20   JILLIAN Amaya CNP   folic acid (FOLVITE) 1 MG tablet Take 1 tablet by mouth daily 3/11/20   JILLIAN Up CNP   vitamin B-1 100 MG tablet Take 1 tablet by mouth daily 3/11/20   JILLIAN Up CNP   omeprazole (PRILOSEC OTC) 20 MG tablet Take 1 tablet by mouth daily. 2/10/15 7/1/15  Anahy Ramirez MD       REVIEW OF SYSTEMS    (2-9 systems for level 4, 10 or more forlevel 5)      Review of Systems   Constitutional: Negative for activity change, chills, fatigue and fever. HENT: Negative for congestion, rhinorrhea and sore throat. Eyes: Negative for pain. Respiratory: Negative for apnea, cough, shortness of breath and wheezing. Cardiovascular: Negative for chest pain. Gastrointestinal: Positive for abdominal pain, nausea and vomiting. Negative for diarrhea. Genitourinary: Negative for decreased urine volume, difficulty urinating, dysuria and hematuria. Musculoskeletal: Negative for neck pain and neck stiffness. Skin: Negative for rash. Abscess   Neurological: Negative for dizziness, weakness, light-headedness and headaches.        PHYSICAL EXAM   (up to 7 for level 4, 8 or more forlevel 5)      ED TRIAGE VITALS BP: (!) 145/89, Temp: 96.8 °F (36 °C), Pulse: 64, Resp: 16, SpO2: 97 %    Vitals:    06/20/21 0130   BP: (!) 145/89   Pulse: 64   Resp: 16   Temp: 96.8 °F (36 °C)   TempSrc: Oral SpO2: 97%   Weight: 170 lb (77.1 kg)   Height: 5' 8\" (1.727 m)         Physical Exam  Constitutional:       Appearance: He is well-developed. He is not ill-appearing, toxic-appearing or diaphoretic. Comments: Comfortable appearing, not ill or toxic. HENT:      Head: Normocephalic and atraumatic. Right Ear: External ear normal.      Left Ear: External ear normal.      Nose: Nose normal.      Mouth/Throat:      Mouth: Mucous membranes are moist.   Eyes:      General: No scleral icterus. Right eye: No discharge. Left eye: No discharge. Extraocular Movements: Extraocular movements intact. Pupils: Pupils are equal, round, and reactive to light. Neck:      Trachea: No tracheal deviation. Cardiovascular:      Rate and Rhythm: Normal rate and regular rhythm. Heart sounds: Normal heart sounds. No murmur heard. No friction rub. No gallop. Pulmonary:      Effort: Pulmonary effort is normal. No respiratory distress. Breath sounds: Normal breath sounds. No wheezing, rhonchi or rales. Abdominal:      General: There is no distension. Palpations: Abdomen is soft. There is no mass. Tenderness: There is no abdominal tenderness. There is no guarding. Comments: No tenderness. No guarding or rigidity, nonperitoneal.   Musculoskeletal:         General: Normal range of motion. Cervical back: Normal range of motion. Skin:     General: Skin is warm and dry. Capillary Refill: Capillary refill takes less than 2 seconds. Findings: No rash. Comments: Multiple areas of induration on his left upper extremity. Small areas of erythema corresponding to induration. Neurological:      Mental Status: He is alert and oriented to person, place, and time. Motor: No abnormal muscle tone.            DIFFERENTIAL  DIAGNOSIS     PLAN (LABS / IMAGING / EKG):  Orders Placed This Encounter   Procedures    CBC WITH AUTO DIFFERENTIAL    COMPREHENSIVE METABOLIC PANEL    LIPASE       MEDICATIONS ORDERED:  ED Medication Orders (From admission, onward)    Start Ordered     Status Ordering Provider    06/20/21 0530 06/20/21 0523  acetaminophen (TYLENOL) tablet 1,000 mg  ONCE      Last MAR action: Given - by Colleen Haney on 06/20/21 at [facility]    06/20/21 0530 06/20/21 0523  dicyclomine (BENTYL) injection 20 mg  ONCE      Last MAR action: Given - by Colleen Haney on 06/20/21 at [facility]    06/20/21 0530 06/20/21 0523  ketorolac (TORADOL) injection 15 mg  ONCE      Last MAR action: Given - by Colleen Haney on 06/20/21 at [address] [place] [address]            DIAGNOSTIC RESULTS / EMERGENCY DEPARTMENT COURSE / MDM     IMPRESSION & INITIAL PLAN:  61-year-old male, history of IV drug use, bipolar type I, hepatitis, hypertension, presents to the emergency department with bilateral lower quadrant abdominal cramping, nausea, vomiting. Abscess in the left upper extremity. Started on doxycycline about 3 days ago. Patient reports that he is taking it as prescribed. On exam, patient appears comfortable, not ill or toxic. Cardiac RRR, no murmurs, rubs, gallops, Lungs are CTA-B, no wheezes, rales, rhonchi, Abd soft, nontender, nondistended. Plan to treat with Tylenol, Bentyl, Toradol. Will check CBC, CMP, lipase.     LABS:  Results for orders placed or performed during the hospital encounter of 06/20/21   CBC WITH AUTO DIFFERENTIAL   Result Value Ref Range    WBC 8.2 3.5 - 11.3 k/uL    RBC 5.15 4.21 - 5.77 m/uL    Hemoglobin 13.9 13.0 - 17.0 g/dL    Hematocrit 44.0 40.7 - 50.3 %    MCV 85.4 82.6 - 102.9 fL    MCH 27.0 25.2 - 33.5 pg    MCHC 31.6 28.4 - 34.8 g/dL    RDW 16.2 (H) 11.8 - 14.4 %    Platelets 770 (L) 843 - 453 k/uL    MPV 9.8 8.1 - 13.5 fL    NRBC Automated 0.0 0.0 per 100 WBC    Differential Type NOT REPORTED     Seg Neutrophils 59 36 - 65 %    Lymphocytes 25 24 - 43 %    Monocytes 10 3 - 12 %    Eosinophils % 5 (H) 1 - 4 %    Basophils 1 0 - 2 %    Immature Granulocytes 0 0 %    Segs Absolute 4.92 1.50 - 8.10 k/uL    Absolute Lymph # 2.02 1.10 - 3.70 k/uL    Absolute Mono # 0.81 0.10 - 1.20 k/uL    Absolute Eos # 0.40 0.00 - 0.44 k/uL    Basophils Absolute 0.05 0.00 - 0.20 k/uL    Absolute Immature Granulocyte 0.03 0.00 - 0.30 k/uL    WBC Morphology NOT REPORTED     RBC Morphology ANISOCYTOSIS PRESENT     Platelet Estimate NOT REPORTED    COMPREHENSIVE METABOLIC PANEL   Result Value Ref Range    Glucose 123 (H) 70 - 99 mg/dL    BUN 11 6 - 20 mg/dL    CREATININE 0.74 0.70 - 1.20 mg/dL    Bun/Cre Ratio NOT REPORTED 9 - 20    Calcium 9.2 8.6 - 10.4 mg/dL    Sodium 138 135 - 144 mmol/L    Potassium 3.6 (L) 3.7 - 5.3 mmol/L    Chloride 100 98 - 107 mmol/L    CO2 28 20 - 31 mmol/L    Anion Gap 10 9 - 17 mmol/L    Alkaline Phosphatase 81 40 - 129 U/L    ALT 42 (H) 5 - 41 U/L    AST 51 (H) <40 U/L    Total Bilirubin 0.39 0.3 - 1.2 mg/dL    Total Protein 8.2 6.4 - 8.3 g/dL    Albumin 3.9 3.5 - 5.2 g/dL    Albumin/Globulin Ratio 0.9 (L) 1.0 - 2.5    GFR Non-African American >60 >60 mL/min    GFR African American >60 >60 mL/min    GFR Comment          GFR Staging NOT REPORTED    LIPASE   Result Value Ref Range    Lipase 23 13 - 60 U/L       RADIOLOGY:  No orders to display       ECG:  None     All EKG's are interpreted by the Emergency Department Physician who either signsor Co-signs this chart in the absence of a cardiologist.    BEDSIDE ULTRASOUND:  None     EMERGENCY DEPARTMENT COURSE:    ED Course as of Jun 20 0636   Sun Jun 20, 2021   0634 Labs unremarkable. Patient continuing to take doxycycline at home. Patient was counseled to follow up with primary care physician and given appropriate emergency department return precautions.  Patient was discharged in stable condition.       [SM]      ED Course User Index  [SM] Marifer Torres MD        PROCEDURES:  None     CONSULTS:  None    CRITICAL CARE:  See attending physician note    FINAL IMPRESSION      1. Abdominal cramping          DISPOSITION / PLAN     DISPOSITION    Discharge to home     PATIENT REFERRED TO:  [facility]O [facility]  [address] [facility] [address] [phone]  Schedule an appointment as soon as possible for a visit in 2 days  For wound re-check, For follow-up of this visit, to have needle removed if needed    [facility] ED  [address]  Go to   If symptoms worsen    Ana Santos MD  [place] [id number]  [phone]    Schedule an appointment as soon as possible for a visit in 2 days  For follow-up of this visit      DISCHARGE MEDICATIONS:  New Prescriptions    DICYCLOMINE (BENTYL) 10 MG CAPSULE    Take 1 capsule by mouth every 6 hours as needed (cramps)     Modified Medications    No medications on file        Sussy Giraldo MD  Emergency Medicine Resident    (Please note that portions of this note were completed with a voice recognition program.  Efforts were made to edit the dictations but occasionally words are mis-transcribed.)        Sussy Giraldo MD  Resident  06/20/21 5064

## 2021-06-23 NOTE — ED PROVIDER NOTES
on the left upper extremitiy. No active drainage. MDM/Plan:   Lower abdominal cramping, benign exam.  Symptomatic treatment  Also has areas of induration on the LUE and has a known pocket w/ retained needle. Does not appear to need drainage at this time especially due to being high risk. Also he states it is improving with Abx. [id number] Avelina Last for d/c   Return if concerns arise.       Critical Care: None     Taurus Espitia MD  Attending Emergency Physician        Taurus Espitia MD  06/23/21 6135

## 2021-06-28 ENCOUNTER — HOSPITAL ENCOUNTER (EMERGENCY)
Age: 56
Discharge: HOME | End: 2021-06-28
Attending: EMERGENCY MEDICINE
Payer: MEDICARE

## 2021-06-28 VITALS
OXYGEN SATURATION: 98 % | WEIGHT: 170 LBS | HEART RATE: 66 BPM | HEIGHT: 68 IN | BODY MASS INDEX: 25.76 KG/M2 | RESPIRATION RATE: 15 BRPM | SYSTOLIC BLOOD PRESSURE: 135 MMHG | TEMPERATURE: 96.4 F | DIASTOLIC BLOOD PRESSURE: 84 MMHG

## 2021-06-28 DIAGNOSIS — R10.9 ABDOMINAL PAIN, UNSPECIFIED ABDOMINAL LOCATION: Primary | ICD-10-CM

## 2021-06-28 LAB
ABSOLUTE EOS #: 0.17 K/UL (ref 0–0.44)
ABSOLUTE IMMATURE GRANULOCYTE: <0.03 K/UL (ref 0–0.3)
ABSOLUTE LYMPH #: 1.78 K/UL (ref 1.1–3.7)
ABSOLUTE MONO #: 0.49 K/UL (ref 0.1–1.2)
ALBUMIN SERPL-MCNC: 3.4 G/DL (ref 3.5–5.2)
ALBUMIN/GLOBULIN RATIO: 1 (ref 1–2.5)
ALP BLD-CCNC: 61 U/L (ref 40–129)
ALT SERPL-CCNC: 35 U/L (ref 5–41)
ANION GAP SERPL CALCULATED.3IONS-SCNC: 7 MMOL/L (ref 9–17)
AST SERPL-CCNC: 40 U/L
BASOPHILS # BLD: 0 % (ref 0–2)
BASOPHILS ABSOLUTE: <0.03 K/UL (ref 0–0.2)
BILIRUB SERPL-MCNC: 0.35 MG/DL (ref 0.3–1.2)
BUN BLDV-MCNC: 10 MG/DL (ref 6–20)
BUN/CREAT BLD: ABNORMAL (ref 9–20)
CALCIUM SERPL-MCNC: 8.5 MG/DL (ref 8.6–10.4)
CHLORIDE BLD-SCNC: 101 MMOL/L (ref 98–107)
CO2: 26 MMOL/L (ref 20–31)
CREAT SERPL-MCNC: 0.65 MG/DL (ref 0.7–1.2)
DIFFERENTIAL TYPE: ABNORMAL
EOSINOPHILS RELATIVE PERCENT: 3 % (ref 1–4)
GFR AFRICAN AMERICAN: >60 ML/MIN
GFR NON-AFRICAN AMERICAN: >60 ML/MIN
GFR SERPL CREATININE-BSD FRML MDRD: ABNORMAL ML/MIN/{1.73_M2}
GFR SERPL CREATININE-BSD FRML MDRD: ABNORMAL ML/MIN/{1.73_M2}
GLUCOSE BLD-MCNC: 104 MG/DL (ref 70–99)
HCT VFR BLD CALC: 37.7 % (ref 40.7–50.3)
HEMOGLOBIN: 11.8 G/DL (ref 13–17)
IMMATURE GRANULOCYTES: 0 %
LIPASE: 16 U/L (ref 13–60)
LYMPHOCYTES # BLD: 34 % (ref 24–43)
MAGNESIUM: 1.6 MG/DL (ref 1.6–2.6)
MCH RBC QN AUTO: 27.1 PG (ref 25.2–33.5)
MCHC RBC AUTO-ENTMCNC: 31.3 G/DL (ref 28.4–34.8)
MCV RBC AUTO: 86.7 FL (ref 82.6–102.9)
MONOCYTES # BLD: 9 % (ref 3–12)
NRBC AUTOMATED: 0 PER 100 WBC
PDW BLD-RTO: 16 % (ref 11.8–14.4)
PLATELET # BLD: 58 K/UL (ref 138–453)
PLATELET ESTIMATE: ABNORMAL
PMV BLD AUTO: 10.1 FL (ref 8.1–13.5)
POTASSIUM SERPL-SCNC: 3.4 MMOL/L (ref 3.7–5.3)
RBC # BLD: 4.35 M/UL (ref 4.21–5.77)
RBC # BLD: ABNORMAL 10*6/UL
SEG NEUTROPHILS: 53 % (ref 36–65)
SEGMENTED NEUTROPHILS ABSOLUTE COUNT: 2.73 K/UL (ref 1.5–8.1)
SODIUM BLD-SCNC: 134 MMOL/L (ref 135–144)
TOTAL PROTEIN: 6.8 G/DL (ref 6.4–8.3)
WBC # BLD: 5.2 K/UL (ref 3.5–11.3)
WBC # BLD: ABNORMAL 10*3/UL

## 2021-06-28 PROCEDURE — 80053 COMPREHEN METABOLIC PANEL: CPT

## 2021-06-28 PROCEDURE — 83690 ASSAY OF LIPASE: CPT

## 2021-06-28 PROCEDURE — 83735 ASSAY OF MAGNESIUM: CPT

## 2021-06-28 PROCEDURE — 6360000002 HC RX W HCPCS: Performed by: STUDENT IN AN ORGANIZED HEALTH CARE EDUCATION/TRAINING PROGRAM

## 2021-06-28 PROCEDURE — 99284 EMERGENCY DEPT VISIT MOD MDM: CPT

## 2021-06-28 PROCEDURE — 85025 COMPLETE CBC W/AUTO DIFF WBC: CPT

## 2021-06-28 PROCEDURE — 6370000000 HC RX 637 (ALT 250 FOR IP): Performed by: STUDENT IN AN ORGANIZED HEALTH CARE EDUCATION/TRAINING PROGRAM

## 2021-06-28 RX ORDER — NALOXONE HYDROCHLORIDE 1 MG/ML
2 INJECTION INTRAMUSCULAR; INTRAVENOUS; SUBCUTANEOUS ONCE
Status: COMPLETED | OUTPATIENT
Start: 2021-06-28 | End: 2021-06-28

## 2021-06-28 RX ORDER — DICYCLOMINE HYDROCHLORIDE 10 MG/1
10 CAPSULE ORAL ONCE
Status: COMPLETED | OUTPATIENT
Start: 2021-06-28 | End: 2021-06-28

## 2021-06-28 RX ADMIN — NALOXONE HYDROCHLORIDE 2 MG: 1 INJECTION PARENTERAL at 03:11

## 2021-06-28 RX ADMIN — DICYCLOMINE HYDROCHLORIDE 10 MG: 10 CAPSULE ORAL at 02:01

## 2021-06-28 ASSESSMENT — PAIN DESCRIPTION - LOCATION: LOCATION: GROIN

## 2021-06-28 ASSESSMENT — PAIN DESCRIPTION - PAIN TYPE: TYPE: ACUTE PAIN

## 2021-06-28 ASSESSMENT — PAIN DESCRIPTION - DESCRIPTORS: DESCRIPTORS: ACHING

## 2021-06-28 ASSESSMENT — PAIN SCALES - GENERAL: PAINLEVEL_OUTOF10: 9

## 2021-06-28 NOTE — ED PROVIDER NOTES
[facility]  ED  Emergency Department Encounter  EmergencyMedicine Resident     Pt Hannah Sweet  MRN: [id number]  [place] 1965  Date of evaluation: 6/28/21  PCP:  Jaspreet Aquino MD    CHIEF COMPLAINT       Chief Complaint   Patient presents with    Abdominal Pain    Testicle Pain     lump       HISTORY OF PRESENT ILLNESS  (Location/Symptom, Timing/Onset, Context/Setting, Quality, Duration, Modifying Factors, Severity.)      Lee Miles is a 48 y.o. male who presents with chief complaint 9 out of 10 lower abdominal pain and concern for testicular pain and swelling. Abdominal pain has been going on for approximately 3 weeks, testicular pain for more than 2 years. Abdomen pain is lower suprapubic nonradiating dull ache. Patient admits to using heroin daily, used heroin prior to arrival.  Denies dysuria hematuria. Denies chest pain shortness of breath nausea vomiting fevers diarrhea. PAST MEDICAL / SURGICAL / SOCIAL / FAMILY HISTORY      has a past medical history of Bipolar 1 disorder ([address] Chronic mental illness, Depression, Hepatitis C, Hypertension, and Psoriasis. has a past surgical history that includes brain surgery.     Social History     Socioeconomic History    Marital status: Single     Spouse name: Not on file    Number of children: Not on file    Years of education: Not on file    Highest education level: Not on file   Occupational History    Not on file   Tobacco Use    Smoking status: Current Every Day Smoker     Packs/day: 1.00     Years: 15.00     Pack years: 15.00     Types: Cigarettes    Smokeless tobacco: Never Used   Vaping Use    Vaping Use: Former   Substance and Sexual Activity    Alcohol use: No     Alcohol/week: 0.0 standard drinks     Comment: social     Drug use: Not Currently     Types: Opiates      Comment: heroin 6-27-21    Sexual activity: Not on file   Other Topics Concern    Not on file   Social History Narrative    Not on file     Social Determinants of Health     Financial Resource Strain:     Difficulty of Paying Living Expenses:    Food Insecurity:     Worried About Running Out of Food in the Last Year:     [address] in the Last Year:    Transportation Needs:     Lack of Transportation (Medical):  Lack of Transportation (Non-Medical):    Physical Activity:     Days of Exercise per Week:     Minutes of Exercise per Session:    Stress:     Feeling of Stress :    Social Connections:     Frequency of Communication with Friends and Family:     Frequency of Social Gatherings with Friends and Family:     Attends Church Services:     Active Member of Clubs or Organizations:     Attends Club or Organization Meetings:     Marital Status:    Intimate Partner Violence:     Fear of Current or Ex-Partner:     Emotionally Abused:     Physically Abused:     Sexually Abused:        Family History   Problem Relation Age of Onset    Cancer Father     Cancer Child        Allergies:  Haldol [haloperidol] and Latuda [lurasidone hcl]    Home Medications:  Prior to Admission medications    Medication Sig Start Date End Date Taking? Authorizing Provider   dicyclomine (BENTYL) 10 MG capsule Take 1 capsule by mouth every 6 hours as needed (cramps) 6/20/21   Diya Dunlap MD   citalopram (CELEXA) 20 MG tablet Take 1 tablet by mouth daily 6/24/20   JILLIAN Ortiz CNP   traZODone (DESYREL) 50 MG tablet Take 1 tablet by mouth nightly as needed for Sleep 6/24/20   JILLIAN Ortiz - CNP   QUEtiapine (SEROQUEL) 400 MG tablet Take 1 tablet by mouth nightly 6/24/20   JILLIAN Ortiz - CNP   amLODIPine (NORVASC) 10 MG tablet TAKE 1 TABLET BY MOUTH DAILY FOR 30 DAYS.  6/24/20   JILLIAN Ortiz CNP   hydroCHLOROthiazide (HYDRODIURIL) 25 MG tablet Take 1 tablet by mouth daily 6/25/20   Merry Mcneal APRN - CNP   folic acid (FOLVITE) 1 MG tablet Take 1 tablet by mouth daily 3/11/20   Zoe Laws Chiquita Ortega APRN - CNP   vitamin B-1 100 MG tablet Take 1 tablet by mouth daily 3/11/20   JILLIAN Crain - CNP   omeprazole (PRILOSEC OTC) 20 MG tablet Take 1 tablet by mouth daily. 2/10/15 7/1/15  Fransisco Diaz MD       REVIEW OF SYSTEMS    (2-9 systems for level 4, 10 or more for level 5)      Review of Systems   Constitutional: Negative for fever. HENT: Negative for congestion. Eyes: Negative for photophobia. Respiratory: Negative for shortness of breath. Cardiovascular: Negative for chest pain. Gastrointestinal: Positive for abdominal pain  Endocrine: Negative for polyuria. Genitourinary: Negative for dysuria. Musculoskeletal: Negative for arthralgias. Skin: Negative for color change. Allergic/Immunologic: Negative for immunocompromised state. Neurological: Negative for dizziness. Hematological: Does not bruise/bleed easily. Psychiatric/Behavioral: Negative for agitation. PHYSICAL EXAM   (up to 7 for level 4, 8 or more for level 5)      INITIAL VITALS:   /84   Pulse 66   Temp 96.4 °F (35.8 °C) (Oral)   Resp 15   Ht 5' 8\" (1.727 m)   Wt 170 lb (77.1 kg)   SpO2 98%   BMI 25.85 kg/m²     Physical Exam  Constitutional:       General: Not in acute distress. Appearance: Normal appearance. Normal weight. Not toxic-appearing. HENT:      Head: Normocephalic and atraumatic. Nose: Nose normal.      Mouth/Throat: Mucous membranes are moist.  Uvula midline no oropharyngeal edema. Pharynx: Oropharynx is clear. Eyes:      Extraocular Movements: Extraocular movements intact. Conjunctiva/sclera: Conjunctivae normal.      Pupils: Pupils are equal, round, and reactive to light. Neck:      Musculoskeletal: Normal range of motion and neck supple. No neck rigidity. Cardiovascular:      Rate and Rhythm: Normal rate and regular rhythm. Pulses: Normal pulses. Heart sounds: Normal heart sounds. No murmur.      Pulmonary:      Effort: Pulmonary effort is Eosinophils % 3 1 - 4 %    Basophils 0 0 - 2 %    Immature Granulocytes 0 0 %    Segs Absolute 2.73 1.50 - 8.10 k/uL    Absolute Lymph # 1.78 1.10 - 3.70 k/uL    Absolute Mono # 0.49 0.10 - 1.20 k/uL    Absolute Eos # 0.17 0.00 - 0.44 k/uL    Basophils Absolute <0.03 0.00 - 0.20 k/uL    Absolute Immature Granulocyte <0.03 0.00 - 0.30 k/uL   Comprehensive Metabolic Panel w/ Reflex to MG   Result Value Ref Range    Glucose 104 (H) 70 - 99 mg/dL    BUN 10 6 - 20 mg/dL    CREATININE 0.65 (L) 0.70 - 1.20 mg/dL    Bun/Cre Ratio NOT REPORTED 9 - 20    Calcium 8.5 (L) 8.6 - 10.4 mg/dL    Sodium 134 (L) 135 - 144 mmol/L    Potassium 3.4 (L) 3.7 - 5.3 mmol/L    Chloride 101 98 - 107 mmol/L    CO2 26 20 - 31 mmol/L    Anion Gap 7 (L) 9 - 17 mmol/L    Alkaline Phosphatase 61 40 - 129 U/L    ALT 35 5 - 41 U/L    AST 40 (H) <40 U/L    Total Bilirubin 0.35 0.3 - 1.2 mg/dL    Total Protein 6.8 6.4 - 8.3 g/dL    Albumin 3.4 (L) 3.5 - 5.2 g/dL    Albumin/Globulin Ratio 1.0 1.0 - 2.5    GFR Non-African American >60 >60 mL/min    GFR African American >60 >60 mL/min    GFR Comment          GFR Staging NOT REPORTED    Lipase   Result Value Ref Range    Lipase 16 13 - 60 U/L   Magnesium   Result Value Ref Range    Magnesium 1.6 1.6 - 2.6 mg/dL         RADIOLOGY:  None    EKG  None    All EKG's are interpreted by the Emergency Department Physician who either signs or Co-signs this chart in the absence of a cardiologist.    EMERGENCY DEPARTMENT COURSE:  Patient appears sleepy under the influence of heroin. Admits to recently using heroin. Chief complaint is abdominal pain physical exam unremarkable. On testicle exam there soft mobile nontender. There is a hydrocele enlarged scrotum. No hernia. Will do CMP CBC lipase urinalysis. Laboratory work unremarkable. Patient refusing urinalysis. Patient is also difficult to arouse desatting to 87% room air. Will give intranasal Narcan. After Narcan patient became nauseous.   Vital signs remained stable. Patient easy to arouse. After prolonged observation of her Narcan patient is arousable oxygen saturation normal on room air laboratory work normal.  Will outpatient follow-up with PCP concerning chronic abdominal pain and testicular swelling. PROCEDURES:  None    CONSULTS:  None    CRITICAL CARE:  None    FINAL IMPRESSION      1.  Abdominal pain, unspecified abdominal location          DISPOSITION / PLAN     DISPOSITION  06/28/2021 03:12:33 AM      PATIENT REFERRED TO:  Shala Rivers MD  [address] [address]  [phone]    Schedule an appointment as soon as possible for a visit         DISCHARGE MEDICATIONS:  New Prescriptions    No medications on file       Christiano Jasso MD  Emergency Medicine Resident    (Please note that portions of thisnote were completed with a voice recognition program.  Efforts were made to edit the dictations but occasionally words are mis-transcribed.)       Christiano Jasso MD  Resident  06/28/21 9669

## 2021-06-28 NOTE — ED TRIAGE NOTES
Patient arrived to unit with complaints of testicular pain and a lump. Patient also reports that pain radiates up to abdomen. Pain has been present last several days. Denies any obvious injury to site. Alert and oriented.

## 2021-06-28 NOTE — ED PROVIDER NOTES
[facility]     Emergency Department     Faculty Attestation    I performed a history and physical examination of the patient and discussed management with the resident. I have reviewed and agree with the residents findings including all diagnostic interpretations, and treatment plans as written. Any areas of disagreement are noted on the chart. I was personally present for the key portions of any procedures. I have documented in the chart those procedures where I was not present during the key portions. I have reviewed the emergency nurses triage note. I agree with the chief complaint, past medical history, past surgical history, allergies, medications, social and family history as documented unless otherwise noted below. Documentation of the HPI, Physical Exam and Medical Decision Making performed by scribsoniya is based on my personal performance of the HPI, PE and MDM. For Physician Assistant/ Nurse Practitioner cases/documentation I have personally evaluated this patient and have completed at least one if not all key elements of the E/M (history, physical exam, and MDM). Additional findings are as noted. 49 yo M abdominal pain & testicle pain for 10 + days, no fever, no vomit, pt admits to recently using heroin,   Pt states he is eating fine,   pe vss pt appears comfortable, flat affect,   Neck supple, abdomen non tender, no distension, no rigidity, no guarding, no rebound, no mass,   No inguinal mass   Testes mobile, non tender, no deformity,     Lipase-, wbc -, pt observed, awakening, no acute abdomen,   Talkative, ambulatory, tolerating liquids,     EKG Interpretation    Interpreted by me      CRITICAL CARE: There was a high probability of clinically significant/life threatening deterioration in this patient's condition which required my urgent intervention. Total critical care time was 0 minutes.   This excludes any time for separately reportable procedures.        [facility], DO  06/28/21 [address], DO  06/28/21 9033

## 2021-06-28 NOTE — ED NOTES
Patient states he used heroin earlier today- Patient states he uses 2-3 times a day      Isac Forte RN  06/28/21 7216

## 2021-08-18 ENCOUNTER — OFFICE VISIT (OUTPATIENT)
Dept: FAMILY MEDICINE CLINIC | Age: 56
End: 2021-08-18
Payer: MEDICARE

## 2021-08-18 VITALS
HEIGHT: 68 IN | HEART RATE: 67 BPM | BODY MASS INDEX: 23.89 KG/M2 | SYSTOLIC BLOOD PRESSURE: 138 MMHG | OXYGEN SATURATION: 98 % | TEMPERATURE: 97.3 F | DIASTOLIC BLOOD PRESSURE: 90 MMHG | WEIGHT: 157.6 LBS

## 2021-08-18 DIAGNOSIS — F10.11 HISTORY OF ALCOHOL ABUSE: ICD-10-CM

## 2021-08-18 DIAGNOSIS — F31.9 BIPOLAR 1 DISORDER (HCC): ICD-10-CM

## 2021-08-18 DIAGNOSIS — F19.10 IV DRUG ABUSE (HCC): ICD-10-CM

## 2021-08-18 DIAGNOSIS — F41.1 GENERALIZED ANXIETY DISORDER: ICD-10-CM

## 2021-08-18 DIAGNOSIS — Z13.220 SCREENING, LIPID: ICD-10-CM

## 2021-08-18 DIAGNOSIS — R93.3 ABNORMAL CT SCAN, ESOPHAGUS: Primary | ICD-10-CM

## 2021-08-18 DIAGNOSIS — F14.10 COCAINE ABUSE (HCC): ICD-10-CM

## 2021-08-18 DIAGNOSIS — B18.2 CHRONIC HEPATITIS C WITHOUT HEPATIC COMA (HCC): ICD-10-CM

## 2021-08-18 DIAGNOSIS — R52 GENERALIZED PAIN: ICD-10-CM

## 2021-08-18 DIAGNOSIS — I10 ESSENTIAL HYPERTENSION: ICD-10-CM

## 2021-08-18 PROBLEM — T50.902A INTENTIONAL DRUG OVERDOSE (HCC): Status: RESOLVED | Noted: 2020-06-19 | Resolved: 2021-08-18

## 2021-08-18 PROBLEM — I20.0 UNSTABLE ANGINA (HCC): Status: RESOLVED | Noted: 2018-11-20 | Resolved: 2021-08-18

## 2021-08-18 PROBLEM — L03.90 CELLULITIS: Status: RESOLVED | Noted: 2021-02-19 | Resolved: 2021-08-18

## 2021-08-18 PROBLEM — R94.31 PROLONGED Q-T INTERVAL ON ECG: Status: RESOLVED | Noted: 2020-06-20 | Resolved: 2021-08-18

## 2021-08-18 PROBLEM — E87.29 INCREASED ANION GAP METABOLIC ACIDOSIS: Status: RESOLVED | Noted: 2020-10-11 | Resolved: 2021-08-18

## 2021-08-18 PROCEDURE — 99203 OFFICE O/P NEW LOW 30 MIN: CPT | Performed by: INTERNAL MEDICINE

## 2021-08-18 PROCEDURE — G8420 CALC BMI NORM PARAMETERS: HCPCS | Performed by: INTERNAL MEDICINE

## 2021-08-18 PROCEDURE — 3017F COLORECTAL CA SCREEN DOC REV: CPT | Performed by: INTERNAL MEDICINE

## 2021-08-18 PROCEDURE — G8427 DOCREV CUR MEDS BY ELIG CLIN: HCPCS | Performed by: INTERNAL MEDICINE

## 2021-08-18 PROCEDURE — 4004F PT TOBACCO SCREEN RCVD TLK: CPT | Performed by: INTERNAL MEDICINE

## 2021-08-18 RX ORDER — LANOLIN ALCOHOL/MO/W.PET/CERES
100 CREAM (GRAM) TOPICAL DAILY
Qty: 14 TABLET | Refills: 0 | Status: SHIPPED | OUTPATIENT
Start: 2021-08-18 | End: 2022-10-06

## 2021-08-18 RX ORDER — QUETIAPINE FUMARATE 200 MG/1
1 TABLET, FILM COATED ORAL NIGHTLY
COMMUNITY
Start: 2021-07-08 | End: 2021-08-18 | Stop reason: SDUPTHER

## 2021-08-18 RX ORDER — CITALOPRAM 20 MG/1
20 TABLET ORAL DAILY
Qty: 30 TABLET | Refills: 0 | Status: SHIPPED | OUTPATIENT
Start: 2021-08-18 | End: 2022-10-06

## 2021-08-18 RX ORDER — AMLODIPINE BESYLATE 10 MG/1
10 TABLET ORAL DAILY
Qty: 30 TABLET | Refills: 3 | Status: SHIPPED | OUTPATIENT
Start: 2021-08-18 | End: 2022-10-06

## 2021-08-18 RX ORDER — QUETIAPINE FUMARATE 200 MG/1
200 TABLET, FILM COATED ORAL NIGHTLY
Qty: 30 TABLET | Refills: 3 | Status: SHIPPED | OUTPATIENT
Start: 2021-08-18 | End: 2021-11-30

## 2021-08-18 RX ORDER — FOLIC ACID 1 MG/1
1 TABLET ORAL DAILY
Qty: 14 TABLET | Refills: 0 | Status: SHIPPED | OUTPATIENT
Start: 2021-08-18 | End: 2022-10-06

## 2021-08-18 RX ORDER — GABAPENTIN 300 MG/1
300 CAPSULE ORAL 2 TIMES DAILY
Qty: 60 CAPSULE | Refills: 0 | Status: ON HOLD | OUTPATIENT
Start: 2021-08-18 | End: 2022-03-03 | Stop reason: HOSPADM

## 2021-08-18 RX ORDER — LISINOPRIL 20 MG/1
1 TABLET ORAL DAILY
COMMUNITY
Start: 2021-07-08 | End: 2022-10-05 | Stop reason: SDUPTHER

## 2021-08-18 RX ORDER — TRAZODONE HYDROCHLORIDE 50 MG/1
50 TABLET ORAL NIGHTLY PRN
Qty: 30 TABLET | Refills: 0 | Status: SHIPPED | OUTPATIENT
Start: 2021-08-18 | End: 2022-10-06

## 2021-08-18 SDOH — ECONOMIC STABILITY: FOOD INSECURITY: WITHIN THE PAST 12 MONTHS, THE FOOD YOU BOUGHT JUST DIDN'T LAST AND YOU DIDN'T HAVE MONEY TO GET MORE.: NEVER TRUE

## 2021-08-18 SDOH — ECONOMIC STABILITY: FOOD INSECURITY: WITHIN THE PAST 12 MONTHS, YOU WORRIED THAT YOUR FOOD WOULD RUN OUT BEFORE YOU GOT MONEY TO BUY MORE.: NEVER TRUE

## 2021-08-18 ASSESSMENT — SOCIAL DETERMINANTS OF HEALTH (SDOH): HOW HARD IS IT FOR YOU TO PAY FOR THE VERY BASICS LIKE FOOD, HOUSING, MEDICAL CARE, AND HEATING?: HARD

## 2021-08-18 NOTE — PATIENT INSTRUCTIONS
Patient Education        Stopping Smoking: Care Instructions  Your Care Instructions     Cigarette smokers crave the nicotine in cigarettes. Giving it up is much harder than simply changing a habit. Your body has to stop craving the nicotine. It is hard to quit, but you can do it. There are many tools that people use to quit smoking. You may find that combining tools works best for you. There are several steps to quitting. First you get ready to quit. Then you get support to help you. After that, you learn new skills and behaviors to become a nonsmoker. For many people, a necessary step is getting and using medicine. Your doctor will help you set up the plan that best meets your needs. You may want to attend a smoking cessation program to help you quit smoking. When you choose a program, look for one that has proven success. Ask your doctor for ideas. You will greatly increase your chances of success if you take medicine as well as get counseling or join a cessation program.  Some of the changes you feel when you first quit tobacco are uncomfortable. Your body will miss the nicotine at first, and you may feel short-tempered and grumpy. You may have trouble sleeping or concentrating. Medicine can help you deal with these symptoms. You may struggle with changing your smoking habits and rituals. The last step is the tricky one: Be prepared for the smoking urge to continue for a time. This is a lot to deal with, but keep at it. You will feel better. Follow-up care is a key part of your treatment and safety. Be sure to make and go to all appointments, and call your doctor if you are having problems. It's also a good idea to know your test results and keep a list of the medicines you take. How can you care for yourself at home? · Ask your family, friends, and coworkers for support. You have a better chance of quitting if you have help and support.   · Join a support group, such as Nicotine Anonymous, for people who nicotine. · Be prepared to keep trying. Most people are not successful the first few times they try to quit. Do not get mad at yourself if you smoke again. Make a list of things you learned and think about when you want to try again, such as next week, next month, or next year. Where can you learn more? Go to https://LiveRSVPpeluis albertoewpatrick.V[employer]. org and sign in to your [employer] account. Enter U349 in the [employer] box to learn more about \"Stopping Smoking: Care Instructions. \"     If you do not have an account, please click on the \"Sign Up Now\" link. Current as of: February 11, 2021               Content Version: 12.9  © 2006-2021 Healthwise, Incorporated. Care instructions adapted under license by [facility] ([facility]). If you have questions about a medical condition or this instruction, always ask your healthcare professional. [address] any warranty or liability for your use of this information.

## 2021-08-18 NOTE — PROGRESS NOTES
Subjective:       Patient ID:     Lee Miles is a 48 y.o. male who presents for   Chief Complaint   Patient presents with   [employer] Established New Doctor    Mass     liver       HPI:  Nursing note reviewed and discussed with patient. New patient, here to establish care   Went to [facility] couple months ago for hernia pain - states he was told he has a liver mass   Using IV heroin - last use three days ago. States using it to help with hernia pain. He also uses benzos and cocaine intermittently   H/o hep C from IVDU  Has recurrent R inguinal hernia that causes him pain, he has not had surgery for this before. Started about a year ago, multiple ER visits for the same. Would like to get back on gabapentin for his generalized pain - does not need heroin if he is able to consistently get gabapentin   Depression - was doing well on celexa, seroquel - needs refills. Denies anhedonia, nervousness, sleep difficulty, racing thoughts, auditory or visual hallucination, thoughts of self harm, suicidal or homicidal ideation while on his medications  Hypertension-tolerating current regimen without chest pain, palpitations, dizziness, peripheral edema, dyspnea on exertion, orthopnea, paroxysmal nocturnal dyspnea. Cardiovascular risk factors: hypertension, male gender and smoking/ tobacco exposure        Patient's medications, allergies, past medical, surgical, social and family histories were reviewed and updated as appropriate.     Past Medical History:   Diagnosis Date    Bipolar 1 disorder ([facility]     Chronic mental illness     Depression     Hepatitis C     Hypertension     Psoriasis     [facility]      Past Surgical History:   Procedure Laterality Date    BRAIN SURGERY      at age 46    DENTAL SURGERY         Social History     Tobacco Use    Smoking status: Current Every Day Smoker     Packs/day: 1.00     Years: 40.00     Pack years: 40.00     Types: Cigarettes    Smokeless tobacco: Never Used Substance Use Topics    Alcohol use: No     Alcohol/week: 0.0 standard drinks     Comment: social       Patient Active Problem List   Diagnosis    Hepatitis C    Psoriasis    Chest pain    Hypertension    Smoker    Non-intractable vomiting with nausea    Sinus bradycardia    Opioid type dependence, continuous (HCC)    Unstable angina (HCC)    Bipolar disorder (HCC)    Bipolar 1 disorder (HCC)    Int[facility] drug overdose ([facility]    Cocaine abuse ([facility]    Prolonged Q-T interval on ECG    Hypoxia    Intentional benzodiazepine overdose (HCC)    Increased anion gap metabolic acidosis    SERGE (acute kidney injury) (HCC)    Ethylene glycol poisoning    Lactic acidosis    Hyponatremia    High serum osmolar gap    Hypercalcemia    IV drug abuse ([facility]    History of hepatitis C    Cellulitis         Prior to Visit Medications    Medication Sig Taking? Authorizing Provider   QUEtiapine (SEROQUEL) 200 MG tablet Take 1 tablet by mouth nightly Yes Neelima Alvarado MD   lisinopril (PRINIVIL;ZESTRIL) 20 MG tablet Take 1 tablet by mouth daily Yes Neelima Alvarado MD   metoprolol tartrate (LOPRESSOR) 25 MG tablet Take by mouth Yes Historical Provider, MD   dicyclomine (BENTYL) 10 MG capsule Take 1 capsule by mouth every 6 hours as needed (cramps) Yes Deena Sol MD   citalopram (CELEXA) 20 MG tablet Take 1 tablet by mouth daily Yes JILLIAN Montes CNP   traZODone (DESYREL) 50 MG tablet Take 1 tablet by mouth nightly as needed for Sleep Yes JILLIAN Montes CNP   amLODIPine (NORVASC) 10 MG tablet TAKE 1 TABLET BY MOUTH DAILY FOR 30 DAYS.  Yes JILLIAN Montes CNP   hydroCHLOROthiazide (HYDRODIURIL) 25 MG tablet Take 1 tablet by mouth daily Yes JILLIAN Montes CNP   folic acid (FOLVITE) 1 MG tablet Take 1 tablet by mouth daily  Patient not taking: Reported on 8/18/2021  JILLIAN Encinas CNP   vitamin B-1 100 MG tablet Take 1 tablet by mouth daily  Patient not taking: Reported on 8/18/2021  Owen Degree, APRN - CNP   omeprazole (PRILOSEC OTC) 20 MG tablet Take 1 tablet by mouth daily. Su Whitfield MD     Review of Systems  Review of Systems   Constitutional: Negative for fatigue, fever and unexpected weight change. Respiratory: Negative for cough, choking, chest tightness, shortness of breath and wheezing. Cardiovascular: Negative for chest pain, palpitations and leg swelling. Gastrointestinal: Positive for abdominal pain. Negative for anal bleeding, blood in stool, constipation, diarrhea, nausea and vomiting. Endocrine: Negative. Genitourinary: Positive for testicular pain. Musculoskeletal: Positive for back pain. Negative for joint swelling and myalgias. Skin: Negative. Neurological: Negative for dizziness. Psychiatric/Behavioral: Negative for sleep disturbance. All other systems reviewed and are negative. Objective:       Physical Exam:  BP (!) 138/90 (Site: Left Upper Arm, Position: Sitting, Cuff Size: Medium Adult)   Pulse 67   Temp 97.3 °F (36.3 °C) (Temporal)   Ht 5' 7.87\" (1.724 m)   Wt 157 lb 9.6 oz (71.5 kg)   SpO2 98%   BMI 24.05 kg/m²   Physical Exam  Vitals and nursing note reviewed. Constitutional:       General: He is not in acute distress. Appearance: He is well-developed. He is ill-appearing (chronically). Eyes:      General: Lids are normal. Vision grossly intact. Cardiovascular:      Rate and Rhythm: Normal rate and regular rhythm. Heart sounds: Normal heart sounds, S1 normal and S2 normal. No murmur heard. No friction rub. No gallop. Pulmonary:      Effort: Pulmonary effort is normal. No respiratory distress. Breath sounds: Normal breath sounds. No wheezing. Abdominal:      General: Bowel sounds are normal.      Palpations: Abdomen is soft. There is no mass. Tenderness: There is no abdominal tenderness. There is no guarding. Musculoskeletal:         General: Normal range of motion. Skin:     General: Skin is warm and dry. Capillary Refill: Capillary refill takes less than 2 seconds. Neurological:      General: No focal deficit present. Mental Status: He is alert and oriented to person, place, and time. Data Review  not applicable       Assessment/Plan:      1. Abnormal CT scan, esophagus  - External Referral To Gastroenterology    2. IV drug abuse ([facility]  Last use three days ago - encouraged to quit for good     3. Bipolar 1 disorder (HCC)  - citalopram (CELEXA) 20 MG tablet; Take 1 tablet by mouth daily  Dispense: 30 tablet; Refill: 0  - traZODone (DESYREL) 50 MG tablet; Take 1 tablet by mouth nightly as needed for Sleep  Dispense: 30 tablet; Refill: 0  - QUEtiapine (SEROQUEL) 200 MG tablet; Take 1 tablet by mouth nightly  Dispense: 30 tablet; Refill: 3  - gabapentin (NEURONTIN) 300 MG capsule; Take 1 capsule by mouth 2 times daily for 30 days. Intended supply: 30 days  Dispense: 60 capsule; Refill: 0    4. Essential hypertension  - amLODIPine (NORVASC) 10 MG tablet; Take 1 tablet by mouth daily  Dispense: 30 tablet; Refill: 3  - Comprehensive Metabolic Panel; Future    5. Chronic hepatitis C without hepatic coma [facility])  - External Referral To Gastroenterology  - Comprehensive Metabolic Panel; Future  - Hepatitis C RNA, Quantitative, PCR; Future    6. Cocaine abuse ([facility] [facility]  States has not used cocaine in some time now     7. History of alcohol abuse  - folic acid (FOLVITE) 1 MG tablet; Take 1 tablet by mouth daily  Dispense: 14 tablet; Refill: 0  - thiamine 100 MG tablet; Take 1 tablet by mouth daily  Dispense: 14 tablet; Refill: 0    8. Generalized pain  - gabapentin (NEURONTIN) 300 MG capsule; Take 1 capsule by mouth 2 times daily for 30 days. Intended supply: 30 days  Dispense: 60 capsule; Refill: 0    9. Generalized anxiety disorder  - gabapentin (NEURONTIN) 300 MG capsule; Take 1 capsule by mouth 2 times daily for 30 days.  Intended supply: 30 days  Dispense: 60 capsule; Refill: 0    10. Screening, lipid  - Lipid, Fasting;  Future           Health Maintenance Due   Topic Date Due    Hepatitis A vaccine (1 of 2 - Risk 2-dose series) Never done    Pneumococcal 0-64 years Vaccine (1 of 2 - PPSV23) Never done    Hepatitis B vaccine (1 of 3 - Risk 3-dose series) Never done    DTaP/Tdap/Td vaccine (1 - Tdap) Never done    Colon cancer screen colonoscopy  Never done    Shingles Vaccine (1 of 2) Never done       Electronically signed by Jj Valdez MD on 8/18/2021 at 3:18 PM

## 2021-08-18 NOTE — PROGRESS NOTES
Pt is here to establish. He was at [facility] and was told has a liver mass.   He has a bad spot on his RT inner arm at elbow, red and swollen

## 2021-08-28 ASSESSMENT — ENCOUNTER SYMPTOMS
COUGH: 0
CONSTIPATION: 0
WHEEZING: 0
SHORTNESS OF BREATH: 0
BLOOD IN STOOL: 0
ANAL BLEEDING: 0
ABDOMINAL PAIN: 1
BACK PAIN: 1
VOMITING: 0
DIARRHEA: 0
CHOKING: 0
CHEST TIGHTNESS: 0
NAUSEA: 0

## 2021-08-28 ASSESSMENT — VISUAL ACUITY: OU: 1

## 2021-11-30 DIAGNOSIS — F31.9 BIPOLAR 1 DISORDER (HCC): ICD-10-CM

## 2021-11-30 RX ORDER — QUETIAPINE FUMARATE 200 MG/1
200 TABLET, FILM COATED ORAL NIGHTLY
Qty: 30 TABLET | Refills: 0 | Status: SHIPPED | OUTPATIENT
Start: 2021-11-30 | End: 2022-10-06

## 2021-11-30 NOTE — TELEPHONE ENCOUNTER
Last visit: 8/18/21  Last Med refill: 8/18/21  Does patient have enough medication for 72 hours: Yes    Next Visit Date:  No future appointments.     Health Maintenance   Topic Date Due    Hepatitis A vaccine (1 of 2 - Risk 2-dose series) Never done    Pneumococcal 0-64 years Vaccine (1 of 2 - PPSV23) Never done    Hepatitis B vaccine (1 of 3 - Risk 3-dose series) Never done    DTaP/Tdap/Td vaccine (1 - Tdap) Never done    Colon cancer screen colonoscopy  Never done    Shingles Vaccine (1 of 2) Never done    Flu vaccine (1) Never done    COVID-19 Vaccine (1) 12/18/2021 (Originally 11/27/1977)    Low dose CT lung screening  01/09/2022    Potassium monitoring  06/28/2022    Creatinine monitoring  06/28/2022    Lipid screen  03/09/2025    HIV screen  Completed    Hib vaccine  Aged Out    Meningococcal (ACWY) vaccine  Aged Out       Hemoglobin A1C (%)   Date Value   03/09/2020 5.2   07/03/2018 5.3             ( goal A1C is < 7)   No results found for: LABMICR  LDL Cholesterol (mg/dL)   Date Value   03/09/2020 41   07/03/2018 37       (goal LDL is <100)   AST (U/L)   Date Value   06/28/2021 40 (H)     ALT (U/L)   Date Value   06/28/2021 35     BUN (mg/dL)   Date Value   06/28/2021 10     BP Readings from Last 3 Encounters:   08/18/21 (!) 138/90   06/28/21 135/84   06/20/21 (!) 145/89          (goal 120/80)    All Future Testing planned in [facility]  Lab Frequency Next Occurrence   Lipid, Fasting Once 12/12/2021   Comprehensive Metabolic Panel Once 26/67/0286   Hepatitis C RNA, Quantitative, PCR Once 12/12/2021               Patient Active Problem List:     Hepatitis C     Psoriasis     Hypertension     Smoker     Opioid type dependence, continuous (HCC)     Bipolar 1 disorder (HCC)     Cocaine abuse ([facility]     IV drug abuse ([facility]     History of alcohol abuse     Generalized pain     Generalized anxiety disorder     Abnormal CT scan, esophagus

## 2021-12-05 ENCOUNTER — HOSPITAL ENCOUNTER (INPATIENT)
Age: 56
LOS: 1 days | Discharge: HOME | DRG: 203 | End: 2021-12-06
Attending: EMERGENCY MEDICINE
Payer: MEDICARE

## 2021-12-05 ENCOUNTER — HOSPITAL ENCOUNTER (EMERGENCY)
Age: 56
Discharge: LEFT BEFORE DISCHARGE | DRG: 203 | End: 2021-12-05
Payer: MEDICARE

## 2021-12-05 VITALS
DIASTOLIC BLOOD PRESSURE: 99 MMHG | OXYGEN SATURATION: 97 % | SYSTOLIC BLOOD PRESSURE: 195 MMHG | TEMPERATURE: 97.2 F | RESPIRATION RATE: 18 BRPM | WEIGHT: 140 LBS | HEIGHT: 68 IN | HEART RATE: 78 BPM | BODY MASS INDEX: 21.22 KG/M2

## 2021-12-05 DIAGNOSIS — L02.91 ABSCESS: ICD-10-CM

## 2021-12-05 DIAGNOSIS — R07.9 CHEST PAIN, UNSPECIFIED TYPE: Primary | ICD-10-CM

## 2021-12-05 PROCEDURE — 99284 EMERGENCY DEPT VISIT MOD MDM: CPT

## 2021-12-05 PROCEDURE — 93005 ELECTROCARDIOGRAM TRACING: CPT | Performed by: EMERGENCY MEDICINE

## 2021-12-05 PROCEDURE — 96365 THER/PROPH/DIAG IV INF INIT: CPT

## 2021-12-05 ASSESSMENT — PAIN SCALES - GENERAL: PAINLEVEL_OUTOF10: 6

## 2021-12-05 NOTE — Clinical Note
Patient Class: Inpatient [101]   REQUIRED: Diagnosis: Chest pain [203119]   Estimated Length of Stay: Estimated stay of less than 2 midnights

## 2021-12-06 ENCOUNTER — APPOINTMENT (OUTPATIENT)
Dept: GENERAL RADIOLOGY | Age: 56
DRG: 203 | End: 2021-12-06
Payer: MEDICARE

## 2021-12-06 VITALS
SYSTOLIC BLOOD PRESSURE: 142 MMHG | DIASTOLIC BLOOD PRESSURE: 95 MMHG | TEMPERATURE: 98.4 F | HEART RATE: 67 BPM | RESPIRATION RATE: 16 BRPM | OXYGEN SATURATION: 93 %

## 2021-12-06 PROBLEM — R07.9 CHEST PAIN: Status: ACTIVE | Noted: 2021-12-06

## 2021-12-06 LAB
-: NORMAL
ABSOLUTE EOS #: 0.12 K/UL (ref 0–0.44)
ABSOLUTE IMMATURE GRANULOCYTE: 0.03 K/UL (ref 0–0.3)
ABSOLUTE LYMPH #: 2.95 K/UL (ref 1.1–3.7)
ABSOLUTE MONO #: 0.78 K/UL (ref 0.1–1.2)
ANION GAP SERPL CALCULATED.3IONS-SCNC: 10 MMOL/L (ref 9–17)
BASOPHILS # BLD: 1 % (ref 0–2)
BASOPHILS ABSOLUTE: 0.06 K/UL (ref 0–0.2)
BUN BLDV-MCNC: 9 MG/DL (ref 6–20)
BUN/CREAT BLD: ABNORMAL (ref 9–20)
CALCIUM SERPL-MCNC: 8.5 MG/DL (ref 8.6–10.4)
CHLORIDE BLD-SCNC: 101 MMOL/L (ref 98–107)
CO2: 23 MMOL/L (ref 20–31)
CREAT SERPL-MCNC: 0.64 MG/DL (ref 0.7–1.2)
DIFFERENTIAL TYPE: ABNORMAL
EOSINOPHILS RELATIVE PERCENT: 1 % (ref 1–4)
GFR AFRICAN AMERICAN: >60 ML/MIN
GFR NON-AFRICAN AMERICAN: >60 ML/MIN
GFR SERPL CREATININE-BSD FRML MDRD: ABNORMAL ML/MIN/{1.73_M2}
GFR SERPL CREATININE-BSD FRML MDRD: ABNORMAL ML/MIN/{1.73_M2}
GLUCOSE BLD-MCNC: 133 MG/DL (ref 70–99)
HCT VFR BLD CALC: 39.4 % (ref 40.7–50.3)
HEMOGLOBIN: 13.3 G/DL (ref 13–17)
IMMATURE GRANULOCYTES: 0 %
LYMPHOCYTES # BLD: 30 % (ref 24–43)
MCH RBC QN AUTO: 28.7 PG (ref 25.2–33.5)
MCHC RBC AUTO-ENTMCNC: 33.8 G/DL (ref 28.4–34.8)
MCV RBC AUTO: 85.1 FL (ref 82.6–102.9)
MONOCYTES # BLD: 8 % (ref 3–12)
NRBC AUTOMATED: 0 PER 100 WBC
PDW BLD-RTO: 15 % (ref 11.8–14.4)
PLATELET # BLD: 178 K/UL (ref 138–453)
PLATELET ESTIMATE: ABNORMAL
PMV BLD AUTO: 12.5 FL (ref 8.1–13.5)
POTASSIUM SERPL-SCNC: 3.8 MMOL/L (ref 3.7–5.3)
RBC # BLD: 4.63 M/UL (ref 4.21–5.77)
RBC # BLD: ABNORMAL 10*6/UL
REASON FOR REJECTION: NORMAL
SARS-COV-2, RAPID: NOT DETECTED
SEG NEUTROPHILS: 60 % (ref 36–65)
SEGMENTED NEUTROPHILS ABSOLUTE COUNT: 5.87 K/UL (ref 1.5–8.1)
SODIUM BLD-SCNC: 134 MMOL/L (ref 135–144)
SPECIMEN DESCRIPTION: NORMAL
TROPONIN INTERP: NORMAL
TROPONIN INTERP: NORMAL
TROPONIN T: NORMAL NG/ML
TROPONIN T: NORMAL NG/ML
TROPONIN, HIGH SENSITIVITY: 10 NG/L (ref 0–22)
TROPONIN, HIGH SENSITIVITY: 12 NG/L (ref 0–22)
WBC # BLD: 9.8 K/UL (ref 3.5–11.3)
WBC # BLD: ABNORMAL 10*3/UL
ZZ NTE CLEAN UP: ORDERED TEST: NORMAL
ZZ NTE WITH NAME CLEAN UP: SPECIMEN SOURCE: NORMAL

## 2021-12-06 PROCEDURE — 96365 THER/PROPH/DIAG IV INF INIT: CPT

## 2021-12-06 PROCEDURE — 84484 ASSAY OF TROPONIN QUANT: CPT

## 2021-12-06 PROCEDURE — 87205 SMEAR GRAM STAIN: CPT

## 2021-12-06 PROCEDURE — 85025 COMPLETE CBC W/AUTO DIFF WBC: CPT

## 2021-12-06 PROCEDURE — 86403 PARTICLE AGGLUT ANTBDY SCRN: CPT

## 2021-12-06 PROCEDURE — 1200000000 HC SEMI PRIVATE

## 2021-12-06 PROCEDURE — 6360000002 HC RX W HCPCS: Performed by: STUDENT IN AN ORGANIZED HEALTH CARE EDUCATION/TRAINING PROGRAM

## 2021-12-06 PROCEDURE — 80048 BASIC METABOLIC PNL TOTAL CA: CPT

## 2021-12-06 PROCEDURE — 87635 SARS-COV-2 COVID-19 AMP PRB: CPT

## 2021-12-06 PROCEDURE — G0378 HOSPITAL OBSERVATION PER HR: HCPCS

## 2021-12-06 PROCEDURE — 71045 X-RAY EXAM CHEST 1 VIEW: CPT

## 2021-12-06 PROCEDURE — 36415 COLL VENOUS BLD VENIPUNCTURE: CPT

## 2021-12-06 PROCEDURE — 87040 BLOOD CULTURE FOR BACTERIA: CPT

## 2021-12-06 RX ORDER — ONDANSETRON 2 MG/ML
4 INJECTION INTRAMUSCULAR; INTRAVENOUS EVERY 6 HOURS PRN
Status: DISCONTINUED | OUTPATIENT
Start: 2021-12-06 | End: 2021-12-06 | Stop reason: HOSPADM

## 2021-12-06 RX ORDER — VANCOMYCIN HYDROCHLORIDE 1 G/200ML
1000 INJECTION, SOLUTION INTRAVENOUS EVERY 12 HOURS
Status: DISCONTINUED | OUTPATIENT
Start: 2021-12-06 | End: 2021-12-06 | Stop reason: HOSPADM

## 2021-12-06 RX ORDER — ONDANSETRON 4 MG/1
4 TABLET, ORALLY DISINTEGRATING ORAL EVERY 8 HOURS PRN
Status: DISCONTINUED | OUTPATIENT
Start: 2021-12-06 | End: 2021-12-06 | Stop reason: HOSPADM

## 2021-12-06 RX ORDER — SODIUM CHLORIDE 0.9 % (FLUSH) 0.9 %
5-40 SYRINGE (ML) INJECTION EVERY 12 HOURS SCHEDULED
Status: DISCONTINUED | OUTPATIENT
Start: 2021-12-06 | End: 2021-12-06 | Stop reason: HOSPADM

## 2021-12-06 RX ORDER — SODIUM CHLORIDE 0.9 % (FLUSH) 0.9 %
5-40 SYRINGE (ML) INJECTION PRN
Status: DISCONTINUED | OUTPATIENT
Start: 2021-12-06 | End: 2021-12-06 | Stop reason: HOSPADM

## 2021-12-06 RX ORDER — VANCOMYCIN HYDROCHLORIDE 1 G/200ML
15 INJECTION, SOLUTION INTRAVENOUS ONCE
Status: COMPLETED | OUTPATIENT
Start: 2021-12-06 | End: 2021-12-06

## 2021-12-06 RX ORDER — SODIUM CHLORIDE 9 MG/ML
25 INJECTION, SOLUTION INTRAVENOUS PRN
Status: DISCONTINUED | OUTPATIENT
Start: 2021-12-06 | End: 2021-12-06 | Stop reason: HOSPADM

## 2021-12-06 RX ORDER — ACETAMINOPHEN 325 MG/1
650 TABLET ORAL EVERY 4 HOURS PRN
Status: DISCONTINUED | OUTPATIENT
Start: 2021-12-06 | End: 2021-12-06 | Stop reason: HOSPADM

## 2021-12-06 RX ADMIN — VANCOMYCIN HYDROCHLORIDE 1000 MG: 1 INJECTION, SOLUTION INTRAVENOUS at 02:18

## 2021-12-06 ASSESSMENT — ENCOUNTER SYMPTOMS
VOMITING: 0
COUGH: 0
ABDOMINAL PAIN: 0
SHORTNESS OF BREATH: 1
NAUSEA: 0

## 2021-12-06 NOTE — PROGRESS NOTES
Pharmacy Note  Vancomycin Consult    Nidia Booth is a 55 y.o. male started on Vancomycin for SSTI; consult received from Dr. Latha Sol to manage therapy. Also receiving the following antibiotics: none at this estrellita. Patient Active Problem List   Diagnosis    Hepatitis C    Psoriasis    Hypertension    Smoker    Opioid type dependence, continuous (HCC)    Bipolar 1 disorder (HCC)    Cocaine abuse ([facility] [facility]    IV drug abuse ([facility] [facility]    History of alcohol abuse    Generalized pain    Generalized anxiety disorder    Abnormal CT scan, esophagus    Chest pain     Allergies:  Haldol [haloperidol], Latuda [lurasidone hcl], and Ziprasidone     Temp max: 98.4 F    Recent Labs     12/06/21  0209 12/06/21  0333   BUN  --  9   CREATININE  --  0.64*   WBC 9.8  --        Intake/Output Summary (Last 24 hours) at 12/6/2021 8431  Last data filed at 12/6/2021 0327  Gross per 24 hour   Intake 200 ml   Output --   Net 200 ml     Culture Date      Source                       Results  See micro    Ht Readings from Last 1 Encounters:   08/18/21 5' 7.87\" (1.724 m)        Wt Readings from Last 1 Encounters:   08/18/21 157 lb 9.6 oz (71.5 kg)       There is no height or weight on file to calculate BMI. Estimated Creatinine Clearance: 117 mL/min (A) (based on SCr of 0.64 mg/dL (L)). Goal Trough Level: 10-20 mcg/mL  Goal AUC Level: 400-600 mg/L.hr    Assessment/Plan:  Will initiate Vancomycin 1000 mg IV every 12 hours per PK modeling predicts  and trough 12.8. Timing of trough level will be determined based on culture results, renal function, and clinical response. Thank you for the consult. Will continue to follow.    Ania Allan  PharmD  [phone]

## 2021-12-06 NOTE — ED NOTES
RN went in to see the pt and pt had eloped and had a IV in place. Pt was called to tell him to return so RN could remove the IV and pt hung up on the call. . Pt had all the leads removed from his cardiac monitor and was not in the room. Daria Snow RN  12/06/21 9925

## 2021-12-06 NOTE — ED NOTES
Pt had checked in earlier in the evening, left and was taken out of system. Pt came back in to be seen.       Sarah Sheldon RN  12/05/21 1142       Sarah Sheldon RN  12/05/21 9959

## 2021-12-06 NOTE — ED PROVIDER NOTES
[facility]     Emergency Department     Faculty Attestation    I performed a history and physical examination of the patient and discussed management with the resident. I reviewed the residents note and agree with the documented findings and plan of care. Any areas of disagreement are noted on the chart. I was personally present for the key portions of any procedures. I have documented in the chart those procedures where I was not present during the key portions. I have reviewed the emergency nurses triage note. I agree with the chief complaint, past medical history, past surgical history, allergies, medications, social and family history as documented unless otherwise noted below. For Physician Assistant/ Nurse Practitioner cases/documentation I have personally evaluated this patient and have completed at least one if not all key elements of the E/M (history, physical exam, and MDM). Additional findings are as noted. I have personally seen and evaluated the patient. I find the patient's history and physical exam are consistent with the NP/PA documentation. I agree with the care provided, treatment rendered, disposition and follow-up plan. 45-year-old male with a history of IVDA, recently injected heroin, presenting with 2 days of chest pain. Started yesterday evening, worsened today. Worsens with movement. Describes it as pressure and sharp pain in the middle of his chest.  He also has bilateral abscesses on his arms that have been there for weeks. He states that they started when he was skin popping. He denies any cough or fever. No rash. Exam:  General: Laying on the bed and awake, alert  CV: normal rate and regular rhythm  Lungs: Breathing comfortably on room air with no tachypnea, hypoxia, or increased work of breathing  Skin: Indurated areas over both forearms, nonfluctuant. Compartments are soft overall.     Plan:  Chest pain in patient with history of IVDA, concern for infectious etiology such as endocarditis. Patient is afebrile, not meeting SIRS criteria. Will obtain blood cultures. Bedside ultrasound shows no drainable fluid collection in the arms, however does have significant cobblestoning suggestive of cellulitis.   Will start on vancomycin, place in observation for echocardiogram.         Karo Ferrari MD   Attending Emergency  Physician    (Please note that portions of this note were completed with a voice recognition program. Efforts were made to edit the dictations but occasionally words are mis-transcribed.)              Karo Ferrari MD  12/06/21 8030

## 2021-12-06 NOTE — ED PROVIDER NOTES
[facility] ED  Emergency Department Encounter  Emergency Medicine Resident     Pt Name: Vida Novoa  ECA:[id number]  [place] 1965  Date of evaluation: 12/5/21  PCP:  Jeanette Stevens MD    [address] [place] [address]       Chief Complaint   Patient presents with    Chest Pain     Starting 2 days ago.  Abscess     Abscesses to bilat forearms x 3 weeks because of IV drug use. Afebrile not tachycardic at this time. HISTORY OF PRESENT ILLNESS  (Location/Symptom, Timing/Onset, Context/Setting, Quality, Duration, ModifyingFactors, Severity.)      Vida Novoa is a 55 y.o. male with PMH of Htn, polysubstance abuse who presents for evaluation of CP and SOB since last night. No change in vision, abdominal pain, diaphroesis, nausea, vomiting, weakness, numbness. Patient with history of IV drug use, most recently benzos and opiates this morning. No history of blood clots. No recent travel or surgery. No hormone use. Patient does smoke tobacco.    Patient with abscesses to bilateral arms, unsure how long they have been present. No antibiotic use currently. Does admit to injecting in his arms. PAST MEDICAL / SURGICAL / SOCIAL /FAMILY HISTORY      has a past medical history of Bipolar 1 disorder ([facility] [address] Chronic mental illness, Depression, Hepatitis C, Hypertension, Psoriasis, and Substance abuse ([facility] [address] No other pertinent PMH on review with patient/guardian. has a past surgical history that includes brain surgery and Dental surgery. No other pertinent PSH on review with patient/guardian.   Social History     Socioeconomic History    Marital status: Single     Spouse name: Not on file    Number of children: Not on file    Years of education: Not on file    Highest education level: Not on file   Occupational History    Not on file   Tobacco Use    Smoking status: Current Every Day Smoker     Packs/day: 1.00     Years: 40.00     Pack years: 40.00     Types: Cigarettes    Smokeless tobacco: Never Used   Vaping Use    Vaping Use: Former   Substance and Sexual Activity    Alcohol use: No     Alcohol/week: 0.0 standard drinks     Comment: social     Drug use: Yes     Types: Opiates      Comment: heroin 6-27-21   Opiods    Sexual activity: Not on file   Other Topics Concern    Not on file   Social History Narrative    Not on file     Social Determinants of Health     Financial Resource Strain: High Risk    Difficulty of Paying Living Expenses: Hard   Food Insecurity: No Food Insecurity    Worried About Running Out of Food in the Last Year: Never true    Liza of Food in the Last Year: Never true   Transportation Needs:     Lack of Transportation (Medical): Not on file    Lack of Transportation (Non-Medical): Not on file   Physical Activity:     Days of Exercise per Week: Not on file    Minutes of Exercise per Session: Not on file   Stress:     Feeling of Stress : Not on file   Social Connections:     Frequency of Communication with Friends and Family: Not on file    Frequency of Social Gatherings with Friends and Family: Not on file    Attends Denominational Services: Not on file    Active Member of [address] [place] [address] or Organizations: Not on file    Attends Club or Organization Meetings: Not on file    Marital Status: Not on file   Intimate Partner Violence:     Fear of Current or Ex-Partner: Not on file    Emotionally Abused: Not on file    Physically Abused: Not on file    Sexually Abused: Not on file   Housing Stability:     Unable to Pay for Housing in the Last Year: Not on file    Number of [place] in the Last Year: Not on file    Unstable Housing in the Last Year: Not on file       I counseled the patient against using tobacco products. Family History   Problem Relation Age of Onset    Cancer Father     Cancer Child      No other pertinent FamHx on review with patient/guardian.     Allergies:  Haldol [haloperidol], Latuda [lurasidone hcl], and Ziprasidone    Home Medications:  Prior to Admission medications    Medication Sig Start Date End Date Taking? Authorizing Provider   QUEtiapine (SEROQUEL) 200 MG tablet TAKE 1 TABLET BY MOUTH NIGHTLY 11/30/21   Tuyet Nunez MD   lisinopril (PRINIVIL;ZESTRIL) 20 MG tablet Take 1 tablet by mouth daily 7/8/21   Miguel Guillen MD   metoprolol tartrate (LOPRESSOR) 25 MG tablet Take by mouth    Historical Provider, MD   amLODIPine (NORVASC) 10 MG tablet Take 1 tablet by mouth daily 8/18/21   Tuyet Nunez MD   folic acid (FOLVITE) 1 MG tablet Take 1 tablet by mouth daily 8/18/21   Tuyet Nunez MD   thiamine 100 MG tablet Take 1 tablet by mouth daily 8/18/21   Tuyet Nunez MD   citalopram (CELEXA) 20 MG tablet Take 1 tablet by mouth daily 8/18/21   Tuyet Nunez MD   traZODone (DESYREL) 50 MG tablet Take 1 tablet by mouth nightly as needed for Sleep 8/18/21   Tuyet Nunez MD   gabapentin (NEURONTIN) 300 MG capsule Take 1 capsule by mouth 2 times daily for 30 days. Intended supply: 30 days 8/18/21 9/17/21  Tuyet Nunez MD   dicyclomine (BENTYL) 10 MG capsule Take 1 capsule by mouth every 6 hours as needed (cramps) 6/20/21   Ivone Ordoñez MD   hydroCHLOROthiazide (HYDRODIURIL) 25 MG tablet Take 1 tablet by mouth daily 6/25/20   JILLIAN Miguel CNP   omeprazole (PRILOSEC OTC) 20 MG tablet Take 1 tablet by mouth daily. 2/10/15 7/1/15  Tyrone Enamorado MD       REVIEW OF SYSTEMS    (2-9 systems for level 4, 10 ormore for level 5)      Review of Systems   Constitutional: Negative for chills and fever. Eyes: Negative for visual disturbance. Respiratory: Positive for shortness of breath. Negative for cough. Cardiovascular: Positive for chest pain. Negative for palpitations. Gastrointestinal: Negative for abdominal pain, nausea and vomiting. Genitourinary: Negative for flank pain. Skin: Negative for rash. Allergic/Immunologic: Negative for immunocompromised state. Neurological: Negative for dizziness, weakness and headaches. Hematological: Does not bruise/bleed easily. PHYSICAL EXAM   (up to 7 for level 4, 8 or more for level 5)      INITIAL VITALS:   BP (!) 138/98   Pulse 70   Temp 98.4 °F (36.9 °C) (Oral)   Resp 22   SpO2 97%     Physical Exam  Constitutional:       General: He is not in acute distress. Appearance: Normal appearance. He is not ill-appearing, toxic-appearing or diaphoretic. HENT:      Head: Normocephalic and atraumatic. Right Ear: External ear normal.      Left Ear: External ear normal.   Eyes:      General:         Right eye: No discharge. Left eye: No discharge. Cardiovascular:      Rate and Rhythm: Normal rate and regular rhythm. Pulses: Normal pulses. Heart sounds: No murmur heard. Pulmonary:      Effort: Pulmonary effort is normal. No respiratory distress. Breath sounds: Normal breath sounds. No wheezing, rhonchi or rales. Abdominal:      Palpations: Abdomen is soft. Tenderness: There is no abdominal tenderness. Musculoskeletal:      Right lower leg: No edema. Left lower leg: No edema. Comments: Multiple areas of induration on bilateral arms with overlying erythema and warmth. No fluctuance. No crepitus. No pain out of proportion to exam.  Radial pulse 2+ bilaterally. Skin:     Capillary Refill: Capillary refill takes less than 2 seconds. Neurological:      General: No focal deficit present. Mental Status: He is alert.        DIFFERENTIAL  DIAGNOSIS     PLAN (LABS / IMAGING / EKG):  Orders Placed This Encounter   Procedures    Culture, Blood 1    Culture, Blood 2    COVID-19, Rapid    XR CHEST PORTABLE    CBC Auto Differential    Basic Metabolic Panel w/ Reflex to MG    Troponin    EKG 12 Lead       MEDICATIONS ORDERED:  Orders Placed This Encounter   Medications    vancomycin (VANCOCIN) 1000 mg in dextrose 5% 200 mL IVPB     Order Specific Question: Antimicrobial Indications     Answer:   Skin and Soft Tissue Infection       DIAGNOSTIC RESULTS / EMERGENCY DEPARTMENT COURSE / MDM     LABS:  Results for orders placed or performed during the hospital encounter of 12/05/21   CBC Auto Differential   Result Value Ref Range    WBC 9.8 3.5 - 11.3 k/uL    RBC 4.63 4.21 - 5.77 m/uL    Hemoglobin 13.3 13.0 - 17.0 g/dL    Hematocrit 39.4 (L) 40.7 - 50.3 %    MCV 85.1 82.6 - 102.9 fL    MCH 28.7 25.2 - 33.5 pg    MCHC 33.8 28.4 - 34.8 g/dL    RDW 15.0 (H) 11.8 - 14.4 %    Platelets 496 141 - 514 k/uL    MPV 12.5 8.1 - 13.5 fL    NRBC Automated 0.0 0.0 per 100 WBC    Differential Type NOT REPORTED     Seg Neutrophils 60 36 - 65 %    Lymphocytes 30 24 - 43 %    Monocytes 8 3 - 12 %    Eosinophils % 1 1 - 4 %    Basophils 1 0 - 2 %    Immature Granulocytes 0 0 %    Segs Absolute 5.87 1.50 - 8.10 k/uL    Absolute Lymph # 2.95 1.10 - 3.70 k/uL    Absolute Mono # 0.78 0.10 - 1.20 k/uL    Absolute Eos # 0.12 0.00 - 0.44 k/uL    Basophils Absolute 0.06 0.00 - 0.20 k/uL    Absolute Immature Granulocyte 0.03 0.00 - 0.30 k/uL    WBC Morphology NOT REPORTED     RBC Morphology ANISOCYTOSIS PRESENT     Platelet Estimate NOT REPORTED        IMPRESSION/MDM/ED COURSE:  55 y.o. male presented with chest pain shortness of breath since yesterday. Patient afebrile. Slightly hypertensive but vitals otherwise WNL. On exam patient resting comfortably no acute distress. Heart RRR, lungs clear. Abdomen soft nontender. Multiple areas of induration on bilateral arms with overlying erythema and warmth. No fluctuance. No crepitus. No pain out of proportion to exam.  Radial pulse 2+ bilaterally. Bedside ultrasound performed which showed cobblestoning with small area of fluid however too deep to drain. Will obtain cardiac work-up as well as blood cultures. IV vancomycin.     ED Course as of 12/06/21 0601   Mon Dec 06, 2021   0253 Troponin, High Sensitivity: 10 [AF]   2964 Basic Metabolic Panel w/ Reflex to MG(!):    Glucose 133(!)   BUN 9   Creatinine 0.64(!)   Bun/Cre Ratio NOT REPORTED   Calcium 8.5(!)   Sodium 134(!)   Potassium 3.8   Chloride 101   CO2 23   Anion Gap 10   GFR Non- >60   GFR  >60   GFR Comment        GFR Staging NOT REPORTED [AF]   0342 IMPRESSION:  Normal examination. [AF]   0408 Troponin, High Sensitivity: 12  Please from 10, this is within margin of error for the test.  Will admit patient to observation for echo and evaluation by cardiology tomorrow. Continue IV antibiotics. [AF]      ED Course User Index  [AF] Morena Burrows DO       RADIOLOGY:  XR CHEST PORTABLE   Final Result   Normal examination. EKG  No acute ischemic changes. EKG not available at time of this dictation 12/6/2021 6:08 PM    All EKG's are interpreted by the Emergency Department Physician who either signs or Co-signs this chart in the absence of a cardiologist.    PROCEDURES:  None    CONSULTS:  None    FINAL IMPRESSION      1. Chest pain, unspecified type    2. Abscess        DISPOSITION / PLAN     DISPOSITION        PATIENT REFERREDTO:  No follow-up provider specified.     DISCHARGE MEDICATIONS:  New Prescriptions    No medications on file       Damine Boyer DO  PGY 2  Resident Physician Emergency Medicine  12/06/21 2:30 AM    (Please note that portions of this note were completed with a voice recognition program.Efforts were made to edit the dictations but occasionally words are mis-transcribed.)       Morena Burrows DO  Resident  12/06/21 9998

## 2021-12-06 NOTE — ED PROVIDER NOTES
Faculty Sign-Out Attestation  Handoff taken on the following patient from prior Attending Physician: Terrance Yang    I was available and discussed any additional care issues that arose and coordinated the management plans with the resident(s) caring for the patient during my duty period. Any areas of disagreement with residents documentation of care or procedures are noted on the chart. I was personally present for the key portions of any/all procedures during my duty period. I have documented in the chart those procedures where I was not present during the key portions.     Cp, needing endocarditis work up, getting abx,   Will need obs admit    Christi Adler DO  Attending Physician     Christi Adler, DO  12/06/21 0132    obs admit per plan     Christi Adler DO  12/06/21 1650

## 2021-12-07 LAB
CULTURE: ABNORMAL
EKG ATRIAL RATE: 70 BPM
EKG P AXIS: 56 DEGREES
EKG P-R INTERVAL: 158 MS
EKG Q-T INTERVAL: 356 MS
EKG QRS DURATION: 96 MS
EKG QTC CALCULATION (BAZETT): 384 MS
EKG R AXIS: 52 DEGREES
EKG T AXIS: 77 DEGREES
EKG VENTRICULAR RATE: 70 BPM
Lab: ABNORMAL
SPECIMEN DESCRIPTION: ABNORMAL

## 2021-12-11 LAB
CULTURE: NORMAL
Lab: NORMAL
SPECIMEN DESCRIPTION: NORMAL

## 2022-01-06 ENCOUNTER — HOSPITAL ENCOUNTER (EMERGENCY)
Age: 57
Discharge: HOME | End: 2022-01-07
Attending: EMERGENCY MEDICINE
Payer: MEDICARE

## 2022-01-06 VITALS
TEMPERATURE: 98.5 F | HEART RATE: 85 BPM | OXYGEN SATURATION: 99 % | HEIGHT: 68 IN | DIASTOLIC BLOOD PRESSURE: 109 MMHG | WEIGHT: 160 LBS | RESPIRATION RATE: 18 BRPM | SYSTOLIC BLOOD PRESSURE: 171 MMHG | BODY MASS INDEX: 24.25 KG/M2

## 2022-01-06 DIAGNOSIS — R10.9 ABDOMINAL PAIN, UNSPECIFIED ABDOMINAL LOCATION: Primary | ICD-10-CM

## 2022-01-06 PROCEDURE — 99284 EMERGENCY DEPT VISIT MOD MDM: CPT

## 2022-01-06 PROCEDURE — 6370000000 HC RX 637 (ALT 250 FOR IP): Performed by: STUDENT IN AN ORGANIZED HEALTH CARE EDUCATION/TRAINING PROGRAM

## 2022-01-06 RX ORDER — MAGNESIUM HYDROXIDE/ALUMINUM HYDROXICE/SIMETHICONE 120; 1200; 1200 MG/30ML; MG/30ML; MG/30ML
30 SUSPENSION ORAL ONCE
Status: COMPLETED | OUTPATIENT
Start: 2022-01-06 | End: 2022-01-06

## 2022-01-06 RX ORDER — FAMOTIDINE 20 MG/1
20 TABLET, FILM COATED ORAL ONCE
Status: COMPLETED | OUTPATIENT
Start: 2022-01-06 | End: 2022-01-06

## 2022-01-06 RX ORDER — ONDANSETRON 4 MG/1
4 TABLET, FILM COATED ORAL ONCE
Status: COMPLETED | OUTPATIENT
Start: 2022-01-06 | End: 2022-01-06

## 2022-01-06 RX ADMIN — ALUMINUM HYDROXIDE, MAGNESIUM HYDROXIDE, AND SIMETHICONE 30 ML: 200; 200; 20 SUSPENSION ORAL at 22:55

## 2022-01-06 RX ADMIN — FAMOTIDINE 20 MG: 20 TABLET, FILM COATED ORAL at 22:55

## 2022-01-06 RX ADMIN — ONDANSETRON HYDROCHLORIDE 4 MG: 4 TABLET, FILM COATED ORAL at 22:55

## 2022-01-07 ASSESSMENT — ENCOUNTER SYMPTOMS
BACK PAIN: 0
ABDOMINAL PAIN: 1
VOMITING: 0
NAUSEA: 0
COUGH: 0
DIARRHEA: 0
SHORTNESS OF BREATH: 0
SINUS PAIN: 0
EYE PAIN: 0
SORE THROAT: 0

## 2022-01-07 NOTE — ED NOTES
Bed: 08  Expected date:   Expected time:   Means of arrival:   Comments:     Stormy Meyers RN  01/06/22 1610

## 2022-01-07 NOTE — ED NOTES
Lower abdominal pain x 2 hours  Pain described as sharp pain   Pain eased with eating ice cream   Has not taken anything for the pain       Galileo Dave, RN  01/07/22 0012

## 2022-01-07 NOTE — ED PROVIDER NOTES
[facility]  ED  Emergency Department Encounter  EmergencyMedicineResident     This patient was seen during the COVID-19 crisis. There were limited resources and those resources we did have had to be conserved for the sickest of patients. Pt Name: Paula Manning  MRN: [id number]  [place] 1965  Date of evaluation: 1/6/22  PCP: Michele Eli MD    [address] [place] [address]       Chief Complaint   Patient presents with    Abdominal Pain       HISTORY OF PRESENT ILLNESS  (Location/Symptom, Timing/Onset, Context/Setting, Quality, Duration, Modifying Factors, Severity.)      Paula Manning is a 55 y.o. male who presents for evaluation of abdominal pain. He reports lower abdominal crampy pain that he thinks may be related to his hernia. However his friend who also had lower abdominal crampy pain he had issues with his intestines and he was worried that he may have issues with his intestines as well. He has significant past medical history for chronic substance abuse with multiple visits to the emergency department multiple locations this year. He has no history of abdominal surgery. He has no history of bloody bowel movements, nausea vomiting or diarrhea. He is tolerating p.o. fluid urinating and defecating normally. PAST MEDICAL / SURGICAL /SOCIAL / FAMILY HISTORY      has a past medical history of Bipolar 1 disorder ([facility] [address] Chronic mental illness, Depression, Hepatitis C, Hypertension, Psoriasis, and Substance abuse ([facility] [address] has a past surgical history that includes brain surgery and Dental surgery.       Social History     Socioeconomic History    Marital status: Single     Spouse name: Not on file    Number of children: Not on file    Years of education: Not on file    Highest education level: Not on file   Occupational History    Not on file   Tobacco Use    Smoking status: Current Every Day Smoker     Packs/day: 1.00     Years: 40.00     Pack years: 40.00     Types: Cigarettes    Smokeless tobacco: Never Used   Vaping Use    Vaping Use: Former   Substance and Sexual Activity    Alcohol use: No     Alcohol/week: 0.0 standard drinks     Comment: social     Drug use: Yes     Types: Opiates      Comment: heroin 6-27-21   Opiods    Sexual activity: Not on file   Other Topics Concern    Not on file   Social History Narrative    Not on file     Social Determinants of Health     Financial Resource Strain: High Risk    Difficulty of Paying Living Expenses: Hard   Food Insecurity: No Food Insecurity    Worried About Running Out of Food in the Last Year: Never true    Liza of Food in the Last Year: Never true   Transportation Needs:     Lack of Transportation (Medical): Not on file    Lack of Transportation (Non-Medical): Not on file   Physical Activity:     Days of Exercise per Week: Not on file    Minutes of Exercise per Session: Not on file   Stress:     Feeling of Stress : Not on file   Social Connections:     Frequency of Communication with Friends and Family: Not on file    Frequency of Social Gatherings with Friends and Family: Not on file    Attends Christianity Services: Not on file    Active Member of [address] [place] [address] or Organizations: Not on file    Attends Club or Organization Meetings: Not on file    Marital Status: Not on file   Intimate Partner Violence:     Fear of Current or Ex-Partner: Not on file    Emotionally Abused: Not on file    Physically Abused: Not on file    Sexually Abused: Not on file   Housing Stability:     Unable to Pay for Housing in the Last Year: Not on file    Number of [place] in the Last Year: Not on file    Unstable Housing in the Last Year: Not on file       Family History   Problem Relation Age of Onset    Cancer Father     Cancer Child        Allergies:  Haldol [haloperidol], Latuda [lurasidone hcl], and Ziprasidone    Home Medications:  Prior to Admission medications    Medication Sig Start Date End Date Taking? Authorizing Provider   QUEtiapine (SEROQUEL) 200 MG tablet TAKE 1 TABLET BY MOUTH NIGHTLY 11/30/21   Tuyet Marion MD   lisinopril (PRINIVIL;ZESTRIL) 20 MG tablet Take 1 tablet by mouth daily 7/8/21   Feliciano Villagran MD   metoprolol tartrate (LOPRESSOR) 25 MG tablet Take by mouth    Historical Provider, MD   amLODIPine (NORVASC) 10 MG tablet Take 1 tablet by mouth daily 8/18/21   Tuyet Marion MD   folic acid (FOLVITE) 1 MG tablet Take 1 tablet by mouth daily 8/18/21   Tuyet Marion MD   thiamine 100 MG tablet Take 1 tablet by mouth daily 8/18/21   Tuyet Marion MD   citalopram (CELEXA) 20 MG tablet Take 1 tablet by mouth daily 8/18/21   Tuyet Marion MD   traZODone (DESYREL) 50 MG tablet Take 1 tablet by mouth nightly as needed for Sleep 8/18/21   Tuyet Marion MD   gabapentin (NEURONTIN) 300 MG capsule Take 1 capsule by mouth 2 times daily for 30 days. Intended supply: 30 days 8/18/21 9/17/21  Tuyet Marion MD   dicyclomine (BENTYL) 10 MG capsule Take 1 capsule by mouth every 6 hours as needed (cramps) 6/20/21   Noah Lo MD   hydroCHLOROthiazide (HYDRODIURIL) 25 MG tablet Take 1 tablet by mouth daily 6/25/20   JILLIAN Saleh - CNP   omeprazole (PRILOSEC OTC) 20 MG tablet Take 1 tablet by mouth daily. 2/10/15 7/1/15  Monique Hamilton MD       REVIEW OF SYSTEMS    (2-9 systems for level 4, 10 or more forlevel 5)      Review of Systems   Constitutional: Negative for activity change, chills and fever. HENT: Negative for congestion, sinus pain and sore throat. Eyes: Negative for pain and visual disturbance. Respiratory: Negative for cough and shortness of breath. Cardiovascular: Negative for chest pain. Gastrointestinal: Positive for abdominal pain. Negative for diarrhea, nausea and vomiting. Genitourinary: Negative for difficulty urinating, dysuria and hematuria. Musculoskeletal: Negative for back pain and myalgias. Skin: Negative for rash and wound. Neurological: Negative for dizziness, light-headedness and headaches. Psychiatric/Behavioral: Negative for agitation and confusion. PHYSICAL EXAM   (up to 7 for level 4, 8 or more forlevel 5)      ED TRIAGE VITALS BP: (!) 187/105, Temp: 97 °F (36.1 °C), Pulse: 62, Resp: 18, SpO2: 99 %    Vitals:    01/06/22 1949 01/06/22 2234   BP: (!) 187/105 (!) 171/109   Pulse: 62 85   Resp: 18 18   Temp: 97 °F (36.1 °C) 98.5 °F (36.9 °C)   TempSrc: Temporal    SpO2: 99% 99%   Weight: 160 lb (72.6 kg)    Height: 5' 8\" (1.727 m)          Physical Exam  Vitals and nursing note reviewed. Constitutional:       Appearance: Normal appearance. HENT:      Head: Normocephalic and atraumatic. Nose: Nose normal.      Mouth/Throat:      Mouth: Mucous membranes are moist.   Eyes:      Extraocular Movements: Extraocular movements intact. Pupils: Pupils are equal, round, and reactive to light. Cardiovascular:      Rate and Rhythm: Normal rate and regular rhythm. Pulses: Normal pulses. Heart sounds: Normal heart sounds. Pulmonary:      Effort: Pulmonary effort is normal.      Breath sounds: Normal breath sounds. Abdominal:      General: Abdomen is flat. There is no distension. Palpations: Abdomen is soft. There is no hepatomegaly. Tenderness: There is no abdominal tenderness. Hernia: No hernia is present. There is no hernia in the left inguinal area or right inguinal area. Genitourinary:     Comments: Multiple hemorrhoids noted, none of them to be thrombosed, guaiac negative  Musculoskeletal:         General: Normal range of motion. Cervical back: Normal range of motion. Skin:     General: Skin is warm and dry. Capillary Refill: Capillary refill takes less than 2 seconds. Neurological:      General: No focal deficit present. Mental Status: He is alert and oriented to person, place, and time.    Psychiatric:         Mood and Affect: Mood normal.         Behavior: Behavior normal.           DIFFERENTIAL  DIAGNOSIS     PLAN (LABS / IMAGING / EKG):  Orders Placed This Encounter   Procedures    Misc nursing order (specify)       MEDICATIONS ORDERED:  ED Medication Orders (From admission, onward)    Start Ordered     Status Ordering Provider    01/06/22 2245 01/06/22 2244  famotidine (PEPCID) tablet 20 mg  ONCE         Last MAR action: [facility]     01/06/22 2245 01/06/22 2244  aluminum & magnesium hydroxide-simethicone (MAALOX) 200-200-20 MG/5ML suspension 30 mL  ONCE         Last MAR action: Given - by Dilshad Byers on 01/06/22 at [facility] [facility]     01/06/22 2245 01/06/22 2244  ondansetron (ZOFRAN) tablet 4 mg  ONCE         Last MAR action: Given - by Dilshad Byers on 01/06/22 at [facility] [facility]           DDX: Abdomen pain unspecified    DIAGNOSTIC RESULTS / [facility]     IMPRESSION & INITIAL PLAN:  56-year-old male present emerged from today for evaluation of crampy abdominal pain. He reports that the pain has been present chronically. He wants to be evaluated since friends with similar pain and up having issues with his intestines and he was to make sure that he does not have any intestines issue. Patient is nontoxic with normal vitals aside from hypertension on evaluation. He has no significant surgical past medical history. He has no complaints of nausea vomiting or diarrhea. He has no report of constipation. Patient's abdomen is soft nontender and nonsurgical.    I have a low suspicion for any emergent conditions in this patient. Patient provided Pepcid, Zofran, Maalox and p.o. challenged with box lunch. Patient given strict return precautions if symptoms worsen. LABS:  No results found for this visit on 01/06/22. RADIOLOGY:  No orders to display       CONSULTS:  None    CRITICAL CARE:  See attending physician note    FINAL IMPRESSION      1.  Abdominal pain,

## 2022-01-07 NOTE — ED PROVIDER NOTES
[address] [facility]     Emergency Department     Faculty Note/ Attestation      Pt Name: Barbara Durbin                                       MRN: [id number]  [place]ala 1965  Date of evaluation: 1/6/2022    Patients PCP:    Riya Pat MD      Attestation  I performed a history and physical examination of the patient and discussed management with the resident. I reviewed the residents note and agree with the documented findings and plan of care. Any areas of disagreement are noted on the chart. I was personally present for the key portions of any procedures. I have documented in the chart those procedures where I was not present during the key portions. I have reviewed the emergency nurses triage note. I agree with the chief complaint, past medical history, past surgical history, allergies, medications, social and family history as documented unless otherwise noted below. For Physician Assistant/ Nurse Practitioner cases/documentation I have personally evaluated this patient and have completed at least one if not all key elements of the E/M (history, physical exam, and MDM). Additional findings are as noted.       Initial Screens:             Vitals:    Vitals:    01/06/22 1949 01/06/22 2234   BP: (!) 187/105 (!) 171/109   Pulse: 62 85   Resp: 18 18   Temp: 97 °F (36.1 °C) 98.5 °F (36.9 °C)   TempSrc: Temporal    SpO2: 99% 99%   Weight: 160 lb (72.6 kg)    Height: 5' 8\" (1.727 m)        CHIEF COMPLAINT       Chief Complaint   Patient presents with    Abdominal Pain             DIAGNOSTIC RESULTS             RADIOLOGY:   No orders to display         LABS:  Labs Reviewed - No data to display      EMERGENCY DEPARTMENT COURSE:     -------------------------  BP: (!) 171/109, Temp: 98.5 °F (36.9 °C), Pulse: 85, Resp: 18      Comments    BPD and PSA, EtOH hx  Here today complaining of abd pain, crampy in nature  Began this morning, gradually  No n/v/d  No rectal bleeding    Ab exam reassuring, will offer symptomatic treatment and some food, patient is unable to tolerate food, has persistent pain we may get labs and perhaps an abdominal film. Otherwise will likely discharge with symptomatic treatment and follow-up with PCP.     (Please note that portions of this note were completed with a voice recognition program.  Efforts were made to edit the dictations but occasionally words are mis-transcribed.)      Emily Bojorquez MD,, MD  Attending Emergency Physician         Emily Bojorquez MD  01/06/22 7110

## 2022-03-02 ENCOUNTER — APPOINTMENT (OUTPATIENT)
Dept: CT IMAGING | Age: 57
DRG: 134 | End: 2022-03-02
Payer: MEDICARE

## 2022-03-02 ENCOUNTER — APPOINTMENT (OUTPATIENT)
Dept: GENERAL RADIOLOGY | Age: 57
DRG: 134 | End: 2022-03-02
Payer: MEDICARE

## 2022-03-02 ENCOUNTER — HOSPITAL ENCOUNTER (INPATIENT)
Age: 57
LOS: 1 days | Discharge: LEFT BEFORE DISCHARGE | DRG: 134 | End: 2022-03-03
Attending: EMERGENCY MEDICINE | Admitting: INTERNAL MEDICINE
Payer: MEDICARE

## 2022-03-02 DIAGNOSIS — I26.99 ACUTE PULMONARY EMBOLISM, UNSPECIFIED PULMONARY EMBOLISM TYPE, UNSPECIFIED WHETHER ACUTE COR PULMONALE PRESENT (HCC): ICD-10-CM

## 2022-03-02 DIAGNOSIS — A41.9 SEPTICEMIA (HCC): ICD-10-CM

## 2022-03-02 DIAGNOSIS — R41.82 ALTERED MENTAL STATUS, UNSPECIFIED ALTERED MENTAL STATUS TYPE: Primary | ICD-10-CM

## 2022-03-02 LAB
-: NORMAL
ABSOLUTE EOS #: 0.2 K/UL (ref 0–0.44)
ABSOLUTE IMMATURE GRANULOCYTE: 0.09 K/UL (ref 0–0.3)
ABSOLUTE LYMPH #: 1.26 K/UL (ref 1.1–3.7)
ABSOLUTE MONO #: 0.91 K/UL (ref 0.1–1.2)
ACETAMINOPHEN LEVEL: <5 UG/ML (ref 10–30)
ALBUMIN SERPL-MCNC: 4.1 G/DL (ref 3.5–5.2)
ALBUMIN/GLOBULIN RATIO: 0.7 (ref 1–2.5)
ALLEN TEST: POSITIVE
ALP BLD-CCNC: 106 U/L (ref 40–129)
ALT SERPL-CCNC: 75 U/L (ref 5–41)
AMMONIA: 33 UMOL/L (ref 16–60)
AMPHETAMINE SCREEN URINE: NEGATIVE
ANION GAP SERPL CALCULATED.3IONS-SCNC: 15 MMOL/L (ref 9–17)
ANION GAP: 12 MMOL/L (ref 7–16)
AST SERPL-CCNC: 94 U/L
BARBITURATE SCREEN URINE: NEGATIVE
BASOPHILS # BLD: 0 % (ref 0–2)
BASOPHILS ABSOLUTE: 0.06 K/UL (ref 0–0.2)
BENZODIAZEPINE SCREEN, URINE: POSITIVE
BILIRUB SERPL-MCNC: 0.68 MG/DL (ref 0.3–1.2)
BILIRUBIN URINE: NEGATIVE
BUN BLDV-MCNC: 10 MG/DL (ref 6–20)
CALCIUM SERPL-MCNC: 9.7 MG/DL (ref 8.6–10.4)
CANNABINOID SCREEN URINE: NEGATIVE
CASTS UA: NORMAL /LPF (ref 0–8)
CHLORIDE BLD-SCNC: 94 MMOL/L (ref 98–107)
CO2: 24 MMOL/L (ref 20–31)
COCAINE METABOLITE, URINE: POSITIVE
COLOR: YELLOW
CREAT SERPL-MCNC: 0.51 MG/DL (ref 0.7–1.2)
D-DIMER QUANTITATIVE: 1.96 MG/L FEU
EKG ATRIAL RATE: 78 BPM
EKG P AXIS: 40 DEGREES
EKG P-R INTERVAL: 156 MS
EKG Q-T INTERVAL: 344 MS
EKG QRS DURATION: 88 MS
EKG QTC CALCULATION (BAZETT): 392 MS
EKG R AXIS: 29 DEGREES
EKG T AXIS: 31 DEGREES
EKG VENTRICULAR RATE: 78 BPM
EOSINOPHILS RELATIVE PERCENT: 1 % (ref 1–4)
EPITHELIAL CELLS UA: NORMAL /HPF (ref 0–5)
ETHANOL PERCENT: <0.01 %
ETHANOL: <10 MG/DL
FIO2: 90
GFR AFRICAN AMERICAN: >60 ML/MIN
GFR NON-AFRICAN AMERICAN: >60 ML/MIN
GFR NON-AFRICAN AMERICAN: >60 ML/MIN
GFR SERPL CREATININE-BSD FRML MDRD: >60 ML/MIN
GFR SERPL CREATININE-BSD FRML MDRD: ABNORMAL ML/MIN/{1.73_M2}
GFR SERPL CREATININE-BSD FRML MDRD: NORMAL ML/MIN/{1.73_M2}
GLUCOSE BLD-MCNC: 123 MG/DL (ref 70–99)
GLUCOSE BLD-MCNC: 130 MG/DL (ref 74–100)
GLUCOSE BLD-MCNC: 140 MG/DL (ref 74–100)
GLUCOSE BLD-MCNC: 147 MG/DL (ref 75–110)
GLUCOSE URINE: NEGATIVE
HCO3 VENOUS: 31.3 MMOL/L (ref 22–29)
HCT VFR BLD CALC: 43.5 % (ref 40.7–50.3)
HCT VFR BLD CALC: 55.7 % (ref 40.7–50.3)
HEMOGLOBIN: 14.2 G/DL (ref 13–17)
HEMOGLOBIN: 17.7 G/DL (ref 13–17)
IMMATURE GRANULOCYTES: 1 %
INR BLD: 1
KETONES, URINE: NEGATIVE
LACTIC ACID, WHOLE BLOOD: 0.8 MMOL/L (ref 0.7–2.1)
LEUKOCYTE ESTERASE, URINE: NEGATIVE
LV EF: 58 %
LVEF MODALITY: NORMAL
LYMPHOCYTES # BLD: 8 % (ref 24–43)
MCH RBC QN AUTO: 27.8 PG (ref 25.2–33.5)
MCH RBC QN AUTO: 28.6 PG (ref 25.2–33.5)
MCHC RBC AUTO-ENTMCNC: 31.8 G/DL (ref 28.4–34.8)
MCHC RBC AUTO-ENTMCNC: 32.6 G/DL (ref 28.4–34.8)
MCV RBC AUTO: 87.4 FL (ref 82.6–102.9)
MCV RBC AUTO: 87.5 FL (ref 82.6–102.9)
METHADONE SCREEN, URINE: NEGATIVE
MONOCYTES # BLD: 6 % (ref 3–12)
MYOGLOBIN: 238 NG/ML (ref 28–72)
NITRITE, URINE: NEGATIVE
NRBC AUTOMATED: 0 PER 100 WBC
NRBC AUTOMATED: 0 PER 100 WBC
O2 DEVICE/FLOW/%: ABNORMAL
O2 SAT, VEN: 42 % (ref 60–85)
OPIATES, URINE: NEGATIVE
OXYCODONE SCREEN URINE: NEGATIVE
PARTIAL THROMBOPLASTIN TIME: 26.3 SEC (ref 20.5–30.5)
PARTIAL THROMBOPLASTIN TIME: 26.7 SEC (ref 20.5–30.5)
PARTIAL THROMBOPLASTIN TIME: 40.8 SEC (ref 20.5–30.5)
PARTIAL THROMBOPLASTIN TIME: 47.4 SEC (ref 20.5–30.5)
PATIENT TEMP: 38.7
PCO2, VEN: 58.5 MM HG (ref 41–51)
PDW BLD-RTO: 14 % (ref 11.8–14.4)
PDW BLD-RTO: 14 % (ref 11.8–14.4)
PH UA: 6.5 (ref 5–8)
PH VENOUS: 7.34 (ref 7.32–7.43)
PHENCYCLIDINE, URINE: NEGATIVE
PLATELET # BLD: 117 K/UL (ref 138–453)
PLATELET # BLD: 136 K/UL (ref 138–453)
PMV BLD AUTO: 10.3 FL (ref 8.1–13.5)
PMV BLD AUTO: 11.3 FL (ref 8.1–13.5)
PO2, VEN: 26.1 MM HG (ref 30–50)
POC BUN: 9 MG/DL (ref 8–26)
POC CHLORIDE: 99 MMOL/L (ref 98–107)
POC CREATININE: 0.56 MG/DL (ref 0.51–1.19)
POC HCO3: 29.8 MMOL/L (ref 21–28)
POC HEMATOCRIT: 61 % (ref 41–53)
POC HEMOGLOBIN: 20.7 G/DL (ref 13.5–17.5)
POC IONIZED CALCIUM: 1.23 MMOL/L (ref 1.15–1.33)
POC LACTIC ACID: 0.94 MMOL/L (ref 0.56–1.39)
POC LACTIC ACID: 2.04 MMOL/L (ref 0.56–1.39)
POC O2 SATURATION: 100 % (ref 94–98)
POC PCO2 TEMP: 57 MM HG
POC PCO2: 53.2 MM HG (ref 35–48)
POC PH TEMP: 7.33
POC PH: 7.36 (ref 7.35–7.45)
POC PO2 TEMP: 345 MM HG
POC PO2: 336.1 MM HG (ref 83–108)
POC POTASSIUM: 4.5 MMOL/L (ref 3.5–4.5)
POC SODIUM: 142 MMOL/L (ref 138–146)
POC TCO2: 32 MMOL/L (ref 22–30)
POSITIVE BASE EXCESS, ART: 3 (ref 0–3)
POSITIVE BASE EXCESS, VEN: 3 (ref 0–3)
POTASSIUM SERPL-SCNC: 4.3 MMOL/L (ref 3.7–5.3)
PRO-BNP: 116 PG/ML
PROTEIN UA: ABNORMAL
PROTHROMBIN TIME: 10.8 SEC (ref 9.1–12.3)
RBC # BLD: 4.97 M/UL (ref 4.21–5.77)
RBC # BLD: 6.37 M/UL (ref 4.21–5.77)
RBC UA: NORMAL /HPF (ref 0–4)
SALICYLATE LEVEL: <1 MG/DL (ref 3–10)
SAMPLE SITE: ABNORMAL
SARS-COV-2, RAPID: NOT DETECTED
SEG NEUTROPHILS: 84 % (ref 36–65)
SEGMENTED NEUTROPHILS ABSOLUTE COUNT: 13.37 K/UL (ref 1.5–8.1)
SODIUM BLD-SCNC: 133 MMOL/L (ref 135–144)
SPECIFIC GRAVITY UA: 1.02 (ref 1–1.03)
SPECIMEN DESCRIPTION: NORMAL
TEST INFORMATION: ABNORMAL
TOTAL CK: 145 U/L (ref 39–308)
TOTAL PROTEIN: 9.6 G/DL (ref 6.4–8.3)
TOXIC TRICYCLIC SC,BLOOD: NEGATIVE
TROPONIN, HIGH SENSITIVITY: 7 NG/L (ref 0–22)
TROPONIN, HIGH SENSITIVITY: <6 NG/L (ref 0–22)
TSH SERPL DL<=0.05 MIU/L-ACNC: 1.35 MIU/L (ref 0.3–5)
TURBIDITY: CLEAR
URINE HGB: NEGATIVE
UROBILINOGEN, URINE: NORMAL
WBC # BLD: 15.9 K/UL (ref 3.5–11.3)
WBC # BLD: 18.3 K/UL (ref 3.5–11.3)
WBC UA: NORMAL /HPF (ref 0–5)

## 2022-03-02 PROCEDURE — 2500000003 HC RX 250 WO HCPCS

## 2022-03-02 PROCEDURE — 80053 COMPREHEN METABOLIC PANEL: CPT

## 2022-03-02 PROCEDURE — 83880 ASSAY OF NATRIURETIC PEPTIDE: CPT

## 2022-03-02 PROCEDURE — 84484 ASSAY OF TROPONIN QUANT: CPT

## 2022-03-02 PROCEDURE — 5A1935Z RESPIRATORY VENTILATION, LESS THAN 24 CONSECUTIVE HOURS: ICD-10-PCS | Performed by: INTERNAL MEDICINE

## 2022-03-02 PROCEDURE — 6360000002 HC RX W HCPCS

## 2022-03-02 PROCEDURE — 93005 ELECTROCARDIOGRAM TRACING: CPT | Performed by: INTERNAL MEDICINE

## 2022-03-02 PROCEDURE — 94761 N-INVAS EAR/PLS OXIMETRY MLT: CPT

## 2022-03-02 PROCEDURE — 85014 HEMATOCRIT: CPT

## 2022-03-02 PROCEDURE — 31500 INSERT EMERGENCY AIRWAY: CPT

## 2022-03-02 PROCEDURE — 3209999900 CT LUMBAR SPINE TRAUMA RECONSTRUCTION

## 2022-03-02 PROCEDURE — 73100 X-RAY EXAM OF WRIST: CPT

## 2022-03-02 PROCEDURE — 2000000000 HC ICU R&B

## 2022-03-02 PROCEDURE — 80143 DRUG ASSAY ACETAMINOPHEN: CPT

## 2022-03-02 PROCEDURE — 81001 URINALYSIS AUTO W/SCOPE: CPT

## 2022-03-02 PROCEDURE — 6360000004 HC RX CONTRAST MEDICATION: Performed by: STUDENT IN AN ORGANIZED HEALTH CARE EDUCATION/TRAINING PROGRAM

## 2022-03-02 PROCEDURE — 2580000003 HC RX 258: Performed by: STUDENT IN AN ORGANIZED HEALTH CARE EDUCATION/TRAINING PROGRAM

## 2022-03-02 PROCEDURE — 96365 THER/PROPH/DIAG IV INF INIT: CPT

## 2022-03-02 PROCEDURE — 96361 HYDRATE IV INFUSION ADD-ON: CPT

## 2022-03-02 PROCEDURE — 71260 CT THORAX DX C+: CPT

## 2022-03-02 PROCEDURE — 84443 ASSAY THYROID STIM HORMONE: CPT

## 2022-03-02 PROCEDURE — 82565 ASSAY OF CREATININE: CPT

## 2022-03-02 PROCEDURE — 82140 ASSAY OF AMMONIA: CPT

## 2022-03-02 PROCEDURE — 85730 THROMBOPLASTIN TIME PARTIAL: CPT

## 2022-03-02 PROCEDURE — 96375 TX/PRO/DX INJ NEW DRUG ADDON: CPT

## 2022-03-02 PROCEDURE — 82947 ASSAY GLUCOSE BLOOD QUANT: CPT

## 2022-03-02 PROCEDURE — 74177 CT ABD & PELVIS W/CONTRAST: CPT

## 2022-03-02 PROCEDURE — 87040 BLOOD CULTURE FOR BACTERIA: CPT

## 2022-03-02 PROCEDURE — 6360000002 HC RX W HCPCS: Performed by: STUDENT IN AN ORGANIZED HEALTH CARE EDUCATION/TRAINING PROGRAM

## 2022-03-02 PROCEDURE — 85027 COMPLETE CBC AUTOMATED: CPT

## 2022-03-02 PROCEDURE — 36415 COLL VENOUS BLD VENIPUNCTURE: CPT

## 2022-03-02 PROCEDURE — 93306 TTE W/DOPPLER COMPLETE: CPT

## 2022-03-02 PROCEDURE — 94660 CPAP INITIATION&MGMT: CPT

## 2022-03-02 PROCEDURE — 2700000000 HC OXYGEN THERAPY PER DAY

## 2022-03-02 PROCEDURE — 80179 DRUG ASSAY SALICYLATE: CPT

## 2022-03-02 PROCEDURE — 82550 ASSAY OF CK (CPK): CPT

## 2022-03-02 PROCEDURE — 99291 CRITICAL CARE FIRST HOUR: CPT | Performed by: INTERNAL MEDICINE

## 2022-03-02 PROCEDURE — 0BH18EZ INSERTION OF ENDOTRACHEAL AIRWAY INTO TRACHEA, VIA NATURAL OR ARTIFICIAL OPENING ENDOSCOPIC: ICD-10-PCS | Performed by: EMERGENCY MEDICINE

## 2022-03-02 PROCEDURE — 82803 BLOOD GASES ANY COMBINATION: CPT

## 2022-03-02 PROCEDURE — 73090 X-RAY EXAM OF FOREARM: CPT

## 2022-03-02 PROCEDURE — 3209999900 CT THORACIC SPINE TRAUMA RECONSTRUCTION

## 2022-03-02 PROCEDURE — 83605 ASSAY OF LACTIC ACID: CPT

## 2022-03-02 PROCEDURE — 85025 COMPLETE CBC W/AUTO DIFF WBC: CPT

## 2022-03-02 PROCEDURE — 85610 PROTHROMBIN TIME: CPT

## 2022-03-02 PROCEDURE — G0480 DRUG TEST DEF 1-7 CLASSES: HCPCS

## 2022-03-02 PROCEDURE — 87641 MR-STAPH DNA AMP PROBE: CPT

## 2022-03-02 PROCEDURE — 84520 ASSAY OF UREA NITROGEN: CPT

## 2022-03-02 PROCEDURE — 93010 ELECTROCARDIOGRAM REPORT: CPT | Performed by: INTERNAL MEDICINE

## 2022-03-02 PROCEDURE — 85379 FIBRIN DEGRADATION QUANT: CPT

## 2022-03-02 PROCEDURE — 70450 CT HEAD/BRAIN W/O DYE: CPT

## 2022-03-02 PROCEDURE — 72125 CT NECK SPINE W/O DYE: CPT

## 2022-03-02 PROCEDURE — 80051 ELECTROLYTE PANEL: CPT

## 2022-03-02 PROCEDURE — 99255 IP/OBS CONSLTJ NEW/EST HI 80: CPT | Performed by: SURGERY

## 2022-03-02 PROCEDURE — 82330 ASSAY OF CALCIUM: CPT

## 2022-03-02 PROCEDURE — 94002 VENT MGMT INPAT INIT DAY: CPT

## 2022-03-02 PROCEDURE — 6370000000 HC RX 637 (ALT 250 FOR IP): Performed by: STUDENT IN AN ORGANIZED HEALTH CARE EDUCATION/TRAINING PROGRAM

## 2022-03-02 PROCEDURE — 87635 SARS-COV-2 COVID-19 AMP PRB: CPT

## 2022-03-02 PROCEDURE — 73130 X-RAY EXAM OF HAND: CPT

## 2022-03-02 PROCEDURE — 83874 ASSAY OF MYOGLOBIN: CPT

## 2022-03-02 PROCEDURE — 96366 THER/PROPH/DIAG IV INF ADDON: CPT

## 2022-03-02 PROCEDURE — 99291 CRITICAL CARE FIRST HOUR: CPT

## 2022-03-02 PROCEDURE — 71045 X-RAY EXAM CHEST 1 VIEW: CPT

## 2022-03-02 PROCEDURE — 2500000003 HC RX 250 WO HCPCS: Performed by: STUDENT IN AN ORGANIZED HEALTH CARE EDUCATION/TRAINING PROGRAM

## 2022-03-02 PROCEDURE — 36600 WITHDRAWAL OF ARTERIAL BLOOD: CPT

## 2022-03-02 PROCEDURE — 80307 DRUG TEST PRSMV CHEM ANLYZR: CPT

## 2022-03-02 RX ORDER — DEXMEDETOMIDINE HYDROCHLORIDE 4 UG/ML
.1-1.5 INJECTION, SOLUTION INTRAVENOUS CONTINUOUS
Status: DISCONTINUED | OUTPATIENT
Start: 2022-03-02 | End: 2022-03-03 | Stop reason: HOSPADM

## 2022-03-02 RX ORDER — NALOXONE HYDROCHLORIDE 1 MG/ML
2 INJECTION INTRAMUSCULAR; INTRAVENOUS; SUBCUTANEOUS ONCE
Status: COMPLETED | OUTPATIENT
Start: 2022-03-02 | End: 2022-03-02

## 2022-03-02 RX ORDER — 0.9 % SODIUM CHLORIDE 0.9 %
500 INTRAVENOUS SOLUTION INTRAVENOUS ONCE
Status: COMPLETED | OUTPATIENT
Start: 2022-03-02 | End: 2022-03-03

## 2022-03-02 RX ORDER — HEPARIN SODIUM 1000 [USP'U]/ML
80 INJECTION, SOLUTION INTRAVENOUS; SUBCUTANEOUS PRN
Status: DISCONTINUED | OUTPATIENT
Start: 2022-03-02 | End: 2022-03-03 | Stop reason: HOSPADM

## 2022-03-02 RX ORDER — NALOXONE HYDROCHLORIDE 1 MG/ML
INJECTION INTRAMUSCULAR; INTRAVENOUS; SUBCUTANEOUS
Status: COMPLETED
Start: 2022-03-02 | End: 2022-03-02

## 2022-03-02 RX ORDER — PROPOFOL 10 MG/ML
INJECTION, EMULSION INTRAVENOUS
Status: COMPLETED
Start: 2022-03-02 | End: 2022-03-02

## 2022-03-02 RX ORDER — VANCOMYCIN HYDROCHLORIDE 1 G/200ML
15 INJECTION, SOLUTION INTRAVENOUS ONCE
Status: DISCONTINUED | OUTPATIENT
Start: 2022-03-02 | End: 2022-03-02

## 2022-03-02 RX ORDER — SODIUM CHLORIDE 0.9 % (FLUSH) 0.9 %
5-40 SYRINGE (ML) INJECTION PRN
Status: DISCONTINUED | OUTPATIENT
Start: 2022-03-02 | End: 2022-03-03 | Stop reason: HOSPADM

## 2022-03-02 RX ORDER — MIDAZOLAM HYDROCHLORIDE 1 MG/ML
INJECTION INTRAMUSCULAR; INTRAVENOUS
Status: DISPENSED
Start: 2022-03-02 | End: 2022-03-02

## 2022-03-02 RX ORDER — HEPARIN SODIUM 1000 [USP'U]/ML
80 INJECTION, SOLUTION INTRAVENOUS; SUBCUTANEOUS ONCE
Status: COMPLETED | OUTPATIENT
Start: 2022-03-02 | End: 2022-03-02

## 2022-03-02 RX ORDER — 0.9 % SODIUM CHLORIDE 0.9 %
1000 INTRAVENOUS SOLUTION INTRAVENOUS ONCE
Status: COMPLETED | OUTPATIENT
Start: 2022-03-02 | End: 2022-03-02

## 2022-03-02 RX ORDER — POLYETHYLENE GLYCOL 3350 17 G/17G
17 POWDER, FOR SOLUTION ORAL DAILY PRN
Status: DISCONTINUED | OUTPATIENT
Start: 2022-03-02 | End: 2022-03-03 | Stop reason: HOSPADM

## 2022-03-02 RX ORDER — NALOXONE HYDROCHLORIDE 0.4 MG/ML
INJECTION, SOLUTION INTRAMUSCULAR; INTRAVENOUS; SUBCUTANEOUS
Status: DISPENSED
Start: 2022-03-02 | End: 2022-03-02

## 2022-03-02 RX ORDER — SODIUM CHLORIDE 9 MG/ML
25 INJECTION, SOLUTION INTRAVENOUS PRN
Status: DISCONTINUED | OUTPATIENT
Start: 2022-03-02 | End: 2022-03-03 | Stop reason: HOSPADM

## 2022-03-02 RX ORDER — ONDANSETRON 2 MG/ML
4 INJECTION INTRAMUSCULAR; INTRAVENOUS EVERY 6 HOURS PRN
Status: DISCONTINUED | OUTPATIENT
Start: 2022-03-02 | End: 2022-03-03 | Stop reason: HOSPADM

## 2022-03-02 RX ORDER — ACETAMINOPHEN 325 MG/1
650 TABLET ORAL EVERY 6 HOURS PRN
Status: DISCONTINUED | OUTPATIENT
Start: 2022-03-02 | End: 2022-03-03 | Stop reason: HOSPADM

## 2022-03-02 RX ORDER — HEPARIN SODIUM 1000 [USP'U]/ML
40 INJECTION, SOLUTION INTRAVENOUS; SUBCUTANEOUS PRN
Status: DISCONTINUED | OUTPATIENT
Start: 2022-03-02 | End: 2022-03-03 | Stop reason: HOSPADM

## 2022-03-02 RX ORDER — SODIUM CHLORIDE 0.9 % (FLUSH) 0.9 %
5-40 SYRINGE (ML) INJECTION EVERY 12 HOURS SCHEDULED
Status: DISCONTINUED | OUTPATIENT
Start: 2022-03-02 | End: 2022-03-03 | Stop reason: HOSPADM

## 2022-03-02 RX ORDER — HEPARIN SODIUM AND DEXTROSE 10000; 5 [USP'U]/100ML; G/100ML
5-30 INJECTION INTRAVENOUS CONTINUOUS
Status: DISCONTINUED | OUTPATIENT
Start: 2022-03-02 | End: 2022-03-02

## 2022-03-02 RX ORDER — PROPOFOL 10 MG/ML
5-50 INJECTION, EMULSION INTRAVENOUS CONTINUOUS
Status: DISCONTINUED | OUTPATIENT
Start: 2022-03-02 | End: 2022-03-03

## 2022-03-02 RX ORDER — ONDANSETRON 4 MG/1
4 TABLET, ORALLY DISINTEGRATING ORAL EVERY 8 HOURS PRN
Status: DISCONTINUED | OUTPATIENT
Start: 2022-03-02 | End: 2022-03-03 | Stop reason: HOSPADM

## 2022-03-02 RX ORDER — ACETAMINOPHEN 650 MG/1
650 SUPPOSITORY RECTAL EVERY 6 HOURS PRN
Status: DISCONTINUED | OUTPATIENT
Start: 2022-03-02 | End: 2022-03-03 | Stop reason: HOSPADM

## 2022-03-02 RX ADMIN — SODIUM CHLORIDE, PRESERVATIVE FREE 10 ML: 5 INJECTION INTRAVENOUS at 20:28

## 2022-03-02 RX ADMIN — SODIUM CHLORIDE 500 ML: 9 INJECTION, SOLUTION INTRAVENOUS at 23:42

## 2022-03-02 RX ADMIN — PIPERACILLIN AND TAZOBACTAM 3375 MG: 3; .375 INJECTION, POWDER, LYOPHILIZED, FOR SOLUTION INTRAVENOUS at 13:28

## 2022-03-02 RX ADMIN — NALOXONE HYDROCHLORIDE 2 MG: 1 INJECTION PARENTERAL at 03:02

## 2022-03-02 RX ADMIN — VANCOMYCIN HYDROCHLORIDE 1250 MG: 10 INJECTION, POWDER, LYOPHILIZED, FOR SOLUTION INTRAVENOUS at 18:19

## 2022-03-02 RX ADMIN — NALOXONE HYDROCHLORIDE 2 MG: 1 INJECTION PARENTERAL at 03:00

## 2022-03-02 RX ADMIN — PIPERACILLIN AND TAZOBACTAM 3375 MG: 3; .375 INJECTION, POWDER, FOR SOLUTION INTRAVENOUS at 04:07

## 2022-03-02 RX ADMIN — NALOXONE HYDROCHLORIDE 2 MG: 1 INJECTION, SOLUTION INTRAMUSCULAR; INTRAVENOUS; SUBCUTANEOUS at 03:08

## 2022-03-02 RX ADMIN — HEPARIN SODIUM 5810 UNITS: 1000 INJECTION INTRAVENOUS; SUBCUTANEOUS at 13:28

## 2022-03-02 RX ADMIN — DEXMEDETOMIDINE HYDROCHLORIDE 0.1 MCG/KG/HR: 4 INJECTION, SOLUTION INTRAVENOUS at 21:24

## 2022-03-02 RX ADMIN — VANCOMYCIN HYDROCHLORIDE 1250 MG: 10 INJECTION, POWDER, LYOPHILIZED, FOR SOLUTION INTRAVENOUS at 05:18

## 2022-03-02 RX ADMIN — NALOXONE HYDROCHLORIDE 2 MG: 1 INJECTION INTRAMUSCULAR; INTRAVENOUS; SUBCUTANEOUS at 03:04

## 2022-03-02 RX ADMIN — HEPARIN SODIUM 2900 UNITS: 1000 INJECTION INTRAVENOUS; SUBCUTANEOUS at 18:23

## 2022-03-02 RX ADMIN — SODIUM CHLORIDE 1000 ML: 9 INJECTION, SOLUTION INTRAVENOUS at 03:55

## 2022-03-02 RX ADMIN — Medication 18 UNITS/KG/HR: at 13:14

## 2022-03-02 RX ADMIN — NALOXONE HYDROCHLORIDE 2 MG: 1 INJECTION INTRAMUSCULAR; INTRAVENOUS; SUBCUTANEOUS at 03:08

## 2022-03-02 RX ADMIN — PROPOFOL 15 MCG/KG/MIN: 10 INJECTION, EMULSION INTRAVENOUS at 20:28

## 2022-03-02 RX ADMIN — ACETAMINOPHEN 650 MG: 325 TABLET ORAL at 20:18

## 2022-03-02 RX ADMIN — HEPARIN SODIUM 5810 UNITS: 1000 INJECTION INTRAVENOUS; SUBCUTANEOUS at 08:18

## 2022-03-02 RX ADMIN — Medication 50 MCG/HR: at 07:52

## 2022-03-02 RX ADMIN — PROPOFOL 20 MCG/KG/MIN: 10 INJECTION, EMULSION INTRAVENOUS at 07:32

## 2022-03-02 RX ADMIN — HEPARIN SODIUM 2900 UNITS: 1000 INJECTION INTRAVENOUS; SUBCUTANEOUS at 23:44

## 2022-03-02 RX ADMIN — NALOXONE HYDROCHLORIDE 2 MG: 1 INJECTION, SOLUTION INTRAMUSCULAR; INTRAVENOUS; SUBCUTANEOUS at 03:04

## 2022-03-02 RX ADMIN — SODIUM CHLORIDE 1000 ML: 9 INJECTION, SOLUTION INTRAVENOUS at 05:22

## 2022-03-02 RX ADMIN — PIPERACILLIN AND TAZOBACTAM 3375 MG: 3; .375 INJECTION, POWDER, LYOPHILIZED, FOR SOLUTION INTRAVENOUS at 20:06

## 2022-03-02 RX ADMIN — SODIUM CHLORIDE, PRESERVATIVE FREE 10 ML: 5 INJECTION INTRAVENOUS at 11:30

## 2022-03-02 RX ADMIN — IOPAMIDOL 75 ML: 755 INJECTION, SOLUTION INTRAVENOUS at 06:11

## 2022-03-02 ASSESSMENT — PULMONARY FUNCTION TESTS
PIF_VALUE: 24
PIF_VALUE: 23
PIF_VALUE: 23
PIF_VALUE: 22
PIF_VALUE: 20
PIF_VALUE: 12
PIF_VALUE: 21

## 2022-03-02 ASSESSMENT — PAIN SCALES - GENERAL: PAINLEVEL_OUTOF10: 0

## 2022-03-02 NOTE — ED PROVIDER NOTES
ATTENDING  ADDENDUM     Care of this patient was assumed from Dr. Soy Melo. The patient was seen for Altered Mental Status  . The patient's initial evaluation and plan have been discussed with the prior provider who initially evaluated the patient. Nursing Notes, Past Medical Hx, Past Surgical Hx, Social Hx, Allergies, and Family Hx were all reviewed.       ED COURSE      The patient was given the following medications:  Orders Placed This Encounter   Medications    naloxone (NARCAN) 2 MG/2ML injection     Julito Grant: cabinet override    naloxone (NARCAN) 2 MG/2ML injection     Julito Grant: cabinet override    naloxone (NARCAN) injection 2 mg    naloxone (NARCAN) injection 2 mg    naloxone (NARCAN) injection 2 mg    naloxone (NARCAN) injection 2 mg    piperacillin-tazobactam (ZOSYN) 3,375 mg in dextrose 5 % 50 mL IVPB (mini-bag)     Order Specific Question:   Antimicrobial Indications     Answer:   Sepsis of Unknown Etiology    0.9 % sodium chloride bolus    DISCONTD: vancomycin (VANCOCIN) intermittent dosing (placeholder)     Order Specific Question:   Antimicrobial Indications     Answer:   Sepsis of Unknown Etiology    DISCONTD: vancomycin (VANCOCIN) 1000 mg in dextrose 5% 200 mL IVPB     Order Specific Question:   Antimicrobial Indications     Answer:   Sepsis of Unknown Etiology    vancomycin (VANCOCIN) intermittent dosing (placeholder)     Order Specific Question:   Antimicrobial Indications     Answer:   Sepsis of Unknown Etiology    vancomycin (VANCOCIN) 1250 mg in sodium chloride 0.9% 250 mL IVPB     Order Specific Question:   Antimicrobial Indications     Answer:   Sepsis of Unknown Etiology    0.9 % sodium chloride bolus    DISCONTD: iopamidol (ISOVUE-370) 76 % injection 75 mL    iopamidol (ISOVUE-370) 76 % injection 75 mL    heparin (porcine) injection 5,810 Units    heparin (porcine) injection 5,810 Units    heparin (porcine) injection 2,900 Units    heparin 25,000 units in dextrose 5 % 250 mL infusion (rate based)    naloxone (NARCAN) 0.4 MG/ML injection     SayNeetu méndez: cabinet override    propofol 1000 MG/100ML injection     ElrodrigoriJonny Carias: cabinet override    propofol injection     Order Specific Question:   Titrate Infusion? Answer:   Yes     Order Specific Question:   Initial Infusion Rate: Answer:   20 mcg/kg/min     Order Specific Question:   Goal of Therapy:     Answer:   RASS of -2 to -3     Order Specific Question:   Contact Provider if:     Answer:   New onset HR less than 50 bpm     Order Specific Question:   Contact Provider if:     Answer:   New onset SBP less than 90 mmHg     Order Specific Question:   Contact Provider if:     Answer:   Patient is receiving maximum dose and is not achieving the goal of therapy     Order Specific Question:   Contact Provider if:     Answer:   Triglycerides greater than 500 mg/dL    midazolam (VERSED) 2 MG/2ML injection     SayedNeetu: cabinet override    fentaNYL 20 mcg/mL Infusion     Order Specific Question:   Titrate Infusion? Answer:   Yes     Order Specific Question:   Initial Infusion Dose: Answer:   48 mcg/hr     Order Specific Question:   Goal of Therapy is: Answer:   RASS of -1 to 1     Order Specific Question:   Contact Provider if:     Answer:   Patient is receiving the maximum dose and is not achieving the goal of therapy       RECENT VITALS:   Temp: 98.7 °F (37.1 °C), Pulse: 76, Resp: 19, BP: (!) 149/89    MEDICAL DECISION MAKING       Patient accepted at shift change from Dr Caryl Slaughter. Patient with known history of IVDA presented with altered mental status and signs of head trauma. Initially responsive to 8 mg of Narcan. Found to have CT head neck negative for acute injury. CT abdomen pelvis shows gallbladder distention, multiple hernias, without signs of obstruction. On my evaluation, the patient again has decreased level of consciousness, hypoventilation, hypoxia.   Breath sounds are coarse

## 2022-03-02 NOTE — ED NOTES
Writer enters room, pt. GCS3, unable to arouse  SpO2 83% pt. Placed on NRB   Dr. Jose Aguilar called to bedside  Pt.  To be intubated   RT notified      Renetta Kline RN  03/02/22 3526

## 2022-03-02 NOTE — ED NOTES
20mg of Etomidate IVP given by Kimo Ahumada.   Verbal order from Dr. Jean Carlos Nunez, RN  03/02/22 0135

## 2022-03-02 NOTE — PROGRESS NOTES
[address] [facility] Pharmacokinetic Monitoring Service - Vancomycin     Barbara Durbin is a 55 y.o. male starting on vancomycin therapy for sepsis. Pharmacy consulted by Pamella Wood for monitoring and adjustment. Target Concentration: Goal AUC/TOMMY 400-600 mg*hr/L    Additional Antimicrobials: zosyn    Pertinent Laboratory Values: Wt Readings from Last 1 Encounters:   03/02/22 160 lb (72.6 kg)     Temp Readings from Last 1 Encounters:   03/02/22 98.7 °F (37.1 °C) (Oral)     Estimated Creatinine Clearance: 158 mL/min (A) (based on SCr of 0.51 mg/dL (L)). Recent Labs     03/02/22  0256 03/02/22  0326   CREATININE 0.56 0.51*   WBC  --  15.9*         Pertinent Cultures:  Culture Date Source Results           MRSA Nasal Swab: N/A. Non-respiratory infection.     Plan:  Dosing recommendations based on Bayesian software  Start vancomycin 1250 mg every 12 hours  Anticipated AUC of 479 and trough concentration of 13.6 at steady state  Renal labs as indicated   Vancomycin concentration ordered for   @     Pharmacy will continue to monitor patient and adjust therapy as indicated    Thank you for the consult,  Hoa Valerio, [facility]  3/2/2022 4:22 AM

## 2022-03-02 NOTE — PROGRESS NOTES
Trauma Tertiary Survey    Admit Date: 3/2/2022  Hospital day 0    Other found down        Past Medical History:   Diagnosis Date    Bipolar 1 disorder ([facility]     Chronic mental illness     Depression     Hepatitis C     Hypertension     Psoriasis     Substance abuse ([facility] [facility]        Scheduled Meds:   vancomycin (VANCOCIN) intermittent dosing (placeholder)   Other RX Placeholder    vancomycin  1,250 mg IntraVENous Q12H    naloxone        midazolam        sodium chloride flush  5-40 mL IntraVENous 2 times per day    piperacillin-tazobactam  3,375 mg IntraVENous Q8H    heparin (porcine)  80 Units/kg IntraVENous Once     Continuous Infusions:   propofol Stopped (03/02/22 1006)    fentaNYL 50 mcg/hr (03/02/22 0752)    sodium chloride      heparin (PORCINE) Infusion       PRN Meds:heparin (porcine), heparin (porcine), sodium chloride flush, sodium chloride, ondansetron **OR** ondansetron, polyethylene glycol, acetaminophen **OR** acetaminophen, heparin (porcine), heparin (porcine)    Subjective:     Patient remains intubated. Unable to participate in exam     Objective:     Patient Vitals for the past 8 hrs:   BP Pulse Resp SpO2 Height   03/02/22 1100 -- 66 20 100 % --   03/02/22 0904 -- 63 20 97 % --   03/02/22 0900 126/76 63 20 -- --   03/02/22 0834 -- 66 20 96 % --   03/02/22 0833 139/81 67 18 -- --   03/02/22 0800 (!) 149/89 76 19 99 % --   03/02/22 0749 -- -- -- -- 5' 11\" (1.803 m)   03/02/22 0741 (!) 200/121 78 22 100 % --   03/02/22 0736 (!) 193/124 82 15 99 % --   03/02/22 0515 (!) 168/110 80 20 98 % --       I/O last 3 completed shifts: In: 1050 [IV Piggyback:1050]  Out: -   No intake/output data recorded. Radiology: XR RADIUS ULNA RIGHT (2 VIEWS)    Result Date: 3/2/2022  EXAMINATION: TWO XRAY VIEWS OF THE RIGHT FOREARM; 2 XRAY VIEWS OF THE RIGHT WRIST; THREE XRAY VIEWS OF THE RIGHT HAND 3/2/2022 5:24 am COMPARISON: None.  HISTORY: ORDERING SYSTEM PROVIDED HISTORY: Trauma TECHNOLOGIST PROVIDED HISTORY: Trauma Reason for Exam: found unresponsive,best films pt unable to cooperate,ams FINDINGS: No fracture, dislocation or joint abnormality identified in the right forearm, wrist and hand. There is moderate diffuse soft tissue swelling throughout. Moderate diffuse soft tissue swelling. No osseous or articular abnormality evident. XR WRIST RIGHT (2 VIEWS)    Result Date: 3/2/2022  EXAMINATION: TWO XRAY VIEWS OF THE RIGHT FOREARM; 2 XRAY VIEWS OF THE RIGHT WRIST; THREE XRAY VIEWS OF THE RIGHT HAND 3/2/2022 5:24 am COMPARISON: None. HISTORY: ORDERING SYSTEM PROVIDED HISTORY: Trauma TECHNOLOGIST PROVIDED HISTORY: Trauma Reason for Exam: found unresponsive,best films pt unable to cooperate,ams FINDINGS: No fracture, dislocation or joint abnormality identified in the right forearm, wrist and hand. There is moderate diffuse soft tissue swelling throughout. Moderate diffuse soft tissue swelling. No osseous or articular abnormality evident. XR HAND RIGHT (MIN 3 VIEWS)    Result Date: 3/2/2022  EXAMINATION: TWO XRAY VIEWS OF THE RIGHT FOREARM; 2 XRAY VIEWS OF THE RIGHT WRIST; THREE XRAY VIEWS OF THE RIGHT HAND 3/2/2022 5:24 am COMPARISON: None. HISTORY: ORDERING SYSTEM PROVIDED HISTORY: Trauma TECHNOLOGIST PROVIDED HISTORY: Trauma Reason for Exam: found unresponsive,best films pt unable to cooperate,ams FINDINGS: No fracture, dislocation or joint abnormality identified in the right forearm, wrist and hand. There is moderate diffuse soft tissue swelling throughout. Moderate diffuse soft tissue swelling. No osseous or articular abnormality evident.      CT Head WO Contrast    Result Date: 3/2/2022  EXAMINATION: CT OF THE HEAD WITHOUT CONTRAST; CT OF THE CERVICAL SPINE WITHOUT CONTRAST 3/2/2022 3:19 am TECHNIQUE: CT of the head was performed without the administration of intravenous contrast. Dose modulation, iterative reconstruction, and/or weight based adjustment of the mA/kV was utilized to reduce the radiation dose to as low as reasonably achievable.; CT of the cervical spine was performed without the administration of intravenous contrast. Multiplanar reformatted images are provided for review. Dose modulation, iterative reconstruction, and/or weight based adjustment of the mA/kV was utilized to reduce the radiation dose to as low as reasonably achievable. COMPARISON: None. HISTORY: ORDERING SYSTEM PROVIDED HISTORY: ams TECHNOLOGIST PROVIDED HISTORY: ams Decision Support Exception - unselect if not a suspected or confirmed emergency medical condition->Emergency Medical Condition (MA) FINDINGS: CERVICAL SPINE: BONES/ALIGNMENT: There is no acute fracture or traumatic malalignment. DEGENERATIVE CHANGES: No significant degenerative changes. SOFT TISSUES: There is no prevertebral soft tissue swelling. HEAD: BRAIN/VENTRICLES: Small area of encephalomalacia involving the inferior left frontal lobe indicative of an old insult. There is no acute intracranial hemorrhage, mass effect or midline shift. No abnormal extra-axial fluid collection. The gray-white differentiation is maintained without evidence of an acute infarct. There is no evidence of hydrocephalus. ORBITS: The visualized portion of the orbits demonstrate no acute abnormality. SINUSES: The visualized paranasal sinuses and mastoid air cells demonstrate no acute abnormality. SOFT TISSUES/SKULL: No acute abnormality of the visualized skull or soft tissues. No CT evidence of an acute intracranial abnormality. No evidence of acute fracture or traumatic malalignment of the cervical spine. Small area of encephalomalacia involving the inferior left frontal lobe indicative of an old insult.  RECOMMENDATIONS: Unavailable     CT Cervical Spine WO Contrast    Result Date: 3/2/2022  EXAMINATION: CT OF THE HEAD WITHOUT CONTRAST; CT OF THE CERVICAL SPINE WITHOUT CONTRAST 3/2/2022 3:19 am TECHNIQUE: CT of the head was performed without the administration of intravenous contrast. Dose modulation, iterative reconstruction, and/or weight based adjustment of the mA/kV was utilized to reduce the radiation dose to as low as reasonably achievable.; CT of the cervical spine was performed without the administration of intravenous contrast. Multiplanar reformatted images are provided for review. Dose modulation, iterative reconstruction, and/or weight based adjustment of the mA/kV was utilized to reduce the radiation dose to as low as reasonably achievable. COMPARISON: None. HISTORY: ORDERING SYSTEM PROVIDED HISTORY: ams TECHNOLOGIST PROVIDED HISTORY: ams Decision Support Exception - unselect if not a suspected or confirmed emergency medical condition->Emergency Medical Condition (MA) FINDINGS: CERVICAL SPINE: BONES/ALIGNMENT: There is no acute fracture or traumatic malalignment. DEGENERATIVE CHANGES: No significant degenerative changes. SOFT TISSUES: There is no prevertebral soft tissue swelling. HEAD: BRAIN/VENTRICLES: Small area of encephalomalacia involving the inferior left frontal lobe indicative of an old insult. There is no acute intracranial hemorrhage, mass effect or midline shift. No abnormal extra-axial fluid collection. The gray-white differentiation is maintained without evidence of an acute infarct. There is no evidence of hydrocephalus. ORBITS: The visualized portion of the orbits demonstrate no acute abnormality. SINUSES: The visualized paranasal sinuses and mastoid air cells demonstrate no acute abnormality. SOFT TISSUES/SKULL: No acute abnormality of the visualized skull or soft tissues. No CT evidence of an acute intracranial abnormality. No evidence of acute fracture or traumatic malalignment of the cervical spine. Small area of encephalomalacia involving the inferior left frontal lobe indicative of an old insult.  RECOMMENDATIONS: Unavailable     CT ABDOMEN PELVIS W IV CONTRAST Additional Contrast? None    Result Date: abdominal aorta and iliac branch vasculature. No retroperitoneal lymphadenopathy. Psoas muscles normal in size and symmetric in appearance. Bones/Soft Tissues: Small midline fat-containing periumbilical hernia. Mild diffuse degenerative changes throughout the spine. 1. Multifocal airspace disease at the lung bases which could represent multifocal pneumonia and/or atelectasis. Follow-up is recommended to document resolution. 2. Large right inguinal hernia which contains bowel loops. No evidence for small bowel obstruction. 3. Small midline fat containing periumbilical hernia. 4. Small 9 mm mid pole left renal cyst. 5. Gallbladder distension. Mild intra- and extrahepatic biliary ductal dilation. Further evaluation with gallbladder ultrasound and nonemergent MRCP recommended. Correlate with LFTs. 6. Old granulomatous disease. XR CHEST PORTABLE    Result Date: 3/2/2022  EXAMINATION: ONE XRAY VIEW OF THE CHEST 3/2/2022 7:42 am COMPARISON: 03/02/2022, 315 hours HISTORY: ORDERING SYSTEM PROVIDED HISTORY: confirm ET tube placement TECHNOLOGIST PROVIDED HISTORY: confirm ET tube placement 60-year-old male; confirm endotracheal tube placement FINDINGS: Portable supine view of the chest. Endotracheal tube distal tip overlying the mid trachea approximately 5.8 cm above the level of the arnel. Enteric tube traverses the GE junction with distal tip excluded from the field of view. Cardiac monitor leads overlie the chest. No obvious pneumothorax on limited portable supine positioning. No acute focal airspace consolidation or pleural effusions. Cardiac and mediastinal contours remain unchanged. No acute osseous abnormality evident. 1. Tubes as detailed above. 2. No acute focal airspace consolidation.      XR CHEST PORTABLE    Result Date: 3/2/2022  EXAMINATION: ONE XRAY VIEW OF THE CHEST 3/2/2022 3:15 am COMPARISON: December 6, 2021 HISTORY: ORDERING SYSTEM PROVIDED HISTORY: [facility] TECHNOLOGIST PROVIDED HISTORY: ams Reason for Exam: upr,cva,ams FINDINGS: No lines or tubes. Normal cardiomediastinal silhouette. The lungs are clear without focal consolidation or pleural effusion. No suspicious pulmonary nodules. No pulmonary edema. No pneumothorax. No acute osseous abnormality. 1. No acute cardiopulmonary disease. CT CHEST PULMONARY EMBOLISM W CONTRAST    Result Date: 3/2/2022  EXAMINATION: CTA OF THE CHEST 3/2/2022 6:04 am TECHNIQUE: CTA of the chest was performed after the administration of intravenous contrast.  Multiplanar reformatted images are provided for review. MIP images are provided for review. Dose modulation, iterative reconstruction, and/or weight based adjustment of the mA/kV was utilized to reduce the radiation dose to as low as reasonably achievable. COMPARISON: 01/09/2021 HISTORY: ORDERING SYSTEM PROVIDED HISTORY: concern for PE TECHNOLOGIST PROVIDED HISTORY: concern for PE Decision Support Exception - unselect if not a suspected or confirmed emergency medical condition->Emergency Medical Condition (MA) Reason for Exam: found down FINDINGS: Pulmonary Arteries: Pulmonary arteries are adequately opacified for evaluation. Evaluation is somewhat limited due to respiratory motion artifact. There appears to be extensive pulmonary emboli on the left to upper lobe superiorly and anteriorly. Pulmonary embolus may also be present to the anteromedial right upper lobe. Main pulmonary artery is normal in caliber. Mediastinum: No evidence of mediastinal lymphadenopathy. The heart and pericardium demonstrate no acute abnormality. No evidence of heart strain. There is no acute abnormality of the thoracic aorta. Lungs/pleura: Patchy consolidation at the anteroinferior right middle lobe. Atelectasis versus consolidation at the posterior lung bases bilaterally, left worse than right. Focal opacity also noted at the right apex. No pneumothorax or effusion.  Upper Abdomen: Limited images of the upper abdomen are unremarkable. Soft Tissues/Bones: No acute bone or soft tissue abnormality. Limited exam due to motion artifact but there appears to be extensive pulmonary emboli on the left to the upper lobe superiorly and anteriorly. A nonobstructing pulmonary embolus may also be present to a branch to the anteromedial right upper lobe. No heart strain. Possible superimposed developing pneumonia. CT LUMBAR SPINE TRAUMA RECONSTRUCTION    Result Date: 3/2/2022  EXAMINATION: CT OF THE LUMBAR SPINE WITHOUT CONTRAST; CT OF THE THORACIC SPINE WITHOUT CONTRAST 3/2/2022 TECHNIQUE: CT of the lumbar spine was performed without the administration of intravenous contrast. Multiplanar reformatted images are provided for review. Adjustment of mA and/or kV according to patient size was utilized. Dose modulation, iterative reconstruction, and/or weight based adjustment of the mA/kV was utilized to reduce the radiation dose to as low as reasonably achievable.; CT of the thoracic spine was performed without the administration of intravenous contrast. Multiplanar reformatted images are provided for review. Dose modulation, iterative reconstruction, and/or weight based adjustment of the mA/kV was utilized to reduce the radiation dose to as low as reasonably achievable. COMPARISON: None. HISTORY: ORDERING SYSTEM PROVIDED HISTORY: Trauma TECHNOLOGIST PROVIDED HISTORY: Trauma Reason for Exam: found down FINDINGS: BONES/ALIGNMENT: There is no acute fracture or traumatic malalignment. DEGENERATIVE CHANGES: No significant degenerative changes of the thoracic or lumbar spine. SOFT TISSUES: No paraspinal mass is seen. No acute thoracic or lumbar spine trauma.      CT THORACIC SPINE TRAUMA RECONSTRUCTION    Result Date: 3/2/2022  EXAMINATION: CT OF THE LUMBAR SPINE WITHOUT CONTRAST; CT OF THE THORACIC SPINE WITHOUT CONTRAST 3/2/2022 TECHNIQUE: CT of the lumbar spine was performed without the administration of intravenous contrast. Multiplanar reformatted images are provided for review. Adjustment of mA and/or kV according to patient size was utilized. Dose modulation, iterative reconstruction, and/or weight based adjustment of the mA/kV was utilized to reduce the radiation dose to as low as reasonably achievable.; CT of the thoracic spine was performed without the administration of intravenous contrast. Multiplanar reformatted images are provided for review. Dose modulation, iterative reconstruction, and/or weight based adjustment of the mA/kV was utilized to reduce the radiation dose to as low as reasonably achievable. COMPARISON: None. HISTORY: ORDERING SYSTEM PROVIDED HISTORY: Trauma TECHNOLOGIST PROVIDED HISTORY: Trauma Reason for Exam: found down FINDINGS: BONES/ALIGNMENT: There is no acute fracture or traumatic malalignment. DEGENERATIVE CHANGES: No significant degenerative changes of the thoracic or lumbar spine. SOFT TISSUES: No paraspinal mass is seen. No acute thoracic or lumbar spine trauma. PHYSICAL EXAM:   GCS:  1 - Does not open eyes   1 - No motor response to pain  1 - Makes no noise    Pupil size:  Left 3 mm Right 3 mm  Pupil reaction: Yes  Wiggles fingers: Left no Right No  Hand grasp:   Unable to participate in exam   Wiggles toes:  Unable to participate in exam   Plantar flexion:  Unable to participate in exam     /76   Pulse 66   Temp 98.7 °F (37.1 °C) (Oral)   Resp 20   Ht 5' 11\" (1.803 m)   Wt 160 lb (72.6 kg)   SpO2 100%   BMI 22.32 kg/m²   General appearance: alert, appears stated age and cooperative  Head: Normocephalic, without obvious abnormality, atraumatic. Small facial laceration to R cheek   Neck: supple, symmetrical, trachea midline  Back: symmetric, no curvature. ROM normal. No CVA tenderness.   Lungs: equal chest rise bilaterally   Chest wall: no deformity  Heart: regular rate and rhythm  Abdomen: soft, non-tender; bowel sounds normal; no masses,  no organomegaly  Extremities: BUE with erythema   Pulses: 2+ and symmetric  Neurologic: Grossly normal        Spine:     Spine Tenderness ROM   Cervical Unable to participate  /10 Normal   Thoracic Unable to participate /10 Normal   Lumbar Unable to participate /10 Normal     Musculoskeletal    Joint Tenderness Swelling ROM   Right shoulder absent absent normal   Left shoulder absent absent normal   Right elbow absent absent normal   Left elbow absent absent normal   Right wrist absent absent normal   Left wrist absent absent normal   Right hand grasp absent absent normal   Left hand grasp absent absent normal   Right hip absent absent normal   Left hip absent absent normal   Right knee absent absent normal   Left knee absent absent normal   Right ankle absent absent normal   Left ankle absent absent normal   Right foot absent absent normal   Left foot absent absent normal           CONSULTS: Derm vs ID    PROCEDURES: None    INJURIES:        Patient Active Problem List   Diagnosis    Hepatitis C    Psoriasis    Hypertension    Smoker    Opioid type dependence, continuous ([facility]    Bipolar 1 disorder (H[facility])    Cocaine abuse ([facility]    IV drug abuse ([facility] [facility]    History of alcohol abuse    Generalized pain    Generalized anxiety disorder    Abnormal CT scan, esophagus    Chest pain    Altered mental status    AMS (altered mental status)         Assessment/Plan:     No additional imaging at this time  Recommend Derm vs ID for evaluation of bilateral upper extremity skin lesions   LFTs reviewed, not concerning at this time for GB disease.    Follow up as outpatient for R inguinal hernia, Gallbladder, and periumbilical hernia,

## 2022-03-02 NOTE — ED NOTES
Patient to the ED via EMS for altered mental status. Patient's friend called for a well check and patient was found to be minimally responsive on arrival.  Pt arrived to ED with non-rebreather in place, pinpoint pupils on exam.  Patient administered Narcan 6 mg IN and 2 mg IV with some response and improvement in pupil size.      Isis Hughes RN  03/02/22 1027

## 2022-03-02 NOTE — ED PROVIDER NOTES
[address] [facility]   Emergency Department  Faculty Attestation       I performed a history and physical examination of the patient and discussed management with the resident. I reviewed the residents note and agree with the documented findings including all diagnostic interpretations and plan of care. Any areas of disagreement are noted on the chart. I was personally present for the key portions of any procedures. I have documented in the chart those procedures where I was not present during the key portions. I have reviewed the emergency nurses triage note. I agree with the chief complaint, past medical history, past surgical history, allergies, medications, social and family history as documented unless otherwise noted below. Documentation of the HPI, Physical Exam and Medical Decision Making performed by scribsoniya is based on my personal performance of the HPI, PE and MDM. For Physician Assistant/ Nurse Practitioner cases/documentation I have personally evaluated this patient and have completed at least one if not all key elements of the E/M (history, physical exam, and MDM). Additional findings are as noted. Pertinent Comments     Primary Care Physician: SHADIA Skaggs MD      ED Triage Vitals   BP Temp Temp Source Pulse Resp SpO2 Height Weight   03/02/22 0301 03/02/22 0301 03/02/22 0301 03/02/22 0301 03/02/22 0301 03/02/22 0301 -- 03/02/22 0400   (!) 188/110 98.7 °F (37.1 °C) Oral 79 20 96 %  160 lb (72.6 kg)        History/Physical: This is a 55 y.o. male who presents to the Emergency Department with complaint of found down. The patient was found after; EMS for a well check. Unclear etiology. Neighbor did give Narcan. No Narcan had been given by first responders. Patient initially not answering questions. Initial examination, patient is obtunded, is normocephalic, has swelling over the right cheek with laceration. Has pinpoint pupils that are equal and minimally responsive.   Initially was mildly tachycardic but regular. He has tachypnea and transmitted upper airway noises. Abdomen is soft and nondistended, nontender. Patient log-rolled and spine evaluated. No midline thoracic/lumbar step off or deformity. No pressure ulcers or skin changes on the back. There is signs of trench foot on the feet. There is chronic skin changes of bilateral upper extremities with pitting edema. Mild erythematous and not warm. Initial exam patient is minimally responsive to painful stimuli    MDM/Plan:   Patient found down and unclear history? Was initially minimally responsive. Received 4 mg IN narcan and started to become more arousable   Given an additional 4 of narcan and patient is still somewhat somnolent but answering my questions. Is able to tell me his name, year, and city. Still requiring oxygen, but pH is within normal limits. Will get CT head and CT cspine   Basic labs and monitor for additional narcan dosing. Was found to have an increased wbc - arms are mildly edematous and erythemous unclear if true cellulitis or chronic changes. However, given leukocytosis and mild lactic acidosis at (slightly above 2). Will treat as infection at this time. (Identified at 3:26 AM)    Given hypoxia, D-dimer added on. Elevated. Trauma consult as patient will be admitted and has obvious hematoma and laceration  CT r/o PE with multiple PEs  Signed out to oncoming physician     Sepsis Times and Checklist  Vital Signs: BP: (!) 149/89  Pulse: 63  Resp: 20  Temp: 98.7 °F (37.1 °C) SpO2: 97 %  SIRS (>2)   Temp > 38.3C or < 36C   HR > 90   RR > 20   WBC > 12 or < 4 or >10% bands  SIRS (>2) and confirmed or suspected source of infection = Sepsis  Is Sepsis due to likely bacterial infection?: Yes      Sepsis Identified:  Date: 3/2/22   Time: 3:26 AM  Sepsis Orders:  ·  CBC: Yes  ·  CMP: Yes  ·  PT/PTT: Yes  ·  Blood Cultures x2: Yes  ·  Urinalysis and Urine Culture: Yes  ·  Lactate:  Yes  ·  Broad Spectrum Antibiotics Given (within 3 hours of sepsis identification,  after blood cultures):  Yes    (If unable to obtain IV access for IV antibiotics within 3 hours of  identification of sepsis, IM antibiotics is acceptable.)  ·             If lactate >2.0 MUST repeat within 6 hours    If elevated, is elevated lactate from a likely infectious source?: Mildly elevated above 2. Unclear if from sepsis of hypoxia.       IV Fluid Bolus:  Is lactate > 4.0:  No  If lactate >  4.0 OR hypotension (MAP<65 mmHg) (with 2 BP readings) 30ml/kg crystalloid MUST be ordered.  - NOT IDENTIFIED    MIPS 415 - CT Head in Adult Head Trauma > 25years of age:   A head CT was ordered, by someone other than the emergency care provider: No    A head CT was ordered by an emergency care provider, and some of the indications for ordering the head CT included:   Measure Exclusions:  - Patient has a ventricular shunt: No  - Patient has a brain tumor: No  - Patient has multi-system trauma: No  - Patient is pregnant: No  - Patient is taking an antiplatelet medication (excluding aspirin): No  - Patient is 72years old or older: No    Signs and Symptoms:  - Patients GCS was less than 15: Yes  - Focal neurological deficit: No  - Severe Headache: No  - Vomiting: No  - Physical signs of a basilar skull fracture: No  - Coagulopathy: No  - Thrombocytopenia: No  - Patient suspected of taking an anticoagulant medication: No  - Dangerous mechanism of injury: UNknown      Patient had loss of consciousness OR posttraumatic amnesia AND:   Headache: No  Patient is 61years old or older: No  Drug or Alcohol intoxication: Yes  Short-term memory deficits: No  Evidence of trauma above the clavicles (any visible or detected trauma to the head or neck, including lacerations, abrasions, bruising, swelling or fracture): Yes  Post-traumatic seizure: No    EKG Interpretation    Interpreted by emergency department physician    Clinical Impression:Normal sinsu with no signs of ischemia  Zane Taylor MD      Critical Care: There was significant risk of life threatening deterioration of patient's condition requiring my direct management. Critical care time 30 minutes, excluding any documented procedures.       Zane Taylor MD  Attending Emergency Physician        Zane Taylor MD  03/02/22 9813

## 2022-03-02 NOTE — FLOWSHEET NOTE
[facility]     Emergency/Trauma Note    PATIENT NAME: Kun Loving    Shift date: 3/01/2022  Shift day: Tuesday   Shift # 3    Room # 22/22   Name: Kun Loving            Age: 55 y.o. Gender: male          Hindu: [address] of Religious: Unknown    Trauma/Incident type: Adult Trauma Consult  Admit Date & Time: 3/2/2022  2:43 AM  TRAUMA NAME: N/A    ADVANCE DIRECTIVES IN CHART? No    NAME OF DECISION MAKER: Unknown    RELATIONSHIP OF DECISION MAKER TO PATIENT: Unknown    PATIENT/EVENT DESCRIPTION:  Kun Loving is a 55 y.o. male who arrived to [facility] and was later paged out as an \"Adult Trauma Consult,\" due to being \"found down/abrasion to the face. \" Patient remained \"altered,\" and would not wake upon  calling his name. \" Per report, patient's friend called [employer] to request for a wellness check and patient was found unresponsive. Pt to be admitted to 22/22. SPIRITUAL ASSESSMENT/INTERVENTION:  Floy Prom was present when patient arrived to the ED.  gathered patient information from EMS and noted that patient's father is listed as his only emergency contact.  returned to bedside to attempt to speak with patient, however, he remained unresponsive.  contacted patient's father, Renetta Peck ([phone]), and informed him of patient's arrival.  shared hospital phone number with patient's father for his reference. Patient's father thanked  for call and expressed concern over patient's condition. Near 0700, patient was \"emergently intubated. \" Floy Prom requested that patient's father receive a follow-up call from ED Resident regarding his current condition. PATIENT BELONGINGS:  No belongings noted    ANY BELONGINGS OF SIGNIFICANT VALUE NOTED:  N/A    REGISTRATION STAFF NOTIFIED?   Yes      WHAT IS YOUR SPIRITUAL CARE PLAN FOR THIS PATIENT?:  [facility] can make follow-up visit, per request. Gertrude Melo can be reached 24/7 via [facility]. 03/02/22 0241   Encounter Summary   Services provided to: Family; Patient not available   Referral/Consult From: Multi-disciplinary team  (Adult Trauma Consult)   Support System Parent   Continue Visiting   (3/01/2022)   Complexity of Encounter High   Length of Encounter 1 hour   Spiritual Assessment Completed Yes   Routine   Type Initial   Crisis   Type Trauma   Spiritual/Yazidism   Type Spiritual support   Assessment Anxious; Fearful;Unable to respond   Intervention Active listening;Explored feelings, thoughts, concerns;Sustaining presence/ Ministry of presence; Discussed illness/injury and it's impact   Outcome Expressed gratitude;Receptive;Did not respond     Electronically signed by Palak Baez on 3/2/2022 at 6:58 AM.  [facility]  [phone]

## 2022-03-02 NOTE — ED PROVIDER NOTES
[address] [facility]  ED  Emergency Department Encounter  EmergencyMedicine Resident     Pt Patricio Martínez  MRN: [id number]  [place] 1965  Date of evaluation: 3/2/22  PCP:  Duane Winn MD    This patient was evaluated in the Emergency Department for symptoms described in the history of present illness. The patient was evaluated in the context of the global COVID-19 pandemic, which necessitated consideration that the patient might be at risk for infection with the SARS-CoV-2 virus that causes COVID-19. Institutional protocols and algorithms that pertain to the evaluation of patients at risk for COVID-19 are in a state of rapid change based on information released by regulatory bodies including the CDC and federal and state organizations. These policies and algorithms were followed during the patient's care in the ED. CHIEF COMPLAINT       Chief Complaint   Patient presents with    Altered Mental Status       HISTORY OF PRESENT ILLNESS  (Location/Symptom, Timing/Onset, Context/Setting, Quality, Duration, Modifying Factors, Severity.)      Kevin Daugherty is a 55 y.o. male who presents being found down for unknown amount of time. Patient presents emerged part with altered mental status not participate in physical exam answering questions appropriately GCS 4. Patient's GCS improved to 6 after multiple rounds of Narcan. Patient did have pinpoint pupils on arrival.  Patient has right-sided facial abrasion but otherwise no other signs of outward trauma. Patient is notably cold and shivering. PAST MEDICAL / SURGICAL / SOCIAL / FAMILY HISTORY      has a past medical history of Bipolar 1 disorder ([address] Chronic mental illness, Depression, Hepatitis C, Hypertension, Psoriasis, and Substance abuse ([address] has a past surgical history that includes brain surgery and Dental surgery.       Social History     Socioeconomic History    Marital status: Single     Spouse name: Not on file    Number of children: Not on file    Years of education: Not on file    Highest education level: Not on file   Occupational History    Not on file   Tobacco Use    Smoking status: Current Every Day Smoker     Packs/day: 1.00     Years: 40.00     Pack years: 40.00     Types: Cigarettes    Smokeless tobacco: Never Used   Vaping Use    Vaping Use: Former   Substance and Sexual Activity    Alcohol use: No     Alcohol/week: 0.0 standard drinks     Comment: social     Drug use: Yes     Types: Opiates      Comment: heroin 6-27-21   Opiods    Sexual activity: Not on file   Other Topics Concern    Not on file   Social History Narrative    Not on file     Social Determinants of Health     Financial Resource Strain: High Risk    Difficulty of Paying Living Expenses: Hard   Food Insecurity: No Food Insecurity    Worried About Running Out of Food in the Last Year: Never true    Liza of Food in the Last Year: Never true   Transportation Needs:     Lack of Transportation (Medical): Not on file    Lack of Transportation (Non-Medical):  Not on file   Physical Activity:     Days of Exercise per Week: Not on file    Minutes of Exercise per Session: Not on file   Stress:     Feeling of Stress : Not on file   Social Connections:     Frequency of Communication with Friends and Family: Not on file    Frequency of Social Gatherings with Friends and Family: Not on file    Attends [employer] Services: Not on file    Active Member of [address] [place] [address] or Organizations: Not on file    Attends Club or Organization Meetings: Not on file    Marital Status: Not on file   Intimate Partner Violence:     Fear of Current or Ex-Partner: Not on file    Emotionally Abused: Not on file    Physically Abused: Not on file    Sexually Abused: Not on file   Housing Stability:     Unable to Pay for Housing in the Last Year: Not on file    Number of [place] in the Last Year: Not on file    Unstable Housing in the Last Year: Not on file Family History   Problem Relation Age of Onset    Cancer Father     Cancer Child        Allergies:  Haldol [haloperidol], Latuda [lurasidone hcl], and Ziprasidone    Home Medications:  Prior to Admission medications    Medication Sig Start Date End Date Taking? Authorizing Provider   QUEtiapine (SEROQUEL) 200 MG tablet TAKE 1 TABLET BY MOUTH NIGHTLY 11/30/21   Tuyet Booker MD   lisinopril (PRINIVIL;ZESTRIL) 20 MG tablet Take 1 tablet by mouth daily 7/8/21   Preston Washburn MD   metoprolol tartrate (LOPRESSOR) 25 MG tablet Take by mouth    Historical Provider, MD   amLODIPine (NORVASC) 10 MG tablet Take 1 tablet by mouth daily 8/18/21   Tuyet Booker MD   folic acid (FOLVITE) 1 MG tablet Take 1 tablet by mouth daily 8/18/21   Tuyet Booker MD   thiamine 100 MG tablet Take 1 tablet by mouth daily 8/18/21   Tuyet Booker MD   citalopram (CELEXA) 20 MG tablet Take 1 tablet by mouth daily 8/18/21   Tuyet Booker MD   traZODone (DESYREL) 50 MG tablet Take 1 tablet by mouth nightly as needed for Sleep 8/18/21   Tuyet Booker MD   gabapentin (NEURONTIN) 300 MG capsule Take 1 capsule by mouth 2 times daily for 30 days. Intended supply: 30 days 8/18/21 9/17/21  Tuyet Booker MD   dicyclomine (BENTYL) 10 MG capsule Take 1 capsule by mouth every 6 hours as needed (cramps) 6/20/21   Juve Logan MD   hydroCHLOROthiazide (HYDRODIURIL) 25 MG tablet Take 1 tablet by mouth daily 6/25/20   William García, APRN - CNP   omeprazole (PRILOSEC OTC) 20 MG tablet Take 1 tablet by mouth daily.  2/10/15 7/1/15  Hector Wlash MD       REVIEW OF SYSTEMS    (2-9 systems for level 4, 10 or more for level 5)      Review of Systems   Unable to perform ROS: Mental status change       PHYSICAL EXAM   (up to 7 for level 4, 8 or more for level 5)      INITIAL VITALS:   BP (!) 168/110   Pulse 80   Temp 98.7 °F (37.1 °C) (Oral)   Resp 20   Wt 160 lb (72.6 kg)   SpO2 98%   BMI 24.33 kg/m²     Physical Exam  Constitutional:       Appearance: He is ill-appearing. Comments: Confused   HENT:      Head:      Comments: Right-sided facial abrasion edema     Ears:      Comments: Bilateral cerumen no evidence of hemotympanum bilaterally     Mouth/Throat:      Mouth: Mucous membranes are dry. Comments: Poor dentition  Eyes:      General: No scleral icterus. Comments: +1 pupils bilaterally reactive   Cardiovascular:      Rate and Rhythm: Normal rate. Heart sounds: No murmur heard. No friction rub. No gallop. Pulmonary:      Breath sounds: No stridor. No wheezing. Comments: Coarse breath sounds bilaterally  Abdominal:      General: There is no distension. Palpations: Abdomen is soft. There is no mass. Tenderness: There is no abdominal tenderness. There is no guarding or rebound. Hernia: No hernia is present. Musculoskeletal:      Cervical back: No rigidity. Comments: Bilateral lower extremity edema. Bilateral upper extremity edema. Right-sided forearm erythema and edema   Skin:     Capillary Refill: Capillary refill takes less than 2 seconds. Neurological:      Comments: Patient is not alert to himself time or place. No facial droop noted. No obvious lateralizing signs. Patient moving all extremities. Mentation improved following Narcan.   Neurological exam grossly limited due to patient's neurological status         DIFFERENTIAL  DIAGNOSIS     PLAN (LABS / IMAGING / EKG):  Orders Placed This Encounter   Procedures    COVID-19, Rapid    Culture, Blood 1    Culture, Blood 1    CT Head WO Contrast    CT Cervical Spine WO Contrast    XR CHEST PORTABLE    CT CHEST ABDOMEN PELVIS W CONTRAST    CT THORACIC SPINE TRAUMA RECONSTRUCTION    CT LUMBAR SPINE TRAUMA RECONSTRUCTION    XR HAND RIGHT (MIN 3 VIEWS)    XR WRIST RIGHT (2 VIEWS)    XR RADIUS ULNA RIGHT (2 VIEWS)    ELECTROLYTES PLUS    Hemoglobin and hematocrit, blood    CALCIUM, IONIC (POC)    CBC with Auto Differential    Comprehensive Metabolic Panel w/ Reflex to MG    Urinalysis with Reflex to Culture    Troponin    Brain Natriuretic Peptide    TSH with Reflex    TOX SCR, BLD, ED    Ammonia    CK    Myoglobin, Serum    D-Dimer, Quantitative    Protime-INR    APTT    Height and weight    Pharmacy to Dose: Vancomycin    Inpatient consult to Trauma Surgery    Venous Blood Gas, POC    Creatinine W/GFR Point of Care    POCT urea (BUN)    Lactic Acid, POC    POCT Glucose    POC Glucose Fingerstick       MEDICATIONS ORDERED:  Orders Placed This Encounter   Medications    naloxone (NARCAN) 2 MG/2ML injection     Julito Hawk: cabinet override    naloxone (NARCAN) 2 MG/2ML injection     Julito Hawk: cabinet override    naloxone (NARCAN) injection 2 mg    naloxone (NARCAN) injection 2 mg    naloxone (NARCAN) injection 2 mg    naloxone (NARCAN) injection 2 mg    piperacillin-tazobactam (ZOSYN) 3,375 mg in dextrose 5 % 50 mL IVPB (mini-bag)     Order Specific Question:   Antimicrobial Indications     Answer:   Sepsis of Unknown Etiology    0.9 % sodium chloride bolus    DISCONTD: vancomycin (VANCOCIN) intermittent dosing (placeholder)     Order Specific Question:   Antimicrobial Indications     Answer:   Sepsis of Unknown Etiology    DISCONTD: vancomycin (VANCOCIN) 1000 mg in dextrose 5% 200 mL IVPB     Order Specific Question:   Antimicrobial Indications     Answer:   Sepsis of Unknown Etiology    vancomycin (VANCOCIN) intermittent dosing (placeholder)     Order Specific Question:   Antimicrobial Indications     Answer:   Sepsis of Unknown Etiology    vancomycin (VANCOCIN) 1250 mg in sodium chloride 0.9% 250 mL IVPB     Order Specific Question:   Antimicrobial Indications     Answer:   Sepsis of Unknown Etiology    0.9 % sodium chloride bolus       DDX: Opioid overdose, sepsis secondary to cellulitis, encephalopathy    DIAGNOSTIC RESULTS / EMERGENCY DEPARTMENT COURSE / MDM   LAB RESULTS:  Results for orders placed or performed during the hospital encounter of 03/02/22   COVID-19, Rapid    Specimen: Nasopharyngeal Swab   Result Value Ref Range    Specimen Description . NASOPHARYNGEAL SWAB     SARS-CoV-2, Rapid Not Detected Not Detected   Culture, Blood 1    Specimen: Blood   Result Value Ref Range    Specimen Description . BLOOD     Special Requests LAC 20ML     Culture NO GROWTH <24 HRS    Culture, Blood 1    Specimen: Blood   Result Value Ref Range    Specimen Description . BLOOD     Special Requests RAC 20ML     Culture NO GROWTH <24 HRS    ELECTROLYTES PLUS   Result Value Ref Range    POC Sodium 142 138 - 146 mmol/L    POC Potassium 4.5 3.5 - 4.5 mmol/L    POC Chloride 99 98 - 107 mmol/L    POC TCO2 32 (H) 22 - 30 mmol/L    Anion Gap 12 7 - 16 mmol/L   Hemoglobin and hematocrit, blood   Result Value Ref Range    POC Hemoglobin 20.7 (H) 13.5 - 17.5 g/dL    POC Hematocrit 61 (H) 41 - 53 %   CALCIUM, IONIC (POC)   Result Value Ref Range    POC Ionized Calcium 1.23 1.15 - 1.33 mmol/L   CBC with Auto Differential   Result Value Ref Range    WBC 15.9 (H) 3.5 - 11.3 k/uL    RBC 6.37 (H) 4.21 - 5.77 m/uL    Hemoglobin 17.7 (H) 13.0 - 17.0 g/dL    Hematocrit 55.7 (H) 40.7 - 50.3 %    MCV 87.4 82.6 - 102.9 fL    MCH 27.8 25.2 - 33.5 pg    MCHC 31.8 28.4 - 34.8 g/dL    RDW 14.0 11.8 - 14.4 %    Platelets 331 (L) 451 - 453 k/uL    MPV 11.3 8.1 - 13.5 fL    NRBC Automated 0.0 0.0 per 100 WBC    Seg Neutrophils 84 (H) 36 - 65 %    Lymphocytes 8 (L) 24 - 43 %    Monocytes 6 3 - 12 %    Eosinophils % 1 1 - 4 %    Basophils 0 0 - 2 %    Immature Granulocytes 1 (H) 0 %    Segs Absolute 13.37 (H) 1.50 - 8.10 k/uL    Absolute Lymph # 1.26 1.10 - 3.70 k/uL    Absolute Mono # 0.91 0.10 - 1.20 k/uL    Absolute Eos # 0.20 0.00 - 0.44 k/uL    Basophils Absolute 0.06 0.00 - 0.20 k/uL    Absolute Immature Granulocyte 0.09 0.00 - 0.30 k/uL   Comprehensive Metabolic Panel w/ Reflex to MG   Result Value Ref Range Glucose 123 (H) 70 - 99 mg/dL    BUN 10 6 - 20 mg/dL    CREATININE 0.51 (L) 0.70 - 1.20 mg/dL    Calcium 9.7 8.6 - 10.4 mg/dL    Sodium 133 (L) 135 - 144 mmol/L    Potassium 4.3 3.7 - 5.3 mmol/L    Chloride 94 (L) 98 - 107 mmol/L    CO2 24 20 - 31 mmol/L    Anion Gap 15 9 - 17 mmol/L    Alkaline Phosphatase 106 40 - 129 U/L    ALT 75 (H) 5 - 41 U/L    AST 94 (H) <40 U/L    Total Bilirubin 0.68 0.3 - 1.2 mg/dL    Total Protein 9.6 (H) 6.4 - 8.3 g/dL    Albumin 4.1 3.5 - 5.2 g/dL    Albumin/Globulin Ratio 0.7 (L) 1.0 - 2.5    GFR Non-African American >60 >60 mL/min    GFR African American >60 >60 mL/min    GFR Comment         Troponin   Result Value Ref Range    Troponin, High Sensitivity 7 0 - 22 ng/L   Troponin   Result Value Ref Range    Troponin, High Sensitivity <6 0 - 22 ng/L   Brain Natriuretic Peptide   Result Value Ref Range    Pro- <300 pg/mL   TSH with Reflex   Result Value Ref Range    TSH 1.35 0.30 - 5.00 mIU/L   TOX SCR, BLD, ED   Result Value Ref Range    Acetaminophen Level <5 (L) 10 - 30 ug/mL    Ethanol <10 <10 mg/dL    Ethanol percent <5.161 <0.216 %    Salicylate Lvl <1 (L) 3 - 10 mg/dL    Toxic Tricyclic Sc,Blood NEGATIVE NEGATIVE   Ammonia   Result Value Ref Range    Ammonia 33 16 - 60 umol/L   CK   Result Value Ref Range    Total  39 - 308 U/L   Myoglobin, Serum   Result Value Ref Range    Myoglobin 238 (H) 28 - 72 ng/mL   D-Dimer, Quantitative   Result Value Ref Range    D-Dimer, Quant 1.96 mg/L FEU   Protime-INR   Result Value Ref Range    Protime 10.8 9.1 - 12.3 sec    INR 1.0    APTT   Result Value Ref Range    PTT 26.7 20.5 - 30.5 sec   Venous Blood Gas, POC   Result Value Ref Range    pH, [place] 7.336 7.320 - 7.430    pCO2, [place] 58.5 (H) 41.0 - 51.0 mm Hg    pO2, [place] 26.1 (L) 30.0 - 50.0 mm Hg    HCO3, Venous 31.3 (H) 22.0 - 29.0 mmol/L    Positive Base Excess, [place] 3 0.0 - 3.0    O2 Sat, [place] 42 (L) 60.0 - 85.0 %   Creatinine W/GFR Point of Care   Result Value Ref Range    POC Creatinine 0.56 0.51 - 1.19 mg/dL    GFR Comment >60 >60 mL/min    GFR Non-African American >60 >60 mL/min    GFR Comment         POCT urea (BUN)   Result Value Ref Range    POC BUN 9 8 - 26 mg/dL   Lactic Acid, POC   Result Value Ref Range    POC Lactic Acid 2.04 (H) 0.56 - 1.39 mmol/L   POCT Glucose   Result Value Ref Range    POC Glucose 140 (H) 74 - 100 mg/dL   POC Glucose Fingerstick   Result Value Ref Range    POC Glucose 147 (H) 75 - 110 mg/dL       RADIOLOGY:  CT Head WO Contrast    Result Date: 3/2/2022  No CT evidence of an acute intracranial abnormality. No evidence of acute fracture or traumatic malalignment of the cervical spine. Small area of encephalomalacia involving the inferior left frontal lobe indicative of an old insult. RECOMMENDATIONS: Unavailable     CT Cervical Spine WO Contrast    Result Date: 3/2/2022  No CT evidence of an acute intracranial abnormality. No evidence of acute fracture or traumatic malalignment of the cervical spine. Small area of encephalomalacia involving the inferior left frontal lobe indicative of an old insult. RECOMMENDATIONS: Unavailable     XR CHEST PORTABLE    Result Date: 3/2/2022  1. No acute cardiopulmonary disease. EKG Interpretation    Interpreted by myself    Rhythm: normal sinus   Rate: normal  Axis: normal  Ectopy: none  Conduction: normal  ST Segments: normal  T Waves: normal  Q Waves: none    Clinical Impression: no acute changes    All EKG's are interpreted by the Emergency Department Physician who either signs or Co-signs this chart in the absence of a cardiologist.    EMERGENCY DEPARTMENT COURSE:  ED Course as of 03/02/22 0754   Wed Mar 02, 2022   0323 Patient valuated bedside. Intranasal Narcan x2 IV Narcan x1 patient's mentation improved mildly. Patient moving all extremities difficult to communicate with at this time. VSS. No lateralizing signs or facial droop present.   Will get head and neck CT as patient has facial hematoma and was found down.  Unclear etiology for patient's altered mental status at this time. Will get altered mental status labs [ZE]   0440 Trauma surgery at bedside evaluating patient right now [ZE]   0549 D-dimer elevated will get CT chest pulmonary embolism. Pan scans pending per trauma [ZE]   0710 Start patient on heparin. Will consult medicine for admission [ZE]      ED Course User Index  [ZE] Everett Frias DO       PROCEDURES:  Intubation    Date/Time: 3/2/2022 7:55 AM  Performed by: Everett Frias DO  Authorized by: Yana Terry MD     Consent:     Consent obtained:  Emergent situation  Pre-procedure details:     Patient status:  Altered mental status    Mallampati score:  II    Pretreatment meds: Etomidate. Paralytics:  Succinylcholine  Procedure details:     Preoxygenation:  Nonrebreather mask    CPR in progress: no      Intubation method:  Oral    Oral intubation technique:  Video-assisted    Laryngoscope blade: Mac 4    Tube size (mm):  8.0    Tube type:  Cuffed    Number of attempts:  1    Ventilation between attempts: no      Cricoid pressure: no      Tube visualized through cords: yes    Placement assessment:     ETT to lip:  26    Tube secured with:  ETT hecotr    Breath sounds:  Equal    Placement verification: chest rise, condensation, CXR verification, direct visualization, equal breath sounds, ETCO2 detector and tube exhalation      CXR findings:  ETT in proper place  Post-procedure details:     Patient tolerance of procedure: Tolerated well, no immediate complications         CONSULTS:  PHARMACY TO DOSE VANCOMYCIN  IP CONSULT TO TRAUMA SURGERY    CRITICAL CARE:  Greater than 35 minutes of critical care time was provided to this patient due to life-threatening clinical condition    MDM  Here for evaluation of altered mental status. Patient improved slightly throughout stay with Narcan. CT scan did show acute pulmonary emboli in the chest he was started on heparin.   Patient also noted with multiple hernias that are nonincarcerated. Patient also noted to be septic likely from a right forearm cellulitis sepsis protocol underwent inpatient given antibiotics fluids and labs were drawn. Patient ultimately required intubation during pain inability to protect airway. Patient admitted to critical care unit    FINAL IMPRESSION      1. Altered mental status, unspecified altered mental status type      DISPOSITION / PLAN     DISPOSITION Decision To Admit 03/02/2022 04:21:15 AM      PATIENT REFERRED TO:  No follow-up provider specified.     DISCHARGE MEDICATIONS:  New Prescriptions    No medications on file       Ramírez Carlson DO  Emergency Medicine Resident    (Please note that portions of thisnote were completed with a voice recognition program.  Efforts were made to edit the dictations but occasionally words are mis-transcribed.)        Sarah Penn DO  Resident  03/02/22 9324

## 2022-03-02 NOTE — CONSULTS
Division of Vascular Surgery        New Consult      Physician Requesting Consult:  Dr. Shikha Botello    Reason for Consult:   Pulmonary embolism    Chief Complaint:      Intubated    History of Present Illness:      Lianna Jimenez is a 55 y.o. gentleman who is known to the ED for frequent narcotic overdoses and sequela of long term IVDA. Patient was found down by EMS and brought in, administered narcan and worked up as a possible trauma. Imaging negative for traumatic injury but noted to have segmental/subsegmental AMBER PE and question of RUL PE without any evidence of heart strain or tachycardia. Patient was unfortunately intubated prior to any questioning, additional information obtained through chart review and from the ED team. On exam patient noted to have induration and chronic cellulitic changes to the right forearm consistent with IVDA history, intubated, sedated, large amount of secretions in ETT. No apparent unilateral leg swelling on exam nor any significant edema. Per chart review patient patient a current smoker, denies etoh and none noted on labs, and has IVDA with Hep C.     Medical History:     Past Medical History:   Diagnosis Date    Bipolar 1 disorder ([facility] [address]     Chronic mental illness     Depression     Hepatitis C     Hypertension     Psoriasis     Substance abuse ([facility] [address]        Surgical History:     Past Surgical History:   Procedure Laterality Date    BRAIN SURGERY      at age 46    DENTAL SURGERY         Family History:     Family History   Problem Relation Age of Onset    Cancer Father     Cancer Child        Allergies:       Haldol [haloperidol], Latuda [lurasidone hcl], and Ziprasidone    Medications:      Current Facility-Administered Medications   Medication Dose Route Frequency Provider Last Rate Last Admin    vancomycin (VANCOCIN) intermittent dosing (placeholder)   Other RX Placeholder Tracy Summers,         vancomycin (VANCOCIN) 1250 mg in sodium chloride 0.9% 250 mL IVPB  1,250 mg IntraVENous Q12H Bula [place], .7 mL/hr at 03/02/22 0518 1,250 mg at 03/02/22 0518    heparin (porcine) injection 5,810 Units  80 Units/kg IntraVENous Once Bula [place], DO        heparin (porcine) injection 5,810 Units  80 Units/kg IntraVENous PRN Bula [place], DO        heparin (porcine) injection 2,900 Units  40 Units/kg IntraVENous PRN Bula [place], DO        heparin 25,000 units in dextrose 5 % 250 mL infusion (rate based)  5-30 Units/kg/hr IntraVENous Continuous Bula [place], DO        naloxone [facility]) 0.4 MG/ML injection             propofol 1000 MG/100ML injection             propofol injection  5-50 mcg/kg/min IntraVENous Continuous Chastity Barron MD         Current Outpatient Medications   Medication Sig Dispense Refill    QUEtiapine (SEROQUEL) 200 MG tablet TAKE 1 TABLET BY MOUTH NIGHTLY 30 tablet 0    lisinopril (PRINIVIL;ZESTRIL) 20 MG tablet Take 1 tablet by mouth daily      metoprolol tartrate (LOPRESSOR) 25 MG tablet Take by mouth      amLODIPine (NORVASC) 10 MG tablet Take 1 tablet by mouth daily 30 tablet 3    folic acid (FOLVITE) 1 MG tablet Take 1 tablet by mouth daily 14 tablet 0    thiamine 100 MG tablet Take 1 tablet by mouth daily 14 tablet 0    citalopram (CELEXA) 20 MG tablet Take 1 tablet by mouth daily 30 tablet 0    traZODone (DESYREL) 50 MG tablet Take 1 tablet by mouth nightly as needed for Sleep 30 tablet 0    gabapentin (NEURONTIN) 300 MG capsule Take 1 capsule by mouth 2 times daily for 30 days. Intended supply: 30 days 60 capsule 0    dicyclomine (BENTYL) 10 MG capsule Take 1 capsule by mouth every 6 hours as needed (cramps) 20 capsule 0    hydroCHLOROthiazide (HYDRODIURIL) 25 MG tablet Take 1 tablet by mouth daily 30 tablet 0       Social History:     Tobacco:    reports that he has been smoking cigarettes. He has a 40.00 pack-year smoking history.  He has never used smokeless tobacco.  Alcohol:      reports no history of alcohol use. Drug Use:  reports current drug use. Drug: Opiates . Review of Systems:     Unable to be performed due to the patient's condition    Physical Exam:     Vitals:  BP (!) 168/110   Pulse 80   Temp 98.7 °F (37.1 °C) (Oral)   Resp 20   Wt 160 lb (72.6 kg)   SpO2 98%   BMI 24.33 kg/m²     Physical Exam  Constitutional:       General: He is not in acute distress. Appearance: He is underweight. He is ill-appearing. Interventions: He is sedated and intubated. Comments: Chronic cellulitic changes dorsal right forearm   HENT:      Head: Normocephalic. Abrasion and laceration present. Comments: Abrasions w/ small laceration       Right Ear: External ear normal.      Left Ear: External ear normal.      Nose: Nose normal.      Mouth/Throat:      Mouth: Mucous membranes are dry. Pharynx: Oropharynx is clear. Eyes:      Extraocular Movements: Extraocular movements intact. Conjunctiva/sclera: Conjunctivae normal.      Pupils: Pupils are equal, round, and reactive to light. Cardiovascular:      Rate and Rhythm: Normal rate and regular rhythm. Pulses: Normal pulses. Pulmonary:      Effort: Bradypnea present. He is intubated. Chest:      Chest wall: No deformity or crepitus. Abdominal:      General: Abdomen is flat. There is no distension. Palpations: Abdomen is soft. Hernia: A hernia is present. Hernia is present in the umbilical area, ventral area, left inguinal area and right inguinal area (  Without any signs of obstruction, strangulation or overlying skin changes). Musculoskeletal:      Cervical back: Normal range of motion. No rigidity. Right lower leg: No edema. Left lower leg: No edema. Skin:     Capillary Refill: Capillary refill takes 2 to 3 seconds. Findings: Lesion (  right dorsal forearm) present. Neurological:      Mental Status: He is lethargic and confused. GCS: GCS eye subscore is 2.  GCS verbal XR CHEST PORTABLE    Result Date: 3/2/2022  1. No acute cardiopulmonary disease. CT CHEST PULMONARY EMBOLISM W CONTRAST    Result Date: 3/2/2022  Limited exam due to motion artifact but there appears to be extensive pulmonary emboli on the left to the upper lobe superiorly and anteriorly. A nonobstructing pulmonary embolus may also be present to a branch to the anteromedial right upper lobe. No heart strain. Possible superimposed developing pneumonia. CT LUMBAR SPINE TRAUMA RECONSTRUCTION    Result Date: 3/2/2022  No acute thoracic or lumbar spine trauma. CT THORACIC SPINE TRAUMA RECONSTRUCTION    Result Date: 3/2/2022  No acute thoracic or lumbar spine trauma. Assessment and Plan:     1. Patient has small segmental/subsegmental pulmonary emboli noted on CT without evidence of right heart strain, tachycardia, elevation of troponin or BNP that would suggest significant cardiac demand. No vascular surgical intervention required at this time. 2. Recommend initiation of therapeutic anticoagulation, may use lovenox if GFR appropriate  3. No therapeutic use in venous duplex at present, ECHO may be obtained per primary team discretion if concerns for valvular disease or vegitation  4. Would transition to PO anticoagulation when patient improved, extubated, and taking PO intake. 5. Would recommend  consult to determine barriers to care and access to medications as patient will need one year of anticoagulation    Electronically signed by Aleta Castaneda DO on 3/2/22 at 7:26 AM [address]  Office: [phone]  Cell: [phone]  Email: [email or web address]

## 2022-03-02 NOTE — PROGRESS NOTES
Comprehensive Nutrition Assessment    Type and Reason for Visit:  Initial (vent)    Nutrition Recommendations/Plan: Start nutrition as able/as warranted. If TF, suggest Peptide Based Formula goal 60 mL/hr to provide 1728 kcal and 108 g pro/day. Nutrition Assessment:  Pt admitted s/p being found down. PMH: Bipolar, Hep C, HTN, Substance Abuse. Pt is currently intubated. No nutrition at present. Labs/Meds reviewed. Malnutrition Assessment:  Malnutrition Status:  Insufficient data    Context:  Chronic Illness     Findings of the 6 clinical characteristics of malnutrition:  Energy Intake:  Unable to assess  Weight Loss:  No significant weight loss     Body Fat Loss:  Unable to assess     Muscle Mass Loss:   (Moderate muscle loss) Temples (temporalis)  Fluid Accumulation:  No significant fluid accumulation     Strength:  Not Performed    Estimated Daily Nutrient Needs:  Energy (kcal):  1800 kcal/day; Weight Used for Energy Requirements:  Current     Protein (g):  110 g pro/day; Weight Used for Protein Requirements:  Current (1.5)        Fluid (ml/day):  2200 mL/day; Method Used for Fluid Requirements:  ml/Kg      Nutrition Related Findings:  Meds/labs reviewed      Wounds:  None       Current Nutrition Therapies:    Diet NPO  Additional Calorie Sources:   Propofol off at present    Anthropometric Measures:  · Height: 5' 11\" (180.3 cm)  · Current Body Weight: 160 lb (72.6 kg)   · Usual Body Weight:  (157-170 lb per EHR)     · Ideal Body Weight: 172 lbs; % Ideal Body Weight 93 %   · BMI: 22.3   · BMI Categories: Normal Weight (BMI 18.5-24. 9)       Nutrition Diagnosis:   · Inadequate oral intake related to impaired respiratory function as evidenced by NPO or clear liquid status due to medical condition    Nutrition Interventions:   Food and/or Nutrient Delivery:  Start nutrition as able/as warranted. If TF, suggest Peptide Based Formula goal 60 mL/hr to provide 1728 kcal and 108 g pro/day.   Nutrition Education/Counseling:  No recommendation at this time   Coordination of Nutrition Care:  Continue to monitor while inpatient    Goals:  meet % of estimated nutrient needs       Nutrition Monitoring and Evaluation:   Behavioral-Environmental Outcomes:  None Identified   Food/Nutrient Intake Outcomes:  Diet Advancement/Tolerance  Physical Signs/Symptoms Outcomes:  Biochemical Data,Nutrition Focused Physical Findings,Skin,Weight,Fluid Status or Edema     Discharge Planning:     Too soon to determine     Electronically signed by Davin Boyce RD, LD on 3/2/22 at 2:28 PM EST    Contact: [phone]

## 2022-03-02 NOTE — PROGRESS NOTES
Charting intubations and reintubations    ETT Size : 8.0  ETT Placement : 24 @ teeth   ETT secured with : Deidra    Tube placement verified by:  Auscultation : yes  Positive color change on end tidal CO2 detector : yes  CXR : yes    Reason for intubation : airway protection/ impeding respiratory failure    -- Notify charge therapist regarding all intubations, extubations, reintubations and self extubations    Marie Jonas RCP  7:56 AM

## 2022-03-02 NOTE — ED NOTES
8.0 ett tube @ 24 @ teeth placed by Dr. Gómez Torres at this time   Positive color change, bilateral breath sounds        Ivon Sinclair RN  03/02/22 0225
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
## 2022-03-02 NOTE — PLAN OF CARE
Nutrition Problem #1: Inadequate oral intake  Intervention: Food and/or Nutrient Delivery:  (Start nutrition as able/as warranted.  If TF, suggest Peptide Based Formula goal 60 mL/hr to provide 1728 kcal and 108 g pro/day.)  Nutritional Goals: meet % of estimated nutrient needs

## 2022-03-02 NOTE — H&P
Critical Care - History and Physical Examination    Patient's name:  Dee Mishra  Medical Record Number: [id number]  Patient's account/billing number: [de-identified]  Patient's YOB: 1965  Age: 55 y.o. Date of Admission: 3/2/2022  2:43 AM  Reason of ICU admission: AMS   Date of History and Physical Examination: 3/2/2022    Primary Care Physician: Jose Mack MD  Attending Physician: Dayton Kellogg     Code Status: Prior    Chief complaint:   Chief Complaint   Patient presents with    Altered Mental Status       Reason for ICU admission: Acute metabolic encephalpthy/AMS     History Of Present Illness:   History was obtained from chart review    Dee Mishra is a 55 y.o.  male  With past medical history of IVDA , Hepatitis C ,psoriasis,HTN ,       Patient with above past medical history presented with altered mental status with unknown downtime. In the ED patient GCS was 4 and was not responding appropriately to questions, pinpoint pupils. Patient was given a total of 8 mg IV Narcan after which patient responded somehow. Trauma work-up was done because patient had facial abrasion with CT head,, and cervical, thoracic lumbar negative. CT chest however showed extensive pulmonary emboli on the left to the upper lobe superiorly and anteriorly and possibly on the anterior medial right upper lung. Patient was started on heparin GTT. CT abdomen pelvis show gallbladder distention and multiple hernias without any menstruation. Patient was also noted to have right upper extremity swelling with concern for cellulitis, WBC creeping 15.9> 18 .3, . Patient was started on Zosyn and Seabron [place]. On my evaluation, patient right upper extremity was swollen, psoriatic patches on bilateral knee. Echo sent.       Pertinent labs  WBC 18.3  Hemoglobin 17.7-14.2  D-dimer 1.96           Past Medical History:        Diagnosis Date    Bipolar 1 disorder ([facility] [address]     Chronic mental illness     Depression  Hepatitis C     Hypertension     Psoriasis     Substance abuse ([facility] [address]        Past Surgical History:        Procedure Laterality Date    BRAIN SURGERY      at age 46    DENTAL SURGERY         Allergies: Allergies   Allergen Reactions    Haldol [Haloperidol] Other (See Comments)     Seizure activity per patient report. Tongue swelling     Latuda [Lurasidone Hcl] Other (See Comments)     Pt reports muscle spasms and fatigue    Ziprasidone          Home Medications:   Prior to Admission medications    Medication Sig Start Date End Date Taking? Authorizing Provider   QUEtiapine (SEROQUEL) 200 MG tablet TAKE 1 TABLET BY MOUTH NIGHTLY 11/30/21   Tuyet Romero MD   lisinopril (PRINIVIL;ZESTRIL) 20 MG tablet Take 1 tablet by mouth daily 7/8/21   Marcela Raya MD   metoprolol tartrate (LOPRESSOR) 25 MG tablet Take by mouth    Historical Provider, MD   amLODIPine (NORVASC) 10 MG tablet Take 1 tablet by mouth daily 8/18/21   Tuyet Romero MD   folic acid (FOLVITE) 1 MG tablet Take 1 tablet by mouth daily 8/18/21   Tuyet Romero MD   thiamine 100 MG tablet Take 1 tablet by mouth daily 8/18/21   Tuyet Romero MD   citalopram (CELEXA) 20 MG tablet Take 1 tablet by mouth daily 8/18/21   Tuyet Romero MD   traZODone (DESYREL) 50 MG tablet Take 1 tablet by mouth nightly as needed for Sleep 8/18/21   Tuyet Romero MD   gabapentin (NEURONTIN) 300 MG capsule Take 1 capsule by mouth 2 times daily for 30 days. Intended supply: 30 days 8/18/21 9/17/21  Tuyet Romero MD   dicyclomine (BENTYL) 10 MG capsule Take 1 capsule by mouth every 6 hours as needed (cramps) 6/20/21   Fidel Edward MD   hydroCHLOROthiazide (HYDRODIURIL) 25 MG tablet Take 1 tablet by mouth daily 6/25/20   JILLIAN Bass - CNP   omeprazole (PRILOSEC OTC) 20 MG tablet Take 1 tablet by mouth daily. 2/10/15 7/1/15  Debi Vásquez MD       Social History:   TOBACCO:   reports that he has been smoking cigarettes.  He has a 40.00 pack-year smoking history. He has never used smokeless tobacco.  ETOH:   reports no history of alcohol use. DRUGS:  reports current drug use. Drug: Opiates . OCCUPATION:      Family History:       Problem Relation Age of Onset    Cancer Father     Cancer Child        REVIEW OF SYSTEMS (ROS):  Review of Systems    Physical Exam:    Vitals: BP (!) 149/89   Pulse 66   Temp 98.7 °F (37.1 °C) (Oral)   Resp 20   Ht 5' 11\" (1.803 m)   Wt 160 lb (72.6 kg)   SpO2 96%   BMI 22.32 kg/m²     Body weight:   Wt Readings from Last 3 Encounters:   03/02/22 160 lb (72.6 kg)   01/06/22 160 lb (72.6 kg)   08/18/21 157 lb 9.6 oz (71.5 kg)       Body Mass Index : Body mass index is 22.32 kg/m². PHYSICAL EXAMINATION :  Constitutional: INTUBATED AND SEDATED   EENT: PERRLA, EOMI, sclera clear, anicteric, moist mucous membranes, oropharynx clear, no lesions  Neck: Supple, symmetrical, trachea midline, no adenopathy, thyroid symmetric, no jvd  Respiratory: clear to auscultation, no wheezes, rales, or rhonchi  Cardiovascular: NON MURMUR APPRECIATED  Abdomen: soft, nontender, nondistended, no masses or organomegaly  Neurological: INTUBATED AND SEDATED   Extremities: SOFT TISSUE SWELLING/ERYTHEMA ON UPPER EXTREMITIES. Laboratory findings:-  CBC:   Recent Labs     03/02/22  0818   WBC 18.3*   HGB 14.2   *     BMP:    Recent Labs     03/02/22  0256 03/02/22  0326   NA  --  133*   K  --  4.3   CL  --  94*   CO2  --  24   BUN  --  10   CREATININE 0.56 0.51*   GLUCOSE  --  123*     S. Calcium:  Recent Labs     03/02/22  0326   CALCIUM 9.7     S. Ionized Calcium:No results for input(s): IONCA in the last 72 hours. S. Magnesium:No results for input(s): MG in the last 72 hours. S. Phosphorus:No results for input(s): PHOS in the last 72 hours.   S. Glucose:  Recent Labs     03/02/22  0250 03/02/22  0256 03/02/22  0814   POCGLU 147* 140* 130*     Glycosylated hemoglobin A1C: No results for input(s): LABA1C in the last 72 hours.  INR:   Recent Labs     03/02/22 0326   INR 1.0     Hepatic functions:   Recent Labs     03/02/22 0326   ALKPHOS 106   ALT 75*   AST 94*   PROT 9.6*   BILITOT 0.68   LABALBU 4.1     Pancreatic functions:No results for input(s): LACTA, AMYLASE in the last 72 hours. S. Lactic Acid: No results for input(s): LACTA in the last 72 hours. Cardiac enzymes:  Recent Labs     03/02/22 0326   CKTOTAL 145     BNP:No results for input(s): BNP in the last 72 hours. Lipid profile: No results for input(s): CHOL, TRIG, HDL, LDL, LDLCALC in the last 72 hours.   Blood Gases: No results found for: PH, PCO2, PO2, HCO3, O2SAT  Thyroid functions:   Lab Results   Component Value Date    TSH 1.35 03/02/2022        Urinalysis:     Microbiology:  Cultures during this admission:   Blood cultures:                 [] None drawn      [] Negative             []  Positive (Details:  )  Urine Culture:                   [] None drawn      [] Negative             []  Positive (Details:  )  Sputum Culture:               [] None drawn       [] Negative             []  Positive (Details:  )   Endotracheal aspirate:     [] None drawn       [] Negative             []  Positive (Details:  )   -----------------------------------------------------------------  Radiological reports:    CXR:    Electrocardiogram: EKG: NOT DONE     Last Echocardiogram findings:     Assessment and Plan     Patient Active Problem List   Diagnosis    Hepatitis C    Psoriasis    Hypertension    Smoker    Opioid type dependence, continuous ([facility] [address]    Bipolar 1 disorder ([facility] [address]    Cocaine abuse ([facility] [address]    IV drug abuse ([facility] [address]    History of alcohol abuse    Generalized pain    Generalized anxiety disorder    Abnormal CT scan, esophagus    Chest pain    Altered mental status       Additional assessment:  Mars Goldsmith is a 55 y.o. male who intially presented on 3/2/2022 for   Chief Complaint   Patient presents with    Altered Mental Status       PLAN/MEDICAL DECISION MAKING:  Neurologic:   · Neuro intact  · Neuro checks per protocol  · Sedation: fentanylp and propofol   · Pain control: fenatnyl  Cardiovascular:  · Hemodynamically stable 149/89   · HR  · MAP  · MAP goal 65  Pulmonary:  · Maintain oxygen sats >92%  · Pulmonary toilet  · Vent settings: Mode prvc  RR20   PEEP8 FiO2 70  · ABG: pH 7.3  pCO2 53.2 pO2 336  GI/Nutrition  · Glycolax   · Ulcer Prophylaxis:none   · not indicated   · Diet:No diet orders on file   Renal/Fluid/Electrolyte  · IV Fluids: none  .   · I/O: In: 1050   · Out: -   · UOP: to be determined   · BUN/Cr 1-: 0.51  · Monitor electrolytes, replace PRN   ID  WBC:   Lab Results   Component Value Date    WBC 18.3 (H) 2022   ·   · Tmax: Temp (24hrs), Av.7 °F (37.1 °C), Min:98.7 °F (37.1 °C), Max:98.7 °F (37.1 °C)  ·   · Antimicrobials: vancomycin and Zosyn not indicated   Hematology:  Recent Labs     22  0326 22  0818   HGB 17.7* 14.2   ·  stable   Endocrine:   glucose controlled - most recent BGL is   Recent Labs     22  0326   GLUCOSE 123*   ·   DVT Prophylaxis  · Heparin   Discharge Needs:  Case management on board    CODE STATUS: Prior    DISPOSITION:  [x] To remain ICU:  [] OK for out of ICU from Liam Guerra MD  Internal Medicine Resident, PGY2   [address] [place] [address],  [phone]  3/2/2022,3:12 PM

## 2022-03-03 VITALS
DIASTOLIC BLOOD PRESSURE: 74 MMHG | WEIGHT: 158.2 LBS | HEIGHT: 71 IN | SYSTOLIC BLOOD PRESSURE: 155 MMHG | OXYGEN SATURATION: 100 % | HEART RATE: 55 BPM | RESPIRATION RATE: 12 BRPM | TEMPERATURE: 99.9 F | BODY MASS INDEX: 22.15 KG/M2

## 2022-03-03 LAB
ABSOLUTE EOS #: 0.17 K/UL (ref 0–0.44)
ABSOLUTE IMMATURE GRANULOCYTE: <0.03 K/UL (ref 0–0.3)
ABSOLUTE LYMPH #: 2.01 K/UL (ref 1.1–3.7)
ABSOLUTE MONO #: 0.66 K/UL (ref 0.1–1.2)
ANION GAP SERPL CALCULATED.3IONS-SCNC: 11 MMOL/L (ref 9–17)
BASOPHILS # BLD: 0 % (ref 0–2)
BASOPHILS ABSOLUTE: 0.03 K/UL (ref 0–0.2)
BUN BLDV-MCNC: 12 MG/DL (ref 6–20)
CALCIUM SERPL-MCNC: 7.8 MG/DL (ref 8.6–10.4)
CHLORIDE BLD-SCNC: 106 MMOL/L (ref 98–107)
CO2: 22 MMOL/L (ref 20–31)
CREAT SERPL-MCNC: 0.57 MG/DL (ref 0.7–1.2)
EOSINOPHILS RELATIVE PERCENT: 2 % (ref 1–4)
GFR AFRICAN AMERICAN: >60 ML/MIN
GFR NON-AFRICAN AMERICAN: >60 ML/MIN
GFR SERPL CREATININE-BSD FRML MDRD: ABNORMAL ML/MIN/{1.73_M2}
GLUCOSE BLD-MCNC: 98 MG/DL (ref 70–99)
HCT VFR BLD CALC: 32.7 % (ref 40.7–50.3)
HEMOGLOBIN: 10.8 G/DL (ref 13–17)
IMMATURE GRANULOCYTES: 0 %
LYMPHOCYTES # BLD: 28 % (ref 24–43)
MCH RBC QN AUTO: 29.1 PG (ref 25.2–33.5)
MCHC RBC AUTO-ENTMCNC: 33 G/DL (ref 28.4–34.8)
MCV RBC AUTO: 88.1 FL (ref 82.6–102.9)
MONOCYTES # BLD: 9 % (ref 3–12)
MRSA, DNA, NASAL: ABNORMAL
NRBC AUTOMATED: 0 PER 100 WBC
PARTIAL THROMBOPLASTIN TIME: 64.1 SEC (ref 20.5–30.5)
PDW BLD-RTO: 14.4 % (ref 11.8–14.4)
PLATELET # BLD: 198 K/UL (ref 138–453)
PMV BLD AUTO: 11.3 FL (ref 8.1–13.5)
POTASSIUM SERPL-SCNC: 4 MMOL/L (ref 3.7–5.3)
RBC # BLD: 3.71 M/UL (ref 4.21–5.77)
SEG NEUTROPHILS: 60 % (ref 36–65)
SEGMENTED NEUTROPHILS ABSOLUTE COUNT: 4.39 K/UL (ref 1.5–8.1)
SODIUM BLD-SCNC: 139 MMOL/L (ref 135–144)
SPECIMEN DESCRIPTION: ABNORMAL
WBC # BLD: 7.3 K/UL (ref 3.5–11.3)

## 2022-03-03 PROCEDURE — 2580000003 HC RX 258: Performed by: STUDENT IN AN ORGANIZED HEALTH CARE EDUCATION/TRAINING PROGRAM

## 2022-03-03 PROCEDURE — 6360000002 HC RX W HCPCS: Performed by: STUDENT IN AN ORGANIZED HEALTH CARE EDUCATION/TRAINING PROGRAM

## 2022-03-03 PROCEDURE — 94660 CPAP INITIATION&MGMT: CPT

## 2022-03-03 PROCEDURE — 80048 BASIC METABOLIC PNL TOTAL CA: CPT

## 2022-03-03 PROCEDURE — 85730 THROMBOPLASTIN TIME PARTIAL: CPT

## 2022-03-03 PROCEDURE — 99239 HOSP IP/OBS DSCHRG MGMT >30: CPT | Performed by: INTERNAL MEDICINE

## 2022-03-03 PROCEDURE — 36415 COLL VENOUS BLD VENIPUNCTURE: CPT

## 2022-03-03 PROCEDURE — 85025 COMPLETE CBC W/AUTO DIFF WBC: CPT

## 2022-03-03 RX ORDER — 0.9 % SODIUM CHLORIDE 0.9 %
1000 INTRAVENOUS SOLUTION INTRAVENOUS ONCE
Status: COMPLETED | OUTPATIENT
Start: 2022-03-03 | End: 2022-03-03

## 2022-03-03 RX ORDER — 0.9 % SODIUM CHLORIDE 0.9 %
500 INTRAVENOUS SOLUTION INTRAVENOUS ONCE
Status: COMPLETED | OUTPATIENT
Start: 2022-03-03 | End: 2022-03-03

## 2022-03-03 RX ORDER — SODIUM CHLORIDE 9 MG/ML
10 INJECTION INTRAVENOUS ONCE
Status: DISCONTINUED | OUTPATIENT
Start: 2022-03-03 | End: 2022-03-03 | Stop reason: HOSPADM

## 2022-03-03 RX ORDER — FOLIC ACID 1 MG/1
1 TABLET ORAL DAILY
Status: DISCONTINUED | OUTPATIENT
Start: 2022-03-03 | End: 2022-03-03 | Stop reason: HOSPADM

## 2022-03-03 RX ORDER — CITALOPRAM 20 MG/1
20 TABLET ORAL DAILY
Status: DISCONTINUED | OUTPATIENT
Start: 2022-03-03 | End: 2022-03-03 | Stop reason: HOSPADM

## 2022-03-03 RX ORDER — LANOLIN ALCOHOL/MO/W.PET/CERES
100 CREAM (GRAM) TOPICAL DAILY
Status: DISCONTINUED | OUTPATIENT
Start: 2022-03-03 | End: 2022-03-03 | Stop reason: HOSPADM

## 2022-03-03 RX ORDER — DOXYCYCLINE 100 MG/1
100 TABLET ORAL 2 TIMES DAILY
Qty: 14 TABLET | Refills: 0 | Status: SHIPPED | OUTPATIENT
Start: 2022-03-03 | End: 2022-03-03 | Stop reason: SDUPTHER

## 2022-03-03 RX ORDER — DOXYCYCLINE 100 MG/1
100 TABLET ORAL 2 TIMES DAILY
Qty: 14 TABLET | Refills: 0 | Status: SHIPPED | OUTPATIENT
Start: 2022-03-03 | End: 2022-03-10

## 2022-03-03 RX ORDER — PANTOPRAZOLE SODIUM 40 MG/10ML
40 INJECTION, POWDER, LYOPHILIZED, FOR SOLUTION INTRAVENOUS ONCE
Status: DISCONTINUED | OUTPATIENT
Start: 2022-03-03 | End: 2022-03-03 | Stop reason: HOSPADM

## 2022-03-03 RX ORDER — SODIUM CHLORIDE 9 MG/ML
INJECTION, SOLUTION INTRAVENOUS CONTINUOUS
Status: DISCONTINUED | OUTPATIENT
Start: 2022-03-03 | End: 2022-03-03 | Stop reason: HOSPADM

## 2022-03-03 RX ADMIN — SODIUM CHLORIDE 500 ML: 9 INJECTION, SOLUTION INTRAVENOUS at 04:13

## 2022-03-03 RX ADMIN — Medication 22 UNITS/KG/HR: at 06:08

## 2022-03-03 RX ADMIN — SODIUM CHLORIDE: 9 INJECTION, SOLUTION INTRAVENOUS at 01:33

## 2022-03-03 RX ADMIN — VANCOMYCIN HYDROCHLORIDE 1250 MG: 10 INJECTION, POWDER, LYOPHILIZED, FOR SOLUTION INTRAVENOUS at 05:12

## 2022-03-03 RX ADMIN — SODIUM CHLORIDE 1000 ML: 9 INJECTION, SOLUTION INTRAVENOUS at 01:01

## 2022-03-03 RX ADMIN — PIPERACILLIN AND TAZOBACTAM 3375 MG: 3; .375 INJECTION, POWDER, LYOPHILIZED, FOR SOLUTION INTRAVENOUS at 03:15

## 2022-03-03 ASSESSMENT — PAIN SCALES - GENERAL
PAINLEVEL_OUTOF10: 0
PAINLEVEL_OUTOF10: 0

## 2022-03-03 ASSESSMENT — PULMONARY FUNCTION TESTS: PIF_VALUE: 12

## 2022-03-03 NOTE — PLAN OF CARE
Problem: MECHANICAL VENTILATION  Goal: Patient will maintain patent airway  3/2/2022 2252 by Edgar Pastor RCP  Outcome: Completed     Problem: MECHANICAL VENTILATION  Goal: Oral health is maintained or improved  3/2/2022 2252 by Edgar Pastor RCP  Outcome: Completed     Problem: MECHANICAL VENTILATION  Goal: ET tube will be managed safely  3/2/2022 2252 by Edgar Pastor RCP  Outcome: Completed     Problem: MECHANICAL VENTILATION  Goal: Ability to express needs and understand communication  3/2/2022 2252 by Edgar Pastor RCP  Outcome: Completed     Problem: MECHANICAL VENTILATION  Goal: Mobility/activity is maintained at optimum level for patient  3/2/2022 2252 by Edgar Pastor RCP  Outcome: Completed     Problem: SKIN INTEGRITY  Goal: Skin integrity is maintained or improved  3/2/2022 2252 by Edgar Pastor RCP  Outcome: Completed     Problem: RESPIRATORY  Intervention: Respiratory assessment  Note:   PROVIDE ADEQUATE OXYGENATION WITH ACCEPTABLE SP02/ABG'S    [x]  IDENTIFY APPROPRIATE OXYGEN THERAPY  [x]   MONITOR SP02/ABG'S AS NEEDED   [x]   PATIENT EDUCATION AS NEEDED     NONINVASIVE VENTILATION    PROVIDE OPTIMAL VENTILATION/ACCEPTABLE SPO2   IMPLEMENT NONINVASIVE VENTILATION PROTOCOL   MAINTAIN ACCEPTABLE SPO2   ASSESS SKIN INTEGRITY/BREAKDOWN SCORE   PATIENT EDUCATION AS NEEDED   BIPAP AS NEEDED

## 2022-03-03 NOTE — FLOWSHEET NOTE
Assessment: Patient is a 55 y.o. male who arrived on previous shift due to \"being found down for an unknown length of time. \" Patient was \"emergently intubated,\" at end of previous shift and taken to MICU room, 130. Intervention:  visited patient per ED follow-up visit. Per charge nurse, patient \"self-extricated from his soft-restraints and self-extubated overnight. \" Per report, patient remains on \"bipap machine,\" and \"may need re-intubated. \"     Outcome: Patient remained resting throughout encounter. Plan: Chaplains can make follow-up visit, per request. Zuhair Murrieta can be reached 24/7 via [employer].     Urban Boxer     03/03/22 [id number]   Encounter Summary   Services provided to: Patient not available   Referral/Consult From: Rounding   Complexity of Encounter Low   Length of Encounter 15 minutes   Crisis   Type Follow up   Spiritual/Episcopal   Type Spiritual support

## 2022-03-03 NOTE — PROGRESS NOTES
INTENSIVE CARE UNIT  Resident Physician Progress Note    Patient - Jessie Faria  Date of Admission -  3/2/2022  2:43 AM  Date of Evaluation -  3/3/2022  Room and Bed Number -  0130/0130-01   Hospital Day - 1    SUBJECTIVE:     HISTORY OF PRESENT ILLNESS:    History was obtained from chart review   Jessie Faria is a 55 y.o.  male with past medical history of IVDA , Hepatitis C, psoriasis, HTN.      Patient with above past medical history presented with altered mental status with unknown downtime. In the ED patient GCS was 4 and was not responding appropriately to questions, pinpoint pupils. Patient was given a total of 8 mg IV Narcan after which patient responded somehow. Trauma work-up was done because patient had facial abrasion with CT head,, and cervical, thoracic lumbar negative. CT chest however showed extensive pulmonary emboli on the left to the upper lobe superiorly and anteriorly and possibly on the anterior medial right upper lung. Patient was started on heparin GTT. CT abdomen pelvis show gallbladder distention and multiple hernias without any menstruation.       Patient was also noted to have right upper extremity swelling with concern for cellulitis, WBC creeping 15.9> 18 .3, . Patient was started on Zosyn and vanco.     On my evaluation, patient right upper extremity was swollen, psoriatic patches on bilateral knee. Echo sent. CODE STATUS: Full Code    OVERNIGHT EVENTS:      Patient self extubated yesterday evening, has been on BiPAP overnight. Currently on Precedex. Patient is now back to baseline mental status, ANO x4 and answering questions appropriately. States that he wants to go home because he has a dog/cat that he needs to take care of and there is no one else that can assist him. Patient refusing physical exam or any other treatments until he is able to leave. We will switch from BiPAP to nasal cannula and see how patient tolerates.     Patient did have low urine output and received fluid bolus with improvement in urine output. Slightly bradycardic in 40s-50s. Pressures were soft yesterday evening but now slightly hypertensive. Home beta-blocker held due to bradycardia. Fever and leukocytosis resolved on vancomycin/Zosyn. Patient had echo completed yesterday, awaiting read.     TODAY:     AWAKE & FOLLOWING COMMANDS: []   No  [x]   Yes                           SECRETIONS                 Amount:  []   Small []   Moderate []   Large []   None        Color: []   White []   Colored []   Bloody                         SEDATION:                 RAAS Score: [x]   +1 to -1 []   -2 []   -3 []   -4        Sedation Agent: []   Propofol gtt []   Versed gtt  []   Fentanyl gtt  []   No Sedation        Paralytic Agent: [x]   Precedex []   Norcuron []   Nimbex []                         VASOPRESSORS:  [x]   No  []   Yes            Vasopressor Agent []   Levophed []   Dopamine []   Vasopressin []   Dobutamine  []   Phenylephrine  []   Epinephrine     OBJECTIVE:     VITAL SIGNS:  Patient Vitals for the past 8 hrs:   BP Temp Temp src Pulse Resp SpO2 Weight   03/03/22 0645 (!) 149/81 -- -- 54 13 98 % --   03/03/22 0630 (!) 146/77 -- -- 53 14 98 % --   03/03/22 0615 136/75 -- -- 53 12 97 % --   03/03/22 0600 132/75 99.9 °F (37.7 °C) Oral 52 12 98 % --   03/03/22 0545 138/69 -- -- 52 13 97 % --   03/03/22 0530 (!) 142/77 -- -- 57 13 98 % --   03/03/22 0515 132/69 -- -- 51 12 97 % --   03/03/22 0500 133/74 -- -- 52 12 97 % --   03/03/22 0445 130/72 -- -- 52 12 97 % --   03/03/22 0430 126/68 -- -- 52 12 97 % --   03/03/22 0415 124/69 -- -- 52 12 97 % --   03/03/22 0405 -- -- -- 59 25 97 % --   03/03/22 0400 127/68 99.9 °F (37.7 °C) Oral 56 14 97 % --   03/03/22 0345 121/67 -- -- 59 13 97 % --   03/03/22 0330 113/61 -- -- (!) 48 11 97 % --   03/03/22 0315 130/66 -- -- 58 11 97 % --   03/03/22 0300 107/63 -- -- (!) 46 12 97 % --   03/03/22 0245 116/60 -- -- (!) 47 12 97 % --   03/03/22 0230 117/60 -- -- (!) 47 12 97 % --   22 0215 109/64 -- -- (!) 47 13 97 % --   22 0200 118/61 99.1 °F (37.3 °C) Oral (!) 47 12 97 % --   22 0145 (!) 108/59 -- -- (!) 48 12 97 % --   22 0136 -- -- -- -- -- -- 158 lb 3.2 oz (71.8 kg)   22 0130 112/60 -- -- (!) 47 13 97 % --   22 0115 (!) 103/59 -- -- (!) 48 11 97 % --   22 0100 (!) 101/57 -- -- (!) 48 12 97 % --   22 0047 101/62 -- -- 54 13 -- --   22 0045 -- -- -- 56 12 97 % --   22 0030 (!) 103/57 -- -- (!) 48 12 97 % --   22 0015 (!) 105/58 -- -- (!) 49 19 97 % --   22 0000 (!) 97/54 98.8 °F (37.1 °C) CORE (!) 48 11 97 % --   22 2345 (!) 89/56 -- -- 54 11 99 % --   22 2342 -- -- -- -- 13 -- --   22 2330 (!) 88/56 -- -- 52 11 99 % --       Last Body weight:   Wt Readings from Last 3 Encounters:   22 158 lb 3.2 oz (71.8 kg)   22 160 lb (72.6 kg)   21 157 lb 9.6 oz (71.5 kg)       Body Mass Index : Body mass index is 22.06 kg/m². Tmax over 24 hours: Temp (24hrs), Av.9 °F (37.7 °C), Min:98.8 °F (37.1 °C), Max:100.9 °F (38.3 °C)      Ins/Outs:   In: 2689.7 [I.V.:328.2]  Out: 1130 [Urine:730]    PHYSICAL EXAM   Constitutional: Appears well, no distress  EENT: PERRLA, EOMI, sclera clear, anicteric, oropharynx clear, no lesions, neck supple with midline trachea. Neck: Supple, symmetrical, trachea midline, no adenopathy, thyroid symmetric, no jvd skin normal  Respiratory: On BiPAP, symmetric chest rise, no respiratory distress, patient refusing auscultation  Cardiovascular: regular rate and rhythm on monitor, patient refusing exam  Abdomen: Patient refused  Extremities: Abscess to bilateral forearms  Neuro: A&O x4.  Responding to questions logically.   Moving all extremities    MEDICATIONS:  Scheduled Meds:   vancomycin (VANCOCIN) intermittent dosing (placeholder)   Other RX Placeholder    vancomycin  1,250 mg IntraVENous Q12H    sodium chloride flush  5-40 mL IntraVENous 2 times per day    piperacillin-tazobactam  3,375 mg IntraVENous Q8H       Continuous Infusions:   sodium chloride 100 mL/hr at 03/03/22 0133    propofol Stopped (03/02/22 2046)    fentaNYL Stopped (03/02/22 2049)    sodium chloride      heparin (PORCINE) Infusion 22 Units/kg/hr (03/03/22 0655)    dexmedetomidine 0.2 mcg/kg/hr (03/03/22 0655)       PRN Meds:   heparin (porcine), 80 Units/kg, PRN  heparin (porcine), 40 Units/kg, PRN  sodium chloride flush, 5-40 mL, PRN  sodium chloride, 25 mL, PRN  ondansetron, 4 mg, Q8H PRN   Or  ondansetron, 4 mg, Q6H PRN  polyethylene glycol, 17 g, Daily PRN  acetaminophen, 650 mg, Q6H PRN   Or  acetaminophen, 650 mg, Q6H PRN  heparin (porcine), 80 Units/kg, PRN  heparin (porcine), 40 Units/kg, PRN        SUPPORT DEVICES: [] Ventilator [x] BIPAP  [] Nasal Cannula [] Room Air      ABGs:   No results found for: PH, PCO2, PO2, HCO3, O2SAT    DATA:  Labs:  CBC:   Recent Labs     03/03/22  0503   WBC 7.3   HGB 10.8*        BMP:    Recent Labs     03/02/22  0256 03/02/22  0326 03/02/22  0326 03/03/22  0503   NA  --  133*   < > 139   K  --  4.3   < > 4.0   CL  --  94*   < > 106   CO2  --  24   < > 22   BUN  --  10   < > 12   CREATININE 0.56 0.51*  --  0.57*   GLUCOSE  --  123*   < > 98    < > = values in this interval not displayed. S. Calcium:  Recent Labs     03/03/22  0503   CALCIUM 7.8*     S. Ionized Calcium:No results for input(s): IONCA in the last 72 hours. S. Magnesium:No results for input(s): MG in the last 72 hours. S. Phosphorus:No results for input(s): PHOS in the last 72 hours. S. Glucose:  Recent Labs     03/02/22  0250 03/02/22  0256 03/02/22  0814   POCGLU 147* 140* 130*     Glycosylated hemoglobin A1C: No results for input(s): LABA1C in the last 72 hours.   INR:   Recent Labs     03/02/22 0326   INR 1.0     Hepatic functions:   Recent Labs     03/02/22 0326   ALKPHOS 106   ALT 75*   AST 94*   PROT 9.6*   BILITOT 0.68   LABALBU 4.1 Pancreatic functions:No results for input(s): LACTA, AMYLASE in the last 72 hours. S. Lactic Acid: No results for input(s): LACTA in the last 72 hours. Cardiac enzymes:  Recent Labs     03/02/22  0326   CKTOTAL 145     BNP:No results for input(s): BNP in the last 72 hours. Lipid profile: No results for input(s): CHOL, TRIG, HDL, LDL, LDLCALC in the last 72 hours. Blood Gases: No results found for: PH, PCO2, PO2, HCO3, O2SAT  Thyroid functions:   Lab Results   Component Value Date    TSH 1.35 03/02/2022        Urinalysis: Trace protein, otherwise WNL    Microbiology: Cultures during this admission:     Blood cultures:                 [] None drawn      [] Negative             []  Positive (Details:  )  Urine Culture:                   [] None drawn      [] Negative             []  Positive (Details:  )  Sputum Culture:               [] None drawn       [] Negative             []  Positive (Details:  )   Endotracheal aspirate:     [] None drawn       [] Negative             []  Positive (Details:  )     Radiology/Imaging:  XR CHEST PORTABLE   Final Result   1. Tubes as detailed above. 2. No acute focal airspace consolidation. CT THORACIC SPINE TRAUMA RECONSTRUCTION   Final Result   No acute thoracic or lumbar spine trauma. CT LUMBAR SPINE TRAUMA RECONSTRUCTION   Final Result   No acute thoracic or lumbar spine trauma. CT CHEST PULMONARY EMBOLISM W CONTRAST   Final Result   Limited exam due to motion artifact but there appears to be extensive   pulmonary emboli on the left to the upper lobe superiorly and anteriorly. A   nonobstructing pulmonary embolus may also be present to a branch to the   anteromedial right upper lobe. No heart strain. Possible superimposed developing pneumonia. CT ABDOMEN PELVIS W IV CONTRAST Additional Contrast? None   Final Result   1. Multifocal airspace disease at the lung bases which could represent   multifocal pneumonia and/or atelectasis. Follow-up is recommended to   document resolution. 2. Large right inguinal hernia which contains bowel loops. No evidence for   small bowel obstruction. 3. Small midline fat containing periumbilical hernia. 4. Small 9 mm mid pole left renal cyst.   5. Gallbladder distension. Mild intra- and extrahepatic biliary ductal   dilation. Further evaluation with gallbladder ultrasound and nonemergent   MRCP recommended. Correlate with LFTs. 6. Old granulomatous disease. XR HAND RIGHT (MIN 3 VIEWS)   Final Result   Moderate diffuse soft tissue swelling. No osseous or articular abnormality   evident. XR WRIST RIGHT (2 VIEWS)   Final Result   Moderate diffuse soft tissue swelling. No osseous or articular abnormality   evident. XR RADIUS ULNA RIGHT (2 VIEWS)   Final Result   Moderate diffuse soft tissue swelling. No osseous or articular abnormality   evident. CT Head WO Contrast   Final Result   No CT evidence of an acute intracranial abnormality. No evidence of acute fracture or traumatic malalignment of the cervical spine. Small area of encephalomalacia involving the inferior left frontal lobe   indicative of an old insult. RECOMMENDATIONS:   Unavailable         CT Cervical Spine WO Contrast   Final Result   No CT evidence of an acute intracranial abnormality. No evidence of acute fracture or traumatic malalignment of the cervical spine. Small area of encephalomalacia involving the inferior left frontal lobe   indicative of an old insult. RECOMMENDATIONS:   Unavailable         XR CHEST PORTABLE   Final Result   1. No acute cardiopulmonary disease.              ASSESSMENT:     Patient Active Problem List    Diagnosis Date Noted    Altered mental status 03/02/2022    AMS (altered mental status) 03/02/2022    Acute pulmonary embolism ([address]     Chest pain 12/06/2021    History of alcohol abuse 08/18/2021    Generalized pain 08/18/2021    Generalized anxiety disorder 08/18/2021    Abnormal CT scan, esophagus 08/18/2021    [facility]     Cocaine abuse ([facility] 06/20/2020    Bipolar 1 disorder ([facility] 03/08/2020    Opioid type dependence, continuous ([facility] 07/05/2018    Hypertension     Smoker     Hepatitis C     Psoriasis         PLAN:      WEAN PER PROTOCOL:  []   No  [x]   Yes []   N/A                         ICU PROPHYLAXIS:                Stress ulcer:  [x]   PPI Agent []   P4Muzdi []   Sucralfate []   Other []   None      VTE:  []   Enoxaparin []   SQ Heparin []   EPC Cuffs []  Other                       NUTRITION:   [x]  NPO []   TF []   TPN []   PO                       HOME MEDS RECONCILED:  []   No  [x]   Yes                           CONSULTATION NEEDED:  []   No  [x]   Yes                           FAMILY UPDATED:  [x]   No  []   Yes                           TRANSFER OUT OF ICU:  [x]   No  []   Yes             PLAN/MEDICAL DECISION MAKING:  Neurologic:   - Neuro intact  - Neuro checks per protocol  - Sedation: Precedex    1. Altered mental status, ? Secondary to sepsis, resolved  - CT head 3/2 shows encephalomalacia from previous insult    2. History of bipolar disorder/depression  - Continue home Celexa    3. History of alcohol/IV drug  - Folate/thiamine  - CIWA assessment    Cardiovascular:  - Hemodynamically stable  - MAP goal >65  -Follow-up echo  1. History of hypertension: Home antihypertensives held  2. Bradycardia: Home metoprolol held    Pulmonary:  - Maintain oxygen sats >92%  - Pulmonary toilet    1. Acute respiratory failure  -Self extubated yesterday 3/2  -A.m. ABG pending    2. Pulmonary embolism 3/2/2020  -Heparin drip  -Vascular surgery: No intervention    GI/Nutrition:  - Bowel regimen: MiraLAX  - Diet:Diet NPO   1. History of hepatitis C: Monitor LFTS  2. Gallbladder distention/ductal dilation   -No elevation in LFTs, outpatient follow-up or surgery  3.   Periumbilical hernia/right inguinal hernia   -No

## 2022-03-03 NOTE — PLAN OF CARE
Problem: OXYGENATION/RESPIRATORY FUNCTION  Goal: Patient will maintain patent airway  3/2/2022 1912 by Sherri Peres RN  Outcome: Ongoing  3/2/2022 0800 by Tello Rios RCP  Outcome: Ongoing  Goal: Patient will achieve/maintain normal respiratory rate/effort  Description: Respiratory rate and effort will be within normal limits for the patient  3/2/2022 1912 by Sherri Peres RN  Outcome: Ongoing  3/2/2022 0800 by Tello Rios RCP  Outcome: Ongoing     Problem: MECHANICAL VENTILATION  Goal: Patient will maintain patent airway  3/2/2022 1912 by Sherri Peres RN  Outcome: Ongoing  3/2/2022 0800 by Tello Rios RCP  Outcome: Ongoing  Goal: Oral health is maintained or improved  3/2/2022 1912 by Sherri Peres RN  Outcome: Ongoing  3/2/2022 0800 by Tello Rios RCP  Outcome: Ongoing  Goal: ET tube will be managed safely  3/2/2022 1912 by Sherri Peres RN  Outcome: Ongoing  3/2/2022 0800 by Tello Rios RCP  Outcome: Ongoing  Goal: Ability to express needs and understand communication  3/2/2022 1912 by Sherri Peres RN  Outcome: Ongoing  3/2/2022 0800 by Tello Rios RCP  Outcome: Ongoing  Goal: Mobility/activity is maintained at optimum level for patient  3/2/2022 1912 by Sherri Peres RN  Outcome: Ongoing  3/2/2022 0800 by Tello Rios RCP  Outcome: Ongoing     Problem: SKIN INTEGRITY  Goal: Skin integrity is maintained or improved  3/2/2022 1912 by Sherri Peres RN  Outcome: Ongoing  3/2/2022 0800 by Tello Rios RCP  Outcome: Ongoing     Problem: Nutrition  Goal: Optimal nutrition therapy  3/2/2022 1912 by Sherri Peres RN  Outcome: Ongoing  3/2/2022 1429 by Navdeep Sanchez RD, LD  Outcome: Ongoing  Note: Nutrition Problem #1: Inadequate oral intake  Intervention: Food and/or Nutrient Delivery:  (Start nutrition as able/as warranted.  If TF, suggest Peptide Based Formula goal 60 mL/hr to provide 1728 kcal and 108 g pro/day.)  Nutritional Goals: meet % of estimated nutrient needs

## 2022-03-03 NOTE — FLOWSHEET NOTE
Pt self extubated at 2049. Pt sedation medications were immediatly turned off, and he was placed on Bipap. Pt is A&O x4 cam negative and following all commands sating 96%. Respiratory at bedside and Dr. Carolyn Vigil aware.     Electronically signed by Ector Wadsworth RN on 3/2/2022 at 10:15 PM

## 2022-03-03 NOTE — PROGRESS NOTES
Patient ripped off BiPAP and states that he is leaving AMA. After calming patient down he was agreeable to stay for medications and follow-up instructions. Patient satting well on room air following removal of BiPAP. No respiratory distress. Patient does have full medical decision-making capacity. Is able to repeat risks back to me and understands that he could die without proper treatment. Patient understands that he is welcome to return at any time for completion of treatment. Follow-up with PCP in 1-2 days. Began Eliquis and doxycycline.   Strict ED return precautions

## 2022-03-03 NOTE — DISCHARGE SUMMARY
[facility]     Department of Internal Medicine - Critical Care Service    INPATIENT DISCHARGE SUMMARY      PATIENT IDENTIFICATION:  NAME:  Arvin Rosario   :   1965  MRN:    [id number]     Acct:    [de-identified]   Admit Date:  3/2/2022  Discharge date:  No discharge date for patient encounter. Attending Provider: Margarita Childress MD                                     Principal Problem:    Altered mental status  Active Problems:    AMS (altered mental status)    Acute pulmonary embolism ([address]  Resolved Problems:    * No resolved hospital problems. *       REASON FOR HOSPITALIZATION:   Chief Complaint   Patient presents with    Altered [address] Course  69-year-old male with history of hypertension, psoriasis, bipolar disorder, IV drug use, admitted yesterday 3/2 with altered mental status. Patient found to have PE and started on heparin drip, no intervention per vascular. Concern for sepsis from arm abscess versus pneumonia. Patient also at risk for endocarditis due to IV drug use, had echo however this not been read yet. Patient with fever and leukocytosis that resolved after 1 day of vancomycin/Zosyn. Patient self extubated evening of 3/2 and was on BiPAP overnight. This morning mental status is back to baseline, patient signed out AMA. He does have medical decision capacity and understands the risks of leaving prior to completion of work-up. Started on Eliquis and doxycycline for abscess/pneumonia coverage. Follow-up with PCP in 1-2 days. Strict return precautions    Patient also incidentally found to have periumbilical hernia, gallbladder distention/ductal dilation, and right inguinal hernia with bowel present. No abdominal pain, elevated LFTs, or evidence of obstruction. Follow-up with surgery outpatient regarding this.       Consults:   vascular surgery    Procedures: Intubation    Any Hospital Acquired Infections: None    PATIENT'S DISCHARGE CONDITION:      PATIENT/FAMILY INSTRUCTIONS:   Current Discharge Medication List      START taking these medications    Details   apixaban starter pack (ELIQUIS DVT/PE STARTER PACK) 5 MG TBPK tablet Take 1 tablet by mouth See Admin Instructions  Qty: 74 tablet, Refills: 0      doxycycline monohydrate (ADOXA) 100 MG tablet Take 1 tablet by mouth 2 times daily for 7 days  Qty: 14 tablet, Refills: 0         CONTINUE these medications which have NOT CHANGED    Details   QUEtiapine (SEROQUEL) 200 MG tablet TAKE 1 TABLET BY MOUTH NIGHTLY  Qty: 30 tablet, Refills: 0    Associated Diagnoses: Bipolar 1 disorder (HCC)      lisinopril (PRINIVIL;ZESTRIL) 20 MG tablet Take 1 tablet by mouth daily      amLODIPine (NORVASC) 10 MG tablet Take 1 tablet by mouth daily  Qty: 30 tablet, Refills: 3    Associated Diagnoses: Essential hypertension      folic acid (FOLVITE) 1 MG tablet Take 1 tablet by mouth daily  Qty: 14 tablet, Refills: 0    Associated Diagnoses: History of alcohol abuse      thiamine 100 MG tablet Take 1 tablet by mouth daily  Qty: 14 tablet, Refills: 0    Associated Diagnoses: History of alcohol abuse      citalopram (CELEXA) 20 MG tablet Take 1 tablet by mouth daily  Qty: 30 tablet, Refills: 0    Associated Diagnoses: Bipolar 1 disorder (HCC)      traZODone (DESYREL) 50 MG tablet Take 1 tablet by mouth nightly as needed for Sleep  Qty: 30 tablet, Refills: 0    Associated Diagnoses: Bipolar 1 disorder (HCC)      hydroCHLOROthiazide (HYDRODIURIL) 25 MG tablet Take 1 tablet by mouth daily  Qty: 30 tablet, Refills: 0         STOP taking these medications       metoprolol tartrate (LOPRESSOR) 25 MG tablet Comments:   Reason for Stopping:         gabapentin (NEURONTIN) 300 MG capsule Comments:   Reason for Stopping:         dicyclomine (BENTYL) 10 MG capsule Comments:   Reason for Stopping:             Activity: activity as tolerated    Diet: regular diet    Disposition: home    Follow-up:  tomorrow with SHADIA KOCH MD,  in the next few days surgery    Electronically signed by Ying Solis DO on 3/3/2022 at 9:01 AM     Time Spent on discharge is more than 45 minutes in the examination, evaluation, counseling and review of medications and discharge plan.

## 2022-03-03 NOTE — PLAN OF CARE
Problem: Non-Violent Restraints  Goal: Removal from restraints as soon as assessed to be safe  3/3/2022 0412 by Shaniqua Scott RN  Outcome: Met This Shift  3/2/2022 1912 by Jorge Swain RN  Outcome: Not Met This Shift     Problem: OXYGENATION/RESPIRATORY FUNCTION  Goal: Patient will maintain patent airway  3/3/2022 0412 by Shaniqua Scott RN  Outcome: Ongoing  3/2/2022 1947 by COLEMAN BryantP  Outcome: Ongoing  3/2/2022 1912 by Jorge Swain RN  Outcome: Ongoing  Goal: Patient will achieve/maintain normal respiratory rate/effort  Description: Respiratory rate and effort will be within normal limits for the patient  3/3/2022 0412 by Shaniqua Scott RN  Outcome: Ongoing  3/2/2022 1947 by Isael Cook RCP  Outcome: Ongoing  3/2/2022 1912 by Jorge Swain RN  Outcome: Ongoing     Problem: Nutrition  Goal: Optimal nutrition therapy  3/3/2022 0412 by Shaniqua Scott RN  Outcome: Ongoing  3/2/2022 1912 by Jorge Swain RN  Outcome: Ongoing  3/2/2022 1429 by Billy Small RD, LD  Outcome: Ongoing  Note: Nutrition Problem #1: Inadequate oral intake  Intervention: Food and/or Nutrient Delivery:  (Start nutrition as able/as warranted.  If TF, suggest Peptide Based Formula goal 60 mL/hr to provide 1728 kcal and 108 g pro/day.)  Nutritional Goals: meet % of estimated nutrient needs       Problem: Non-Violent Restraints  Goal: No harm/injury to patient while restraints in use  3/3/2022 0412 by Shaniqua Scott RN  Outcome: Completed  3/2/2022 1912 by Jorge Swain RN  Outcome: Not Met This Shift  Goal: Patient's dignity will be maintained  3/3/2022 0412 by Shaniqua Scott RN  Outcome: Completed  3/2/2022 1912 by Jorge Swain RN  Outcome: Not Met This Shift     Problem: MECHANICAL VENTILATION  Goal: Patient will maintain patent airway  3/2/2022 2252 by Phillip Bowman RCP  Outcome: Completed  3/2/2022 1947 by COLEMAN BryantP  Outcome: Ongoing  3/2/2022 1912 by Jorge Swain RN  Outcome: Ongoing  Goal: Oral health is maintained or improved  3/2/2022 2252 by Isidra Mayorga RCP  Outcome: Completed  3/2/2022 1947 by Alana Mckenzie RCP  Outcome: Ongoing  3/2/2022 1912 by Sherri Peres RN  Outcome: Ongoing  Goal: ET tube will be managed safely  3/2/2022 2252 by COLEMAN ValeraP  Outcome: Completed  3/2/2022 1947 by Alana Mckenzie RCP  Outcome: Ongoing  3/2/2022 1912 by Sherri Peres RN  Outcome: Ongoing  Goal: Ability to express needs and understand communication  3/2/2022 2252 by Isidra Mayorga RCP  Outcome: Completed  3/2/2022 1947 by Alana Mckenzie RCP  Outcome: Ongoing  3/2/2022 1912 by Sherri Peres RN  Outcome: Ongoing  Goal: Mobility/activity is maintained at optimum level for patient  3/2/2022 2252 by Isidra Mayorga RCP  Outcome: Completed  3/2/2022 1947 by COLEMAN OrtizP  Outcome: Ongoing  3/2/2022 1912 by Sherri Peres RN  Outcome: Ongoing     Problem: SKIN INTEGRITY  Goal: Skin integrity is maintained or improved  3/2/2022 2252 by Isidra Mayorga RCP  Outcome: Completed  3/2/2022 1947 by Alana Mckenzie RCP  Outcome: Ongoing  3/2/2022 1912 by Sherri Peres RN  Outcome: Ongoing

## 2022-03-03 NOTE — PROGRESS NOTES
1510 - pt pulled one of his IVs out, stating \"I'm getting out of here right now\". Critical care resident called to bedside to talk to pt. Pt still insisted he was leaving but will wait until meds are called into pharmacy. Pt taken off monitor and all IV medications stopped. 6113 - Pt refused to sign any paperwork, Pt states \"I have things to do, I'm leaving right now\". All medical devices were removed and pt sent home with his boots and belt.

## 2022-03-03 NOTE — PLAN OF CARE
Problem: OXYGENATION/RESPIRATORY FUNCTION  Goal: Patient will maintain patent airway  3/2/2022 1947 by Jyoti Wilkins RCP  Outcome: Ongoing     Problem: OXYGENATION/RESPIRATORY FUNCTION  Goal: Patient will achieve/maintain normal respiratory rate/effort  Description: Respiratory rate and effort will be within normal limits for the patient  3/2/2022 1947 by Jyoti Wilkins RCP  Outcome: Ongoing     Problem: MECHANICAL VENTILATION  Goal: Patient will maintain patent airway  3/2/2022 1947 by Jyoti Wilkins RCP  Outcome: Ongoing     Problem: MECHANICAL VENTILATION  Goal: Oral health is maintained or improved  3/2/2022 1947 by Jyoti Wilkins RCP  Outcome: Ongoing     Problem: MECHANICAL VENTILATION  Goal: ET tube will be managed safely  3/2/2022 1947 by Jyoti Wilkins RCP  Outcome: Ongoing     Problem: MECHANICAL VENTILATION  Goal: Ability to express needs and understand communication  3/2/2022 1947 by Jyoti Wilkins RCP  Outcome: Ongoing     Problem: MECHANICAL VENTILATION  Goal: Mobility/activity is maintained at optimum level for patient  3/2/2022 1947 by Jyoti Wilkins RCP  Outcome: Ongoing     Problem: SKIN INTEGRITY  Goal: Skin integrity is maintained or improved  3/2/2022 1947 by Jyoti Wilkins RCP  Outcome: Ongoing

## 2022-03-04 ENCOUNTER — HOSPITAL ENCOUNTER (EMERGENCY)
Age: 57
Discharge: HOME | End: 2022-03-04
Attending: EMERGENCY MEDICINE
Payer: MEDICARE

## 2022-03-04 VITALS
HEART RATE: 73 BPM | OXYGEN SATURATION: 96 % | RESPIRATION RATE: 20 BRPM | TEMPERATURE: 98.6 F | SYSTOLIC BLOOD PRESSURE: 177 MMHG | DIASTOLIC BLOOD PRESSURE: 123 MMHG

## 2022-03-04 DIAGNOSIS — L02.91 ABSCESS: Primary | ICD-10-CM

## 2022-03-04 DIAGNOSIS — F11.90 OPIOID USE: ICD-10-CM

## 2022-03-04 PROCEDURE — 99284 EMERGENCY DEPT VISIT MOD MDM: CPT

## 2022-03-04 ASSESSMENT — PAIN SCALES - WONG BAKER: WONGBAKER_NUMERICALRESPONSE: 8

## 2022-03-04 ASSESSMENT — PAIN DESCRIPTION - ORIENTATION: ORIENTATION: RIGHT;LEFT

## 2022-03-04 ASSESSMENT — PAIN DESCRIPTION - LOCATION: LOCATION: ARM

## 2022-03-04 ASSESSMENT — PAIN SCALES - GENERAL: PAINLEVEL_OUTOF10: 8

## 2022-03-04 ASSESSMENT — PAIN DESCRIPTION - FREQUENCY: FREQUENCY: INTERMITTENT

## 2022-03-04 ASSESSMENT — PAIN DESCRIPTION - DESCRIPTORS: DESCRIPTORS: BURNING

## 2022-03-04 ASSESSMENT — PAIN DESCRIPTION - PAIN TYPE: TYPE: CHRONIC PAIN

## 2022-03-04 ASSESSMENT — PAIN - FUNCTIONAL ASSESSMENT: PAIN_FUNCTIONAL_ASSESSMENT: 0-10

## 2022-03-05 ENCOUNTER — HOSPITAL ENCOUNTER (EMERGENCY)
Age: 57
Discharge: HOME | End: 2022-03-05
Attending: EMERGENCY MEDICINE
Payer: MEDICARE

## 2022-03-05 ENCOUNTER — APPOINTMENT (OUTPATIENT)
Dept: GENERAL RADIOLOGY | Age: 57
End: 2022-03-05
Payer: MEDICARE

## 2022-03-05 ENCOUNTER — APPOINTMENT (OUTPATIENT)
Dept: CT IMAGING | Age: 57
End: 2022-03-05
Payer: MEDICARE

## 2022-03-05 VITALS
OXYGEN SATURATION: 94 % | DIASTOLIC BLOOD PRESSURE: 98 MMHG | HEART RATE: 71 BPM | SYSTOLIC BLOOD PRESSURE: 148 MMHG | RESPIRATION RATE: 12 BRPM | TEMPERATURE: 97.8 F

## 2022-03-05 DIAGNOSIS — F19.90 DRUG USE: Primary | ICD-10-CM

## 2022-03-05 LAB
ABSOLUTE EOS #: 0.46 K/UL (ref 0–0.44)
ABSOLUTE IMMATURE GRANULOCYTE: 0.05 K/UL (ref 0–0.3)
ABSOLUTE LYMPH #: 3.03 K/UL (ref 1.1–3.7)
ABSOLUTE MONO #: 0.75 K/UL (ref 0.1–1.2)
ACETAMINOPHEN LEVEL: <5 UG/ML (ref 10–30)
ALBUMIN SERPL-MCNC: 3.8 G/DL (ref 3.5–5.2)
ALBUMIN/GLOBULIN RATIO: 0.8 (ref 1–2.5)
ALP BLD-CCNC: 90 U/L (ref 40–129)
ALT SERPL-CCNC: 52 U/L (ref 5–41)
AMMONIA: 42 UMOL/L (ref 16–60)
ANION GAP SERPL CALCULATED.3IONS-SCNC: 15 MMOL/L (ref 9–17)
ANION GAP: 10 MMOL/L (ref 7–16)
AST SERPL-CCNC: 64 U/L
BASOPHILS # BLD: 1 % (ref 0–2)
BASOPHILS ABSOLUTE: 0.09 K/UL (ref 0–0.2)
BILIRUB SERPL-MCNC: 0.37 MG/DL (ref 0.3–1.2)
BILIRUBIN DIRECT: 0.12 MG/DL
BILIRUBIN, INDIRECT: 0.25 MG/DL (ref 0–1)
BUN BLDV-MCNC: 11 MG/DL (ref 6–20)
CALCIUM SERPL-MCNC: 9.5 MG/DL (ref 8.6–10.4)
CHLORIDE BLD-SCNC: 98 MMOL/L (ref 98–107)
CO2: 22 MMOL/L (ref 20–31)
CREAT SERPL-MCNC: 0.61 MG/DL (ref 0.7–1.2)
EOSINOPHILS RELATIVE PERCENT: 5 % (ref 1–4)
ETHANOL PERCENT: <0.01 %
ETHANOL: <10 MG/DL
GFR AFRICAN AMERICAN: >60 ML/MIN
GFR NON-AFRICAN AMERICAN: >60 ML/MIN
GFR NON-AFRICAN AMERICAN: >60 ML/MIN
GFR SERPL CREATININE-BSD FRML MDRD: >60 ML/MIN
GFR SERPL CREATININE-BSD FRML MDRD: ABNORMAL ML/MIN/{1.73_M2}
GFR SERPL CREATININE-BSD FRML MDRD: NORMAL ML/MIN/{1.73_M2}
GLUCOSE BLD-MCNC: 108 MG/DL (ref 75–110)
GLUCOSE BLD-MCNC: 117 MG/DL (ref 70–99)
GLUCOSE BLD-MCNC: 123 MG/DL (ref 74–100)
HCO3 VENOUS: 29.6 MMOL/L (ref 22–29)
HCT VFR BLD CALC: 44 % (ref 40.7–50.3)
HEMOGLOBIN: 14.6 G/DL (ref 13–17)
IMMATURE GRANULOCYTES: 1 %
LACTIC ACID, WHOLE BLOOD: 1.8 MMOL/L (ref 0.7–2.1)
LIPASE: 15 U/L (ref 13–60)
LYMPHOCYTES # BLD: 30 % (ref 24–43)
MCH RBC QN AUTO: 28.1 PG (ref 25.2–33.5)
MCHC RBC AUTO-ENTMCNC: 33.2 G/DL (ref 28.4–34.8)
MCV RBC AUTO: 84.8 FL (ref 82.6–102.9)
MONOCYTES # BLD: 7 % (ref 3–12)
NRBC AUTOMATED: 0 PER 100 WBC
O2 SAT, VEN: 76 % (ref 60–85)
PCO2, VEN: 46.4 MM HG (ref 41–51)
PDW BLD-RTO: 14.1 % (ref 11.8–14.4)
PH VENOUS: 7.41 (ref 7.32–7.43)
PLATELET # BLD: 234 K/UL (ref 138–453)
PMV BLD AUTO: 10.2 FL (ref 8.1–13.5)
PO2, VEN: 41 MM HG (ref 30–50)
POC BUN: 11 MG/DL (ref 8–26)
POC CHLORIDE: 103 MMOL/L (ref 98–107)
POC CREATININE: 0.61 MG/DL (ref 0.51–1.19)
POC HEMATOCRIT: 48 % (ref 41–53)
POC HEMOGLOBIN: 16.4 G/DL (ref 13.5–17.5)
POC IONIZED CALCIUM: 1.21 MMOL/L (ref 1.15–1.33)
POC LACTIC ACID: 1.23 MMOL/L (ref 0.56–1.39)
POC POTASSIUM: 4.5 MMOL/L (ref 3.5–4.5)
POC SODIUM: 142 MMOL/L (ref 138–146)
POC TCO2: 30 MMOL/L (ref 22–30)
POSITIVE BASE EXCESS, VEN: 4 (ref 0–3)
POTASSIUM SERPL-SCNC: 4.6 MMOL/L (ref 3.7–5.3)
PRO-BNP: 284 PG/ML
RBC # BLD: 5.19 M/UL (ref 4.21–5.77)
SALICYLATE LEVEL: <1 MG/DL (ref 3–10)
SEG NEUTROPHILS: 57 % (ref 36–65)
SEGMENTED NEUTROPHILS ABSOLUTE COUNT: 5.72 K/UL (ref 1.5–8.1)
SODIUM BLD-SCNC: 135 MMOL/L (ref 135–144)
TOTAL PROTEIN: 8.5 G/DL (ref 6.4–8.3)
TOXIC TRICYCLIC SC,BLOOD: NEGATIVE
TROPONIN, HIGH SENSITIVITY: 10 NG/L (ref 0–22)
TROPONIN, HIGH SENSITIVITY: 9 NG/L (ref 0–22)
WBC # BLD: 10.1 K/UL (ref 3.5–11.3)

## 2022-03-05 PROCEDURE — 84520 ASSAY OF UREA NITROGEN: CPT

## 2022-03-05 PROCEDURE — 80143 DRUG ASSAY ACETAMINOPHEN: CPT

## 2022-03-05 PROCEDURE — 82803 BLOOD GASES ANY COMBINATION: CPT

## 2022-03-05 PROCEDURE — 80076 HEPATIC FUNCTION PANEL: CPT

## 2022-03-05 PROCEDURE — 80051 ELECTROLYTE PANEL: CPT

## 2022-03-05 PROCEDURE — 82330 ASSAY OF CALCIUM: CPT

## 2022-03-05 PROCEDURE — 85025 COMPLETE CBC W/AUTO DIFF WBC: CPT

## 2022-03-05 PROCEDURE — 99285 EMERGENCY DEPT VISIT HI MDM: CPT

## 2022-03-05 PROCEDURE — 82947 ASSAY GLUCOSE BLOOD QUANT: CPT

## 2022-03-05 PROCEDURE — 80179 DRUG ASSAY SALICYLATE: CPT

## 2022-03-05 PROCEDURE — 80307 DRUG TEST PRSMV CHEM ANLYZR: CPT

## 2022-03-05 PROCEDURE — 80048 BASIC METABOLIC PNL TOTAL CA: CPT

## 2022-03-05 PROCEDURE — 82565 ASSAY OF CREATININE: CPT

## 2022-03-05 PROCEDURE — 83880 ASSAY OF NATRIURETIC PEPTIDE: CPT

## 2022-03-05 PROCEDURE — 93005 ELECTROCARDIOGRAM TRACING: CPT | Performed by: STUDENT IN AN ORGANIZED HEALTH CARE EDUCATION/TRAINING PROGRAM

## 2022-03-05 PROCEDURE — 85014 HEMATOCRIT: CPT

## 2022-03-05 PROCEDURE — 83605 ASSAY OF LACTIC ACID: CPT

## 2022-03-05 PROCEDURE — 83690 ASSAY OF LIPASE: CPT

## 2022-03-05 PROCEDURE — 70450 CT HEAD/BRAIN W/O DYE: CPT

## 2022-03-05 PROCEDURE — G0480 DRUG TEST DEF 1-7 CLASSES: HCPCS

## 2022-03-05 PROCEDURE — 84484 ASSAY OF TROPONIN QUANT: CPT

## 2022-03-05 PROCEDURE — 71045 X-RAY EXAM CHEST 1 VIEW: CPT

## 2022-03-05 PROCEDURE — 82140 ASSAY OF AMMONIA: CPT

## 2022-03-05 ASSESSMENT — ENCOUNTER SYMPTOMS
SHORTNESS OF BREATH: 0
NAUSEA: 0
RHINORRHEA: 0
NAUSEA: 1
SHORTNESS OF BREATH: 1
CONSTIPATION: 0
DIARRHEA: 0
ABDOMINAL PAIN: 0
BACK PAIN: 0
VOMITING: 0
COUGH: 0
ABDOMINAL PAIN: 1

## 2022-03-05 NOTE — ED NOTES
The following labs were labeled with patient stickers & tubed to lab;    [x]Lavender   [x]On Ice  []Blue  [x]Green/ Yellow  [x]Green/ Black [x]On Ice  []Pink  []Red  [x]Yellow    []COVID-19 Swab []Rapid    []Urine Sample  []Pelvic Cultures    []Blood Cultures       Nori Basilio LPN  44/44/66 [id number]

## 2022-03-05 NOTE — ED PROVIDER NOTES
[facility]  Emergency Department  Faculty Sign-Out Addendum     Care of Buel Limb was assumed from previous attending and is being seen for Abscess (bilateral arms, IV drug use yesterday (T[place] and fentanyl)  AMA from here yesterday.)  . The patient's initial evaluation and plan have been discussed with the prior provider who initially evaluated the patient. Handoff taken on the following patient from prior Attending Physician:    [address]    I was available and discussed any additional care issues that arose and coordinated the management plans with the resident(s) caring for the patient during my duty period. Any areas of disagreement with residents documentation of care or procedures are noted on the chart. I was personally present for the key portions of any/all procedures during my duty period. I have documented in the chart those procedures where I was not present during the key portions. EMERGENCY DEPARTMENT COURSE / MEDICAL DECISION MAKING:       MEDICATIONS GIVEN:  No orders of the defined types were placed in this encounter.       LABS / RADIOLOGY:     Labs Reviewed - No data to display    ECHO Complete 2D W Doppler W Color    Result Date: 3/3/2022  Transthoracic Echocardiography Report (TTE)  Patient Name Quintin Hinkle    Date of Study             03/02/2022               KEYLA DAILEY   Date of      1965  Gender                    Male  Birth   Age          55 year(s)  Race                         Room Number  0130        Height:                   71 inch, 180.34 cm   Corporate ID [id number]    Weight:                   160 pounds, 72.6 kg  #   Patient Acct [de-identified]   BSA:         1.92 m^2     BMI:       22.32 kg/m^2  #   MR #         [id number]     Sonographer               Lynda Brasher   Accession #  [id number]  Interpreting Physician    [address] [place] [address]   Fellow                   Referring Nurse                           Practitioner   Interpreting Referring Physician       [address] [place] [address],  Fellow                                             MD  Type of Study   TTE procedure:2D Echocardiogram, M-Mode, Doppler, Color Doppler. Procedure Date Date: 03/02/2022 Start: 03:13 PM Study Location: [facility] Technical Quality: Fair visualization Indications: Altered mental status and Rule out vegetation. History / Tech. Comments: Procedure explained to patient. Study done at the bedside in MICU. Technically difficult study. Smoker, IV drug abuse, cocaine abuse Patient Status: Inpatient Height: 71 inches Weight: 160 pounds BSA: 1.92 m^2 BMI: 22.32 kg/m^2 HR: 77 bpm Allergies   - *Unlisted:(haldol). CONCLUSIONS Summary Left ventricle is normal in size. Global left ventricular systolic function is normal. Calculated EF via 3D Heart Model is 58 %. Trivial aortic insufficiency. No obvious vegetation on this study, consider BRAXTON if indicated. Signature ----------------------------------------------------------------------------  Electronically signed by Annalee Lopez(Sonographer) on 03/02/2022  05:08 PM ---------------------------------------------------------------------------- ----------------------------------------------------------------------------  Electronically signed by Stephan Whitmore(Interpreting physician) on 03/03/2022  10:19 AM ---------------------------------------------------------------------------- FINDINGS Left Atrium Left atrium is normal in size. Left Ventricle Left ventricle is normal in size. Global left ventricular systolic function is normal. Estimated ejection fraction is 55 % . Calculated EF via 3D Heart Model is 58 %. Normal left ventricular wall thickness. No obvious wall motion abnormality seen. Right Atrium Right atrium is normal in size. Right Ventricle Normal right ventricular size and function. Mitral Valve Normal mitral valve structure. No mitral regurgitation. No mitral stenosis.  Aortic Valve Aortic valve structure and function normal. Aortic valve is trileaflet. Trivial aortic insufficiency. No aortic stenosis. Tricuspid Valve Normal tricuspid valve leaflets. No tricuspid regurgitation. No tricuspid stenosis. Insignificant tricuspid regurgitation, unable to estimate RVSP. Pulmonic Valve Pulmonic valve is normal in structure and function. No pulmonic insufficiency. No evidence of pulmonic stenosis. Pericardial Effusion No pericardial effusion. Miscellaneous Normal aortic root dimension. E/E' average = 8.35. IVC is normal in size but unable to assess respiratory collapse due to patient on ventilator. M-mode / 2D Measurements & Calculations:   LVIDd:4.7 cm(3.7 - 5.6 cm)       Diastolic EURRQP:814 ml  EVIQT:1.0 cm(2.2 - 4.0 cm)       Systolic CJEVWB:47 ml  IVSd:1 cm(0.6 - 1.1 cm)          Aortic Root:3 cm(2.0 - 3.7 cm)  LVPWd:1 cm(0.6 - 1.1 cm)         LA Dimension: 3.6 cm(1.9 - 4.0 cm)  Fractional Shortenin.3 %     LA volume/Index: 52.5 ml /27m^2  Calculated LVEF (%): 57.45 %     LVOT:2 cm                                   RVDd:3.6 cm   Mitral:                                 Aortic   Valve Area (P1/2-Time): 2.06 cm^2       Peak Velocity: 1.81 m/s  Peak E-Wave: 0.77 m/s                   Mean Velocity: 1.17 m/s  Peak A-Wave: 0.73 m/s                   Peak Gradient: 13.1 mmHg  E/A Ratio: 1.05                         Mean Gradient: 8 mmHg  Peak Gradient: 2.38 mmHg  Mean Gradient: 2 mmHg  Deceleration Time: 310 msec             Area (continuity): 2.85 cm^2  P1/2t: 107 msec                         AV VTI: 32.7 cm   Area (continuity): 2.49 cm^2  Mean Velocity: 0.58 m/s  Diastology / Tissue Doppler Septal Wall E' velocity:0.10 m/s Septal Wall E/E':7.9 Lateral Wall E' velocity:0.09 m/s Lateral Wall E/E':8.8    XR RADIUS ULNA RIGHT (2 VIEWS)    Result Date: 3/2/2022  EXAMINATION: TWO XRAY VIEWS OF THE RIGHT FOREARM; 2 XRAY VIEWS OF THE RIGHT WRIST; THREE XRAY VIEWS OF THE RIGHT HAND 3/2/2022 5:24 am COMPARISON: None.  HISTORY: ORDERING SYSTEM PROVIDED HISTORY: Trauma TECHNOLOGIST PROVIDED HISTORY: Trauma Reason for Exam: found unresponsive,best films pt unable to cooperate,ams FINDINGS: No fracture, dislocation or joint abnormality identified in the right forearm, wrist and hand. There is moderate diffuse soft tissue swelling throughout. Moderate diffuse soft tissue swelling. No osseous or articular abnormality evident. XR WRIST RIGHT (2 VIEWS)    Result Date: 3/2/2022  EXAMINATION: TWO XRAY VIEWS OF THE RIGHT FOREARM; 2 XRAY VIEWS OF THE RIGHT WRIST; THREE XRAY VIEWS OF THE RIGHT HAND 3/2/2022 5:24 am COMPARISON: None. HISTORY: ORDERING SYSTEM PROVIDED HISTORY: Trauma TECHNOLOGIST PROVIDED HISTORY: Trauma Reason for Exam: found unresponsive,best films pt unable to cooperate,ams FINDINGS: No fracture, dislocation or joint abnormality identified in the right forearm, wrist and hand. There is moderate diffuse soft tissue swelling throughout. Moderate diffuse soft tissue swelling. No osseous or articular abnormality evident. XR HAND RIGHT (MIN 3 VIEWS)    Result Date: 3/2/2022  EXAMINATION: TWO XRAY VIEWS OF THE RIGHT FOREARM; 2 XRAY VIEWS OF THE RIGHT WRIST; THREE XRAY VIEWS OF THE RIGHT HAND 3/2/2022 5:24 am COMPARISON: None. HISTORY: ORDERING SYSTEM PROVIDED HISTORY: Trauma TECHNOLOGIST PROVIDED HISTORY: Trauma Reason for Exam: found unresponsive,best films pt unable to cooperate,ams FINDINGS: No fracture, dislocation or joint abnormality identified in the right forearm, wrist and hand. There is moderate diffuse soft tissue swelling throughout. Moderate diffuse soft tissue swelling. No osseous or articular abnormality evident.      CT Head WO Contrast    Result Date: 3/2/2022  EXAMINATION: CT OF THE HEAD WITHOUT CONTRAST; CT OF THE CERVICAL SPINE WITHOUT CONTRAST 3/2/2022 3:19 am TECHNIQUE: CT of the head was performed without the administration of intravenous contrast. Dose modulation, iterative reconstruction, and/or weight based adjustment of the mA/kV was utilized to reduce the radiation dose to as low as reasonably achievable.; CT of the cervical spine was performed without the administration of intravenous contrast. Multiplanar reformatted images are provided for review. Dose modulation, iterative reconstruction, and/or weight based adjustment of the mA/kV was utilized to reduce the radiation dose to as low as reasonably achievable. COMPARISON: None. HISTORY: ORDERING SYSTEM PROVIDED HISTORY: ams TECHNOLOGIST PROVIDED HISTORY: ams Decision Support Exception - unselect if not a suspected or confirmed emergency medical condition->Emergency Medical Condition (MA) FINDINGS: CERVICAL SPINE: BONES/ALIGNMENT: There is no acute fracture or traumatic malalignment. DEGENERATIVE CHANGES: No significant degenerative changes. SOFT TISSUES: There is no prevertebral soft tissue swelling. HEAD: BRAIN/VENTRICLES: Small area of encephalomalacia involving the inferior left frontal lobe indicative of an old insult. There is no acute intracranial hemorrhage, mass effect or midline shift. No abnormal extra-axial fluid collection. The gray-white differentiation is maintained without evidence of an acute infarct. There is no evidence of hydrocephalus. ORBITS: The visualized portion of the orbits demonstrate no acute abnormality. SINUSES: The visualized paranasal sinuses and mastoid air cells demonstrate no acute abnormality. SOFT TISSUES/SKULL: No acute abnormality of the visualized skull or soft tissues. No CT evidence of an acute intracranial abnormality. No evidence of acute fracture or traumatic malalignment of the cervical spine. Small area of encephalomalacia involving the inferior left frontal lobe indicative of an old insult.  RECOMMENDATIONS: Unavailable     CT Cervical Spine WO Contrast    Result Date: 3/2/2022  EXAMINATION: CT OF THE HEAD WITHOUT CONTRAST; CT OF THE CERVICAL SPINE WITHOUT CONTRAST 3/2/2022 3:19 am TECHNIQUE: CT of the head was performed without the administration of intravenous contrast. Dose modulation, iterative reconstruction, and/or weight based adjustment of the mA/kV was utilized to reduce the radiation dose to as low as reasonably achievable.; CT of the cervical spine was performed without the administration of intravenous contrast. Multiplanar reformatted images are provided for review. Dose modulation, iterative reconstruction, and/or weight based adjustment of the mA/kV was utilized to reduce the radiation dose to as low as reasonably achievable. COMPARISON: None. HISTORY: ORDERING SYSTEM PROVIDED HISTORY: ams TECHNOLOGIST PROVIDED HISTORY: ams Decision Support Exception - unselect if not a suspected or confirmed emergency medical condition->Emergency Medical Condition (MA) FINDINGS: CERVICAL SPINE: BONES/ALIGNMENT: There is no acute fracture or traumatic malalignment. DEGENERATIVE CHANGES: No significant degenerative changes. SOFT TISSUES: There is no prevertebral soft tissue swelling. HEAD: BRAIN/VENTRICLES: Small area of encephalomalacia involving the inferior left frontal lobe indicative of an old insult. There is no acute intracranial hemorrhage, mass effect or midline shift. No abnormal extra-axial fluid collection. The gray-white differentiation is maintained without evidence of an acute infarct. There is no evidence of hydrocephalus. ORBITS: The visualized portion of the orbits demonstrate no acute abnormality. SINUSES: The visualized paranasal sinuses and mastoid air cells demonstrate no acute abnormality. SOFT TISSUES/SKULL: No acute abnormality of the visualized skull or soft tissues. No CT evidence of an acute intracranial abnormality. No evidence of acute fracture or traumatic malalignment of the cervical spine. Small area of encephalomalacia involving the inferior left frontal lobe indicative of an old insult.  RECOMMENDATIONS: Unavailable     CT ABDOMEN PELVIS W IV CONTRAST Additional Contrast? None    Result Date: 3/2/2022  EXAMINATION: CT OF THE ABDOMEN AND PELVIS WITH CONTRAST 3/2/2022 6:04 am TECHNIQUE: CT of the abdomen and pelvis was performed with the administration of intravenous contrast. Multiplanar reformatted images are provided for review. Dose modulation, iterative reconstruction, and/or weight based adjustment of the mA/kV was utilized to reduce the radiation dose to as low as reasonably achievable. COMPARISON: 10/11/2020 HISTORY: ORDERING SYSTEM PROVIDED HISTORY: concern for potential trauma TECHNOLOGIST PROVIDED HISTORY: Concern for potential trauma Decision Support Exception - unselect if not a suspected or confirmed emergency medical condition->Emergency Medical Condition (MA) Reason for Exam: found down 52-year-old male; concern for potential trauma FINDINGS: Lower Chest: Consolidation in the lung bases, primarily in the right middle lobe and posterior aspect of the dependent lower lobes, which could represent combination of atelectasis and/or infiltrate. No free intra-abdominal air. Organs: Gallbladder distension. Mild intrahepatic biliary ductal dilation. Extrahepatic biliary ductal dilation. Adrenal glands, right kidney, and pancreas grossly unremarkable in appearance. Liver otherwise grossly unremarkable in appearance. Small renal cyst at the posterior mid pole left kidney measuring 9 mm on image 58, series 8. No obstructing calculus or hydronephrosis. Scattered calcifications throughout the spleen consistent with old granulomatous disease. GI/Bowel: No focal bowel wall thickening or interloop fluid. Multiple small bowel loops are present within a large right inguinal hernia on image 196, series 8. No evidence for small bowel obstruction. Moderate fat- containing left inguinal hernia. Mild stool burden. Pelvis: Marked distention of the urinary bladder. No free fluid in the pelvis. No inguinal or pelvic sidewall lymphadenopathy.  Peritoneum/Retroperitoneum: Atherosclerotic calcification of the abdominal aorta and iliac branch vasculature. No retroperitoneal lymphadenopathy. Psoas muscles normal in size and symmetric in appearance. Bones/Soft Tissues: Small midline fat-containing periumbilical hernia. Mild diffuse degenerative changes throughout the spine. 1. Multifocal airspace disease at the lung bases which could represent multifocal pneumonia and/or atelectasis. Follow-up is recommended to document resolution. 2. Large right inguinal hernia which contains bowel loops. No evidence for small bowel obstruction. 3. Small midline fat containing periumbilical hernia. 4. Small 9 mm mid pole left renal cyst. 5. Gallbladder distension. Mild intra- and extrahepatic biliary ductal dilation. Further evaluation with gallbladder ultrasound and nonemergent MRCP recommended. Correlate with LFTs. 6. Old granulomatous disease. XR CHEST PORTABLE    Result Date: 3/2/2022  EXAMINATION: ONE XRAY VIEW OF THE CHEST 3/2/2022 7:42 am COMPARISON: 03/02/2022, 315 hours HISTORY: ORDERING SYSTEM PROVIDED HISTORY: confirm ET tube placement TECHNOLOGIST PROVIDED HISTORY: confirm ET tube placement 20-year-old male; confirm endotracheal tube placement FINDINGS: Portable supine view of the chest. Endotracheal tube distal tip overlying the mid trachea approximately 5.8 cm above the level of the arnel. Enteric tube traverses the GE junction with distal tip excluded from the field of view. Cardiac monitor leads overlie the chest. No obvious pneumothorax on limited portable supine positioning. No acute focal airspace consolidation or pleural effusions. Cardiac and mediastinal contours remain unchanged. No acute osseous abnormality evident. 1. Tubes as detailed above. 2. No acute focal airspace consolidation.      XR CHEST PORTABLE    Result Date: 3/2/2022  EXAMINATION: ONE XRAY VIEW OF THE CHEST 3/2/2022 3:15 am COMPARISON: December 6, 2021 HISTORY: ORDERING SYSTEM PROVIDED HISTORY: ams TECHNOLOGIST PROVIDED HISTORY: ams Reason for Exam: upr,cva,ams FINDINGS: No lines or tubes. Normal cardiomediastinal silhouette. The lungs are clear without focal consolidation or pleural effusion. No suspicious pulmonary nodules. No pulmonary edema. No pneumothorax. No acute osseous abnormality. 1. No acute cardiopulmonary disease. CT CHEST PULMONARY EMBOLISM W CONTRAST    Result Date: 3/2/2022  EXAMINATION: CTA OF THE CHEST 3/2/2022 6:04 am TECHNIQUE: CTA of the chest was performed after the administration of intravenous contrast.  Multiplanar reformatted images are provided for review. MIP images are provided for review. Dose modulation, iterative reconstruction, and/or weight based adjustment of the mA/kV was utilized to reduce the radiation dose to as low as reasonably achievable. COMPARISON: 01/09/2021 HISTORY: ORDERING SYSTEM PROVIDED HISTORY: concern for PE TECHNOLOGIST PROVIDED HISTORY: concern for PE Decision Support Exception - unselect if not a suspected or confirmed emergency medical condition->Emergency Medical Condition (MA) Reason for Exam: found down FINDINGS: Pulmonary Arteries: Pulmonary arteries are adequately opacified for evaluation. Evaluation is somewhat limited due to respiratory motion artifact. There appears to be extensive pulmonary emboli on the left to upper lobe superiorly and anteriorly. Pulmonary embolus may also be present to the anteromedial right upper lobe. Main pulmonary artery is normal in caliber. Mediastinum: No evidence of mediastinal lymphadenopathy. The heart and pericardium demonstrate no acute abnormality. No evidence of heart strain. There is no acute abnormality of the thoracic aorta. Lungs/pleura: Patchy consolidation at the anteroinferior right middle lobe. Atelectasis versus consolidation at the posterior lung bases bilaterally, left worse than right. Focal opacity also noted at the right apex. No pneumothorax or effusion.  Upper Abdomen: Limited images of the upper abdomen are unremarkable. Soft Tissues/Bones: No acute bone or soft tissue abnormality. Limited exam due to motion artifact but there appears to be extensive pulmonary emboli on the left to the upper lobe superiorly and anteriorly. A nonobstructing pulmonary embolus may also be present to a branch to the anteromedial right upper lobe. No heart strain. Possible superimposed developing pneumonia. CT LUMBAR SPINE TRAUMA RECONSTRUCTION    Result Date: 3/2/2022  EXAMINATION: CT OF THE LUMBAR SPINE WITHOUT CONTRAST; CT OF THE THORACIC SPINE WITHOUT CONTRAST 3/2/2022 TECHNIQUE: CT of the lumbar spine was performed without the administration of intravenous contrast. Multiplanar reformatted images are provided for review. Adjustment of mA and/or kV according to patient size was utilized. Dose modulation, iterative reconstruction, and/or weight based adjustment of the mA/kV was utilized to reduce the radiation dose to as low as reasonably achievable.; CT of the thoracic spine was performed without the administration of intravenous contrast. Multiplanar reformatted images are provided for review. Dose modulation, iterative reconstruction, and/or weight based adjustment of the mA/kV was utilized to reduce the radiation dose to as low as reasonably achievable. COMPARISON: None. HISTORY: ORDERING SYSTEM PROVIDED HISTORY: Trauma TECHNOLOGIST PROVIDED HISTORY: Trauma Reason for Exam: found down FINDINGS: BONES/ALIGNMENT: There is no acute fracture or traumatic malalignment. DEGENERATIVE CHANGES: No significant degenerative changes of the thoracic or lumbar spine. SOFT TISSUES: No paraspinal mass is seen. No acute thoracic or lumbar spine trauma.      CT THORACIC SPINE TRAUMA RECONSTRUCTION    Result Date: 3/2/2022  EXAMINATION: CT OF THE LUMBAR SPINE WITHOUT CONTRAST; CT OF THE THORACIC SPINE WITHOUT CONTRAST 3/2/2022 TECHNIQUE: CT of the lumbar spine was performed without the administration of intravenous contrast. Multiplanar reformatted images are provided for review. Adjustment of mA and/or kV according to patient size was utilized. Dose modulation, iterative reconstruction, and/or weight based adjustment of the mA/kV was utilized to reduce the radiation dose to as low as reasonably achievable.; CT of the thoracic spine was performed without the administration of intravenous contrast. Multiplanar reformatted images are provided for review. Dose modulation, iterative reconstruction, and/or weight based adjustment of the mA/kV was utilized to reduce the radiation dose to as low as reasonably achievable. COMPARISON: None. HISTORY: ORDERING SYSTEM PROVIDED HISTORY: Trauma TECHNOLOGIST PROVIDED HISTORY: Trauma Reason for Exam: found down FINDINGS: BONES/ALIGNMENT: There is no acute fracture or traumatic malalignment. DEGENERATIVE CHANGES: No significant degenerative changes of the thoracic or lumbar spine. SOFT TISSUES: No paraspinal mass is seen. No acute thoracic or lumbar spine trauma. RECENT VITALS:     Temp: 98.6 °F (37 °C),  Pulse: 73, Resp: 20, BP: (!) 177/123, SpO2: 96 %    This patient is a 55 y.o. Male with abscesses. Multiple abscesses in the arms where he injects IV drugs. Plan is ultrasounding these lesions to evaluate for any fluid collections. I&D if needed otherwise doxycycline    OUTSTANDING TASKS / RECOMMENDATIONS:    1.  Ultrasound      Dariela Garcia MD, Shayy Benson  Attending Emergency Physician  [facility]       Sri Espinosa MD  03/04/22 4044

## 2022-03-05 NOTE — ED NOTES
Pt presents to the ED by police escort after being found on [employer] [address], altered and in possession of fentynl and gabapentin. Pt states he snorted fentynl about an hour ago. Pt was administered 1 dose of narcan in the field. Pt is drowsy, lethargic, and speech is slow and slurred, oriented to person, place, time, and situation, RR even and unlabored, resting on stretcher with eyes closed and call light in reach. Pt placed in a gown and on continuous monitor with alarms set, vitals obtained and IV access initiated. Dr. Dominga Holley at bedside to assess pt. Initial assessment performed by physician, Mike Guerin will carry out orders/tasks then reassess.      Niru Pablo, KANIKAN  13/97/82 9512

## 2022-03-05 NOTE — ED PROVIDER NOTES
[address] [facility]  ED  eMERGENCY dEPARTMENT eNCOUnter   Attending Attestation     Pt Name: Anca Shepard  MRN: [id number]  [place] 1965  Date of evaluation: 3/5/22       Anca Shepard is a 55 y.o. male who presents with Drug Overdose      History: Presents after taking fentanyl, Xanax, gabapentin. Patient was not acting himself and was sort of wobbly on his feet. Patient says that he has abdominal pain which is acute on chronic and he has chest pain which is there all the time and not different from baseline. Patient recently admitted to the MICU. Patient had a PE and was started on Eliquis. Exam: Heart rate and rhythm are regular. Lungs are clear to auscultation bilaterally. Abdomen is soft, nontender my exam.  Patient is awake and alert and acting appropriately. Patient did try to leave the emergency department. Patient is to call upon release for [facility]. Patient is in warrants. Patient was attempting to walk out and was somewhat unsteady on his feet. Patient escorted back to his room without difficulty. Patient then fell asleep in the bed. Will get ACS screening, plan for discharge is likely if patient is awake alert acting appropriately after evaluation. EG shows normal sinus rhythm with rate of 77 beats minute. Normal axis. No ST elevation or  depression. T waves upright. No blocks arrhythmias. Nonspecific EKG. I performed a history and physical examination of the patient and discussed management with the resident. I reviewed the residents note and agree with the documented findings and plan of care. Any areas of disagreement are noted on the chart. I was personally present for the key portions of any procedures. I have documented in the chart those procedures where I was not present during the key portions. I have personally reviewed all images and agree with the resident's interpretation. I have reviewed the emergency nurses triage note.  I agree with the chief complaint, past medical history, past surgical history, allergies, medications, social and family history as documented unless otherwise noted below. Documentation of the HPI, Physical Exam and Medical Decision Making performed by medical students or scribes is based on my personal performance of the HPI, PE and MDM. For Phys Assistant/ Nurse Practitioner cases/documentation I have had a face to face evaluation of this patient and have completed at least one if not all key elements of the E/M (history, physical exam, and MDM). Additional findings are as noted. For APC cases I have personally evaluated and examined the patient in conjunction with the APC and agree with the treatment plan and disposition of the patient as recorded by the APC.     Elio Valenzuela MD  Attending Emergency  Physician       Joan Powers MD  03/05/22 [id number]

## 2022-03-05 NOTE — ED PROVIDER NOTES
[facility]  Emergency Department  Faculty Attestation     I performed a history and physical examination of the patient and discussed management with the resident. I reviewed the residents note and agree with the documented findings and plan of care. Any areas of disagreement are noted on the chart. I was personally present for the key portions of any procedures. I have documented in the chart those procedures where I was not present during the key portions. I have reviewed the emergency nurses triage note. I agree with the chief complaint, past medical history, past surgical history, allergies, medications, social and family history as documented unless otherwise noted below. For Physician Assistant/ Nurse Practitioner cases/documentation I have personally evaluated this patient and have completed at least one if not all key elements of the E/M (history, physical exam, and MDM). Additional findings are as noted. Primary Care Physician:  Andrew Cool MD    Screenings:  [unfilled]    [address] [place] [address]       Chief Complaint   Patient presents with    Abscess     bilateral arms, IV drug use yesterday ([place] and fentanyl)  AMA from here yesterday. RECENT VITALS:   Temp: 98.6 °F (37 °C),  Pulse: 78, Resp: 16, BP: (!) 175/126    LABS:  Labs Reviewed - No data to display    Radiology  No orders to display       CRITICAL CARE: There was a high probability of clinically significant/life threatening deterioration in this patient's condition which required my urgent intervention. Total critical care time was none minutes. This excludes any time for separately reportable procedures. EKG:      Attending Physician Additional  Notes    Patient is concerned his forearm abscesses might need to be drained. He is attempted to drain the himself which is done previously. He denies fever chills or sweats. There is no new areas of redness or swelling.   He daily uses fentanyl last use was yesterday. He is somewhat interested in detox but does not use Suboxone due to side effects and had better success with methadone. He was admitted several days ago after overdose, requiring intubation and ICU care. He had fever that day but blood cultures are negative. He has MRSA positive on nasal swab. He was treated with 1 day of antibiotics and defervesced. He then pulled out his ET tube. Following this he left AGAINST MEDICAL ADVICE. He did receive a prescription for Eliquis for his pulmonary embolism and doxycycline for his abscesses. He does reuse needles and was unaware of the needle program in [facility]. He denies knowledge of the cardiac murmur. On exam is hypertensive but nontoxic afebrile vital signs otherwise normal.  There are no respiratory distress. Card exam does not suggest mitral regurg murmur. His forearms have several areas of induration but no apparent tenderness or fluctuance. There is normal capillary refill. Normal motor strength. GCS is 15. Impression is forearm abscesses, IV drug use, cardiac murmur. My suspicion for endocarditis is low at this time especially with recent blood cultures being negative and no fever or tachycardia. Will ultrasound his forearms to assess for fluid accumulation and consider incision and drainage if necessary. Will consult social work regarding resources for detox and sterile needles. Recommend continue doxycycline and follow-up to PCP. Twila Pineda.  Phan Sears MD, [address] [facility]  Attending Emergency  Physician               Corrina Barakat MD  03/04/22 2035
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
## 2022-03-05 NOTE — ED NOTES
Pt ambulatory to ED 23.  Pt is a/o x4. Pt states he was just admitted here and left AMA. Pt c/o bilateral arm pain from daily IV drug use. Pt admits to using [place] and Fentanyl 3x a day for 13 years. Pt placed on cardiac monitor. Call light provided. Dr. Elenora Crigler at bedside.       Sun Milton RN  03/04/22 [id number]

## 2022-03-05 NOTE — ED PROVIDER NOTES
surgical history that includes brain surgery and Dental surgery. Social History     Socioeconomic History    Marital status: Single     Spouse name: Not on file    Number of children: Not on file    Years of education: Not on file    Highest education level: Not on file   Occupational History    Not on file   Tobacco Use    Smoking status: Current Every Day Smoker     Packs/day: 1.00     Years: 40.00     Pack years: 40.00     Types: Cigarettes    Smokeless tobacco: Never Used   Vaping Use    Vaping Use: Former   Substance and Sexual Activity    Alcohol use: No     Alcohol/week: 0.0 standard drinks     Comment: social     Drug use: Yes     Frequency: 7.0 times per week     Types: Opiates , Cocaine     Comment: 3x a day, [place] and fentanyl    Sexual activity: Not on file   Other Topics Concern    Not on file   Social History Narrative    Not on file     Social Determinants of Health     Financial Resource Strain: High Risk    Difficulty of Paying Living Expenses: Hard   Food Insecurity: No Food Insecurity    Worried About Running Out of Food in the Last Year: Never true    Liza of Food in the Last Year: Never true   Transportation Needs:     Lack of Transportation (Medical): Not on file    Lack of Transportation (Non-Medical):  Not on file   Physical Activity:     Days of Exercise per Week: Not on file    Minutes of Exercise per Session: Not on file   Stress:     Feeling of Stress : Not on file   Social Connections:     Frequency of Communication with Friends and Family: Not on file    Frequency of Social Gatherings with Friends and Family: Not on file    Attends Alevism Services: Not on file    Active Member of Clubs or Organizations: Not on file    Attends Club or Organization Meetings: Not on file    Marital Status: Not on file   Intimate Partner Violence:     Fear of Current or Ex-Partner: Not on file    Emotionally Abused: Not on file    Physically Abused: Not on file   Sena Servin Sexually Abused: Not on file   Housing Stability:     Unable to Pay for Housing in the Last Year: Not on file    Number of Places Lived in the Last Year: Not on file    Unstable Housing in the Last Year: Not on file       Family History   Problem Relation Age of Onset    Cancer Father     Cancer Child         Allergies:  Haldol [haloperidol], Latuda [lurasidone hcl], and Ziprasidone    Home Medications:  Prior to Admission medications    Medication Sig Start Date End Date Taking? Authorizing Provider   apixaban starter pack (ELIQUIS DVT/PE STARTER PACK) 5 MG TBPK tablet Take 1 tablet by mouth See Admin Instructions 3/3/22   Ric Smith DO   doxycycline monohydrate (ADOXA) 100 MG tablet Take 1 tablet by mouth 2 times daily for 7 days 3/3/22 3/10/22  Ric Smith DO   QUEtiapine (SEROQUEL) 200 MG tablet TAKE 1 TABLET BY MOUTH NIGHTLY 11/30/21   Tuyet Smith MD   lisinopril (PRINIVIL;ZESTRIL) 20 MG tablet Take 1 tablet by mouth daily 7/8/21   Deonte Barboza MD   amLODIPine (NORVASC) 10 MG tablet Take 1 tablet by mouth daily 8/18/21   Tuyet Smith MD   folic acid (FOLVITE) 1 MG tablet Take 1 tablet by mouth daily 8/18/21   Tuyet Smith MD   thiamine 100 MG tablet Take 1 tablet by mouth daily 8/18/21   Tuyet Smith MD   citalopram (CELEXA) 20 MG tablet Take 1 tablet by mouth daily 8/18/21   Tuyet Smith MD   traZODone (DESYREL) 50 MG tablet Take 1 tablet by mouth nightly as needed for Sleep 8/18/21   Tuyet Smith MD   hydroCHLOROthiazide (HYDRODIURIL) 25 MG tablet Take 1 tablet by mouth daily 6/25/20   JILLIAN Chau CNP   omeprazole (PRILOSEC OTC) 20 MG tablet Take 1 tablet by mouth daily. 2/10/15 7/1/15  Gunjan Parikh MD       REVIEW OFSYSTEMS    (2-9 systems for level 4, 10 or more for level 5)      Review of Systems   Constitutional: Negative for chills and fever. HENT: Negative for congestion and rhinorrhea. Eyes: Negative for visual disturbance. Respiratory: Negative for cough and shortness of breath. Cardiovascular: Negative for chest pain. Gastrointestinal: Negative for abdominal pain, constipation, diarrhea, nausea and vomiting. Genitourinary: Negative for dysuria and frequency. Musculoskeletal: Negative for back pain and neck pain. Skin: Positive for wound. Negative for rash. Neurological: Negative for weakness, numbness and headaches. PHYSICAL EXAM   (up to 7 for level 4, 8 or more forlevel 5)      INITIAL VITALS:   ED Triage Vitals [03/04/22 2126]   BP Temp Temp Source Pulse Resp SpO2 Height Weight   (!) 188/110 98.6 °F (37 °C) Oral 78 16 94 % -- --       Physical Exam  Constitutional:       General: He is not in acute distress. Appearance: Normal appearance. He is not ill-appearing, toxic-appearing or diaphoretic. HENT:      Head: Normocephalic and atraumatic. Mouth/Throat:      Mouth: Mucous membranes are moist.      Pharynx: Oropharynx is clear. Eyes:      Extraocular Movements: Extraocular movements intact. Cardiovascular:      Rate and Rhythm: Normal rate and regular rhythm. Heart sounds: Murmur (Auscultated at the apex of heart) heard. Pulmonary:      Effort: Pulmonary effort is normal. No respiratory distress. Breath sounds: Normal breath sounds. No wheezing or rhonchi. Abdominal:      Palpations: Abdomen is soft. Tenderness: There is no abdominal tenderness. Musculoskeletal:         General: Normal range of motion. Cervical back: Normal range of motion and neck supple. Skin:     General: Skin is warm and dry. Comments: Multiple indurated and raised areas on bilateral forearms. Mild erythema overlying some of these areas. Appear chronic. Neurological:      General: No focal deficit present. Mental Status: He is alert and oriented to person, place, and time.          DIFFERENTIAL  DIAGNOSIS     PLAN (LABS / IMAGING / EKG):  No orders of the defined types were placed in this encounter. MEDICATIONS ORDERED:  No orders of the defined types were placed in this encounter. Initial MDM/Plan/ED COURSE:    55 y.o. male who presents with concerns of abscesses on bilateral forearms in the setting of frequent substance use. On exam, patient is in no acute distress and vitals are stable and within normal limits. Lung exam are unremarkable but he does appear to have a mitral murmur. He has no systemic symptoms to suggest sepsis, endocarditis, or other severe infection. Demented soft nontender. Bilateral forearms have multiple indurated areas with mild erythematous and chronic skin changes overlying. No obvious fluctuant areas. Bedside ultrasound was performed to look for any pockets of fluid and no significant pockets identified. ED Course as of 03/05/22 0558   Broderick Crowe Mar 04, 2022   2235 Spoke with the patient and gave resources for both clean needle exchange and walk-in resources for detox and addiction programs. Patient will consider these. Outside ultrasound was also performed to look at multiple areas of possible abscess on bilateral arms. No significant fluid pockets identified. Patient was updated on these findings but continued to try and pick and squeeze at multiple areas. We discussed the importance of keeping these areas clean, taking antibiotics, and avoiding any self treatment with razors and/or needles at home. Patient to be discharged. [JS]      ED Course User Index  [JS] Nella Dewey DO    :     DIAGNOSTIC RESULTS / Gaby Collazo 94 COURSE / MDM     LABS:  Labs Reviewed - No data to display        No results found. EKG      All EKG's are interpreted by the Emergency Department Physicianwho either signs or Co-signs this chart in the absence of a cardiologist.      PROCEDURES:  None    CONSULTS:  None    CRITICAL CARE:  Please see attending note    FINAL IMPRESSION      1. Abscess    2.  Opioid use          DISPOSITION / PLAN     DISPOSITION Decision To Discharge 03/04/2022 10:37:53 PM      PATIENT REFERRED TO:  Arti Elysa Kussmaul, [address] [facility] [address] [phone]    Schedule an appointment as soon as possible for a visit in 3 days      [facility]  [address] [facility] [id number] [phone]    If symptoms worsen      DISCHARGE MEDICATIONS:  Discharge Medication List as of 3/4/2022 10:39 PM          Aurora Foss DO  Emergency Medicine Resident    (Please note that portions of this note were completed with a voice recognition program.Efforts were made to edit the dictations but occasionally words are mis-transcribed.)       Aurora Foss DO  Resident  03/05/22 7257

## 2022-03-06 NOTE — ED NOTES
Pt A&O x 3, on continuous monitor, does not appear in acute distress, RR even and unlabored, resting comfortably on stretcher with eyes closed and call light in reach. Writer will continue to monitor pt closely.        Sj López LPN  12/75/53 8541

## 2022-03-06 NOTE — ED NOTES
Pt A&O x 3, on continuous monitor, does not appear in acute distress, RR even and unlabored, resting comfortably on stretcher with eyes closed and call light in reach. Writer will continue to monitor pt closely.        Aby Gipson LPN  01/87/40 9911       Aby Gipson LPN  77/88/38 0528

## 2022-03-06 NOTE — ED NOTES
The following labs were labeled with patient stickers & tubed to lab;    []Lavender   []On Ice  []Blue  [x]Green/ Yellow  []Green/ Black []On Ice  []Pink  []Red  []Yellow    []COVID-19 Swab []Rapid    []Urine Sample  []Pelvic Cultures    []Blood Cultures       Luis Wright LPN  80/61/76 8921

## 2022-03-06 NOTE — ED NOTES
Pt attempted to Elope and pulled out an IV and all his monitor leads and was found walking towards the exit. Writer escorted pt back to room and placed him back on continuous monitor.       Fred Hernandez LPN  91/61/00 8344

## 2022-03-07 LAB
CULTURE: NORMAL
CULTURE: NORMAL
EKG ATRIAL RATE: 77 BPM
EKG P AXIS: 36 DEGREES
EKG P-R INTERVAL: 154 MS
EKG Q-T INTERVAL: 392 MS
EKG QRS DURATION: 90 MS
EKG QTC CALCULATION (BAZETT): 443 MS
EKG R AXIS: 35 DEGREES
EKG T AXIS: 56 DEGREES
EKG VENTRICULAR RATE: 77 BPM
Lab: NORMAL
Lab: NORMAL
SPECIMEN DESCRIPTION: NORMAL
SPECIMEN DESCRIPTION: NORMAL

## 2022-03-07 PROCEDURE — 93010 ELECTROCARDIOGRAM REPORT: CPT | Performed by: INTERNAL MEDICINE

## 2022-03-18 ENCOUNTER — HOSPITAL ENCOUNTER (EMERGENCY)
Age: 57
Discharge: HOME | End: 2022-03-19
Attending: EMERGENCY MEDICINE
Payer: MEDICARE

## 2022-03-18 ENCOUNTER — APPOINTMENT (OUTPATIENT)
Dept: GENERAL RADIOLOGY | Age: 57
End: 2022-03-18
Payer: MEDICARE

## 2022-03-18 DIAGNOSIS — D72.829 LEUKOCYTOSIS, UNSPECIFIED TYPE: Primary | ICD-10-CM

## 2022-03-18 PROCEDURE — 99283 EMERGENCY DEPT VISIT LOW MDM: CPT

## 2022-03-18 PROCEDURE — 84484 ASSAY OF TROPONIN QUANT: CPT

## 2022-03-18 PROCEDURE — 83735 ASSAY OF MAGNESIUM: CPT

## 2022-03-18 PROCEDURE — 85025 COMPLETE CBC W/AUTO DIFF WBC: CPT

## 2022-03-18 PROCEDURE — 80053 COMPREHEN METABOLIC PANEL: CPT

## 2022-03-18 PROCEDURE — 71045 X-RAY EXAM CHEST 1 VIEW: CPT

## 2022-03-18 PROCEDURE — 83605 ASSAY OF LACTIC ACID: CPT

## 2022-03-18 RX ORDER — 0.9 % SODIUM CHLORIDE 0.9 %
1000 INTRAVENOUS SOLUTION INTRAVENOUS ONCE
Status: COMPLETED | OUTPATIENT
Start: 2022-03-18 | End: 2022-03-19

## 2022-03-19 ENCOUNTER — APPOINTMENT (OUTPATIENT)
Dept: GENERAL RADIOLOGY | Age: 57
End: 2022-03-19
Payer: MEDICARE

## 2022-03-19 VITALS
HEART RATE: 72 BPM | DIASTOLIC BLOOD PRESSURE: 121 MMHG | RESPIRATION RATE: 17 BRPM | OXYGEN SATURATION: 99 % | SYSTOLIC BLOOD PRESSURE: 168 MMHG | TEMPERATURE: 97.7 F

## 2022-03-19 LAB
ABSOLUTE EOS #: 0.37 K/UL (ref 0–0.44)
ABSOLUTE IMMATURE GRANULOCYTE: 0.06 K/UL (ref 0–0.3)
ABSOLUTE LYMPH #: 3.8 K/UL (ref 1.1–3.7)
ABSOLUTE MONO #: 1.28 K/UL (ref 0.1–1.2)
ALBUMIN SERPL-MCNC: 3.9 G/DL (ref 3.5–5.2)
ALBUMIN/GLOBULIN RATIO: 1.1 (ref 1–2.5)
ALP BLD-CCNC: 67 U/L (ref 40–129)
ALT SERPL-CCNC: 110 U/L (ref 5–41)
ANION GAP SERPL CALCULATED.3IONS-SCNC: 11 MMOL/L (ref 9–17)
AST SERPL-CCNC: 67 U/L
BASOPHILS # BLD: 1 % (ref 0–2)
BASOPHILS ABSOLUTE: 0.12 K/UL (ref 0–0.2)
BILIRUB SERPL-MCNC: 0.21 MG/DL (ref 0.3–1.2)
BUN BLDV-MCNC: 17 MG/DL (ref 6–20)
CALCIUM SERPL-MCNC: 9.4 MG/DL (ref 8.6–10.4)
CHLORIDE BLD-SCNC: 96 MMOL/L (ref 98–107)
CO2: 25 MMOL/L (ref 20–31)
CREAT SERPL-MCNC: 0.59 MG/DL (ref 0.7–1.2)
EOSINOPHILS RELATIVE PERCENT: 3 % (ref 1–4)
GFR AFRICAN AMERICAN: >60 ML/MIN
GFR NON-AFRICAN AMERICAN: >60 ML/MIN
GFR SERPL CREATININE-BSD FRML MDRD: ABNORMAL ML/MIN/{1.73_M2}
GLUCOSE BLD-MCNC: 106 MG/DL (ref 70–99)
HCT VFR BLD CALC: 38.6 % (ref 40.7–50.3)
HEMOGLOBIN: 12.8 G/DL (ref 13–17)
IMMATURE GRANULOCYTES: 0 %
LACTIC ACID, WHOLE BLOOD: 0.9 MMOL/L (ref 0.7–2.1)
LYMPHOCYTES # BLD: 28 % (ref 24–43)
MAGNESIUM: 1.8 MG/DL (ref 1.6–2.6)
MCH RBC QN AUTO: 28.8 PG (ref 25.2–33.5)
MCHC RBC AUTO-ENTMCNC: 33.2 G/DL (ref 28.4–34.8)
MCV RBC AUTO: 86.9 FL (ref 82.6–102.9)
MONOCYTES # BLD: 10 % (ref 3–12)
NRBC AUTOMATED: 0 PER 100 WBC
PDW BLD-RTO: 14.6 % (ref 11.8–14.4)
PLATELET # BLD: 230 K/UL (ref 138–453)
PMV BLD AUTO: 10.2 FL (ref 8.1–13.5)
POTASSIUM SERPL-SCNC: 4.2 MMOL/L (ref 3.7–5.3)
RBC # BLD: 4.44 M/UL (ref 4.21–5.77)
RBC # BLD: ABNORMAL 10*6/UL
SEG NEUTROPHILS: 58 % (ref 36–65)
SEGMENTED NEUTROPHILS ABSOLUTE COUNT: 7.89 K/UL (ref 1.5–8.1)
SODIUM BLD-SCNC: 132 MMOL/L (ref 135–144)
TOTAL PROTEIN: 7.3 G/DL (ref 6.4–8.3)
TROPONIN, HIGH SENSITIVITY: 10 NG/L (ref 0–22)
WBC # BLD: 13.5 K/UL (ref 3.5–11.3)

## 2022-03-19 PROCEDURE — 2580000003 HC RX 258: Performed by: STUDENT IN AN ORGANIZED HEALTH CARE EDUCATION/TRAINING PROGRAM

## 2022-03-19 PROCEDURE — 73090 X-RAY EXAM OF FOREARM: CPT

## 2022-03-19 RX ADMIN — SODIUM CHLORIDE 1000 ML: 9 INJECTION, SOLUTION INTRAVENOUS at 01:00

## 2022-03-19 ASSESSMENT — ENCOUNTER SYMPTOMS
VOMITING: 0
SHORTNESS OF BREATH: 1
ABDOMINAL PAIN: 0

## 2022-03-19 NOTE — ED PROVIDER NOTES
I performed a history and physical examination of the patient and discussed management with the resident. I reviewed the residents note and agree with the documented findings and plan of care. Any areas of disagreement are noted on the chart. I was personally present for the key portions of any procedures. I have documented in the chart those procedures where I was not present during the key portions. I have reviewed the emergency nurses triage note. I agree with the chief complaint, past medical history, past surgical history, allergies, medications, social and family history as documented unless otherwise noted below. Documentation of the HPI, Physical Exam and Medical Decision Making performed by medical students or scribes is based on my personal performance of the HPI, PE and MDM. For Phys Assistant/ Nurse Practitioner cases/documentation I have personally evaluated this patient and have completed at least one if not all key elements of the E/M (history, physical exam, and MDM). I find the patient's history and physical exam are consistent with the NP/PA documentation. I agree with the care provided, treatment rendered, disposition and followup plan. Additional findings are as noted. Liseth Quinn. Darian Jacob MD  Attending Emergency  Physician          This patient presents to the emergency department with history of having been admitted to [address] [facility] recently for an unspecified infection. He reports that he signed out [address] [facility] today because the room was too small and he is claustrophobic. He denies any fevers or chills. No nausea, vomiting, diarrhea. No cough. No chest or abdominal pain. No shortness of breath. No headache or sore throat. Past medical history is significant for IV drug abuse in the past but denies any recent or current IV drug use. Awake, alert, coop, responsive. Speech fluent, normal comprehension. Lungs clear bilaterally.   No rales, rhonchi, wheezes, stridor, retractions. Patient also reports that he was placed on an unknown antibiotic for an unspecified infection several weeks ago but that due to a dispute with his roommate and a subsequent incarceration the patient was unable to take those antibiotics. Cardiac-S1S2, RRR, no murmur, rub, or gallop. Abdomen soft, nondistended, nontender. No organomegaly, mass, bruit. Normal bowel sounds. Skin-patient has multiple superficial excoriations over his trunk and extremities consistent with self-inflicted abrasions. Patient's lab results have been reviewed. White count is mildly elevated but appears to be trending down. Radiographs have been reviewed. There is a retained small curvilinear foreign body noted in the medial aspect of the elbow which has been present for some period of time. Impression: Feared complaint unfounded. Plan: Patient will be discharged with instructions to follow-up with his primary care provider next week. He is advised to return to the emergency department should his symptoms worsen, persist, progress. Patient is cautioned against IV drug use or other recreational drug use.        Brando Douglass MD  03/19/22 0235 soft/nondistended/nontender

## 2022-03-19 NOTE — ED PROVIDER NOTES
[facility] ED  Emergency Department Encounter  Emergency Medicine Resident     Pt Name: Kunal Katz  MRN: [id number]  [place] 1965  Date of evaluation: 3/19/22  PCP:  Sasha Sellers MD    [address] [place] [address]       Chief Complaint   Patient presents with    Other     pt stated he was being treated at [facility] today for staph infection but left AMA d/t being uncomfortable       HISTORY OFPRESENT ILLNESS  (Location/Symptom, Timing/Onset, Context/Setting, Quality, Duration, Modifying Factors,Severity.)      Kunal Katz is a 55 y. o.yo male who presents with shortness of breath. Patient reports that he was recently tested on 3/2/2022 and was found to be bacteremic. Patient reports that he is here to get his antibiotic treatment. Patient reports that he is not having any chest pain. He denies any fever, chills, generalized body ache. Patient reports the reason why he did not get his antibiotics and was because he had to go to [facility]. Of note, patient does have a history of chronic substance abuse, hepatitis C and bipolar disorder. He otherwise denies any nausea and vomiting, abdominal pain. On chart review, patient did x-rays done at [address] [place] [address] which showed retained substance over his right forearm, his labs did show elevated leukocytosis of 17,000 however it was downtrending. PAST MEDICAL / SURGICAL / SOCIAL / FAMILY HISTORY      has a past medical history of Bipolar 1 disorder ([facility] [address] Chronic mental illness, Depression, Hepatitis C, Hypertension, Psoriasis, and Substance abuse ([facility] [address] has a past surgical history that includes brain surgery and Dental surgery.      Social History     Socioeconomic History    Marital status: Single     Spouse name: Not on file    Number of children: Not on file    Years of education: Not on file    Highest education level: Not on file   Occupational History    Not on file   Tobacco Use    Smoking status: Current Every Day Smoker Packs/day: 1.00     Years: 40.00     Pack years: 40.00     Types: Cigarettes    Smokeless tobacco: Never Used   Vaping Use    Vaping Use: Former   Substance and Sexual Activity    Alcohol use: No     Alcohol/week: 0.0 standard drinks     Comment: social     Drug use: Yes     Frequency: 7.0 times per week     Types: Opiates , Cocaine     Comment: 3x a day, [place] and fentanyl    Sexual activity: Not on file   Other Topics Concern    Not on file   Social History Narrative    Not on file     Social Determinants of Health     Financial Resource Strain: High Risk    Difficulty of Paying Living Expenses: Hard   Food Insecurity: No Food Insecurity    Worried About Running Out of Food in the Last Year: Never true    Liza of Food in the Last Year: Never true   Transportation Needs:     Lack of Transportation (Medical): Not on file    Lack of Transportation (Non-Medical):  Not on file   Physical Activity:     Days of Exercise per Week: Not on file    Minutes of Exercise per Session: Not on file   Stress:     Feeling of Stress : Not on file   Social Connections:     Frequency of Communication with Friends and Family: Not on file    Frequency of Social Gatherings with Friends and Family: Not on file    Attends Hinduism Services: Not on file    Active Member of [address] [place] [address] Clarity or Organizations: Not on file    Attends Club or Organization Meetings: Not on file    Marital Status: Not on file   Intimate Partner Violence:     Fear of Current or Ex-Partner: Not on file    Emotionally Abused: Not on file    Physically Abused: Not on file    Sexually Abused: Not on file   Housing Stability:     Unable to Pay for Housing in the Last Year: Not on file    Number of [place] in the Last Year: Not on file    Unstable Housing in the Last Year: Not on file       Family History   Problem Relation Age of Onset    Cancer Father     Cancer Child         Allergies:  Haldol [haloperidol], Latuda [lurasidone hcl], and Ziprasidone    Home Medications:  Prior to Admission medications    Medication Sig Start Date End Date Taking? Authorizing Provider   apixaban starter pack (ELIQUIS DVT/PE STARTER PACK) 5 MG TBPK tablet Take 1 tablet by mouth See Admin Instructions 3/3/22   Soledad Gutierrez DO   QUEtiapine (SEROQUEL) 200 MG tablet TAKE 1 TABLET BY MOUTH NIGHTLY 11/30/21   Tuyet Walden MD   lisinopril (PRINIVIL;ZESTRIL) 20 MG tablet Take 1 tablet by mouth daily 7/8/21   Moises Engel MD   amLODIPine (NORVASC) 10 MG tablet Take 1 tablet by mouth daily 8/18/21   Tuyet Walden MD   folic acid (FOLVITE) 1 MG tablet Take 1 tablet by mouth daily 8/18/21   Tuyet Walden MD   thiamine 100 MG tablet Take 1 tablet by mouth daily 8/18/21   Tuyet Walden MD   citalopram (CELEXA) 20 MG tablet Take 1 tablet by mouth daily 8/18/21   Tuyet Walden MD   traZODone (DESYREL) 50 MG tablet Take 1 tablet by mouth nightly as needed for Sleep 8/18/21   Tuyet Walden MD   hydroCHLOROthiazide (HYDRODIURIL) 25 MG tablet Take 1 tablet by mouth daily 6/25/20   JILLIAN Mas - CNP   omeprazole (PRILOSEC OTC) 20 MG tablet Take 1 tablet by mouth daily. 2/10/15 7/1/15  Blase Epley, MD       REVIEW OFSYSTEMS    (2-9 systems for level 4, 10 or more for level 5)      Review of Systems   Constitutional: Negative for fatigue and fever. Respiratory: Positive for shortness of breath. Cardiovascular: Negative for chest pain and leg swelling. Gastrointestinal: Negative for abdominal pain and vomiting. Skin: Negative for rash and wound. Psychiatric/Behavioral: Negative for behavioral problems and confusion. PHYSICAL EXAM   (up to 7 for level 4, 8 or more forlevel 5)      INITIAL VITALS:   ED Triage Vitals [03/18/22 2129]   BP Temp Temp Source Pulse Resp SpO2 Height Weight   (!) 202/120 97.7 °F (36.5 °C) Oral 62 17 99 % -- --       Physical Exam  Constitutional:       General: He is not in acute distress.      Appearance: EMERGENCYDEPARTMENT COURSE / MDM     LABS:  Labs Reviewed   CBC WITH AUTO DIFFERENTIAL - Abnormal; Notable for the following components:       Result Value    WBC 13.5 (*)     Hemoglobin 12.8 (*)     Hematocrit 38.6 (*)     RDW 14.6 (*)     Absolute Lymph # 3.80 (*)     Absolute Mono # 1.28 (*)     All other components within normal limits   COMPREHENSIVE METABOLIC PANEL - Abnormal; Notable for the following components:    Glucose 106 (*)     CREATININE 0.59 (*)     Sodium 132 (*)     Chloride 96 (*)      (*)     AST 67 (*)     Total Bilirubin 0.21 (*)     All other components within normal limits   TROPONIN   MAGNESIUM   LACTIC ACID         RADIOLOGY:  XR RADIUS ULNA RIGHT (2 VIEWS)    Result Date: 3/19/2022  EXAMINATION: TWO XRAY VIEWS OF THE RIGHT FOREARM 3/19/2022 12:49 am COMPARISON: 03/02/2022 and 02/19/2021. HISTORY: ORDERING SYSTEM PROVIDED HISTORY: concern for foreign object TECHNOLOGIST PROVIDED HISTORY: concern for foreign object FINDINGS: No fracture, dislocation or joint abnormality is identified. Bone density is normal.  Soft tissues are noted for a fine linear 8 mm foreign body lateral to the lateral epicondyle of the distal humerus unchanged since 03/02/2022 but new since the 02/19/2021 exam.  It may represent a very fine bent wire. At the lateral aspect of the right elbow, there is a fine linear 8 mm radiopaque foreign body possibly representing a fine wire. XR CHEST PORTABLE    Result Date: 3/18/2022  EXAMINATION: ONE XRAY VIEW OF THE CHEST 3/18/2022 11:29 pm COMPARISON: March 5, 2022 HISTORY: ORDERING SYSTEM PROVIDED HISTORY: shortness of breath TECHNOLOGIST PROVIDED HISTORY: shortness of breath Reason for Exam: uprt FINDINGS: The lungs are without acute focal process. There is no effusion or pneumothorax. The cardiomediastinal silhouette is without acute process. The osseous structures are without acute process. No acute process.          EKG      All EKG's are interpreted by the Emergency Department Physicianwho either signs or Co-signs this chart in the absence of a cardiologist.    EMERGENCY DEPARTMENT COURSE:  ED Course as of 03/19/22 0255   Sat Mar 19, 2022   0127 Patient with improving leukocytosis, no lactic acidosis. No pneumonia on x-ray. X-ray of the radius ulna did show the retained foreign body. Given no concern for infection, plan for discharge.   Patient to follow-up with his PCP  [AN]      ED Course User Index  [AN] Heaven Herrera MD          PROCEDURES:  None    CONSULTS:  None    CRITICAL CARE:      FINAL IMPRESSION      1. Leukocytosis, unspecified type          DISPOSITION / PLAN     DISPOSITION Decision To Discharge 03/19/2022 01:31:44 AM      PATIENT REFERRED TO:  [facility]  [address] [facility] [address]  [phone]    If symptoms worsen    Tuyet Soto MD  [address] [address]      As needed      DISCHARGE MEDICATIONS:  Discharge Medication List as of 3/19/2022  1:56 AM          Heaven Herrera MD  Emergency Medicine Resident    (Please note that portions of this note were completed with a voice recognition program.Efforts were made to edit the dictations but occasionally words are mis-transcribed.)        Heaven Herrera MD  Resident  03/19/22 2685

## 2022-03-19 NOTE — ED NOTES
Accesses noted on bilateral forearms. Pt states that it is from \"skin popping drug use. \"     Austin Becerril RN  03/18/22 8738

## 2022-03-20 ENCOUNTER — HOSPITAL ENCOUNTER (EMERGENCY)
Age: 57
Discharge: HOME | End: 2022-03-20
Attending: STUDENT IN AN ORGANIZED HEALTH CARE EDUCATION/TRAINING PROGRAM
Payer: MEDICARE

## 2022-03-20 ENCOUNTER — APPOINTMENT (OUTPATIENT)
Dept: GENERAL RADIOLOGY | Age: 57
End: 2022-03-20
Payer: MEDICARE

## 2022-03-20 VITALS
SYSTOLIC BLOOD PRESSURE: 151 MMHG | DIASTOLIC BLOOD PRESSURE: 94 MMHG | TEMPERATURE: 98.2 F | OXYGEN SATURATION: 96 % | RESPIRATION RATE: 13 BRPM | BODY MASS INDEX: 25.01 KG/M2 | WEIGHT: 165 LBS | HEART RATE: 78 BPM | HEIGHT: 68 IN

## 2022-03-20 DIAGNOSIS — R06.89 DYSPNEA AND RESPIRATORY ABNORMALITIES: ICD-10-CM

## 2022-03-20 DIAGNOSIS — Z86.711 HISTORY OF PULMONARY EMBOLUS (PE): ICD-10-CM

## 2022-03-20 DIAGNOSIS — R07.9 CHEST PAIN, UNSPECIFIED TYPE: Primary | ICD-10-CM

## 2022-03-20 DIAGNOSIS — R06.00 DYSPNEA AND RESPIRATORY ABNORMALITIES: ICD-10-CM

## 2022-03-20 LAB
ABSOLUTE EOS #: 0.2 K/UL (ref 0–0.44)
ABSOLUTE IMMATURE GRANULOCYTE: 0.04 K/UL (ref 0–0.3)
ABSOLUTE LYMPH #: 3.93 K/UL (ref 1.1–3.7)
ABSOLUTE MONO #: 1.22 K/UL (ref 0.1–1.2)
ANION GAP SERPL CALCULATED.3IONS-SCNC: 11 MMOL/L (ref 9–17)
BASOPHILS # BLD: 1 % (ref 0–2)
BASOPHILS ABSOLUTE: 0.08 K/UL (ref 0–0.2)
BUN BLDV-MCNC: 25 MG/DL (ref 6–20)
BUN/CREAT BLD: 33 (ref 9–20)
CALCIUM SERPL-MCNC: 9.2 MG/DL (ref 8.6–10.4)
CHLORIDE BLD-SCNC: 96 MMOL/L (ref 98–107)
CO2: 24 MMOL/L (ref 20–31)
CREAT SERPL-MCNC: 0.75 MG/DL (ref 0.7–1.2)
EOSINOPHILS RELATIVE PERCENT: 2 % (ref 1–4)
GFR AFRICAN AMERICAN: >60 ML/MIN
GFR NON-AFRICAN AMERICAN: >60 ML/MIN
GFR SERPL CREATININE-BSD FRML MDRD: ABNORMAL ML/MIN/{1.73_M2}
GLUCOSE BLD-MCNC: 106 MG/DL (ref 70–99)
HCT VFR BLD CALC: 40.8 % (ref 40.7–50.3)
HEMOGLOBIN: 13.4 G/DL (ref 13–17)
IMMATURE GRANULOCYTES: 0 %
LYMPHOCYTES # BLD: 32 % (ref 24–43)
MCH RBC QN AUTO: 28.6 PG (ref 25.2–33.5)
MCHC RBC AUTO-ENTMCNC: 32.8 G/DL (ref 28.4–34.8)
MCV RBC AUTO: 87 FL (ref 82.6–102.9)
MONOCYTES # BLD: 10 % (ref 3–12)
NRBC AUTOMATED: 0 PER 100 WBC
PDW BLD-RTO: 15.2 % (ref 11.8–14.4)
PLATELET # BLD: 189 K/UL (ref 138–453)
PMV BLD AUTO: 9.3 FL (ref 8.1–13.5)
POTASSIUM SERPL-SCNC: 4.1 MMOL/L (ref 3.7–5.3)
PRO-BNP: 48 PG/ML
RBC # BLD: 4.69 M/UL (ref 4.21–5.77)
RBC # BLD: ABNORMAL 10*6/UL
SEG NEUTROPHILS: 55 % (ref 36–65)
SEGMENTED NEUTROPHILS ABSOLUTE COUNT: 6.72 K/UL (ref 1.5–8.1)
SODIUM BLD-SCNC: 131 MMOL/L (ref 135–144)
TROPONIN, HIGH SENSITIVITY: 8 NG/L (ref 0–22)
WBC # BLD: 12.2 K/UL (ref 3.5–11.3)

## 2022-03-20 PROCEDURE — 99283 EMERGENCY DEPT VISIT LOW MDM: CPT

## 2022-03-20 PROCEDURE — 80048 BASIC METABOLIC PNL TOTAL CA: CPT

## 2022-03-20 PROCEDURE — 71046 X-RAY EXAM CHEST 2 VIEWS: CPT

## 2022-03-20 PROCEDURE — 93005 ELECTROCARDIOGRAM TRACING: CPT | Performed by: STUDENT IN AN ORGANIZED HEALTH CARE EDUCATION/TRAINING PROGRAM

## 2022-03-20 PROCEDURE — 84484 ASSAY OF TROPONIN QUANT: CPT

## 2022-03-20 PROCEDURE — 83880 ASSAY OF NATRIURETIC PEPTIDE: CPT

## 2022-03-20 PROCEDURE — 85025 COMPLETE CBC W/AUTO DIFF WBC: CPT

## 2022-03-20 ASSESSMENT — PAIN DESCRIPTION - PAIN TYPE: TYPE: ACUTE PAIN

## 2022-03-20 ASSESSMENT — PAIN DESCRIPTION - LOCATION: LOCATION: CHEST

## 2022-03-20 ASSESSMENT — PAIN SCALES - GENERAL: PAINLEVEL_OUTOF10: 7

## 2022-03-21 LAB
EKG ATRIAL RATE: 76 BPM
EKG P AXIS: 57 DEGREES
EKG P-R INTERVAL: 152 MS
EKG Q-T INTERVAL: 372 MS
EKG QRS DURATION: 88 MS
EKG QTC CALCULATION (BAZETT): 418 MS
EKG R AXIS: 22 DEGREES
EKG T AXIS: 62 DEGREES
EKG VENTRICULAR RATE: 76 BPM

## 2022-03-21 PROCEDURE — 93010 ELECTROCARDIOGRAM REPORT: CPT | Performed by: INTERNAL MEDICINE

## 2022-03-21 ASSESSMENT — ENCOUNTER SYMPTOMS
SHORTNESS OF BREATH: 1
EYE DISCHARGE: 0
EYE REDNESS: 0
COLOR CHANGE: 0
ABDOMINAL PAIN: 0

## 2022-03-21 NOTE — ED PROVIDER NOTES
[facility]  Emergency Department Encounter     Pt Name: Ree Brooks  MRN: [id number]  [place] 1965  Date of evaluation: 3/21/22  PCP:  Megan Jay MD    [address] [place] [address]       Chief Complaint   Patient presents with    Chest Pain     started yesterday, increased this morning, subsided and came back    Shortness of Breath       HISTORY OFPRESENT ILLNESS  (Location/Symptom, Timing/Onset, Context/Setting, Quality, Duration, Modifying Factors,Severity.)      Ree Brooks is a 55 y.o. male who presents with chest pain and shortness of breath. Does endorse history of subcu opiate use. Was admitted to ICU at [facility] earlier this month for altered mental status and was found to have a PE. Was prescribed Eliquis. He states he has been taking this intermittently and does have a supply of Eliquis. States chest pain is substernal associated with some shortness of breath. Intermittent. Moderate. Denies any other drug use besides subcu opiates. Chest pain is sharp. Nonradiating. PAST MEDICAL / SURGICAL / SOCIAL / FAMILY HISTORY      has a past medical history of Bipolar 1 disorder ([facility] [address] Chronic mental illness, Depression, Hepatitis C, Hypertension, Psoriasis, and Substance abuse ([facility] [address] has a past surgical history that includes brain surgery and Dental surgery.     Social History     Socioeconomic History    Marital status: Single     Spouse name: Not on file    Number of children: Not on file    Years of education: Not on file    Highest education level: Not on file   Occupational History    Not on file   Tobacco Use    Smoking status: Current Every Day Smoker     Packs/day: 1.00     Years: 40.00     Pack years: 40.00     Types: Cigarettes    Smokeless tobacco: Never Used   Vaping Use    Vaping Use: Former   Substance and Sexual Activity    Alcohol use: No     Alcohol/week: 0.0 standard drinks     Comment: social     Drug use: Yes     Frequency: 7.0 times per week     Types: Opiates , Cocaine     Comment: 3x a day, [place] and fentanyl    Sexual activity: Not on file   Other Topics Concern    Not on file   Social History Narrative    Not on file     Social Determinants of Health     Financial Resource Strain: High Risk    Difficulty of Paying Living Expenses: Hard   Food Insecurity: No Food Insecurity    Worried About Running Out of Food in the Last Year: Never true    Liza of Food in the Last Year: Never true   Transportation Needs:     Lack of Transportation (Medical): Not on file    Lack of Transportation (Non-Medical): Not on file   Physical Activity:     Days of Exercise per Week: Not on file    Minutes of Exercise per Session: Not on file   Stress:     Feeling of Stress : Not on file   Social Connections:     Frequency of Communication with Friends and Family: Not on file    Frequency of Social Gatherings with Friends and Family: Not on file    Attends [employer] Services: Not on file    Active Member of [address] [place] [address] or Organizations: Not on file    Attends Club or Organization Meetings: Not on file    Marital Status: Not on file   Intimate Partner Violence:     Fear of Current or Ex-Partner: Not on file    Emotionally Abused: Not on file    Physically Abused: Not on file    Sexually Abused: Not on file   Housing Stability:     Unable to Pay for Housing in the Last Year: Not on file    Number of [place] in the Last Year: Not on file    Unstable Housing in the Last Year: Not on file       Family History   Problem Relation Age of Onset    Cancer Father     Cancer Child        Allergies:  Haldol [haloperidol], Latuda [lurasidone hcl], and Ziprasidone    Home Medications:  Prior to Admission medications    Medication Sig Start Date End Date Taking?  Authorizing Provider   apixaban starter pack (ELIQUIS DVT/PE STARTER PACK) 5 MG TBPK tablet Take 1 tablet by mouth See Admin Instructions 3/3/22   Zohra Brink DO   QUEtiapine (SEROQUEL) distress. Appearance: He is well-developed. He is not toxic-appearing. HENT:      Head: Normocephalic and atraumatic. Nose: Nose normal.      Mouth/Throat:      Mouth: Mucous membranes are moist.   Eyes:      General: No scleral icterus. Conjunctiva/sclera: Conjunctivae normal.      Pupils: Pupils are equal, round, and reactive to light. Cardiovascular:      Rate and Rhythm: Normal rate and regular rhythm. Heart sounds: Normal heart sounds. No murmur heard. No friction rub. No gallop. Pulmonary:      Effort: Pulmonary effort is normal. No respiratory distress. Breath sounds: Normal breath sounds. No wheezing or rales. Comments: Good air movement bilaterally, speaking from multiple sentences with no shortness of breath  Abdominal:      Palpations: Abdomen is soft. Tenderness: There is no abdominal tenderness. Musculoskeletal:      Comments: Scattered areas of subcutaneous bruising to forearms, no surrounding erythema   Skin:     General: Skin is warm and dry. Findings: No erythema or rash. Neurological:      Mental Status: He is alert and oriented to person, place, and time. Psychiatric:      Comments: Pressured speech otherwise good eye contact, linear thought process         DIFFERENTIAL  DIAGNOSIS     PLAN (LABS / IMAGING / EKG):  Orders Placed This Encounter   Procedures    XR CHEST (2 VW)    CBC with Auto Differential    Basic Metabolic Panel    Brain Natriuretic Peptide    Troponin    EKG 12 Lead       MEDICATIONS ORDERED:  No orders of the defined types were placed in this encounter. DDX: Medication noncompliance versus continued drug use versus ACS versus worsening PE versus pneumonia versus septic emboli    Initial MDM/Plan: 55 y.o. male who presents with chest pain, shortness of breath. Chest x-ray, basic labs, troponin and BNP.      DIAGNOSTIC RESULTS / EMERGENCY DEPARTMENT COURSE / MDM     LABS:  Labs Reviewed   CBC WITH AUTO DIFFERENTIAL - Abnormal; Notable for the following components:       Result Value    WBC 12.2 (*)     RDW 15.2 (*)     Absolute Lymph # 3.93 (*)     Absolute Mono # 1.22 (*)     All other components within normal limits   BASIC METABOLIC PANEL - Abnormal; Notable for the following components:    Glucose 106 (*)     BUN 25 (*)     Bun/Cre Ratio 33 (*)     Sodium 131 (*)     Chloride 96 (*)     All other components within normal limits   BRAIN NATRIURETIC PEPTIDE   TROPONIN         RADIOLOGY:  XR CHEST (2 VW)    Result Date: 3/20/2022  EXAMINATION: TWO XRAY VIEWS OF THE CHEST 3/20/2022 10:27 pm COMPARISON: 03/18/2022 HISTORY: ORDERING SYSTEM PROVIDED HISTORY: History subcu drug use, no chest pain shortness of breath, history of PE TECHNOLOGIST PROVIDED HISTORY: History subcu drug use, no chest pain shortness of breath, history of PE Reason for Exam: chest pain Additional signs and symptoms: chest pain, sob and a cough FINDINGS: Cardiomediastinal silhouette is normal in size. There is no pleural effusion or pneumothorax. There is eventration of the right hemidiaphragm. No pulmonary consolidation. No acute osseous abnormality. No acute cardiopulmonary abnormality. EKG  Rhythm: normal sinus   Rate: normal  Axis: normal  Conduction: normal  ST Segments: no acute change  T Waves: no acute change  Q Waves: no acute change    Clinical Impression: no acute change, but this is a nonspecific EKG. All EKG's are interpreted by the Emergency Department Physician who either signs or Co-signs this chart in the absence of a cardiologist.    EMERGENCY DEPARTMENT COURSE:  ED Course as of 03/21/22 1655   Sun Mar 20, 2022   2217 On Eliquis [MS]      ED Course User Index  [MS] Thane Schaumann, DO     Troponin and BNP negative. Recent echo reviewed from earlier this month. Appearing normal.  No vegetations. He is otherwise nontoxic and well-appearing. Doubt for any worsening PE with negative troponin and BNP.   I stated multiple times the importance of remaining on Eliquis as his shortness of breath and chest pain is certainly related to his recent PE. Encouraged to follow-up with primary care doctor return for worsening signs or symptoms. · Based on the low acuity of concerning symptoms and improvement of symptoms, patient will be discharged with follow up and prescription information listed in the Disposition section. · Patient states they will follow-up with primary care physician and/or return to the emergency department should they experience a change or worsening of symptoms. · Patient will be discharged with resources: summary of visit, instructions, follow-up information, prescriptions if necessary. · Patient/ family instructed to read discharge paperwork. All of their questions and concerns were addressed. · Suspicion for any acute life-threatening processes is low. Patient voices understanding of plan. PROCEDURES:  None    CONSULTS:  None    CRITICAL CARE:  0    FINAL IMPRESSION      1. Chest pain, unspecified type    2. Dyspnea and respiratory abnormalities    3.  History of pulmonary embolus (PE)          DISPOSITION / PLAN     DISPOSITION Decision To Discharge 03/20/2022 11:00:58 PM    Discharge    PATIENTREFERRED TO:  Gabriel Gil MD  [address] [place] [address]  [phone]    Schedule an appointment as soon as possible for a visit in 1 week        DISCHARGE MEDICATIONS:  Discharge Medication List as of 3/20/2022 11:04 PM          Juan Carlos Bowling DO  EmergencyMedicine Attending    (Please note that portions of this note were completed with a voice recognition program.  Efforts were made to edit the dictations but occasionally words are mis-transcribed.)       Juan Carlos Bowling DO  03/21/22 [id number]

## 2022-06-20 ENCOUNTER — HOSPITAL ENCOUNTER (EMERGENCY)
Age: 57
Discharge: HOME | End: 2022-06-20
Attending: EMERGENCY MEDICINE
Payer: MEDICARE

## 2022-06-20 VITALS
WEIGHT: 180 LBS | HEART RATE: 70 BPM | HEIGHT: 68 IN | TEMPERATURE: 99 F | BODY MASS INDEX: 27.28 KG/M2 | SYSTOLIC BLOOD PRESSURE: 165 MMHG | DIASTOLIC BLOOD PRESSURE: 92 MMHG | RESPIRATION RATE: 18 BRPM | OXYGEN SATURATION: 96 %

## 2022-06-20 DIAGNOSIS — L03.119 CELLULITIS AND ABSCESS OF UPPER EXTREMITY: Primary | ICD-10-CM

## 2022-06-20 DIAGNOSIS — L02.419 CELLULITIS AND ABSCESS OF UPPER EXTREMITY: Primary | ICD-10-CM

## 2022-06-20 PROCEDURE — 87185 SC STD ENZYME DETCJ PER NZM: CPT

## 2022-06-20 PROCEDURE — 87076 CULTURE ANAEROBE IDENT EACH: CPT

## 2022-06-20 PROCEDURE — 87070 CULTURE OTHR SPECIMN AEROBIC: CPT

## 2022-06-20 PROCEDURE — 6370000000 HC RX 637 (ALT 250 FOR IP): Performed by: HEALTH CARE PROVIDER

## 2022-06-20 PROCEDURE — 87186 SC STD MICRODIL/AGAR DIL: CPT

## 2022-06-20 PROCEDURE — 87077 CULTURE AEROBIC IDENTIFY: CPT

## 2022-06-20 PROCEDURE — 87075 CULTR BACTERIA EXCEPT BLOOD: CPT

## 2022-06-20 PROCEDURE — 99283 EMERGENCY DEPT VISIT LOW MDM: CPT

## 2022-06-20 PROCEDURE — 10060 I&D ABSCESS SIMPLE/SINGLE: CPT

## 2022-06-20 PROCEDURE — 2500000003 HC RX 250 WO HCPCS: Performed by: HEALTH CARE PROVIDER

## 2022-06-20 PROCEDURE — 87205 SMEAR GRAM STAIN: CPT

## 2022-06-20 RX ORDER — DOXYCYCLINE HYCLATE 100 MG
100 TABLET ORAL ONCE
Status: COMPLETED | OUTPATIENT
Start: 2022-06-20 | End: 2022-06-20

## 2022-06-20 RX ORDER — LIDOCAINE HYDROCHLORIDE 10 MG/ML
20 INJECTION, SOLUTION INFILTRATION; PERINEURAL ONCE
Status: COMPLETED | OUTPATIENT
Start: 2022-06-20 | End: 2022-06-20

## 2022-06-20 RX ORDER — DOXYCYCLINE HYCLATE 100 MG
100 TABLET ORAL 2 TIMES DAILY
Qty: 20 TABLET | Refills: 0 | Status: SHIPPED | OUTPATIENT
Start: 2022-06-20 | End: 2022-06-30

## 2022-06-20 RX ADMIN — LIDOCAINE HYDROCHLORIDE 20 ML: 10 INJECTION, SOLUTION INFILTRATION; PERINEURAL at 21:13

## 2022-06-20 RX ADMIN — DOXYCYCLINE HYCLATE 100 MG: 100 TABLET, COATED ORAL at 21:13

## 2022-06-20 ASSESSMENT — ENCOUNTER SYMPTOMS
SORE THROAT: 0
VOMITING: 0
SHORTNESS OF BREATH: 0
ABDOMINAL PAIN: 0
DIARRHEA: 0
NAUSEA: 0
COUGH: 0

## 2022-06-20 ASSESSMENT — PAIN DESCRIPTION - LOCATION: LOCATION: ARM

## 2022-06-20 ASSESSMENT — PAIN SCALES - GENERAL: PAINLEVEL_OUTOF10: 10

## 2022-06-20 ASSESSMENT — PAIN DESCRIPTION - ORIENTATION: ORIENTATION: RIGHT

## 2022-06-20 NOTE — ED PROVIDER NOTES
[facility]  ED  Emergency Department Encounter  EmergencyMedicine Resident     Pt Lavonne Barron  MRN: [id number]  [place] 1965  Date of evaluation: 6/20/22  PCP:  Shilpi Lynch MD    This patient was evaluated in the Emergency Department for symptoms described in the history of present illness. The patient was evaluated in the context of the global COVID-19 pandemic, which necessitated consideration that the patient might be at risk for infection with the SARS-CoV-2 virus that causes COVID-19. Institutional protocols and algorithms that pertain to the evaluation of patients at risk for COVID-19 are in a state of rapid change based on information released by regulatory bodies including the CDC and federal and state organizations. These policies and algorithms were followed during the patient's care in the ED. CHIEF COMPLAINT       Chief Complaint   Patient presents with    Abscess     R. forearm       HISTORY OF PRESENT ILLNESS  (Location/Symptom, Timing/Onset, Context/Setting, Quality, Duration, Modifying Factors, Severity.)      Mariah Avina is a  55 y.o. male who presents with concern for infection in his right forearm. He routinely does skin popping with fentanyl or heroin in that extremity. Last use was this morning. He reports getting clean needles from a friend but has had multiple abscesses in the past.  He often drains himself with a razor blade but this area is getting worsening swelling and pain in the last several days. He did drain an area closer to his wrist 2 days ago that is no longer draining or enlarged. The bulk of his forearm is firm and indurated but is not overly tender. He has good range of motion in his wrist and hand, normal sensation, normal cap refill. There is an area of ballotable fluctuance across his forearm as seen in the pictures below.     He has previously used [facility] for drug rehabilitation and reported wanting to go this morning but used instead. He is concerned about this infection and reports that he will go to [facility] tomorrow morning to get back on treatment. PAST MEDICAL / SURGICAL / SOCIAL / FAMILY HISTORY      has a past medical history of Bipolar 1 disorder ([facility] [address] Chronic mental illness, Depression, Hepatitis C, Hypertension, Psoriasis, and Substance abuse ([facility] [address] has a past surgical history that includes brain surgery and Dental surgery. Social History     Socioeconomic History    Marital status: Single     Spouse name: Not on file    Number of children: Not on file    Years of education: Not on file    Highest education level: Not on file   Occupational History    Not on file   Tobacco Use    Smoking status: Current Every Day Smoker     Packs/day: 1.00     Years: 40.00     Pack years: 40.00     Types: Cigarettes    Smokeless tobacco: Never Used   Vaping Use    Vaping Use: Former   Substance and Sexual Activity    Alcohol use: No     Alcohol/week: 0.0 standard drinks     Comment: social     Drug use: Yes     Frequency: 7.0 times per week     Types: Opiates , Cocaine     Comment: 3x a day, [place] and fentanyl sometimes xanax    Sexual activity: Not on file   Other Topics Concern    Not on file   Social History Narrative    Not on file     Social Determinants of Health     Financial Resource Strain: High Risk    Difficulty of Paying Living Expenses: Hard   Food Insecurity: No Food Insecurity    Worried About Running Out of Food in the Last Year: Never true    Liza of Food in the Last Year: Never true   Transportation Needs:     Lack of Transportation (Medical): Not on file    Lack of Transportation (Non-Medical):  Not on file   Physical Activity:     Days of Exercise per Week: Not on file    Minutes of Exercise per Session: Not on file   Stress:     Feeling of Stress : Not on file   Social Connections:     Frequency of Communication with Friends and Family: Not on file    Frequency of Social Gatherings with Friends and Family: Not on file    Attends [employer] Services: Not on file    Active Member of Clubs or Organizations: Not on file    Attends Club or Organization Meetings: Not on file    Marital Status: Not on file   Intimate Partner Violence:     Fear of Current or Ex-Partner: Not on file    Emotionally Abused: Not on file    Physically Abused: Not on file    Sexually Abused: Not on file   Housing Stability:     Unable to Pay for Housing in the Last Year: Not on file    Number of [place] in the Last Year: Not on file    Unstable Housing in the Last Year: Not on file       Family History   Problem Relation Age of Onset    Cancer Father     Cancer Child        Allergies:  Haldol [haloperidol], Latuda [lurasidone hcl], and Ziprasidone    Home Medications:  Prior to Admission medications    Medication Sig Start Date End Date Taking?  Authorizing Provider   doxycycline hyclate (VIBRA-TABS) 100 MG tablet Take 1 tablet by mouth 2 times daily for 10 days Drink with full glass of water, do not take within 2 hours of eating dairy, do not may flat within 30 minutes of taking, may cause easy sunburn, use sun protection 6/20/22 6/30/22 Yes Valentina Mejia MD   apixaban starter pack (ELIQUIS DVT/PE STARTER PACK) 5 MG TBPK tablet Take 1 tablet by mouth See Admin Instructions 3/3/22   Laney Pedersen DO   QUEtiapine (SEROQUEL) 200 MG tablet TAKE 1 TABLET BY MOUTH NIGHTLY 11/30/21   Tuyet Wong MD   lisinopril (PRINIVIL;ZESTRIL) 20 MG tablet Take 1 tablet by mouth daily 7/8/21   Neelima Alvarado MD   amLODIPine (NORVASC) 10 MG tablet Take 1 tablet by mouth daily 8/18/21   Tuyet Wong MD   folic acid (FOLVITE) 1 MG tablet Take 1 tablet by mouth daily 8/18/21   Tuyet Wong MD   thiamine 100 MG tablet Take 1 tablet by mouth daily 8/18/21   Tuyet oWng MD   citalopram (CELEXA) 20 MG tablet Take 1 tablet by mouth daily 8/18/21   Tuyet Wong MD   traZODone (DESYREL) 50 MG tablet Take 1 tablet by mouth nightly as needed for Sleep  Patient not taking: Reported on 6/20/2022 8/18/21   Tuyet Suarez MD   hydroCHLOROthiazide (HYDRODIURIL) 25 MG tablet Take 1 tablet by mouth daily 6/25/20   JILLIAN See - CNP   omeprazole (PRILOSEC OTC) 20 MG tablet Take 1 tablet by mouth daily. 2/10/15 7/1/15  Steve Ngo MD       REVIEW OF SYSTEMS    (2-9 systems for level 4, 10 or more for level 5)      Review of Systems   Constitutional: Positive for fatigue. Negative for activity change, chills and fever. HENT: Negative for congestion and sore throat. Respiratory: Negative for cough and shortness of breath. Cardiovascular: Negative for chest pain and leg swelling. Gastrointestinal: Negative for abdominal pain, diarrhea, nausea and vomiting. Genitourinary: Negative for decreased urine volume and dysuria. Skin: Negative for pallor and rash. Allergic/Immunologic: Negative for environmental allergies and food allergies. Neurological: Negative for weakness, light-headedness, numbness and headaches. Hematological: Negative for adenopathy. Does not bruise/bleed easily. Psychiatric/Behavioral: Negative for confusion and decreased concentration. PHYSICAL EXAM   (up to 7 for level 4, 8 or more for level 5)      INITIAL VITALS:   BP (!) 165/92   Pulse 70   Temp 99 °F (37.2 °C) (Oral)   Resp 18   Ht 5' 8\" (1.727 m)   Wt 180 lb (81.6 kg)   SpO2 96%   BMI 27.37 kg/m²     Physical Exam  Constitutional:       General: He is not in acute distress. Appearance: He is not ill-appearing or toxic-appearing. HENT:      Head: Normocephalic and atraumatic. Mouth/Throat:      Mouth: Mucous membranes are moist.      Pharynx: Oropharynx is clear. Eyes:      Extraocular Movements: Extraocular movements intact. Pupils: Pupils are equal, round, and reactive to light. Cardiovascular:      Rate and Rhythm: Normal rate. Pulses: Normal pulses.    Pulmonary: Effort: Pulmonary effort is normal.   Abdominal:      General: Abdomen is flat. Palpations: Abdomen is soft. Musculoskeletal:         General: Swelling, tenderness and signs of injury present. Normal range of motion. Right forearm: Swelling, edema, laceration and tenderness present. Left forearm: Swelling present. No edema, lacerations or tenderness. Comments: Significant redness and swelling on the right forearm as evidenced in the images below. Ballotable fluid collection mid to medial forearm. Multiple scars from where pt has completed self I&D with razors over time, most recently near the wrist 2 days ago. Left forearm with no active signs of infection but significant scarring. Pain is not out of proportion and there is no pain beyond the areas of redness, no crepitus for dusky skin changes. No lymphangitis or lymphadenopathy in the arm or axilla. Skin:     General: Skin is warm and dry. Capillary Refill: Capillary refill takes less than 2 seconds. Findings: Erythema and lesion present. Neurological:      General: No focal deficit present. Mental Status: He is alert and oriented to person, place, and time. Sensory: No sensory deficit. Motor: No weakness. Psychiatric:         Mood and Affect: Mood normal.         Behavior: Behavior normal.         Thought Content: Thought content normal.                              INITIAL IMPRESSION / DIFFERENTIAL  DIAGNOSIS / PLAN     INITIAL IMPRESSION / DDX:   Significant skin and soft tissue infection with needle popping drug use. Significant more swelling in the last 2 days. Not febrile and normal vitals on arrival.  Will complete incision and drainage and start antibiotics. Have return the department for wound check tomorrow. Patient reports he will go to [facility] in the morning to restart drug therapy and  the antibiotics.   We will get first antibiotic dose here in the department tonight. EMERGENCY DEPARTMENT COURSE:       PLAN (LABS / IMAGING / EKG):  Orders Placed This Encounter   Procedures    INCISION AND DRAINAGE    Culture, Anaerobic and Aerobic       MEDICATIONS ORDERED:  Orders Placed This Encounter   Medications    lidocaine 1 % injection 20 mL    doxycycline hyclate (VIBRA-TABS) tablet 100 mg     Order Specific Question:   Antimicrobial Indications     Answer:   Skin and Soft Tissue Infection    doxycycline hyclate (VIBRA-TABS) 100 MG tablet     Sig: Take 1 tablet by mouth 2 times daily for 10 days Drink with full glass of water, do not take within 2 hours of eating dairy, do not may flat within 30 minutes of taking, may cause easy sunburn, use sun protection     Dispense:  20 tablet     Refill:  0       DIAGNOSTIC RESULTS / PROCEDURES / CONSULTS   LAB RESULTS:  No results found for this visit on 06/20/22. RADIOLOGY:  No results found. PROCEDURES:  Incision/Drainage    Date/Time: 6/20/2022 10:04 PM  Performed by: Mahin Lares MD  Authorized by: Paula Morris MD     Consent:     Consent obtained:  Verbal    Consent given by:  Patient    Risks discussed:  Bleeding, incomplete drainage, pain and damage to other organs    Alternatives discussed:  No treatment and delayed treatment  Location:     Type:  Abscess    Size:  6x8cm    Location:  Upper extremity    Upper extremity location:  Arm    Arm location:  R lower arm  Pre-procedure details:     Skin preparation:  Betadine and Chloraprep  Anesthesia (see MAR for exact dosages):      Anesthesia method:  Local infiltration    Local anesthetic:  Lidocaine 1% w/o epi  Procedure type:     Complexity:  Complex  Procedure details:     Incision types:  Single straight    Incision depth:  Subcutaneous    Scalpel blade:  11    Wound management:  Probed and deloculated, extensive cleaning and irrigated with saline    Drainage:  Bloody and purulent    Drainage amount:  Copious    Wound treatment:  Wound left open    Packing 30 minutes of taking, may cause easy sunburn, use sun protection, Disp-20 tablet, R-0Normal             Cami Enrique MD  Emergency Medicine Resident    (Please note that portions of thisnote were completed with a voice recognition program.  Efforts were made to edit the dictations but occasionally words are mis-transcribed.)     Michaela Kelsey MD  Resident  06/20/22 1746

## 2022-06-20 NOTE — Clinical Note
Jeronimo Guzman was seen and treated in our emergency department on 6/20/2022. Please allow Mr. Stover to return to the Emergency Department on 21 June for re-evaluation.      Tara Faust MD

## 2022-06-20 NOTE — ED PROVIDER NOTES
I performed a history and physical examination of the patient and discussed management with the resident. I reviewed the residents note and agree with the documented findings and plan of care. Any areas of disagreement are noted on the chart. I was personally present for the key portions of any procedures. I have documented in the chart those procedures where I was not present during the key portions. I have reviewed the emergency nurses triage note. I agree with the chief complaint, past medical history, past surgical history, allergies, medications, social and family history as documented unless otherwise noted below. Documentation of the HPI, Physical Exam and Medical Decision Making performed by medical students or scribes is based on my personal performance of the HPI, PE and MDM. For Phys Assistant/ Nurse Practitioner cases/documentation I have personally evaluated this patient and have completed at least one if not all key elements of the E/M (history, physical exam, and MDM). I find the patient's history and physical exam are consistent with the NP/PA documentation. I agree with the care provided, treatment rendered, disposition and followup plan. Additional findings are as noted. Radha Mckenzie MD  Attending Emergency  Physician    This patient presented to the emergency department with complaint of pain and swelling and a probable abscess on the dorsal aspect of the right midforearm. Patient has been injecting heroin and has a history of similar problems in the past.  He denies any fevers or chills. He is right-hand dominant. Reports that he was able to drain pus out of another lesion on the right forearm just distal to the more concerning 1 today. Denies any numbness, weakness, tingling. On examination the patient is awake and alert. He is cooperative and responsive.   Examination of the right forearm reveals significant swelling over the dorsal aspect of the forearm with some tenderness and significant mount of fluctuance over the most swollen area. Distal sensation, motor function, capillary refill, pulses intact. No epitrochlear or axillar lymphadenopathy. No lymphangitis noted. Impression: Cutaneous abscess right forearm. Plan: We will provide local anesthesia and incise and drain the abscess. Depending on the volume of purulence removed we may utilize packing or drain. Patient will be started on antibiotics and asked to return to the emergency department tomorrow for recheck. Review of the patient's chart demonstrates positive MRSA history. Incision and drainage was performed by Dr. Nomra Mcpherson. Large amount of foul-smelling purulent discharge was expressed.               Roxi Ennis MD  06/24/22 9814

## 2022-06-21 NOTE — ED NOTES
This patient was assessed by the doctor only. Nurse processed and completed the orders from this doctor ie labs, meds, and/or EKG.         Komal Brannon RN  06/20/22 0994

## 2022-06-21 NOTE — ED NOTES
The following labs labeled with pt sticker and tubed to lab:     [] Blue     [] Lavender   [] on ice  [] Green/yellow  [] Green/black [] on ice  [] Yellow  [] Red  [] Pink      [] COVID-19 swab    [] Rapid  [] PCR  [] Flu swab  [] Peds Viral Panel     [] Urine Sample  [x] Wound Cultures  [] Blood Cultures            Andreina Sue RN  06/20/22 9026

## 2022-06-23 LAB
CULTURE: ABNORMAL
DIRECT EXAM: ABNORMAL
SPECIMEN DESCRIPTION: ABNORMAL

## 2022-09-20 ENCOUNTER — HOSPITAL ENCOUNTER (EMERGENCY)
Age: 57
Discharge: LEFT BEFORE DISCHARGE | End: 2022-09-20
Attending: EMERGENCY MEDICINE
Payer: MEDICARE

## 2022-09-20 VITALS
SYSTOLIC BLOOD PRESSURE: 161 MMHG | HEART RATE: 54 BPM | BODY MASS INDEX: 27.28 KG/M2 | HEIGHT: 68 IN | WEIGHT: 180 LBS | DIASTOLIC BLOOD PRESSURE: 107 MMHG | OXYGEN SATURATION: 97 % | TEMPERATURE: 98.1 F | RESPIRATION RATE: 16 BRPM

## 2022-09-20 DIAGNOSIS — Z53.21 ELOPED FROM EMERGENCY DEPARTMENT: Primary | ICD-10-CM

## 2022-09-20 PROCEDURE — 99281 EMR DPT VST MAYX REQ PHY/QHP: CPT

## 2022-09-20 RX ORDER — 0.9 % SODIUM CHLORIDE 0.9 %
1000 INTRAVENOUS SOLUTION INTRAVENOUS ONCE
Status: DISCONTINUED | OUTPATIENT
Start: 2022-09-20 | End: 2022-09-21 | Stop reason: HOSPADM

## 2022-09-20 ASSESSMENT — PAIN DESCRIPTION - ORIENTATION: ORIENTATION: RIGHT;LEFT;LOWER

## 2022-09-20 ASSESSMENT — PAIN SCALES - GENERAL: PAINLEVEL_OUTOF10: 6

## 2022-09-20 ASSESSMENT — PAIN DESCRIPTION - PAIN TYPE: TYPE: CHRONIC PAIN

## 2022-09-20 ASSESSMENT — PAIN DESCRIPTION - LOCATION: LOCATION: ABDOMEN

## 2022-09-20 ASSESSMENT — PAIN - FUNCTIONAL ASSESSMENT: PAIN_FUNCTIONAL_ASSESSMENT: 0-10

## 2022-09-21 ASSESSMENT — ENCOUNTER SYMPTOMS
COLOR CHANGE: 0
BACK PAIN: 0
CONSTIPATION: 0
NAUSEA: 0
ABDOMINAL PAIN: 1
SHORTNESS OF BREATH: 0
DIARRHEA: 0
RHINORRHEA: 0
VOMITING: 0

## 2022-09-21 NOTE — ED PROVIDER NOTES
[facility]  ED  Emergency Department Encounter  Emergency Medicine Resident     Pt Jay Mendoza  MRN: [id number]  [place] 1965  Date of evaluation: 9/20/22  PCP:  Tarsha Motley MD      [address] [place] [address]       Chief Complaint   Patient presents with    Abdominal Pain       HISTORY OF PRESENT ILLNESS  (Location/Symptom, Timing/Onset, Context/Setting, Quality, Duration, Modifying Factors, Severity.)      Lenard Thibodeaux is a 55 y.o. male who presents with lower abdominal pain is going on for the past week. Past medical history significant for bipolar 1 disorder, hypertension as well as substance abuse. Patient states that he has a known inguinal hernia and has been lifting more stuff recently at work. States has been having lower abdominal pain. Is still having normal bowel movements. Denies any dysuria, hematuria, purulent discharge, chest pain, shortness of breath, nausea, vomiting. PAST MEDICAL / SURGICAL / SOCIAL / FAMILY HISTORY      has a past medical history of Bipolar 1 disorder ([facility] [address] Chronic mental illness, Depression, Hepatitis C, Hypertension, Psoriasis, and Substance abuse ([facility] [address] has a past surgical history that includes brain surgery and Dental surgery.       Social History     Socioeconomic History    Marital status: Single     Spouse name: Not on file    Number of children: Not on file    Years of education: Not on file    Highest education level: Not on file   Occupational History    Not on file   Tobacco Use    Smoking status: Every Day     Packs/day: 1.00     Years: 40.00     Pack years: 40.00     Types: Cigarettes    Smokeless tobacco: Never   Vaping Use    Vaping Use: Former   Substance and Sexual Activity    Alcohol use: No     Alcohol/week: 0.0 standard drinks     Comment: social     Drug use: Yes     Frequency: 7.0 times per week     Types: Opiates , Cocaine     Comment: 3x a day, [place] and fentanyl sometimes xanax    Sexual activity: Not on file Other Topics Concern    Not on file   Social History Narrative    Not on file     Social Determinants of Health     Financial Resource Strain: Not on file   Food Insecurity: Not on file   Transportation Needs: Not on file   Physical Activity: Not on file   Stress: Not on file   Social Connections: Not on file   Intimate Partner Violence: Not on file   Housing Stability: Not on file       Family History   Problem Relation Age of Onset    Cancer Father     Cancer Child        Allergies:  Haldol [haloperidol], Latuda [lurasidone hcl], and Ziprasidone    Home Medications:  Prior to Admission medications    Medication Sig Start Date End Date Taking? Authorizing Provider   apixaban starter pack (ELIQUIS DVT/PE STARTER PACK) 5 MG TBPK tablet Take 1 tablet by mouth See Admin Instructions 3/3/22   Nisreen Branch DO   QUEtiapine (SEROQUEL) 200 MG tablet TAKE 1 TABLET BY MOUTH NIGHTLY 11/30/21   Tuyet Peterson MD   lisinopril (PRINIVIL;ZESTRIL) 20 MG tablet Take 1 tablet by mouth daily 7/8/21   Steve Fernandes MD   amLODIPine (NORVASC) 10 MG tablet Take 1 tablet by mouth daily 8/18/21   Tuyet Peterson MD   folic acid (FOLVITE) 1 MG tablet Take 1 tablet by mouth daily 8/18/21   Tuyet Peterson MD   thiamine 100 MG tablet Take 1 tablet by mouth daily 8/18/21   Tuyet Peterson MD   citalopram (CELEXA) 20 MG tablet Take 1 tablet by mouth daily 8/18/21   Tuyet Peterson MD   traZODone (DESYREL) 50 MG tablet Take 1 tablet by mouth nightly as needed for Sleep  Patient not taking: Reported on 6/20/2022 8/18/21   Tuyet Peterson MD   hydroCHLOROthiazide (HYDRODIURIL) 25 MG tablet Take 1 tablet by mouth daily 6/25/20   JILLIAN Vargas - CNP   omeprazole (PRILOSEC OTC) 20 MG tablet Take 1 tablet by mouth daily. 2/10/15 7/1/15  Claudia Anne MD       REVIEW OF SYSTEMS    (2-9 systems for level 4, 10 or more for level 5)      Review of Systems   Constitutional:  Negative for chills and fever.    HENT:  Negative for congestion and rhinorrhea. Eyes:  Negative for visual disturbance. Respiratory:  Negative for shortness of breath. Cardiovascular:  Negative for chest pain. Gastrointestinal:  Positive for abdominal pain. Negative for constipation, diarrhea, nausea and vomiting. Genitourinary:  Positive for scrotal swelling. Negative for dysuria, hematuria, penile pain, penile swelling and testicular pain. Musculoskeletal:  Negative for back pain and neck pain. Skin:  Negative for color change, pallor, rash and wound. Neurological:  Negative for tremors, weakness and headaches. PHYSICAL EXAM   (up to 7 for level 4, 8 or more for level 5)      INITIAL VITALS:   BP (!) 161/107   Pulse 54   Temp 98.1 °F (36.7 °C) (Oral)   Resp 16   Ht 5' 8\" (1.727 m)   Wt 180 lb (81.6 kg)   SpO2 97%   BMI 27.37 kg/m²     Physical Exam  Constitutional:       General: He is not in acute distress. Appearance: He is not ill-appearing, toxic-appearing or diaphoretic. Cardiovascular:      Rate and Rhythm: Normal rate and regular rhythm. Heart sounds: No murmur heard. No friction rub. No gallop. Pulmonary:      Effort: No respiratory distress. Breath sounds: No stridor. No wheezing, rhonchi or rales. Chest:      Chest wall: No tenderness. Abdominal:      Palpations: There is no shifting dullness, fluid wave, hepatomegaly, splenomegaly, mass or pulsatile mass. Tenderness: There is abdominal tenderness in the suprapubic area. There is no guarding or rebound. Negative signs include Mishra's sign, Rovsing's sign, McBurney's sign and psoas sign. Comments: Mild suprapubic abdominal pain, there is no mass   Skin:     Capillary Refill: Capillary refill takes less than 2 seconds. Coloration: Skin is not cyanotic, jaundiced, mottled or pale. Findings: No erythema or rash. Neurological:      Mental Status: He is oriented to person, place, and time.        DIFFERENTIAL  DIAGNOSIS     PLAN (LABS / IMAGING / EKG):  Orders Placed This Encounter   Procedures    Lactic Acid    CBC with Auto Differential    Comprehensive Metabolic Panel w/ Reflex to MG    Lipase    Urinalysis with Microscopic       MEDICATIONS ORDERED:  Orders Placed This Encounter   Medications    DISCONTD: 0.9 % sodium chloride bolus       DDX: Hernia, gastroenteritis, appendicitis, testicular torsion, STI, UTI    DIAGNOSTIC RESULTS / EMERGENCY DEPARTMENT COURSE / MDM   LAB RESULTS:  No results found for this visit on 09/20/22. IMPRESSION: n/a    RADIOLOGY:  No orders to display         EMERGENCY DEPARTMENT COURSE:  Patient is a 64-year-old male presenting with lower abdominal pain. On examination patient no acute distress, nontoxic-appearing. Ambulates throughout the emergency department with no increased work of breathing or any signs of pain. Plan was for abdominal work-up as well as lactic acid to rule out strangulated hernia. However to receiving laboratory work-up patient decided that he did not want to stay here anymore. Patient was tolerating oral intake in the room was drinking PixiaZABETH with no issues. Patient stated that he wanted to leave and patient ambulated out of the emergency department prior to treatment completion. No notes of EC Admission Criteria type on file. PROCEDURES:  N/a    CONSULTS:  None    CRITICAL CARE:  N/a    FINAL IMPRESSION      1. Eloped from emergency department          DISPOSITION / [address] - Left Before Treatment Complete 09/20/2022 11:25:18 PM      PATIENT REFERRED TO:  No follow-up provider specified.     DISCHARGE MEDICATIONS:  Discharge Medication List as of 9/20/2022 11:39 PM          Pat Tierney DO  Emergency Medicine Resident    (Please note that portions of thisnote were completed with a voice recognition program.  Efforts were made to edit the dictations but occasionally words are mis-transcribed.)        Geraldine Lucero DO  Resident  09/21/22 7853

## 2022-09-21 NOTE — ED NOTES
Dr. Miguelangel Hernandez at bedside to assess pt, after Dr. Miguelangel Hernandez exited attempted to start IV. Before finishing gathering supplies pt refused IV and stated he changed his mind and didn't want to be seen anymore.       Marisol Adkins RN  09/20/22 7681

## 2022-09-21 NOTE — ED NOTES
Pt walked out of ER, pt made aware that we only want to help and should be seen. Pt understood and continued to walk out.      Marisol Adkins RN  09/20/22 2344

## 2022-10-05 ENCOUNTER — HOSPITAL ENCOUNTER (OUTPATIENT)
Age: 57
Setting detail: SPECIMEN
Discharge: HOME | End: 2022-10-05

## 2022-10-05 ENCOUNTER — OFFICE VISIT (OUTPATIENT)
Dept: FAMILY MEDICINE CLINIC | Age: 57
End: 2022-10-05
Payer: MEDICARE

## 2022-10-05 VITALS
OXYGEN SATURATION: 98 % | HEART RATE: 66 BPM | SYSTOLIC BLOOD PRESSURE: 142 MMHG | HEIGHT: 68 IN | WEIGHT: 186 LBS | BODY MASS INDEX: 28.19 KG/M2 | RESPIRATION RATE: 20 BRPM | DIASTOLIC BLOOD PRESSURE: 90 MMHG

## 2022-10-05 DIAGNOSIS — N50.819 PERSISTENT TESTICULAR PAIN: ICD-10-CM

## 2022-10-05 DIAGNOSIS — N50.89 SWELLING OF THE TESTICLES: Primary | ICD-10-CM

## 2022-10-05 DIAGNOSIS — B18.2 CHRONIC HEPATITIS C WITHOUT HEPATIC COMA (HCC): ICD-10-CM

## 2022-10-05 DIAGNOSIS — I10 HYPERTENSION, UNSPECIFIED TYPE: ICD-10-CM

## 2022-10-05 DIAGNOSIS — N50.89 SWELLING OF THE TESTICLES: ICD-10-CM

## 2022-10-05 DIAGNOSIS — Z20.2 STD EXPOSURE: ICD-10-CM

## 2022-10-05 LAB
ABSOLUTE EOS #: 0.19 K/UL (ref 0–0.44)
ABSOLUTE IMMATURE GRANULOCYTE: 0.04 K/UL (ref 0–0.3)
ABSOLUTE LYMPH #: 2.34 K/UL (ref 1.1–3.7)
ABSOLUTE MONO #: 0.78 K/UL (ref 0.1–1.2)
ALBUMIN SERPL-MCNC: 3.5 G/DL (ref 3.5–5.2)
ALBUMIN/GLOBULIN RATIO: 0.7 (ref 1–2.5)
ALP BLD-CCNC: 82 U/L (ref 40–129)
ALT SERPL-CCNC: 78 U/L (ref 5–41)
ANION GAP SERPL CALCULATED.3IONS-SCNC: 13 MMOL/L (ref 9–17)
AST SERPL-CCNC: 86 U/L
BASOPHILS # BLD: 1 % (ref 0–2)
BASOPHILS ABSOLUTE: 0.07 K/UL (ref 0–0.2)
BILIRUB SERPL-MCNC: 0.3 MG/DL (ref 0.3–1.2)
BILIRUBIN URINE: NEGATIVE
BUN BLDV-MCNC: 14 MG/DL (ref 6–20)
CALCIUM SERPL-MCNC: 9.3 MG/DL (ref 8.6–10.4)
CHLORIDE BLD-SCNC: 99 MMOL/L (ref 98–107)
CO2: 24 MMOL/L (ref 20–31)
COLOR: YELLOW
CREAT SERPL-MCNC: 0.63 MG/DL (ref 0.7–1.2)
EOSINOPHILS RELATIVE PERCENT: 2 % (ref 1–4)
EPITHELIAL CELLS UA: NORMAL /HPF (ref 0–5)
GFR SERPL CREATININE-BSD FRML MDRD: >60 ML/MIN/1.73M2
GLUCOSE BLD-MCNC: 105 MG/DL (ref 70–99)
GLUCOSE URINE: NEGATIVE
HCT VFR BLD CALC: 44.6 % (ref 40.7–50.3)
HEMOGLOBIN: 14.5 G/DL (ref 13–17)
HIV AG/AB: NONREACTIVE
IMMATURE GRANULOCYTES: 0 %
KETONES, URINE: NEGATIVE
LEUKOCYTE ESTERASE, URINE: NEGATIVE
LYMPHOCYTES # BLD: 21 % (ref 24–43)
MCH RBC QN AUTO: 28.5 PG (ref 25.2–33.5)
MCHC RBC AUTO-ENTMCNC: 32.5 G/DL (ref 28.4–34.8)
MCV RBC AUTO: 87.6 FL (ref 82.6–102.9)
MONOCYTES # BLD: 7 % (ref 3–12)
NITRITE, URINE: NEGATIVE
NRBC AUTOMATED: 0 PER 100 WBC
PDW BLD-RTO: 14.6 % (ref 11.8–14.4)
PH UA: 6 (ref 5–8)
PLATELET # BLD: 177 K/UL (ref 138–453)
PMV BLD AUTO: 11.3 FL (ref 8.1–13.5)
POTASSIUM SERPL-SCNC: 4.1 MMOL/L (ref 3.7–5.3)
PROTEIN UA: NEGATIVE
RBC # BLD: 5.09 M/UL (ref 4.21–5.77)
RBC # BLD: ABNORMAL 10*6/UL
RBC UA: NORMAL /HPF (ref 0–4)
SEG NEUTROPHILS: 69 % (ref 36–65)
SEGMENTED NEUTROPHILS ABSOLUTE COUNT: 7.86 K/UL (ref 1.5–8.1)
SODIUM BLD-SCNC: 136 MMOL/L (ref 135–144)
SPECIFIC GRAVITY UA: 1.01 (ref 1–1.03)
TOTAL PROTEIN: 8.3 G/DL (ref 6.4–8.3)
TURBIDITY: CLEAR
URINE HGB: NEGATIVE
UROBILINOGEN, URINE: NORMAL
WBC # BLD: 11.3 K/UL (ref 3.5–11.3)
WBC UA: NORMAL /HPF (ref 0–5)

## 2022-10-05 PROCEDURE — 3017F COLORECTAL CA SCREEN DOC REV: CPT | Performed by: NURSE PRACTITIONER

## 2022-10-05 PROCEDURE — G8427 DOCREV CUR MEDS BY ELIG CLIN: HCPCS | Performed by: NURSE PRACTITIONER

## 2022-10-05 PROCEDURE — 4004F PT TOBACCO SCREEN RCVD TLK: CPT | Performed by: NURSE PRACTITIONER

## 2022-10-05 PROCEDURE — G8419 CALC BMI OUT NRM PARAM NOF/U: HCPCS | Performed by: NURSE PRACTITIONER

## 2022-10-05 PROCEDURE — G8484 FLU IMMUNIZE NO ADMIN: HCPCS | Performed by: NURSE PRACTITIONER

## 2022-10-05 PROCEDURE — 99214 OFFICE O/P EST MOD 30 MIN: CPT | Performed by: NURSE PRACTITIONER

## 2022-10-05 RX ORDER — LISINOPRIL 20 MG/1
20 TABLET ORAL DAILY
Qty: 30 TABLET | Refills: 1 | Status: SHIPPED | OUTPATIENT
Start: 2022-10-05

## 2022-10-05 SDOH — ECONOMIC STABILITY: FOOD INSECURITY: WITHIN THE PAST 12 MONTHS, YOU WORRIED THAT YOUR FOOD WOULD RUN OUT BEFORE YOU GOT MONEY TO BUY MORE.: NEVER TRUE

## 2022-10-05 SDOH — ECONOMIC STABILITY: FOOD INSECURITY: WITHIN THE PAST 12 MONTHS, THE FOOD YOU BOUGHT JUST DIDN'T LAST AND YOU DIDN'T HAVE MONEY TO GET MORE.: NEVER TRUE

## 2022-10-05 ASSESSMENT — PATIENT HEALTH QUESTIONNAIRE - PHQ9
9. THOUGHTS THAT YOU WOULD BE BETTER OFF DEAD, OR OF HURTING YOURSELF: 0
SUM OF ALL RESPONSES TO PHQ QUESTIONS 1-9: 0
10. IF YOU CHECKED OFF ANY PROBLEMS, HOW DIFFICULT HAVE THESE PROBLEMS MADE IT FOR YOU TO DO YOUR WORK, TAKE CARE OF THINGS AT HOME, OR GET ALONG WITH OTHER PEOPLE: 0
SUM OF ALL RESPONSES TO PHQ QUESTIONS 1-9: 0
SUM OF ALL RESPONSES TO PHQ QUESTIONS 1-9: 0
7. TROUBLE CONCENTRATING ON THINGS, SUCH AS READING THE NEWSPAPER OR WATCHING TELEVISION: 0
1. LITTLE INTEREST OR PLEASURE IN DOING THINGS: 0
6. FEELING BAD ABOUT YOURSELF - OR THAT YOU ARE A FAILURE OR HAVE LET YOURSELF OR YOUR FAMILY DOWN: 0
SUM OF ALL RESPONSES TO PHQ QUESTIONS 1-9: 0
3. TROUBLE FALLING OR STAYING ASLEEP: 0
4. FEELING TIRED OR HAVING LITTLE ENERGY: 0
8. MOVING OR SPEAKING SO SLOWLY THAT OTHER PEOPLE COULD HAVE NOTICED. OR THE OPPOSITE, BEING SO FIGETY OR RESTLESS THAT YOU HAVE BEEN MOVING AROUND A LOT MORE THAN USUAL: 0
2. FEELING DOWN, DEPRESSED OR HOPELESS: 0
5. POOR APPETITE OR OVEREATING: 0
SUM OF ALL RESPONSES TO PHQ9 QUESTIONS 1 & 2: 0

## 2022-10-05 ASSESSMENT — SOCIAL DETERMINANTS OF HEALTH (SDOH): HOW HARD IS IT FOR YOU TO PAY FOR THE VERY BASICS LIKE FOOD, HOUSING, MEDICAL CARE, AND HEATING?: NOT HARD AT ALL

## 2022-10-05 NOTE — PROGRESS NOTES
Lossie Goldmann (:  1965) is a 55 y.o. male,Established patient, here for evaluation of the following chief complaint(s):  Hernia (States in the groin area. Pt states \"a lot of skin you can grab it like a stress ball\") and Health Maintenance (Depression screen completed. )         ASSESSMENT/PLAN:  1. Swelling of the testicles  Comments:  Rx for labs, Ultrasound and referral to urology. Follow up pending results. Orders:  -     Urinalysis with Microscopic; Future  -     Culture, Urine; Future  -     CBC with Auto Differential; Future  -     US SCROTUM AND TESTICLES; Future  -     AFL - Rachel Jeffrey MD, Urology, [facility]  -     Chlamydia/GC DNA, Urine; Future  -     Trichomonas Vaginali, Molecular; Future  2. Chronic hepatitis C without hepatic coma (HCC)  -     Comprehensive Metabolic Panel; Future  3. Persistent testicular pain  -     AFL - Rachel Jeffrey MD, Urology, [facility]  4. STD exposure  -     HIV Screen; Future  -     Herpes Profile; Future  5. Hypertension, unspecified type  Comments:  Uncontrolled, start lisinopril. Follow up in 1 month. Orders:  -     lisinopril (PRINIVIL;ZESTRIL) 20 MG tablet; Take 1 tablet by mouth daily, Disp-30 tablet, R-1Normal    Return in about 1 month (around 2022) for HTN. Subjective   SUBJECTIVE/OBJECTIVE:  Presents for acute visit  Has not been seen in office since 2021  Has not taken any medications since last OV     Was in alf a few months ago for DUI and drug related charges  Recent Er visit 22 for possible inguinal hernia, eloped visit prior to testing   Reports continued tenderness to lower abdomen, has had to walk up and down stairs carrying heavy loads recently. Believes this made possible hernia worse.   Has swollen, painful testicles x1.5 years - getting worse  Denies dysuria, frequency, flank pain, fever/chills  Reports swelling started after having sex and twisting the wrong way     Requesting gabapentin and tramadol today  Has not taken any BP meds since last OV  Denies chest pain/shortness of breath, dizziness/lightheadedness       Review of Systems   Gastrointestinal:  Positive for abdominal pain (lower). Genitourinary:  Positive for penile pain, penile swelling, scrotal swelling and testicular pain. Negative for decreased urine volume, difficulty urinating, dysuria, flank pain, frequency, penile discharge and urgency. Musculoskeletal:  Positive for gait problem. Objective   Physical Exam  Vitals and nursing note reviewed. Exam conducted with a chaperone present. Constitutional:       Appearance: Normal appearance. HENT:      Head: Normocephalic. Right Ear: Hearing normal.      Left Ear: Hearing normal.      Nose: Nose normal.      Mouth/Throat:      Mouth: Mucous membranes are moist.      Pharynx: Oropharynx is clear. Eyes:      Extraocular Movements: Extraocular movements intact. Conjunctiva/sclera: Conjunctivae normal.      Pupils: Pupils are equal, round, and reactive to light. Cardiovascular:      Rate and Rhythm: Normal rate and regular rhythm. Pulses: Normal pulses. Heart sounds: Normal heart sounds. Pulmonary:      Effort: Pulmonary effort is normal.      Breath sounds: Normal breath sounds. Abdominal:      General: Abdomen is flat. Bowel sounds are normal.      Palpations: Abdomen is soft. Genitourinary:     Penis: Swelling present. Testes:         Right: Tenderness and swelling present. Left: Tenderness and swelling present. Musculoskeletal:         General: Normal range of motion. Cervical back: Normal range of motion. Skin:     General: Skin is warm and dry. Capillary Refill: Capillary refill takes less than 2 seconds. Neurological:      General: No focal deficit present. Mental Status: He is alert and oriented to person, place, and time. Mental status is at baseline.    Psychiatric:         Mood and Affect: Mood normal.         Behavior: Behavior normal.         Thought Content: Thought content normal.         Judgment: Judgment normal.              Wt Readings from Last 3 Encounters:   10/05/22 186 lb (84.4 kg)   09/20/22 180 lb (81.6 kg)   06/20/22 180 lb (81.6 kg)     Temp Readings from Last 3 Encounters:   09/20/22 98.1 °F (36.7 °C) (Oral)   06/20/22 99 °F (37.2 °C) (Oral)   03/20/22 98.2 °F (36.8 °C) (Oral)     BP Readings from Last 3 Encounters:   10/05/22 (!) 142/90   09/20/22 (!) 161/107   06/20/22 (!) 165/92     Pulse Readings from Last 3 Encounters:   10/05/22 66   09/20/22 54   06/20/22 70           An electronic signature was used to authenticate this note.     --JILLIAN Keen - NP

## 2022-10-06 ENCOUNTER — TELEPHONE (OUTPATIENT)
Dept: FAMILY MEDICINE CLINIC | Age: 57
End: 2022-10-06

## 2022-10-06 LAB
C. TRACHOMATIS DNA ,URINE: NEGATIVE
CULTURE: NORMAL
HERPES SIMPLEX VIRUS 1 IGG: 1.37
HERPES SIMPLEX VIRUS 2 IGG: 5.39
HERPES TYPE 1/2 IGM COMBINED: 1.26
N. GONORRHOEAE DNA, URINE: NEGATIVE
SOURCE: NORMAL
SPECIMEN DESCRIPTION: NORMAL
SPECIMEN DESCRIPTION: NORMAL
TRICHOMONAS VAGINALI, MOLECULAR: NEGATIVE

## 2022-10-06 ASSESSMENT — ENCOUNTER SYMPTOMS: ABDOMINAL PAIN: 1

## 2022-10-06 NOTE — TELEPHONE ENCOUNTER
Received call from [address]. Patient stated that he cannot make his appointment today for ultrasound. He stated he can only walk about 5 steps before he starts to have bad pain. Patient stated he can do tomorrow but cant today, they had to change a line a truck. Per provider, please inform patient the test is something we do not want to delay, it can cause further problems and risk loosing 1 or both testicles. Pt stated understanding and stated he can do tomorrow.     Informed patient if he can not get testing completed this week, we will have to change the order to routine and then it will be scheduled at first available with hospital

## 2022-10-11 ENCOUNTER — HOSPITAL ENCOUNTER (OUTPATIENT)
Dept: ULTRASOUND IMAGING | Age: 57
Discharge: HOME | End: 2022-10-13
Payer: MEDICARE

## 2022-10-11 DIAGNOSIS — N50.89 SWELLING OF THE TESTICLES: ICD-10-CM

## 2022-10-11 PROCEDURE — 76870 US EXAM SCROTUM: CPT

## 2022-10-12 ENCOUNTER — TELEPHONE (OUTPATIENT)
Dept: FAMILY MEDICINE CLINIC | Age: 57
End: 2022-10-12

## 2022-10-12 DIAGNOSIS — K40.20 BILATERAL INGUINAL HERNIA WITHOUT OBSTRUCTION OR GANGRENE, RECURRENCE NOT SPECIFIED: Primary | ICD-10-CM

## 2022-10-12 NOTE — TELEPHONE ENCOUNTER
Patient states he would like a copy of his US results to take for his court case. Informed patient he will have to sign a release. Patient states he will stop in the office.

## 2022-10-14 ENCOUNTER — TELEPHONE (OUTPATIENT)
Dept: FAMILY MEDICINE CLINIC | Age: 57
End: 2022-10-14

## 2022-10-14 NOTE — TELEPHONE ENCOUNTER
----- Message from Jennifer Wood sent at 10/14/2022 10:04 AM EDT -----  Subject: Message to Provider    QUESTIONS  Information for Provider? Patient would like a call back once more with   the results of his recent ultra sound. Patient states that he already got   a call with the results but has forgotten what was discussed. Please call   back with more information.   ---------------------------------------------------------------------------  --------------  Lesia WADSWORTH  [phone]; OK to leave message on voicemail  ---------------------------------------------------------------------------  --------------  SCRIPT ANSWERS  Relationship to Patient?  Self

## 2022-10-14 NOTE — TELEPHONE ENCOUNTER
Spoke with patient in regards to results. Patient states he wants to write everything down. Pt voiced understanding.

## 2022-10-20 ENCOUNTER — TELEPHONE (OUTPATIENT)
Dept: FAMILY MEDICINE CLINIC | Age: 57
End: 2022-10-20

## 2022-10-20 NOTE — TELEPHONE ENCOUNTER
Patient called asking if he can have something showing his upcoming appointments with his specialist. States he would like to take this to court to show them his upcoming appts. Informed him this would be best to ask those doctors if they can provide this. Explained that we can't see his urologist appointment because they are not in the [address] system. Patient verbally understood.

## 2022-11-10 ENCOUNTER — TELEPHONE (OUTPATIENT)
Dept: FAMILY MEDICINE CLINIC | Age: 57
End: 2022-11-10

## 2022-11-10 NOTE — TELEPHONE ENCOUNTER
Patient contacted office again. I asked provider if a letter can be given for patient and state that he was scheduled with our office but due to transportation, he did not make it. Per provider, ok to do as a 1 time courtesy and please make sure patient contacts office before his appointment time ends.      Patient informed and provided fax number [phone]

## 2022-11-10 NOTE — TELEPHONE ENCOUNTER
Patient was transferred to office from [facility] ([facility]). Patient was scheduled for this morning but due to transportation, he had to reschedule for this afternoon. Patient stated he needs a letter due to him missing court. I advised patient if he was not present in our office we cannot do a letter stating he was here. Patient started to get upset and stated he's done with our office and no he's going to go to correction. Patient disconnected call.

## 2022-12-02 ENCOUNTER — TELEPHONE (OUTPATIENT)
Dept: FAMILY MEDICINE CLINIC | Age: 57
End: 2022-12-02

## 2022-12-02 NOTE — TELEPHONE ENCOUNTER
Pt called and states that his BP is 178/105. Pt was supposed to have an appointment today 12/02/2022 at 1015am but states he can not get here due to lack of transportation. I explained to patient he can call the insurance ([employer] ) and they do have cab services. Pt parisa, was unable to understand. I advised pt to go to the ER. Pt parisa, was unable to understand. I advised again for pt to go to ER. PT mumbled and hung up.

## 2022-12-05 ENCOUNTER — APPOINTMENT (OUTPATIENT)
Dept: GENERAL RADIOLOGY | Age: 57
End: 2022-12-05
Payer: COMMERCIAL

## 2022-12-05 ENCOUNTER — HOSPITAL ENCOUNTER (EMERGENCY)
Age: 57
Discharge: LEFT BEFORE DISCHARGE | End: 2022-12-05
Attending: EMERGENCY MEDICINE
Payer: COMMERCIAL

## 2022-12-05 VITALS
SYSTOLIC BLOOD PRESSURE: 200 MMHG | RESPIRATION RATE: 18 BRPM | DIASTOLIC BLOOD PRESSURE: 114 MMHG | HEART RATE: 66 BPM | OXYGEN SATURATION: 93 % | WEIGHT: 180 LBS | BODY MASS INDEX: 27.28 KG/M2 | HEIGHT: 68 IN | TEMPERATURE: 97.3 F

## 2022-12-05 DIAGNOSIS — L03.114 CELLULITIS OF LEFT UPPER EXTREMITY: Primary | ICD-10-CM

## 2022-12-05 LAB
ABSOLUTE EOS #: 0.37 K/UL (ref 0–0.44)
ABSOLUTE IMMATURE GRANULOCYTE: <0.03 K/UL (ref 0–0.3)
ABSOLUTE LYMPH #: 2.05 K/UL (ref 1.1–3.7)
ABSOLUTE MONO #: 0.98 K/UL (ref 0.1–1.2)
ALBUMIN SERPL-MCNC: 3.2 G/DL (ref 3.5–5.2)
ALBUMIN/GLOBULIN RATIO: 0.7 (ref 1–2.5)
ALP BLD-CCNC: 83 U/L (ref 40–129)
ALT SERPL-CCNC: 46 U/L (ref 5–41)
ANION GAP SERPL CALCULATED.3IONS-SCNC: 8 MMOL/L (ref 9–17)
AST SERPL-CCNC: 64 U/L
BASOPHILS # BLD: 0 % (ref 0–2)
BASOPHILS ABSOLUTE: 0.04 K/UL (ref 0–0.2)
BILIRUB SERPL-MCNC: 0.3 MG/DL (ref 0.3–1.2)
BUN BLDV-MCNC: 14 MG/DL (ref 6–20)
C-REACTIVE PROTEIN: 51.9 MG/L (ref 0–5)
CALCIUM SERPL-MCNC: 9.1 MG/DL (ref 8.6–10.4)
CHLORIDE BLD-SCNC: 103 MMOL/L (ref 98–107)
CO2: 29 MMOL/L (ref 20–31)
CREAT SERPL-MCNC: 0.62 MG/DL (ref 0.7–1.2)
EOSINOPHILS RELATIVE PERCENT: 4 % (ref 1–4)
GFR SERPL CREATININE-BSD FRML MDRD: >60 ML/MIN/1.73M2
GLUCOSE BLD-MCNC: 113 MG/DL (ref 70–99)
HCT VFR BLD CALC: 42.2 % (ref 40.7–50.3)
HEMOGLOBIN: 13.4 G/DL (ref 13–17)
IMMATURE GRANULOCYTES: 0 %
LYMPHOCYTES # BLD: 23 % (ref 24–43)
MCH RBC QN AUTO: 28.1 PG (ref 25.2–33.5)
MCHC RBC AUTO-ENTMCNC: 31.8 G/DL (ref 28.4–34.8)
MCV RBC AUTO: 88.5 FL (ref 82.6–102.9)
MONOCYTES # BLD: 11 % (ref 3–12)
NRBC AUTOMATED: 0 PER 100 WBC
PDW BLD-RTO: 14.6 % (ref 11.8–14.4)
PLATELET # BLD: 229 K/UL (ref 138–453)
PMV BLD AUTO: 10.4 FL (ref 8.1–13.5)
POTASSIUM SERPL-SCNC: 4.3 MMOL/L (ref 3.7–5.3)
RBC # BLD: 4.77 M/UL (ref 4.21–5.77)
RBC # BLD: ABNORMAL 10*6/UL
SEDIMENTATION RATE, ERYTHROCYTE: 66 MM/HR (ref 0–20)
SEG NEUTROPHILS: 62 % (ref 36–65)
SEGMENTED NEUTROPHILS ABSOLUTE COUNT: 5.52 K/UL (ref 1.5–8.1)
SODIUM BLD-SCNC: 140 MMOL/L (ref 135–144)
TOTAL PROTEIN: 7.9 G/DL (ref 6.4–8.3)
WBC # BLD: 9 K/UL (ref 3.5–11.3)

## 2022-12-05 PROCEDURE — 85025 COMPLETE CBC W/AUTO DIFF WBC: CPT

## 2022-12-05 PROCEDURE — 99284 EMERGENCY DEPT VISIT MOD MDM: CPT

## 2022-12-05 PROCEDURE — 85652 RBC SED RATE AUTOMATED: CPT

## 2022-12-05 PROCEDURE — 86140 C-REACTIVE PROTEIN: CPT

## 2022-12-05 PROCEDURE — 6370000000 HC RX 637 (ALT 250 FOR IP): Performed by: STUDENT IN AN ORGANIZED HEALTH CARE EDUCATION/TRAINING PROGRAM

## 2022-12-05 PROCEDURE — 73090 X-RAY EXAM OF FOREARM: CPT

## 2022-12-05 PROCEDURE — 80053 COMPREHEN METABOLIC PANEL: CPT

## 2022-12-05 RX ORDER — DOXYCYCLINE HYCLATE 100 MG
100 TABLET ORAL 2 TIMES DAILY
Qty: 14 TABLET | Refills: 0 | Status: SHIPPED | OUTPATIENT
Start: 2022-12-05 | End: 2022-12-12

## 2022-12-05 RX ORDER — DOXYCYCLINE HYCLATE 100 MG
100 TABLET ORAL ONCE
Status: COMPLETED | OUTPATIENT
Start: 2022-12-05 | End: 2022-12-05

## 2022-12-05 RX ADMIN — DOXYCYCLINE HYCLATE 100 MG: 100 TABLET, COATED ORAL at 03:07

## 2022-12-05 ASSESSMENT — ENCOUNTER SYMPTOMS
NAUSEA: 0
BACK PAIN: 0
FACIAL SWELLING: 0
ABDOMINAL DISTENTION: 0
SHORTNESS OF BREATH: 0
TROUBLE SWALLOWING: 0
VOMITING: 0
ABDOMINAL PAIN: 0
COLOR CHANGE: 1
CHEST TIGHTNESS: 0

## 2022-12-05 NOTE — ED PROVIDER NOTES
[facility] ED  Emergency Department Encounter  EmergencyMedicine Resident     Pt Carmen Sewell  MRN: [id number]  [place] 1965  Date of evaluation: 12/5/22  PCP:  Elena Rosario MD    [address] [place] [address]       Chief Complaint   Patient presents with    Other     Swelling and weeping from Left lower arm. States fell a few days ago. States has a history of shooting Heroin    Arm Swelling       HISTORY OF PRESENT ILLNESS  (Location/Symptom, Timing/Onset, Context/Setting, Quality, Duration, Modifying Factors, Severity.)      Myrna Stephenson is a 65 y.o. male who presents with swelling from the left forearm. Patient states that he has a history of IV heroin use and has had several abscesses. He normally manages his abscesses at home by using a \"rig\" to suck the pus out. States that he had a fall on Wednesday during which he went over the lawnmower and caught himself on that arm, since then has had increased swelling and redness. He did attempt to drain it but only got bloody watery fluid from the area. Patient denies any fevers, chills, nausea or vomiting. Does state that he had some severe abdominal pain earlier today, has known hernias and testicular swelling with calcification in his testicle. Patient states that he does not want to discuss his testicular pain or abdominal pain as he already has a provider for that. Patient also stating that he would like to not be admitted, he has to get to work in the morning. PAST MEDICAL / SURGICAL / SOCIAL / FAMILY HISTORY      has a past medical history of Bipolar 1 disorder ([address] Chronic mental illness, Depression, Hepatitis C, Hypertension, Psoriasis, and Substance abuse ([facility] [address] has a past surgical history that includes brain surgery and Dental surgery.       Social History     Socioeconomic History    Marital status: Single     Spouse name: Not on file    Number of children: Not on file    Years of education: Not on file Highest education level: Not on file   Occupational History    Not on file   Tobacco Use    Smoking status: Every Day     Packs/day: 1.00     Years: 40.00     Pack years: 40.00     Types: Cigarettes    Smokeless tobacco: Never   Vaping Use    Vaping Use: Former   Substance and Sexual Activity    Alcohol use: No     Alcohol/week: 0.0 standard drinks     Comment: social     Drug use: Yes     Frequency: 7.0 times per week     Types: Opiates , Cocaine     Comment: 3x a day, [place] and fentanyl sometimes xanax    Sexual activity: Not on file   Other Topics Concern    Not on file   Social History Narrative    Not on file     Social Determinants of Health     Financial Resource Strain: Low Risk     Difficulty of Paying Living Expenses: Not hard at all   Food Insecurity: No Food Insecurity    Worried About Running Out of Food in the Last Year: Never true    Ran Out of Food in the Last Year: Never true   Transportation Needs: Not on file   Physical Activity: Not on file   Stress: Not on file   Social Connections: Not on file   Intimate Partner Violence: Not on file   Housing Stability: Not on file       Family History   Problem Relation Age of Onset    Cancer Father     Cancer Child        Allergies:  Haldol [haloperidol], Latuda [lurasidone hcl], and Ziprasidone    Home Medications:  Prior to Admission medications    Medication Sig Start Date End Date Taking? Authorizing Provider   doxycycline hyclate (VIBRA-TABS) 100 MG tablet Take 1 tablet by mouth 2 times daily for 7 days 12/5/22 12/12/22 Yes Liz Lema MD   lisinopril (PRINIVIL;ZESTRIL) 20 MG tablet Take 1 tablet by mouth daily 10/5/22   Indira Mcdaniel, APRN - NP   apixaban starter pack (ELIQUIS DVT/PE STARTER PACK) 5 MG TBPK tablet Take 1 tablet by mouth See Admin Instructions  Patient not taking: Reported on 10/5/2022 3/3/22   Carrie Pierre DO   omeprazole (PRILOSEC OTC) 20 MG tablet Take 1 tablet by mouth daily.  2/10/15 7/1/15  Merline Como, MD       REVIEW OF SYSTEMS    (2-9 systems for level 4, 10 or more for level 5)      Review of Systems   Constitutional:  Negative for chills and fever. HENT:  Negative for facial swelling and trouble swallowing. Eyes:  Negative for visual disturbance. Respiratory:  Negative for chest tightness and shortness of breath. Cardiovascular:  Negative for chest pain. Gastrointestinal:  Negative for abdominal distention, abdominal pain, nausea and vomiting. Genitourinary:  Negative for difficulty urinating. Musculoskeletal:  Negative for back pain and neck stiffness. Skin:  Positive for color change, rash and wound. Neurological:  Negative for headaches. Psychiatric/Behavioral:  Negative for agitation and hallucinations. PHYSICAL EXAM   (up to 7 for level 4, 8 or more for level 5)      INITIAL VITALS:   BP (!) 200/114   Pulse 66   Temp 97.3 °F (36.3 °C)   Resp 18   Ht 5' 8\" (1.727 m)   Wt 180 lb (81.6 kg)   SpO2 93%   BMI 27.37 kg/m²     Physical Exam  Constitutional:       General: He is awake. Appearance: Normal appearance. HENT:      Head: Normocephalic and atraumatic. Right Ear: External ear normal.      Left Ear: External ear normal.      Nose: Nose normal.   Eyes:      General: Lids are normal.      Extraocular Movements: Extraocular movements intact. Cardiovascular:      Rate and Rhythm: Normal rate. Pulses: Normal pulses. Heart sounds: Normal heart sounds. Pulmonary:      Effort: Pulmonary effort is normal.      Breath sounds: Normal breath sounds. Musculoskeletal:      Cervical back: Normal range of motion. Comments: Multiple scars and discoloration to the bilateral forearms. Significant erythema, induration and warmth to the left forearm. No fluctuance appreciated. Sensation intact and pulses intact. Patient does have significant tenderness to the area. Neurological:      Mental Status: He is alert and oriented to person, place, and time.       Sensory: Sensation is intact. Motor: Motor function is intact. Psychiatric:         Mood and Affect: Mood normal.         Behavior: Behavior is cooperative. DIFFERENTIAL  DIAGNOSIS     PLAN (LABS / IMAGING / EKG):  Orders Placed This Encounter   Procedures    XR RADIUS ULNA LEFT (2 VIEWS)    CBC with Auto Differential    Comprehensive Metabolic Panel    C-Reactive Protein    Sedimentation Rate       MEDICATIONS ORDERED:  Orders Placed This Encounter   Medications    DISCONTD: vancomycin (VANCOCIN) 1250 mg in sodium chloride 0.9% 250 mL IVPB     Order Specific Question:   Antimicrobial Indications     Answer:   Skin and Soft Tissue Infection    DISCONTD: cefTRIAXone (ROCEPHIN) 1,000 mg in sodium chloride 0.9 % 50 mL IVPB mini-bag     Order Specific Question:   Antimicrobial Indications     Answer:   Skin and Soft Tissue Infection    doxycycline hyclate (VIBRA-TABS) tablet 100 mg     Order Specific Question:   Antimicrobial Indications     Answer:   Skin and Soft Tissue Infection    doxycycline hyclate (VIBRA-TABS) 100 MG tablet     Sig: Take 1 tablet by mouth 2 times daily for 7 days     Dispense:  14 tablet     Refill:  0       DDX: Cellulitis, abscess, fracture    MDM: 65 y.o. male presents today with cellulitis to the left upper extremity. Patient has history of multiple abscesses and has been attempting to drain them himself at home. Patient also has a history of being MRSA positive. Patient is offered IV antibiotics and admission due to concern for infection. Patient denies and states that he would like oral antibiotics. Imaging of the cellulitis placed in chart. Labs ordered as patient is agreeable.   Patient will be leaving AMA, is still provided with doxycycline    EMERGENCY DEPARTMENT COURSE:            [place] Coma Scale  Eye Opening: Spontaneous  Best Verbal Response: Oriented  Best Motor Response: Obeys commands  Leonel Coma Scale Score: 15  DIAGNOSTIC RESULTS / EMERGENCY DEPARTMENT COURSE / MDM LAB RESULTS:  Results for orders placed or performed during the hospital encounter of 12/05/22   CBC with Auto Differential   Result Value Ref Range    WBC 9.0 3.5 - 11.3 k/uL    RBC 4.77 4.21 - 5.77 m/uL    Hemoglobin 13.4 13.0 - 17.0 g/dL    Hematocrit 42.2 40.7 - 50.3 %    MCV 88.5 82.6 - 102.9 fL    MCH 28.1 25.2 - 33.5 pg    MCHC 31.8 28.4 - 34.8 g/dL    RDW 14.6 (H) 11.8 - 14.4 %    Platelets 950 970 - 017 k/uL    MPV 10.4 8.1 - 13.5 fL    NRBC Automated 0.0 0.0 per 100 WBC    Seg Neutrophils 62 36 - 65 %    Lymphocytes 23 (L) 24 - 43 %    Monocytes 11 3 - 12 %    Eosinophils % 4 1 - 4 %    Basophils 0 0 - 2 %    Immature Granulocytes 0 0 %    Segs Absolute 5.52 1.50 - 8.10 k/uL    Absolute Lymph # 2.05 1.10 - 3.70 k/uL    Absolute Mono # 0.98 0.10 - 1.20 k/uL    Absolute Eos # 0.37 0.00 - 0.44 k/uL    Basophils Absolute 0.04 0.00 - 0.20 k/uL    Absolute Immature Granulocyte <0.03 0.00 - 0.30 k/uL    RBC Morphology ANISOCYTOSIS PRESENT    Comprehensive Metabolic Panel   Result Value Ref Range    Glucose 113 (H) 70 - 99 mg/dL    BUN 14 6 - 20 mg/dL    Creatinine 0.62 (L) 0.70 - 1.20 mg/dL    Est, Glom Filt Rate >60 >60 mL/min/1.73m2    Calcium 9.1 8.6 - 10.4 mg/dL    Sodium 140 135 - 144 mmol/L    Potassium 4.3 3.7 - 5.3 mmol/L    Chloride 103 98 - 107 mmol/L    CO2 29 20 - 31 mmol/L    Anion Gap 8 (L) 9 - 17 mmol/L    Alkaline Phosphatase 83 40 - 129 U/L    ALT 46 (H) 5 - 41 U/L    AST 64 (H) <40 U/L    Total Bilirubin 0.3 0.3 - 1.2 mg/dL    Total Protein 7.9 6.4 - 8.3 g/dL    Albumin 3.2 (L) 3.5 - 5.2 g/dL    Albumin/Globulin Ratio 0.7 (L) 1.0 - 2.5   C-Reactive Protein   Result Value Ref Range    CRP 51.9 (H) 0.0 - 5.0 mg/L   Sedimentation Rate   Result Value Ref Range    Sed Rate 66 (H) 0 - 20 mm/Hr           RADIOLOGY:  XR RADIUS ULNA LEFT (2 VIEWS)   Final Result   Soft tissue swelling noted in the dorsal aspect of midportion and distal   portion left forearm. Normal left radius and ulna. PROCEDURES:  None    CONSULTS:  None    CRITICAL CARE:  There was significant risk of life threatening deterioration of patient's condition requiring my direct management. Critical care time 0 minutes, excluding any documented procedures. FINAL IMPRESSION      1.  Cellulitis of left upper extremity          DISPOSITION / PLAN     DISPOSITION [place] 12/05/2022 04:17:44 AM      PATIENT REFERRED TO:  Tuyet Roth, [address] [place] [address]  [phone]          DISCHARGE MEDICATIONS:  Discharge Medication List as of 12/5/2022  4:18 AM        START taking these medications    Details   doxycycline hyclate (VIBRA-TABS) 100 MG tablet Take 1 tablet by mouth 2 times daily for 7 days, Disp-14 tablet, R-0Normal             Erick Chinchilla MD  Emergency Medicine Resident    (Please note that portions of thisnote were completed with a voice recognition program.  Efforts were made to edit the dictations but occasionally words are mis-transcribed.)        Erick Chinchilla MD  Resident  12/05/22 4273

## 2022-12-05 NOTE — ED NOTES
Informed high bp to Dr. Yasmeen Lopez. Wants to keep an eye on it.      Alden Sullivan [facility]  12/05/22 0288

## 2022-12-05 NOTE — DISCHARGE INSTRUCTIONS
You have a history of cellulitis and abscess. Stop draining your abscesses at home. Please try to stop using heroin. If your infection spreads, increases in pain or swelling please return to the emergency department. You have voiced your understanding of your condition and the possibility of death and are still choosing to leave without further testing. PLEASE RETURN TO THE EMERGENCY DEPARTMENT IMMEDIATELY for worsening symptoms, loss of consciousness, shaking of your arms or legs (convulsions), biting your tongue or urinary incontinence or if you develop any concerning symptoms such as: high fever not relieved by acetaminophen (Tylenol) and/or ibuprofen (Motrin), chills, shortness of breath, chest pain, feeling of your heart fluttering or racing, persistent nausea and/or vomiting, numbness, weakness or tingling in the arms or legs or change in color of the extremities, changes in mental status, persistent headache, blurry vision, unable to follow up with your physician, or other any other care or concern.

## 2022-12-05 NOTE — ED NOTES
Pt was admitted with c/o of bilateral arms swelling. He has a history of using heroine for years and has had several abscesses. He normally manages his abscesses at home by using a \"rig\" to suck the pus out. States that he had a fall on Wednesday since then has had increased swelling and redness. He did attempt to drain it but only got bloody watery fluid from the area. RR even and nonlabored. No chest pain, N/V/D.      Tawnya Canela [facility]  12/05/22 6985

## 2022-12-05 NOTE — Clinical Note
The patient has decided to leave against medical advice, because he does not want to be admitted, has to get to work in the AM.    He has normal mental status and adequate capacity to make medical decisions. The patient refuses hospital admission  and wants to be discharged. The risks have been explained to the patient, including worsening illness, chronic pain, permanent disability, and death. The benefits of admission have also been explained, including the availability and proximity o f nurses, physicians, monitoring, diagnostic testing, and treatment. The patient was able to understand and state the risks and benefits of hospital admission. This was witnessed by nurse Merari Mckay RN and me. He had the opportunity to ask kobe cerna about his medical condition. The patient was treated to the extent that he would allow, and knows that he may return for care at any time. Follow up has been discussed and arranged with Dr. Jessica Horner.

## 2022-12-05 NOTE — ED PROVIDER NOTES
[facility]   Emergency Department  Faculty Attestation       I performed a history and physical examination of the patient and discussed management with the resident. I reviewed the residents note and agree with the documented findings including all diagnostic interpretations and plan of care. Any areas of disagreement are noted on the chart. I was personally present for the key portions of any procedures. I have documented in the chart those procedures where I was not present during the key portions. I have reviewed the emergency nurses triage note. I agree with the chief complaint, past medical history, past surgical history, allergies, medications, social and family history as documented unless otherwise noted below. Documentation of the HPI, Physical Exam and Medical Decision Making performed by scribes is based on my personal performance of the HPI, PE and MDM. For Physician Assistant/ Nurse Practitioner cases/documentation I have personally evaluated this patient and have completed at least one if not all key elements of the E/M (history, physical exam, and MDM). Additional findings are as noted. Pertinent Comments     Primary Care Physician: Ashlyn Hairston MD      ED Triage Vitals [12/05/22 0149]   BP Temp Temp src Heart Rate Resp SpO2 Height Weight   (!) 187/107 97.3 °F (36.3 °C) -- 66 18 97 % -- --          This is a 65 y.o. male who presents to the Emergency Department fell over a  on Wednesday and caught himself on the left arm. Did not strike his head. States that it then became infected and swollen. Has been using a \"rig\" to pull out the pus (describing the item as a item that puts or pulls juice out of meats when cooking and it looks similar to a needle). Now having pain ands welling in the area. On exam patient is awake and alert in no dsitress. nC/AT. Heart sounds regular and lungs clear to auscultation. Abdomen soft and nontender.   Left arm with significant swelling in multiple areas with erythema and tenderness. Does have distal pulses. Normal strenght and sensation. Cellulitis of the arm - is extensive. 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 However, he is willing to get lab work done and start antibiotics. Patient understands the risks of going home including increaseing infection that could lead to systemic involvement, severe impairment and even death. Patient has medical decision making capacity. Signed out AMA with antibiotic prescripition and strongly encouraged to return if not improving.      Critical Care: None     Tanna Sicard, MD  Attending Emergency Physician         Tanna Sicard, MD  12/12/22 0266

## 2022-12-17 ENCOUNTER — HOSPITAL ENCOUNTER (EMERGENCY)
Age: 57
Discharge: HOME | End: 2022-12-17
Attending: EMERGENCY MEDICINE
Payer: COMMERCIAL

## 2022-12-17 ENCOUNTER — APPOINTMENT (OUTPATIENT)
Dept: GENERAL RADIOLOGY | Age: 57
End: 2022-12-17
Payer: COMMERCIAL

## 2022-12-17 VITALS
SYSTOLIC BLOOD PRESSURE: 111 MMHG | HEART RATE: 65 BPM | DIASTOLIC BLOOD PRESSURE: 77 MMHG | OXYGEN SATURATION: 94 % | RESPIRATION RATE: 18 BRPM | WEIGHT: 175 LBS | BODY MASS INDEX: 26.61 KG/M2 | TEMPERATURE: 97.7 F

## 2022-12-17 DIAGNOSIS — L03.113 CELLULITIS OF RIGHT UPPER EXTREMITY: Primary | ICD-10-CM

## 2022-12-17 PROCEDURE — 73090 X-RAY EXAM OF FOREARM: CPT

## 2022-12-17 PROCEDURE — 6370000000 HC RX 637 (ALT 250 FOR IP): Performed by: EMERGENCY MEDICINE

## 2022-12-17 PROCEDURE — 99283 EMERGENCY DEPT VISIT LOW MDM: CPT

## 2022-12-17 RX ORDER — ACETAMINOPHEN 500 MG
1000 TABLET ORAL ONCE
Status: COMPLETED | OUTPATIENT
Start: 2022-12-17 | End: 2022-12-17

## 2022-12-17 RX ORDER — DOXYCYCLINE HYCLATE 100 MG
100 TABLET ORAL 2 TIMES DAILY
Qty: 14 TABLET | Refills: 0 | Status: SHIPPED | OUTPATIENT
Start: 2022-12-17 | End: 2022-12-24

## 2022-12-17 RX ORDER — DOXYCYCLINE HYCLATE 100 MG
100 TABLET ORAL ONCE
Status: COMPLETED | OUTPATIENT
Start: 2022-12-17 | End: 2022-12-17

## 2022-12-17 RX ADMIN — DOXYCYCLINE HYCLATE 100 MG: 100 TABLET ORAL at 22:01

## 2022-12-17 RX ADMIN — ACETAMINOPHEN 1000 MG: 500 TABLET ORAL at 21:35

## 2022-12-17 ASSESSMENT — ENCOUNTER SYMPTOMS: COLOR CHANGE: 1

## 2022-12-18 NOTE — ED PROVIDER NOTES
[facility] ED  Emergency Department Encounter  EmergencyMedicine Resident     Pt Jamel Tabares  MRN: [id number]  [place] 1965  Date of evaluation: 12/17/22  PCP:  Marlene Kruse MD    [address] [place] [address]       Chief Complaint   Patient presents with    Abscess     Bilateral forearm       HISTORY OF PRESENT ILLNESS  (Location/Symptom, Timing/Onset, Context/Setting, Quality, Duration, Modifying Factors, Severity.)      Verena Calvo is a 65 y.o. male who presents with swelling and pain to the right upper extremity. Patient has history of IV drug abuse, states that he has not used IV drugs since 2 months ago. Patient states that the swelling in his arm started to occur about 2 days ago. Patient states has been red and warm to the touch. Patient denies any purulent drainage. Patient denies trying to incise and drain this abscess himself. Patient does have a history of incising and draining his own abscesses. Patient denies any fevers, chills, nausea or vomiting. Patient denies any pain radiating to the arm. Patient denies any pain with movement of the elbow. Patient rates his pain as moderate, worsens with palpation of the area of erythema, nothing seems to make it better. PAST MEDICAL / SURGICAL / SOCIAL / FAMILY HISTORY      has a past medical history of Bipolar 1 disorder ([facility] [address] Chronic mental illness, Depression, Hepatitis C, Hypertension, Psoriasis, and Substance abuse ([facility] [address] No additional pertinent     has a past surgical history that includes brain surgery and Dental surgery.   No additional pertinent    Social History     Socioeconomic History    Marital status: Single     Spouse name: Not on file    Number of children: Not on file    Years of education: Not on file    Highest education level: Not on file   Occupational History    Not on file   Tobacco Use    Smoking status: Every Day     Packs/day: 1.00     Years: 40.00     Pack years: 40.00     Types: Cigarettes Smokeless tobacco: Never   Vaping Use    Vaping Use: Former   Substance and Sexual Activity    Alcohol use: No     Alcohol/week: 0.0 standard drinks     Comment: social     Drug use: Yes     Frequency: 7.0 times per week     Types: Opiates , Cocaine     Comment: 3x a day, [place] and fentanyl sometimes xanax    Sexual activity: Not on file   Other Topics Concern    Not on file   Social History Narrative    Not on file     Social Determinants of Health     Financial Resource Strain: Low Risk     Difficulty of Paying Living Expenses: Not hard at all   Food Insecurity: No Food Insecurity    Worried About Running Out of Food in the Last Year: Never true    Ran Out of Food in the Last Year: Never true   Transportation Needs: Not on file   Physical Activity: Not on file   Stress: Not on file   Social Connections: Not on file   Intimate Partner Violence: Not on file   Housing Stability: Not on file       Family History   Problem Relation Age of Onset    Cancer Father     Cancer Child        Allergies:  Haldol [haloperidol], Latuda [lurasidone hcl], and Ziprasidone    Home Medications:  Prior to Admission medications    Medication Sig Start Date End Date Taking? Authorizing Provider   doxycycline hyclate (VIBRA-TABS) 100 MG tablet Take 1 tablet by mouth 2 times daily for 7 days 12/17/22 12/24/22 Yes Meliton Wright DO   lisinopril (PRINIVIL;ZESTRIL) 20 MG tablet Take 1 tablet by mouth daily 10/5/22   JILLIAN Bhardwaj NP   apixaban starter pack (ELIQUIS DVT/PE STARTER PACK) 5 MG TBPK tablet Take 1 tablet by mouth See Admin Instructions  Patient not taking: Reported on 10/5/2022 3/3/22   Angel Tapia DO   omeprazole (PRILOSEC OTC) 20 MG tablet Take 1 tablet by mouth daily. 2/10/15 7/1/15  Jennifer Russ MD       REVIEW OF SYSTEMS    (2-9 systems for level 4, 10 or more for level 5)      Review of Systems   Constitutional:  Negative for chills and fever.    Skin:  Positive for color change and wound visit on 12/17/22. RADIOLOGY:  XR RADIUS ULNA RIGHT (2 VIEWS)   Final Result   1. No acute osseous abnormality. 2. Large amount of soft tissue swelling contour bulge proximal forearm   compatible with cellulitis and or abscess. 3. 5 mm linear metallic density medial aspect antecubital fossa soft tissues. Compatible with foreign body. Could be broken of needle given provided   history of IV drug abuse. EMERGENCY DEPARTMENT COURSE:  ED Course as of 12/17/22 2303   Sat Dec 17, 2022   2100 X-ray evaluated, patient has foreign body that is chronically in place. Not noted in the area of cellulitis. No need to obtain this foreign body at this time. [CR]      ED Course User Index  [CR] Dex Wright DO        PROCEDURES:  none    CONSULTS:  None    MEDICAL DECISION MAKING:  Patient presenting with concerns for cellulitis of the right upper extremity, x-ray obtained to evaluate for foreign body and for osteomyelitis. No osteomyelitis noted, foreign body was noted in the elbow area, patient aware of this foreign body. This is not in the area of the cellulitis. Ultrasound of the area demonstrated no concerns for a fluid-filled pocket that could be drained, there was cobblestoning and edema but no particular area to be able to drain. Area was not fluctuant and most likely appears to be cellulitis at this time. Patient was given doxycycline and given a dose here in the emergency department with prescription for 1 week. Patient struck to return for any worsening pain, worsening erythema, or any other concerns. Patient discharged home in stable condition. CRITICAL CARE:  Please see attending note    FINAL IMPRESSION      1.  Cellulitis of right upper extremity          DISPOSITION / PLAN     DISPOSITION Decision To Discharge 12/17/2022 09:53:05 PM      PATIENT REFERRED TO:  Tuyet Gross, [address] [facility] [address]  [phone]    Schedule an appointment as soon as possible for a visit       DISCHARGE MEDICATIONS:  Discharge Medication List as of 12/17/2022  9:53 PM        START taking these medications    Details   doxycycline hyclate (VIBRA-TABS) 100 MG tablet Take 1 tablet by mouth 2 times daily for 7 days, Disp-14 tablet, R-0Print             Adriana Abel DO  Emergency Medicine Resident    (Please note that portions of thisnote were completed with a voice recognition program.  Efforts were made to edit the dictations but occasionally words are mis-transcribed.)        Katt Farley,   Resident  12/17/22 8621

## 2022-12-18 NOTE — ED PROVIDER NOTES
[facility] [address] [facility]     Emergency Department     Faculty Attestation        I performed a history and physical examination of the patient and discussed management with the resident. I reviewed the residents note and agree with the documented findings and plan of care. Any areas of disagreement are noted on the chart. I was personally present for the key portions of any procedures. I have documented in the chart those procedures where I was not present during the key portions. I have reviewed the emergency nurses triage note. I agree with the chief complaint, past medical history, past surgical history, allergies, medications, social and family history as documented unless otherwise noted below. For mid-level providers such as nurse practitioners as well as physicians assistants:    I have personally seen and evaluated the patient. I find the patient's history and physical exam are consistent with NP/PA documentation. I agree with the care provided, treatment rendered, disposition, & follow-up plan. Additional findings are as noted. Vital Signs: /77   Pulse 65   Temp 97.7 °F (36.5 °C) (Oral)   Resp 18   Wt 175 lb (79.4 kg)   SpO2 94%   BMI 26.61 kg/m²   PCP:  SHADIA KOCH MD    Pertinent Comments:     Patient with concern for infection right upper extremity he states he has a history of IV drug abuse but states he has injection to his right upper extremity in \"quite some time\". Denies any fevers or chills there is area of redness and swelling to his forearm bedside ultrasound shows no identifiable drainable abscess. His compartments are soft compressible he is no signs of infection of the joint of the elbow. +2 radial ulnar pulses.   Will x-ray, antibiotics, strict return precautions      Critical Care  None          Deirdre Alvarado MD    Attending Emergency Medicine Physician            Stacey Braden MD  12/17/22 2041

## 2022-12-18 NOTE — ED NOTES
Pt presents to the ER via triage ambulatory. Pt c/o abcess on both forearms. Pt states abcess on right arm he just noticed yesterday and is visibly swollen. Pt states abcess on left arm has been there awhile and hasn't bothered him as much. Pt states 10 year history of IV drug use. Pt states stopping 2 months ago and having recurring abcess. Pt otherwise in NAD, VSS, A&O x4. Call light within reach. Dr. Terri Hurtado at bedside to further assess pt.      Mavis Rodriguez RN  12/17/22 2005

## 2022-12-21 ENCOUNTER — HOSPITAL ENCOUNTER (OUTPATIENT)
Dept: SURGERY | Age: 57
Discharge: HOME | End: 2022-12-21
Payer: COMMERCIAL

## 2022-12-21 VITALS
SYSTOLIC BLOOD PRESSURE: 115 MMHG | HEART RATE: 65 BPM | WEIGHT: 178 LBS | DIASTOLIC BLOOD PRESSURE: 79 MMHG | HEIGHT: 68 IN | BODY MASS INDEX: 26.98 KG/M2

## 2022-12-21 DIAGNOSIS — K40.20 NON-RECURRENT BILATERAL INGUINAL HERNIA WITHOUT OBSTRUCTION OR GANGRENE: ICD-10-CM

## 2022-12-21 PROCEDURE — 99202 OFFICE O/P NEW SF 15 MIN: CPT

## 2022-12-21 NOTE — H&P
Home Medications:   Prior to Admission medications    Medication Sig Start Date End Date Taking? Authorizing Provider   doxycycline hyclate (VIBRA-TABS) 100 MG tablet Take 1 tablet by mouth 2 times daily for 7 days 12/17/22 12/24/22  Cara Wright DO   lisinopril (PRINIVIL;ZESTRIL) 20 MG tablet Take 1 tablet by mouth daily 10/5/22   JILLIAN Beal - NP   apixaban starter pack (ELIQUIS DVT/PE STARTER PACK) 5 MG TBPK tablet Take 1 tablet by mouth See Admin Instructions  Patient not taking: Reported on 10/5/2022 3/3/22   Kay Griffin,    omeprazole (PRILOSEC OTC) 20 MG tablet Take 1 tablet by mouth daily. 2/10/15 7/1/15  Nubia Restrepo MD       Allergies  Haldol [haloperidol], Jereginaldo Inoue [lurasidone hcl], and Ziprasidone      Review of Systems:  General: Denies any fever, chills. Eyes: Denies any changes in vision, diplopia or eye pain  Ears, Nose, Mouth: Denies changes in hearing/tinnitus or drainage from ears, no rhinorrhea or bloody nose, no difficulty chewing  Throat: no difficulty swallowing, no throat pain  Respiratory: Denies any shortness of breath or cough. Cardiac: Denies any chest pain, palpitations, claudication or edema. Gastrointestinal: Denies any melena, hematochezia, hematemesis or pyrosis. Genitourinary: Denies any frequency, urgency, hesitancy or incontinence. Musculoskeletal: Denies worsening muscle weakness or recent trauma  Skin: Denies rashes or lesions  Psychiatric: Denies any recent changes in mood or affect  Hematologic: Denies bruising or bleeding easily. PHYSICAL EXAMINATION  Vitals:   Vitals:    12/21/22 1149   BP: 115/79   Pulse: 65       General Appearance:  awake, alert, no acute distress, well developed, well nourished   Skin:  Skin color, texture, turgor normal. No rashes or lesions.   Head/face:  NCAT, face symmetrical  Eyes:  PERRL, no evidence of conjunctivitis or ptosis bilaterally  Ears:  External ears and canals grossly normal, no evidence of otorrhea. Nose/Sinuses:  Nares normal. Septum midline. Mucosa normal. No external drainage noted. Mouth/Neck:  Mucosa moist.  No external oral lesions. Trachea midline. No visible masses. Lungs:  Normal chest expansion, unlabored breathing without accessory muscle use. No audible rales, rhonchi, or wheezing. Cardiovascular: S1S2. No evidence of JVD. No evidence of pulsatile masses in abdomen  Abdomen:  large incarcerated inguinal hernias bilaterally descending into scrotum. Musculoskeletal: No evidence of bony/muscular deformities, trauma, atrophy of either left/right upper/lower extremity. No evidence of digital clubbing or cyanosis. Neurologic:  CN 2-12 grossly intact without obvious deficits. Grossly normal sensation in all extremities. Psychiatric: appropriate judgement and insight, appropriate recall of recent and remote memory, no evidence of depression/anxiety/agitation      RADIOLOGY:  The following images and/or reports were personally reviewed with the following significant findings pertinent to the Chief Complaint and/or HPI:     CT abd/pelv in review      DIAGNOSES:  Active Hospital Problems    Diagnosis Date Noted    Non-recurrent bilateral inguinal hernia without obstruction or gangrene [K40.20] 12/21/2022     Priority: Medium         PLAN:  We discussed elective repair. Pt will need bowel prep prior to surgery to facilitate reduction. We also discussed the increased risk of postop complication given his current nicotine use which includes nonhealing and mesh infection, pt expresses understanding of this and desires to proceed. The risks include bleeding, infection, scarring, nerve pain, risk of atrophy to testicles, risk of orchiectomy, damage to surrounding tissues, recurrence of hernia, need for further surgery in the future. The benefits, alternatives, complications and procedure details were explained to the patient, all questions were answered.   We discussed drug testing on day of surgery, pt is agreeable to this.       Medical Decision Making: moderate complexity    Electronically signed by Ayanna Veronica DO on 12/21/2022 at 3:16 PM

## 2022-12-21 NOTE — LETTER
[facility]  Surgical Specialties - General Surgery  [address]  [facility], [facility] [address]  Phone: [phone]  Fax: [phone]      22    Patient: Mac Dumont  MRN: [id number]  : 1965  Date of visit: 2022    Dear Stephan Segal MD:      Thank you for requesting consultation for Mac Dumont in regards to evaluation for bilateral inguinal hernia. Below are the relevant portions of my assessment and plan of care. If you have questions, please do not hesitate to call me. I look forward to following Mac Dumont along with you. Sincerely,        Ana Rodrigez DO        [facility] General Surgery  History & Physical  Ana Rodrigez DO     Pt Name: Mac Dumont  MRN: [id number]  YOB: 1965  Date of evaluation: 2022  Primary Care Physician: Stephan Segal MD     Chief Complaint: bilateral inguinal hernia        SUBJECTIVE:     History of Present Illness:      This is a 65 y.o.  male who presents for evaluation for the above, known right inguinal hernia for some time recently gotten much worse (questionably occurred during intercourse), newer left side inguinal hernia as well. Bilateral testicle discomfort. No history of obstruction or prior attempts at repair. Current smoker at least 1/2ppd. Current heroin use.      Chart review performed to add information to the HPI: Yes     Past Medical History   has a past medical history of Bipolar 1 disorder ([facility] [address] Chronic mental illness, Depression, Hepatitis C, Hypertension, Psoriasis, and Substance abuse ([facility] [address]    Past Surgical History   has a past surgical history that includes brain surgery and Dental surgery.     Family History  family history includes Cancer in his child and father.     Social History  Tobacco use:  reports that he has been smoking cigarettes. He has a 40.00 pack-year smoking history.  He has never used smokeless tobacco.  Alcohol use:  reports no history of alcohol use. Drug use:  reports current drug use. Frequency: 7.00 times per week. Drugs: Opiates  and Cocaine.        Medications  Current Medications:   Current Facility-Administered Medications          Current Outpatient Medications   Medication Sig Dispense Refill    doxycycline hyclate (VIBRA-TABS) 100 MG tablet Take 1 tablet by mouth 2 times daily for 7 days 14 tablet 0    lisinopril (PRINIVIL;ZESTRIL) 20 MG tablet Take 1 tablet by mouth daily 30 tablet 1    apixaban starter pack (ELIQUIS DVT/PE STARTER PACK) 5 MG TBPK tablet Take 1 tablet by mouth See Admin Instructions (Patient not taking: Reported on 10/5/2022) 74 tablet 0      No current facility-administered medications for this encounter.         Home Medications:   Home Medications           Prior to Admission medications    Medication Sig Start Date End Date Taking? Authorizing Provider   doxycycline hyclate (VIBRA-TABS) 100 MG tablet Take 1 tablet by mouth 2 times daily for 7 days 12/17/22 12/24/22   Meliton Wright DO   lisinopril (PRINIVIL;ZESTRIL) 20 MG tablet Take 1 tablet by mouth daily 10/5/22     Indira Mcdaniel APRN - NP   apixaban starter pack (ELIQUIS DVT/PE STARTER PACK) 5 MG TBPK tablet Take 1 tablet by mouth See Admin Instructions  Patient not taking: Reported on 10/5/2022 3/3/22     Carrie Pierre DO   omeprazole (PRILOSEC OTC) 20 MG tablet Take 1 tablet by mouth daily. 2/10/15 7/1/15   [address] [facility], MD            Allergies  Haldol [haloperidol], Jercammie Selvin [lurasidone hcl], and Ziprasidone        Review of Systems:  General: Denies any fever, chills. Eyes: Denies any changes in vision, diplopia or eye pain  Ears, Nose, Mouth: Denies changes in hearing/tinnitus or drainage from ears, no rhinorrhea or bloody nose, no difficulty chewing  Throat: no difficulty swallowing, no throat pain  Respiratory: Denies any shortness of breath or cough.   Cardiac: Denies any chest pain, palpitations, claudication or edema. Gastrointestinal: Denies any melena, hematochezia, hematemesis or pyrosis. Genitourinary: Denies any frequency, urgency, hesitancy or incontinence. Musculoskeletal: Denies worsening muscle weakness or recent trauma  Skin: Denies rashes or lesions  Psychiatric: Denies any recent changes in mood or affect  Hematologic: Denies bruising or bleeding easily.     PHYSICAL EXAMINATION  Vitals:   Vitals:     12/21/22 1149   BP: 115/79   Pulse: 65         General Appearance:  awake, alert, no acute distress, well developed, well nourished   Skin:  Skin color, texture, turgor normal. No rashes or lesions. Head/face:  NCAT, face symmetrical  Eyes:  PERRL, no evidence of conjunctivitis or ptosis bilaterally  Ears:  External ears and canals grossly normal, no evidence of otorrhea. Nose/Sinuses:  Nares normal. Septum midline. Mucosa normal. No external drainage noted. Mouth/Neck:  Mucosa moist.  No external oral lesions. Trachea midline. No visible masses. Lungs:  Normal chest expansion, unlabored breathing without accessory muscle use. No audible rales, rhonchi, or wheezing. Cardiovascular: S1S2. No evidence of JVD. No evidence of pulsatile masses in abdomen  Abdomen:  large incarcerated inguinal hernias bilaterally descending into scrotum. Musculoskeletal: No evidence of bony/muscular deformities, trauma, atrophy of either left/right upper/lower extremity. No evidence of digital clubbing or cyanosis. Neurologic:  CN 2-12 grossly intact without obvious deficits. Grossly normal sensation in all extremities.   Psychiatric: appropriate judgement and insight, appropriate recall of recent and remote memory, no evidence of depression/anxiety/agitation        RADIOLOGY:  The following images and/or reports were personally reviewed with the following significant findings pertinent to the Chief Complaint and/or HPI:      CT abd/pelv in review        DIAGNOSES:        Active Hospital Problems     Diagnosis Date Noted  Non-recurrent bilateral inguinal hernia without obstruction or gangrene [K40.20] 12/21/2022       Priority: Medium            PLAN:   We discussed elective repair. Pt will need bowel prep prior to surgery to facilitate reduction. We also discussed the increased risk of postop complication given his current nicotine use which includes nonhealing and mesh infection, pt expresses understanding of this and desires to proceed.  The risks include bleeding, infection, scarring, nerve pain, risk of atrophy to testicles, risk of orchiectomy, damage to surrounding tissues, recurrence of hernia, need for further surgery in the future. The benefits, alternatives, complications and procedure details were explained to the patient, all questions were answered.    We discussed drug testing on day of surgery, pt is agreeable to this.        Medical Decision Making: moderate complexity     Electronically signed by Megan Reyes DO on 12/21/2022 at 3:16 PM

## 2023-01-23 ENCOUNTER — APPOINTMENT (OUTPATIENT)
Dept: GENERAL RADIOLOGY | Age: 58
End: 2023-01-23
Payer: COMMERCIAL

## 2023-01-23 ENCOUNTER — HOSPITAL ENCOUNTER (EMERGENCY)
Age: 58
Discharge: HOME | End: 2023-01-23
Attending: EMERGENCY MEDICINE
Payer: COMMERCIAL

## 2023-01-23 VITALS
DIASTOLIC BLOOD PRESSURE: 123 MMHG | OXYGEN SATURATION: 99 % | SYSTOLIC BLOOD PRESSURE: 191 MMHG | TEMPERATURE: 97.5 F | HEART RATE: 67 BPM | RESPIRATION RATE: 19 BRPM

## 2023-01-23 DIAGNOSIS — J18.9 PNEUMONIA OF BOTH LUNGS DUE TO INFECTIOUS ORGANISM, UNSPECIFIED PART OF LUNG: Primary | ICD-10-CM

## 2023-01-23 LAB
FLU A ANTIGEN: NEGATIVE
FLU B ANTIGEN: NEGATIVE
SARS-COV-2, RAPID: NOT DETECTED
SPECIMEN DESCRIPTION: NORMAL

## 2023-01-23 PROCEDURE — 71046 X-RAY EXAM CHEST 2 VIEWS: CPT

## 2023-01-23 PROCEDURE — 87804 INFLUENZA ASSAY W/OPTIC: CPT

## 2023-01-23 PROCEDURE — 87635 SARS-COV-2 COVID-19 AMP PRB: CPT

## 2023-01-23 PROCEDURE — 99284 EMERGENCY DEPT VISIT MOD MDM: CPT

## 2023-01-23 PROCEDURE — 6370000000 HC RX 637 (ALT 250 FOR IP): Performed by: STUDENT IN AN ORGANIZED HEALTH CARE EDUCATION/TRAINING PROGRAM

## 2023-01-23 RX ORDER — ONDANSETRON 4 MG/1
4 TABLET, FILM COATED ORAL ONCE
Status: COMPLETED | OUTPATIENT
Start: 2023-01-23 | End: 2023-01-23

## 2023-01-23 RX ORDER — ONDANSETRON 4 MG/1
4 TABLET, FILM COATED ORAL EVERY 8 HOURS PRN
Qty: 20 TABLET | Refills: 0 | Status: SHIPPED | OUTPATIENT
Start: 2023-01-23

## 2023-01-23 RX ORDER — ACETAMINOPHEN 500 MG
1000 TABLET ORAL ONCE
Status: COMPLETED | OUTPATIENT
Start: 2023-01-23 | End: 2023-01-23

## 2023-01-23 RX ORDER — DOXYCYCLINE HYCLATE 100 MG
100 TABLET ORAL 2 TIMES DAILY
Qty: 14 TABLET | Refills: 0 | Status: SHIPPED | OUTPATIENT
Start: 2023-01-23 | End: 2023-01-30

## 2023-01-23 RX ADMIN — ACETAMINOPHEN 1000 MG: 500 TABLET ORAL at 15:06

## 2023-01-23 RX ADMIN — ONDANSETRON HYDROCHLORIDE 4 MG: 4 TABLET, FILM COATED ORAL at 15:07

## 2023-01-23 ASSESSMENT — ENCOUNTER SYMPTOMS
COUGH: 1
VOMITING: 1
SHORTNESS OF BREATH: 1
ABDOMINAL PAIN: 1

## 2023-01-23 ASSESSMENT — PAIN SCALES - GENERAL: PAINLEVEL_OUTOF10: 8

## 2023-01-23 ASSESSMENT — PAIN DESCRIPTION - DESCRIPTORS: DESCRIPTORS: ACHING

## 2023-01-23 ASSESSMENT — PAIN DESCRIPTION - LOCATION: LOCATION: GENERALIZED

## 2023-01-23 ASSESSMENT — LIFESTYLE VARIABLES: HOW OFTEN DO YOU HAVE A DRINK CONTAINING ALCOHOL: NEVER

## 2023-01-23 NOTE — ED PROVIDER NOTES
[facility]     Emergency Department     Faculty Attestation    I performed a history and physical examination of the patient and discussed management with the resident. I have reviewed and agree with the residents findings including all diagnostic interpretations, and treatment plans as written at the time of my review. Any areas of disagreement are noted on the chart. I was personally present for the key portions of any procedures. I have documented in the chart those procedures where I was not present during the key portions. For Physician Assistant/ Nurse Practitioner cases/documentation I have personally evaluated this patient and have completed at least one if not all key elements of the E/M (history, physical exam, and MDM). Additional findings are as noted. Primary Care Physician: Lennox Hook, MD    Note Started: 2:40 PM EST    History: This is a 65 y.o. male who presents to the Emergency Department with complaint of cough. Patient been having a cough congestion for the last week and a half. Physical:   oral temperature is 97.5 °F (36.4 °C). His blood pressure is 191/123 (abnormal) and his pulse is 67. His respiration is 19 and oxygen saturation is 99%. Lungs clear to auscultation, heart regular rate and rhythm, abdomen soft nontender    Impression: Cough    Plan: Chest x-ray, COVID and influenza test      Medical Decision Making  Differential diagnosis to include pneumonia, viral illness,    Amount and/or Complexity of Data Reviewed  Labs: ordered. Decision-making details documented in ED Course. Radiology: ordered. Decision-making details documented in ED Course. Risk  OTC drugs. Prescription drug management. (Please note that portions of this note were completed with a voice recognition program.  Efforts were made to edit the dictations but occasionally words are mis-transcribed.)    Angela Gordon.  MD Rohan, FACE  Attending Emergency Medicine Physician        Blade Madrigal MD  01/23/23 3006

## 2023-01-23 NOTE — DISCHARGE INSTRUCTIONS
Thank you for coming to [facility]. [place]'s emergency department! You were seen today for cough, sore throat, you were diagnosed with pneumonia. Covid and flu were negative. You were prescribed doxycycline and zofran, please pick these medications up at the pharmacy. Follow up with your primary care doctor. If you have any worsening of symptoms or any other concerns, please return to the emergency department. We are always available! 28

## 2023-01-23 NOTE — ED NOTES
Pt resting on cot  Pt talking in complete sentences on phone in room without distress  Pt reported cough  Pt reported generalized aches  NAD noted at this time     Kasia Mitchell RN  01/23/23 1492

## 2023-01-23 NOTE — ED PROVIDER NOTES
[facility] ED  Emergency Department Encounter  Emergency Medicine Resident     Pt Elaine Martinez  MRN: [id number]  [place] 1965  Date of evaluation: 23  PCP:  Lo Timmons MD  Note Started: 2:00 PM EST    CHIEF COMPLAINT       Chief Complaint   Patient presents with    Cough    Pharyngitis     Pt states his roommate , same symptoms     HISTORY OF PRESENT ILLNESS  (Location/Symptom, Timing/Onset, Context/Setting, Quality, Duration, Modifying Factors, Severity.)      Jose Jacobs is a 65 y.o. male who presents with shortness of breath, sore throat, chest pain, abdominal pain, fatigue ongoing for the past week. Reports multiple sick contacts who have been found dead. Patient reports he has been having similar symptoms to these individuals. Able to tolerate PO, however also having some nausea. Denies PMH. No meds PTA. PAST MEDICAL / SURGICAL / SOCIAL / FAMILY HISTORY      has a past medical history of Bipolar 1 disorder ([facility] [address] Chronic mental illness, Depression, Hepatitis C, Hypertension, Psoriasis, and Substance abuse ([facility] [address] has a past surgical history that includes brain surgery and Dental surgery.     Social History     Socioeconomic History    Marital status: Single     Spouse name: Not on file    Number of children: Not on file    Years of education: Not on file    Highest education level: Not on file   Occupational History    Not on file   Tobacco Use    Smoking status: Every Day     Packs/day: 1.00     Years: 40.00     Pack years: 40.00     Types: Cigarettes    Smokeless tobacco: Never   Vaping Use    Vaping Use: Former   Substance and Sexual Activity    Alcohol use: No     Alcohol/week: 0.0 standard drinks     Comment: social     Drug use: Yes     Frequency: 7.0 times per week     Types: Opiates , Cocaine     Comment: 3x a day, [place] and fentanyl sometimes xanax    Sexual activity: Not on file   Other Topics Concern    Not on file   Social History Narrative    Not on file     Social Determinants of Health     Financial Resource Strain: Low Risk     Difficulty of Paying Living Expenses: Not hard at all   Food Insecurity: No Food Insecurity    Worried About Running Out of Food in the Last Year: Never true    Ran Out of Food in the Last Year: Never true   Transportation Needs: Not on file   Physical Activity: Not on file   Stress: Not on file   Social Connections: Not on file   Intimate Partner Violence: Not on file   Housing Stability: Not on file       Family History   Problem Relation Age of Onset    Cancer Father     Cancer Child        Allergies:  Haldol [haloperidol], Latuda [lurasidone hcl], and Ziprasidone    Home Medications:  Prior to Admission medications    Medication Sig Start Date End Date Taking? Authorizing Provider   doxycycline hyclate (VIBRA-TABS) 100 MG tablet Take 1 tablet by mouth 2 times daily for 7 days 1/23/23 1/30/23 Yes India Torres, DO   ondansetron (ZOFRAN) 4 MG tablet Take 1 tablet by mouth every 8 hours as needed for Nausea 1/23/23  Yes India Torres, DO   lisinopril (PRINIVIL;ZESTRIL) 20 MG tablet Take 1 tablet by mouth daily 10/5/22   Michelle Flores APRN - NP   apixaban starter pack (ELIQUIS DVT/PE STARTER PACK) 5 MG TBPK tablet Take 1 tablet by mouth See Admin Instructions  Patient not taking: Reported on 10/5/2022 3/3/22   Latha Sol,    omeprazole (PRILOSEC OTC) 20 MG tablet Take 1 tablet by mouth daily. 2/10/15 7/1/15  Alvino Severin, MD          REVIEW OF SYSTEMS       Review of Systems   Constitutional:  Positive for appetite change and fatigue. HENT:  Positive for congestion. Respiratory:  Positive for cough and shortness of breath. Cardiovascular:  Positive for chest pain. Gastrointestinal:  Positive for abdominal pain and vomiting. Musculoskeletal:  Positive for myalgias. Skin:  Negative for wound. Allergic/Immunologic:        Med allergies   Neurological:  Negative for headaches.    Hematological: - AC   Psychiatric/Behavioral:  Negative for confusion. PHYSICAL EXAM      INITIAL VITALS:   BP (!) 191/123   Pulse 67   Temp 97.5 °F (36.4 °C) (Oral)   Resp 19   SpO2 99%     Physical Exam  Constitutional:       General: He is not in acute distress. HENT:      Head: Normocephalic and atraumatic. Right Ear: External ear normal.      Left Ear: External ear normal.      Nose: Congestion and rhinorrhea present. Mouth/Throat:      Mouth: Mucous membranes are moist.   Eyes:      Conjunctiva/sclera: Conjunctivae normal.   Cardiovascular:      Rate and Rhythm: Normal rate and regular rhythm. Pulses: Normal pulses. Heart sounds: Normal heart sounds. Pulmonary:      Effort: Pulmonary effort is normal. No respiratory distress. Breath sounds: Rhonchi present. Abdominal:      General: Abdomen is flat. There is no distension. Palpations: Abdomen is soft. Tenderness: There is no abdominal tenderness. There is no guarding or rebound. Musculoskeletal:         General: No swelling. Cervical back: Neck supple. No tenderness. Skin:     General: Skin is warm. Capillary Refill: Capillary refill takes 2 to 3 seconds. Neurological:      Mental Status: He is alert and oriented to person, place, and time. Psychiatric:         Mood and Affect: Mood normal.         DDX/DIAGNOSTIC RESULTS / EMERGENCY DEPARTMENT COURSE / MDM     Medical Decision Making  Amount and/or Complexity of Data Reviewed  Labs: ordered. Decision-making details documented in ED Course. Radiology: ordered. Risk  OTC drugs. Prescription drug management. EMERGENCY DEPARTMENT COURSE:     ED Course as of 01/23/23 1526   Mon Jan 23, 2023   1448 Patient with week of cough, sob, chest pain, sore throat with several sick contacts. Exam significant for rhonchi. Will obtain covid, flu, CXR. [ML]   1452 Multifocal pneumonia seen on XR.   [ML]   1508 SARS-CoV-2, Rapid: Not Detected [ML]   1508 Flu A Antigen: NEGATIVE [ML]   1508 Flu B Antigen: NEGATIVE [ML]   6521 Will start on doxycyline and zofran for home. [ML]   1523 Updated patient on his results, will be starting on antibiotics. Recommended return for any worsening of symptoms. Patient amenable to plan. [ML]      ED Course User Index  [ML] Val Calderon DO       FINAL IMPRESSION      1.  Pneumonia of both lungs due to infectious organism, unspecified part of lung          DISPOSITION / PLAN     DISPOSITION Decision To Discharge 01/23/2023 03:09:04 PM      PATIENT REFERRED TO:  Tuyet Veras, [address] [place] [address]  [phone]    Schedule an appointment as soon as possible for a visit       DISCHARGE MEDICATIONS:  New Prescriptions    DOXYCYCLINE HYCLATE (VIBRA-TABS) 100 MG TABLET    Take 1 tablet by mouth 2 times daily for 7 days    ONDANSETRON (ZOFRAN) 4 MG TABLET    Take 1 tablet by mouth every 8 hours as needed for Nausea       Rima Rubin DO  Emergency Medicine Resident    (Please note that portions of thisnote were completed with a voice recognition program.  Efforts were made to edit the dictations but occasionally words are mis-transcribed.)       Val Calderon DO  Resident  01/23/23 1415

## 2023-02-20 ENCOUNTER — OFFICE VISIT (OUTPATIENT)
Dept: FAMILY MEDICINE CLINIC | Age: 58
End: 2023-02-20
Payer: COMMERCIAL

## 2023-02-20 ENCOUNTER — TELEPHONE (OUTPATIENT)
Dept: FAMILY MEDICINE CLINIC | Age: 58
End: 2023-02-20

## 2023-02-20 VITALS
DIASTOLIC BLOOD PRESSURE: 80 MMHG | BODY MASS INDEX: 27.52 KG/M2 | SYSTOLIC BLOOD PRESSURE: 140 MMHG | HEART RATE: 76 BPM | TEMPERATURE: 98.4 F | WEIGHT: 181 LBS | OXYGEN SATURATION: 98 %

## 2023-02-20 DIAGNOSIS — Z53.21 PATIENT LEFT WITHOUT BEING SEEN: Primary | ICD-10-CM

## 2023-02-20 PROCEDURE — 99211 OFF/OP EST MAY X REQ PHY/QHP: CPT | Performed by: INTERNAL MEDICINE

## 2023-02-20 PROCEDURE — 3074F SYST BP LT 130 MM HG: CPT | Performed by: INTERNAL MEDICINE

## 2023-02-20 PROCEDURE — 3078F DIAST BP <80 MM HG: CPT | Performed by: INTERNAL MEDICINE

## 2023-02-20 RX ORDER — AMLODIPINE BESYLATE 10 MG/1
TABLET ORAL
COMMUNITY
Start: 2022-12-05

## 2023-02-20 RX ORDER — GABAPENTIN 300 MG/1
CAPSULE ORAL
COMMUNITY
Start: 2023-02-07

## 2023-02-20 RX ORDER — BUPRENORPHINE AND NALOXONE 12; 3 MG/1; MG/1
FILM, SOLUBLE BUCCAL; SUBLINGUAL
COMMUNITY
Start: 2023-02-17

## 2023-02-20 NOTE — TELEPHONE ENCOUNTER
Patient walked out of exam room and started mumbling. All I was able to understand was that he stated his pharmacy closes in a half hour and then when got to the door he stated he was going to find another doctor.     Dr. Alfonzo Rivera informed

## 2023-03-07 ENCOUNTER — TELEPHONE (OUTPATIENT)
Dept: FAMILY MEDICINE CLINIC | Age: 58
End: 2023-03-07

## 2023-03-07 DIAGNOSIS — K40.20 NON-RECURRENT BILATERAL INGUINAL HERNIA WITHOUT OBSTRUCTION OR GANGRENE: Primary | ICD-10-CM

## 2023-03-07 NOTE — TELEPHONE ENCOUNTER
Patient called asking for a new referral for his hernia. States he does not want to see Dr. Alber Louis. Asking for a referral to Dr. Xander Norris. Referral placed.

## 2023-03-07 NOTE — TELEPHONE ENCOUNTER
----- Message from Roly Lane sent at 3/7/2023 11:36 AM EST -----  Subject: Referral Request    Reason for referral request? Patient requests a referral to a surgeon for   2 hernias. Provider patient wants to be referred to(if known):     Provider Phone Number(if known):     Additional Information for Provider?   ---------------------------------------------------------------------------  --------------  [phone]    [phone]; OK to leave message on voicemail  ---------------------------------------------------------------------------  --------------

## 2023-09-14 ENCOUNTER — APPOINTMENT (OUTPATIENT)
Dept: CT IMAGING | Age: 58
DRG: 199 | End: 2023-09-14
Payer: COMMERCIAL

## 2023-09-14 ENCOUNTER — HOSPITAL ENCOUNTER (INPATIENT)
Age: 58
LOS: 14 days | Discharge: NURSING, REHAB OR LONG-TERM CARE | DRG: 199 | End: 2023-09-28
Attending: EMERGENCY MEDICINE | Admitting: STUDENT IN AN ORGANIZED HEALTH CARE EDUCATION/TRAINING PROGRAM
Payer: COMMERCIAL

## 2023-09-14 DIAGNOSIS — F41.1 GENERALIZED ANXIETY DISORDER: ICD-10-CM

## 2023-09-14 DIAGNOSIS — K40.90 RIGHT INGUINAL HERNIA: ICD-10-CM

## 2023-09-14 DIAGNOSIS — I16.0 HYPERTENSIVE URGENCY: Primary | ICD-10-CM

## 2023-09-14 DIAGNOSIS — F41.0 PANIC ATTACKS: ICD-10-CM

## 2023-09-14 PROBLEM — R53.1 RIGHT SIDED WEAKNESS: Status: ACTIVE | Noted: 2023-09-14

## 2023-09-14 PROBLEM — S06.9XAA TBI (TRAUMATIC BRAIN INJURY) (HCC): Status: ACTIVE | Noted: 2023-09-14

## 2023-09-14 PROBLEM — I61.9 INTRAPARENCHYMAL HEMORRHAGE OF BRAIN (HCC): Status: ACTIVE | Noted: 2023-09-14

## 2023-09-14 LAB
ANION GAP SERPL CALCULATED.3IONS-SCNC: 14 MMOL/L (ref 9–17)
BASOPHILS # BLD: 0.06 K/UL (ref 0–0.2)
BASOPHILS NFR BLD: 1 % (ref 0–2)
BUN SERPL-MCNC: 10 MG/DL (ref 6–20)
CALCIUM SERPL-MCNC: 9.5 MG/DL (ref 8.6–10.4)
CHLORIDE SERPL-SCNC: 104 MMOL/L (ref 98–107)
CO2 SERPL-SCNC: 21 MMOL/L (ref 20–31)
CREAT SERPL-MCNC: 0.6 MG/DL (ref 0.7–1.2)
EOSINOPHIL # BLD: 0.18 K/UL (ref 0–0.44)
EOSINOPHILS RELATIVE PERCENT: 2 % (ref 1–4)
ERYTHROCYTE [DISTWIDTH] IN BLOOD BY AUTOMATED COUNT: 13.4 % (ref 11.8–14.4)
GFR SERPL CREATININE-BSD FRML MDRD: >60 ML/MIN/1.73M2
GLUCOSE SERPL-MCNC: 99 MG/DL (ref 70–99)
HCT VFR BLD AUTO: 49.3 % (ref 40.7–50.3)
HGB BLD-MCNC: 16.5 G/DL (ref 13–17)
IMM GRANULOCYTES # BLD AUTO: 0.03 K/UL (ref 0–0.3)
IMM GRANULOCYTES NFR BLD: 0 %
LYMPHOCYTES NFR BLD: 3.01 K/UL (ref 1.1–3.7)
LYMPHOCYTES RELATIVE PERCENT: 34 % (ref 24–43)
MCH RBC QN AUTO: 30.3 PG (ref 25.2–33.5)
MCHC RBC AUTO-ENTMCNC: 33.5 G/DL (ref 28.4–34.8)
MCV RBC AUTO: 90.5 FL (ref 82.6–102.9)
MONOCYTES NFR BLD: 0.74 K/UL (ref 0.1–1.2)
MONOCYTES NFR BLD: 8 % (ref 3–12)
NEUTROPHILS NFR BLD: 55 % (ref 36–65)
NEUTS SEG NFR BLD: 4.75 K/UL (ref 1.5–8.1)
NRBC BLD-RTO: 0 PER 100 WBC
PLATELET # BLD AUTO: 153 K/UL (ref 138–453)
PMV BLD AUTO: 10.9 FL (ref 8.1–13.5)
POTASSIUM SERPL-SCNC: 3.7 MMOL/L (ref 3.7–5.3)
RBC # BLD AUTO: 5.45 M/UL (ref 4.21–5.77)
SODIUM SERPL-SCNC: 139 MMOL/L (ref 135–144)
TROPONIN I SERPL HS-MCNC: 8 NG/L (ref 0–22)
WBC OTHER # BLD: 8.8 K/UL (ref 3.5–11.3)

## 2023-09-14 PROCEDURE — 2580000003 HC RX 258: Performed by: STUDENT IN AN ORGANIZED HEALTH CARE EDUCATION/TRAINING PROGRAM

## 2023-09-14 PROCEDURE — 80048 BASIC METABOLIC PNL TOTAL CA: CPT

## 2023-09-14 PROCEDURE — 70450 CT HEAD/BRAIN W/O DYE: CPT

## 2023-09-14 PROCEDURE — 99222 1ST HOSP IP/OBS MODERATE 55: CPT | Performed by: STUDENT IN AN ORGANIZED HEALTH CARE EDUCATION/TRAINING PROGRAM

## 2023-09-14 PROCEDURE — 84484 ASSAY OF TROPONIN QUANT: CPT

## 2023-09-14 PROCEDURE — 2060000000 HC ICU INTERMEDIATE R&B

## 2023-09-14 PROCEDURE — 6360000002 HC RX W HCPCS: Performed by: STUDENT IN AN ORGANIZED HEALTH CARE EDUCATION/TRAINING PROGRAM

## 2023-09-14 PROCEDURE — 74176 CT ABD & PELVIS W/O CONTRAST: CPT

## 2023-09-14 PROCEDURE — 85025 COMPLETE CBC W/AUTO DIFF WBC: CPT

## 2023-09-14 PROCEDURE — 6370000000 HC RX 637 (ALT 250 FOR IP): Performed by: STUDENT IN AN ORGANIZED HEALTH CARE EDUCATION/TRAINING PROGRAM

## 2023-09-14 PROCEDURE — 99285 EMERGENCY DEPT VISIT HI MDM: CPT

## 2023-09-14 RX ORDER — HYDRALAZINE HYDROCHLORIDE 20 MG/ML
10 INJECTION INTRAMUSCULAR; INTRAVENOUS EVERY 6 HOURS PRN
Status: DISCONTINUED | OUTPATIENT
Start: 2023-09-14 | End: 2023-09-15

## 2023-09-14 RX ORDER — ACETAMINOPHEN 325 MG/1
650 TABLET ORAL EVERY 6 HOURS PRN
Status: DISCONTINUED | OUTPATIENT
Start: 2023-09-14 | End: 2023-09-28 | Stop reason: HOSPADM

## 2023-09-14 RX ORDER — SODIUM CHLORIDE 0.9 % (FLUSH) 0.9 %
5-40 SYRINGE (ML) INJECTION EVERY 12 HOURS SCHEDULED
Status: DISCONTINUED | OUTPATIENT
Start: 2023-09-14 | End: 2023-09-28 | Stop reason: HOSPADM

## 2023-09-14 RX ORDER — ENOXAPARIN SODIUM 100 MG/ML
40 INJECTION SUBCUTANEOUS DAILY
Status: DISCONTINUED | OUTPATIENT
Start: 2023-09-15 | End: 2023-09-28 | Stop reason: HOSPADM

## 2023-09-14 RX ORDER — ACETAMINOPHEN 650 MG/1
650 SUPPOSITORY RECTAL EVERY 6 HOURS PRN
Status: DISCONTINUED | OUTPATIENT
Start: 2023-09-14 | End: 2023-09-28 | Stop reason: HOSPADM

## 2023-09-14 RX ORDER — ONDANSETRON 4 MG/1
4 TABLET, ORALLY DISINTEGRATING ORAL EVERY 8 HOURS PRN
Status: DISCONTINUED | OUTPATIENT
Start: 2023-09-14 | End: 2023-09-28 | Stop reason: HOSPADM

## 2023-09-14 RX ORDER — LISINOPRIL 20 MG/1
20 TABLET ORAL DAILY
Status: DISCONTINUED | OUTPATIENT
Start: 2023-09-15 | End: 2023-09-15

## 2023-09-14 RX ORDER — ONDANSETRON 2 MG/ML
4 INJECTION INTRAMUSCULAR; INTRAVENOUS EVERY 6 HOURS PRN
Status: DISCONTINUED | OUTPATIENT
Start: 2023-09-14 | End: 2023-09-28 | Stop reason: HOSPADM

## 2023-09-14 RX ORDER — SODIUM CHLORIDE 0.9 % (FLUSH) 0.9 %
5-40 SYRINGE (ML) INJECTION PRN
Status: DISCONTINUED | OUTPATIENT
Start: 2023-09-14 | End: 2023-09-28 | Stop reason: HOSPADM

## 2023-09-14 RX ORDER — AMLODIPINE BESYLATE 10 MG/1
10 TABLET ORAL ONCE
Status: COMPLETED | OUTPATIENT
Start: 2023-09-14 | End: 2023-09-14

## 2023-09-14 RX ORDER — SODIUM CHLORIDE 9 MG/ML
INJECTION, SOLUTION INTRAVENOUS PRN
Status: DISCONTINUED | OUTPATIENT
Start: 2023-09-14 | End: 2023-09-28 | Stop reason: HOSPADM

## 2023-09-14 RX ORDER — AMLODIPINE BESYLATE 10 MG/1
10 TABLET ORAL DAILY
Status: DISCONTINUED | OUTPATIENT
Start: 2023-09-15 | End: 2023-09-17

## 2023-09-14 RX ORDER — POLYETHYLENE GLYCOL 3350 17 G/17G
17 POWDER, FOR SOLUTION ORAL DAILY PRN
Status: DISCONTINUED | OUTPATIENT
Start: 2023-09-14 | End: 2023-09-28 | Stop reason: HOSPADM

## 2023-09-14 RX ADMIN — SODIUM CHLORIDE, PRESERVATIVE FREE 10 ML: 5 INJECTION INTRAVENOUS at 22:56

## 2023-09-14 RX ADMIN — HYDRALAZINE HYDROCHLORIDE 10 MG: 20 INJECTION, SOLUTION INTRAMUSCULAR; INTRAVENOUS at 23:49

## 2023-09-14 RX ADMIN — AMLODIPINE BESYLATE 10 MG: 10 TABLET ORAL at 18:29

## 2023-09-14 ASSESSMENT — ENCOUNTER SYMPTOMS
DIARRHEA: 0
VOMITING: 0
SHORTNESS OF BREATH: 0
BACK PAIN: 0
ABDOMINAL PAIN: 0
CONSTIPATION: 0
NAUSEA: 0

## 2023-09-14 ASSESSMENT — PAIN - FUNCTIONAL ASSESSMENT: PAIN_FUNCTIONAL_ASSESSMENT: NONE - DENIES PAIN
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
## 2023-09-15 ENCOUNTER — APPOINTMENT (OUTPATIENT)
Dept: GENERAL RADIOLOGY | Age: 58
DRG: 199 | End: 2023-09-15
Payer: COMMERCIAL

## 2023-09-15 ENCOUNTER — APPOINTMENT (OUTPATIENT)
Dept: CT IMAGING | Age: 58
DRG: 199 | End: 2023-09-15
Payer: COMMERCIAL

## 2023-09-15 ENCOUNTER — APPOINTMENT (OUTPATIENT)
Dept: MRI IMAGING | Age: 58
DRG: 199 | End: 2023-09-15
Payer: COMMERCIAL

## 2023-09-15 PROBLEM — I63.9 STROKE (CEREBRUM) (HCC): Status: ACTIVE | Noted: 2023-09-15

## 2023-09-15 LAB
ALBUMIN SERPL-MCNC: 3.7 G/DL (ref 3.5–5.2)
ALBUMIN/GLOB SERPL: 1.2 {RATIO} (ref 1–2.5)
ALP SERPL-CCNC: 49 U/L (ref 40–129)
ALT SERPL-CCNC: 86 U/L (ref 5–41)
ANION GAP SERPL CALCULATED.3IONS-SCNC: 11 MMOL/L (ref 9–17)
AST SERPL-CCNC: 57 U/L
BASOPHILS # BLD: 0.07 K/UL (ref 0–0.2)
BASOPHILS NFR BLD: 1 % (ref 0–2)
BILIRUB DIRECT SERPL-MCNC: 0.2 MG/DL
BILIRUB INDIRECT SERPL-MCNC: 0.5 MG/DL (ref 0–1)
BILIRUB SERPL-MCNC: 0.7 MG/DL (ref 0.3–1.2)
BNP SERPL-MCNC: 79 PG/ML
BUN SERPL-MCNC: 10 MG/DL (ref 6–20)
CALCIUM SERPL-MCNC: 9.4 MG/DL (ref 8.6–10.4)
CHLORIDE SERPL-SCNC: 106 MMOL/L (ref 98–107)
CO2 SERPL-SCNC: 21 MMOL/L (ref 20–31)
CREAT SERPL-MCNC: 0.6 MG/DL (ref 0.7–1.2)
EOSINOPHIL # BLD: 0.16 K/UL (ref 0–0.44)
EOSINOPHILS RELATIVE PERCENT: 2 % (ref 1–4)
ERYTHROCYTE [DISTWIDTH] IN BLOOD BY AUTOMATED COUNT: 13.5 % (ref 11.8–14.4)
ERYTHROCYTE [SEDIMENTATION RATE] IN BLOOD BY PHOTOMETRIC METHOD: 8 MM/HR (ref 0–20)
ETHANOL PERCENT: <0.01 %
ETHANOLAMINE SERPL-MCNC: <10 MG/DL
GFR SERPL CREATININE-BSD FRML MDRD: >60 ML/MIN/1.73M2
GLUCOSE BLD-MCNC: 130 MG/DL (ref 75–110)
GLUCOSE SERPL-MCNC: 103 MG/DL (ref 70–99)
HCT VFR BLD AUTO: 49.7 % (ref 40.7–50.3)
HGB BLD-MCNC: 16.3 G/DL (ref 13–17)
IMM GRANULOCYTES # BLD AUTO: <0.03 K/UL (ref 0–0.3)
IMM GRANULOCYTES NFR BLD: 0 %
INR PPP: 1.1
LYMPHOCYTES NFR BLD: 2.65 K/UL (ref 1.1–3.7)
LYMPHOCYTES RELATIVE PERCENT: 37 % (ref 24–43)
MAGNESIUM SERPL-MCNC: 1.9 MG/DL (ref 1.6–2.6)
MCH RBC QN AUTO: 30.4 PG (ref 25.2–33.5)
MCHC RBC AUTO-ENTMCNC: 32.8 G/DL (ref 28.4–34.8)
MCV RBC AUTO: 92.7 FL (ref 82.6–102.9)
MONOCYTES NFR BLD: 0.68 K/UL (ref 0.1–1.2)
MONOCYTES NFR BLD: 10 % (ref 3–12)
NEUTROPHILS NFR BLD: 50 % (ref 36–65)
NEUTS SEG NFR BLD: 3.52 K/UL (ref 1.5–8.1)
NRBC BLD-RTO: 0 PER 100 WBC
PLATELET # BLD AUTO: 146 K/UL (ref 138–453)
PMV BLD AUTO: 10.3 FL (ref 8.1–13.5)
POTASSIUM SERPL-SCNC: 3.8 MMOL/L (ref 3.7–5.3)
PROT SERPL-MCNC: 6.8 G/DL (ref 6.4–8.3)
PROTHROMBIN TIME: 14.4 SEC (ref 11.7–14.9)
RBC # BLD AUTO: 5.36 M/UL (ref 4.21–5.77)
SODIUM SERPL-SCNC: 138 MMOL/L (ref 135–144)
TSH SERPL DL<=0.05 MIU/L-ACNC: 1.03 UIU/ML (ref 0.3–5)
WBC OTHER # BLD: 7.1 K/UL (ref 3.5–11.3)

## 2023-09-15 PROCEDURE — 85610 PROTHROMBIN TIME: CPT

## 2023-09-15 PROCEDURE — 83735 ASSAY OF MAGNESIUM: CPT

## 2023-09-15 PROCEDURE — 85025 COMPLETE CBC W/AUTO DIFF WBC: CPT

## 2023-09-15 PROCEDURE — 2580000003 HC RX 258: Performed by: STUDENT IN AN ORGANIZED HEALTH CARE EDUCATION/TRAINING PROGRAM

## 2023-09-15 PROCEDURE — 6360000004 HC RX CONTRAST MEDICATION: Performed by: SURGERY

## 2023-09-15 PROCEDURE — 70498 CT ANGIOGRAPHY NECK: CPT

## 2023-09-15 PROCEDURE — 71045 X-RAY EXAM CHEST 1 VIEW: CPT

## 2023-09-15 PROCEDURE — 94761 N-INVAS EAR/PLS OXIMETRY MLT: CPT

## 2023-09-15 PROCEDURE — 83880 ASSAY OF NATRIURETIC PEPTIDE: CPT

## 2023-09-15 PROCEDURE — 85652 RBC SED RATE AUTOMATED: CPT

## 2023-09-15 PROCEDURE — 82947 ASSAY GLUCOSE BLOOD QUANT: CPT

## 2023-09-15 PROCEDURE — 6360000002 HC RX W HCPCS: Performed by: INTERNAL MEDICINE

## 2023-09-15 PROCEDURE — 6370000000 HC RX 637 (ALT 250 FOR IP): Performed by: INTERNAL MEDICINE

## 2023-09-15 PROCEDURE — 99222 1ST HOSP IP/OBS MODERATE 55: CPT | Performed by: PSYCHIATRY & NEUROLOGY

## 2023-09-15 PROCEDURE — 2060000000 HC ICU INTERMEDIATE R&B

## 2023-09-15 PROCEDURE — 36415 COLL VENOUS BLD VENIPUNCTURE: CPT

## 2023-09-15 PROCEDURE — 99221 1ST HOSP IP/OBS SF/LOW 40: CPT | Performed by: SURGERY

## 2023-09-15 PROCEDURE — G0480 DRUG TEST DEF 1-7 CLASSES: HCPCS

## 2023-09-15 PROCEDURE — 2580000003 HC RX 258: Performed by: INTERNAL MEDICINE

## 2023-09-15 PROCEDURE — 80076 HEPATIC FUNCTION PANEL: CPT

## 2023-09-15 PROCEDURE — 6360000002 HC RX W HCPCS: Performed by: STUDENT IN AN ORGANIZED HEALTH CARE EDUCATION/TRAINING PROGRAM

## 2023-09-15 PROCEDURE — 80048 BASIC METABOLIC PNL TOTAL CA: CPT

## 2023-09-15 PROCEDURE — 84443 ASSAY THYROID STIM HORMONE: CPT

## 2023-09-15 PROCEDURE — 51798 US URINE CAPACITY MEASURE: CPT

## 2023-09-15 RX ORDER — HYDRALAZINE HYDROCHLORIDE 20 MG/ML
5 INJECTION INTRAMUSCULAR; INTRAVENOUS EVERY 6 HOURS PRN
Status: DISCONTINUED | OUTPATIENT
Start: 2023-09-15 | End: 2023-09-28 | Stop reason: HOSPADM

## 2023-09-15 RX ORDER — SODIUM CHLORIDE 0.9 % (FLUSH) 0.9 %
5-40 SYRINGE (ML) INJECTION PRN
Status: DISCONTINUED | OUTPATIENT
Start: 2023-09-15 | End: 2023-09-28 | Stop reason: HOSPADM

## 2023-09-15 RX ORDER — CYCLOBENZAPRINE HCL 10 MG
10 TABLET ORAL 3 TIMES DAILY PRN
Status: DISCONTINUED | OUTPATIENT
Start: 2023-09-15 | End: 2023-09-16

## 2023-09-15 RX ORDER — LORAZEPAM 2 MG/ML
4 INJECTION INTRAMUSCULAR
Status: DISCONTINUED | OUTPATIENT
Start: 2023-09-15 | End: 2023-09-28 | Stop reason: HOSPADM

## 2023-09-15 RX ORDER — LORAZEPAM 2 MG/ML
2 INJECTION INTRAMUSCULAR
Status: DISCONTINUED | OUTPATIENT
Start: 2023-09-15 | End: 2023-09-28 | Stop reason: HOSPADM

## 2023-09-15 RX ORDER — LISINOPRIL 10 MG/1
10 TABLET ORAL DAILY
Status: DISCONTINUED | OUTPATIENT
Start: 2023-09-16 | End: 2023-09-28 | Stop reason: HOSPADM

## 2023-09-15 RX ORDER — LABETALOL HYDROCHLORIDE 5 MG/ML
10 INJECTION, SOLUTION INTRAVENOUS EVERY 4 HOURS PRN
Status: DISCONTINUED | OUTPATIENT
Start: 2023-09-15 | End: 2023-09-28 | Stop reason: HOSPADM

## 2023-09-15 RX ORDER — IPRATROPIUM BROMIDE AND ALBUTEROL SULFATE 2.5; .5 MG/3ML; MG/3ML
1 SOLUTION RESPIRATORY (INHALATION) EVERY 4 HOURS PRN
Status: DISCONTINUED | OUTPATIENT
Start: 2023-09-15 | End: 2023-09-28 | Stop reason: HOSPADM

## 2023-09-15 RX ORDER — DOCUSATE SODIUM 100 MG/1
100 CAPSULE, LIQUID FILLED ORAL DAILY
Status: DISCONTINUED | OUTPATIENT
Start: 2023-09-15 | End: 2023-09-28 | Stop reason: HOSPADM

## 2023-09-15 RX ORDER — TAMSULOSIN HYDROCHLORIDE 0.4 MG/1
0.4 CAPSULE ORAL DAILY
Status: DISCONTINUED | OUTPATIENT
Start: 2023-09-15 | End: 2023-09-28 | Stop reason: HOSPADM

## 2023-09-15 RX ORDER — LORAZEPAM 1 MG/1
1 TABLET ORAL
Status: DISCONTINUED | OUTPATIENT
Start: 2023-09-15 | End: 2023-09-28 | Stop reason: HOSPADM

## 2023-09-15 RX ORDER — LANOLIN ALCOHOL/MO/W.PET/CERES
100 CREAM (GRAM) TOPICAL DAILY
Status: DISCONTINUED | OUTPATIENT
Start: 2023-09-15 | End: 2023-09-28 | Stop reason: HOSPADM

## 2023-09-15 RX ORDER — LORAZEPAM 2 MG/1
4 TABLET ORAL
Status: DISCONTINUED | OUTPATIENT
Start: 2023-09-15 | End: 2023-09-28 | Stop reason: HOSPADM

## 2023-09-15 RX ORDER — ATORVASTATIN CALCIUM 80 MG/1
80 TABLET, FILM COATED ORAL NIGHTLY
Status: DISCONTINUED | OUTPATIENT
Start: 2023-09-15 | End: 2023-09-28 | Stop reason: HOSPADM

## 2023-09-15 RX ORDER — SODIUM CHLORIDE 0.9 % (FLUSH) 0.9 %
5-40 SYRINGE (ML) INJECTION EVERY 12 HOURS SCHEDULED
Status: DISCONTINUED | OUTPATIENT
Start: 2023-09-15 | End: 2023-09-28 | Stop reason: HOSPADM

## 2023-09-15 RX ORDER — SODIUM CHLORIDE 9 MG/ML
INJECTION, SOLUTION INTRAVENOUS PRN
Status: DISCONTINUED | OUTPATIENT
Start: 2023-09-15 | End: 2023-09-28 | Stop reason: HOSPADM

## 2023-09-15 RX ORDER — LORAZEPAM 2 MG/ML
3 INJECTION INTRAMUSCULAR
Status: DISCONTINUED | OUTPATIENT
Start: 2023-09-15 | End: 2023-09-28 | Stop reason: HOSPADM

## 2023-09-15 RX ORDER — LORAZEPAM 2 MG/1
2 TABLET ORAL
Status: DISCONTINUED | OUTPATIENT
Start: 2023-09-15 | End: 2023-09-28 | Stop reason: HOSPADM

## 2023-09-15 RX ORDER — LORAZEPAM 2 MG/ML
1 INJECTION INTRAMUSCULAR
Status: DISCONTINUED | OUTPATIENT
Start: 2023-09-15 | End: 2023-09-28 | Stop reason: HOSPADM

## 2023-09-15 RX ADMIN — DOCUSATE SODIUM 100 MG: 100 CAPSULE, LIQUID FILLED ORAL at 10:20

## 2023-09-15 RX ADMIN — Medication 100 MG: at 10:20

## 2023-09-15 RX ADMIN — LORAZEPAM 1 MG: 2 INJECTION INTRAMUSCULAR; INTRAVENOUS at 19:50

## 2023-09-15 RX ADMIN — CYCLOBENZAPRINE 10 MG: 10 TABLET, FILM COATED ORAL at 19:50

## 2023-09-15 RX ADMIN — IOPAMIDOL 75 ML: 755 INJECTION, SOLUTION INTRAVENOUS at 03:51

## 2023-09-15 RX ADMIN — SODIUM CHLORIDE, PRESERVATIVE FREE 10 ML: 5 INJECTION INTRAVENOUS at 08:29

## 2023-09-15 RX ADMIN — TAMSULOSIN HYDROCHLORIDE 0.4 MG: 0.4 CAPSULE ORAL at 16:50

## 2023-09-15 RX ADMIN — ENOXAPARIN SODIUM 40 MG: 100 INJECTION SUBCUTANEOUS at 08:28

## 2023-09-15 RX ADMIN — ATORVASTATIN CALCIUM 80 MG: 80 TABLET, FILM COATED ORAL at 19:50

## 2023-09-15 RX ADMIN — SODIUM CHLORIDE, PRESERVATIVE FREE 10 ML: 5 INJECTION INTRAVENOUS at 19:50

## 2023-09-15 RX ADMIN — LORAZEPAM 2 MG: 2 INJECTION INTRAMUSCULAR; INTRAVENOUS at 15:21

## 2023-09-15 NOTE — CONSULTS
General Surgery  Consult    PATIENT NAME: Griselda Shoulders  AGE: 65 y.o. MEDICAL RECORD NO. [id number]  DATE: 9/14/2023  SURGEON: Dr. Zack De La Paz: Jade Bosch MD    Patient evaluated at the request of  Dr. Marrero Prior  Reason for evaluation: Inguinal hernia    Patient information was obtained from past medical records. History/Exam limitations: mental status. Patient presented to the Emergency Department by private vehicle. IMPRESSION:     Patient Active Problem List   Diagnosis    Hepatitis C    Psoriasis    Hypertension    Smoker    Opioid type dependence, continuous (HCC)    Bipolar 1 disorder (HCC)    Cocaine abuse ([address])    IV drug abuse ([address])    History of alcohol abuse    Generalized pain    Generalized anxiety disorder    Abnormal CT scan, esophagus    Chest pain    Altered mental status    AMS (altered mental status)    Acute pulmonary embolism (HCC)    Non-recurrent bilateral inguinal hernia without obstruction or gangrene    Hypertensive urgency    Intraparenchymal hemorrhage of brain (HCC)    TBI (traumatic brain injury) ([address])    Right sided weakness     Sanjeev Gilbert, 64 yo M who presents with hypertensive urgency found to have a reducible right inguinal hernia      PLAN:   No acute surgical intervention indicated at this time. Right inguinal hernia is completely reducible. Patient states that he is having bowel function. No concern for obstruction. Recommend patient follow-up outpatient to discuss elective hernia repair. General surgery service will sign off at this time. Thank you for the consult. Please call/page us if you have any questions/concerns. We appreciate being involved in the care of your patient. HISTORY:   History of Chief Complaint:    Griselda Shoulders is a 65 y.o. male who presents with hypertensive urgency.   Patient was recently at a rehab facility however he left against medical advice and his friend dropped him at the hospital because he is

## 2023-09-15 NOTE — CARE COORDINATION
Met with pt after receiving a social work consult for \"consideration of rehab\". Pt appears alert and oriented but is very difficult to understand. Discussed pt's drug use and he stated that he doesn't use anymore and cannot remember the last time he used. Pt states that he drinks \"once in awhile\". Pt was in a skilled nursing facility and had a friend pick him up. It appears pt will need to be placed in another SNF as he is unable to take care of himself.
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
## 2023-09-15 NOTE — CONSULTS
Comprehensive Nutrition Assessment    Type and Reason for Visit:  Consult (poor po intake x 5 days)    Nutrition Recommendations/Plan:   Continue current diet, Heart Healthy with 2g Na  Will send high kcal/high PRO ONS BID (only chocolate)  Monitor/encourage,assist with PO intake     Malnutrition Assessment:  Malnutrition Status:  No malnutrition (09/15/23 1414)    Context:  Chronic Illness     Findings of the 6 clinical characteristics of malnutrition:  Energy Intake:  Mild decrease in energy intake (Comment)  Weight Loss:  No significant weight loss     Body Fat Loss:  No significant body fat loss     Muscle Mass Loss:  No significant muscle mass loss    Fluid Accumulation:  No significant fluid accumulation     Strength:  Not Performed    Nutrition Assessment:    66 yo M adm hypertensive urgency. PMH significant for hep C, HTN, substance abuse, bipola disorder, TBI. Pt reports some difficulty eating d/t inability to use R arm. Encouraged pt to request assistance if needed at meals. Pt is agreeable to ONS (chocolate only). Per pt NKFA, no chew/swallow issues. Per chart, no significant wt change from esimated wt at admission. No measured wt, ordered. No BM noted, active bowel sounds. No edema noted. Nutrition Related Findings:    labs/meds reviewed Wound Type: None       Current Nutrition Intake & Therapies:    Average Meal Intake: Unable to assess  Average Supplements Intake: None Ordered  ADULT DIET; Regular; Low Fat/Low Chol/High Fiber/HILDA; Low Sodium (2 gm)  ADULT ORAL NUTRITION SUPPLEMENT; Dinner; Standard High Calorie/High Protein Oral Supplement    Anthropometric Measures:  Height: 5' 8\" (172.7 cm)  Ideal Body Weight (IBW): 154 lbs (70 kg)       Current Body Weight: 180 lb (81.6 kg) (stated), 116.9 % IBW.     Current BMI (kg/m2): 27.4  Usual Body Weight: 186 lb (84.4 kg) (10/5/22)  % Weight Change (Calculated): -3.2  Weight Adjustment For: No Adjustment                 BMI Categories: Overweight (BMI
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
## 2023-09-15 NOTE — ED NOTES
Dr. Hair Catching aware of BP     Mickeal Spindle, RN  09/14/23 0814
Dr. Ponce Dewey performed scrotal exam, writer as witness      Eva Spears, RN  09/14/23 9879
Handoff report given to Aquilino Tobar RN  09/14/23 3776
IV attempt x3 unsuccessful, primary RN to be notified.       Riya Garcia, RN  09/14/23 2031
Patient to [address] [place] [address] JULISSA Mcdonough  09/14/23 0212
Per report of Triage RN, Richardo Merlin, patient was dropped off by his friends who told Triage that patient needs to go to a new skilled nursing facility. Per progress notes, patient was discharged from a hospital on 8/8/23 to [facility]. SW consulted ED CM, Raymon Petersen, who will meet with patient. Matt Goel.  [place], [place]  09/14/23 9178
Pt arrived to ED via triage with his friend. Friend states that they were told he could be dropped off at the ED to get  nursing home placement. Denies any current medical needs. Pt arrived with box of possessions. Pt states he has had paralysis of left side since July after falling out of bed. States it is an ongoing problem. Pt has slurred speech that is also linked to February incident. RR even and unlabored, A+O x 4, call light in reach.       Amena Russo RN  09/14/23 5441
Pt to [address] [place] [address] [place], RN  09/14/23 1533
Pt's bedding and clothing changed after he urinated on bed. Upon changing, enlarged scrotum noted. Dr. Chris Barnhart made aware.       Latasha Castellano RN  09/14/23 1902
Report given to [facility] FUENTES FOURNIER, all questions addressed     Meghana Marques RN  09/14/23 5980
The following labs labeled with pt sticker and tubed to lab:     [] Blue     [] Lavender   [] on ice  [x] Green/yellow  [] Green/black [] on ice  [] Yellow  [] Red  [] Pink      [] COVID-19 swab    [] Rapid  [] PCR  [] Flu Swab  [] Strep Swab  [] Peds Viral Panel     [] Urine Sample  [] Pelvic Cultures  [] Blood Cultures   [] Wound Cultures        Dulce Evans RN  09/14/23 8564
The following labs were labeled with appropriate pt sticker and tubed to lab:     [x] Blue     [x] Lavender   [] on ice  [x] Green/yellow  [] Green/black [] on ice  [] Cinderella Camel  [] on ice  [x] Yellow  [x] Red  [] Type/ Screen  [] ABG  [] VBG    [] COVID-19 swab    [] Rapid  [] PCR  [] Flu swab  [] Peds Viral Panel     [] Urine Sample  [] Fecal Sample  [] Pelvic Cultures  [] Blood Cultures  [] X 2  [] STREP Cultures      Maegan Centeno RN  09/14/23 2010
years: 0.00     Total pack years: 40.00     Types: Cigarettes    Smokeless tobacco: Never   Vaping Use    Vaping Use: Former   Substance and Sexual Activity    Alcohol use: No     Alcohol/week: 0.0 standard drinks of alcohol     Comment: social     Drug use: Yes     Frequency: 7.0 times per week     Types: Opiates , Cocaine     Comment: 3x a day, [place] and fentanyl sometimes xanax     Social Determinants of Health     Financial Resource Strain: Low Risk  (10/5/2022)    Overall Financial Resource Strain (CARDIA)     Difficulty of Paying Living Expenses: Not hard at all   Food Insecurity: No Food Insecurity (10/5/2022)    Hunger Vital Sign     Worried About Running Out of Food in the Last Year: Never true     Ran Out of Food in the Last Year: Never true       FAMILY HISTORY       Family History   Problem Relation Age of Onset    Cancer Father     Cancer Child        ALLERGIES     Haldol [haloperidol], Latuda [lurasidone hcl], and Ziprasidone    CURRENT MEDICATIONS       Previous Medications    AMLODIPINE (NORVASC) 10 MG TABLET        BUPRENORPHINE-NALOXONE (SUBOXONE) 12-3 MG SUBLINGUAL FILM        GABAPENTIN (NEURONTIN) 300 MG CAPSULE        LISINOPRIL (PRINIVIL;ZESTRIL) 20 MG TABLET    Take 1 tablet by mouth daily     Orders Placed This Encounter   Medications    amLODIPine (NORVASC) tablet 10 mg    hydrALAZINE (APRESOLINE) injection 10 mg       SURGICAL HISTORY       Past Surgical History:   Procedure Laterality Date    BRAIN SURGERY      at age 37    DENTAL SURGERY         PAST MEDICAL HISTORY       Past Medical History:   Diagnosis Date    Bipolar 1 disorder ([facility])     Chronic mental illness     Depression     Hepatitis C     Hypertension     Psoriasis     Substance abuse ([facility])        Labs:  Labs Reviewed   BASIC METABOLIC PANEL - Abnormal; Notable for the following components:       Result Value    Creatinine 0.6 (*)     All other components within normal limits   CBC WITH AUTO DIFFERENTIAL   TROPONIN

## 2023-09-16 LAB
ALBUMIN SERPL-MCNC: 3.6 G/DL (ref 3.5–5.2)
ALBUMIN/GLOB SERPL: 1.2 {RATIO} (ref 1–2.5)
ALP SERPL-CCNC: 49 U/L (ref 40–129)
ALT SERPL-CCNC: 84 U/L (ref 5–41)
AMPHET UR QL SCN: NEGATIVE
AST SERPL-CCNC: 56 U/L
BARBITURATES UR QL SCN: NEGATIVE
BENZODIAZ UR QL: NEGATIVE
BILIRUB DIRECT SERPL-MCNC: 0.2 MG/DL
BILIRUB INDIRECT SERPL-MCNC: 0.4 MG/DL (ref 0–1)
BILIRUB SERPL-MCNC: 0.6 MG/DL (ref 0.3–1.2)
CANNABINOIDS UR QL SCN: NEGATIVE
COCAINE UR QL SCN: NEGATIVE
FENTANYL UR QL: POSITIVE
METHADONE UR QL: NEGATIVE
OPIATES UR QL SCN: NEGATIVE
OXYCODONE UR QL SCN: NEGATIVE
PCP UR QL SCN: NEGATIVE
PROT SERPL-MCNC: 6.7 G/DL (ref 6.4–8.3)
TEST INFORMATION: ABNORMAL

## 2023-09-16 PROCEDURE — 6370000000 HC RX 637 (ALT 250 FOR IP): Performed by: INTERNAL MEDICINE

## 2023-09-16 PROCEDURE — 80076 HEPATIC FUNCTION PANEL: CPT

## 2023-09-16 PROCEDURE — 36415 COLL VENOUS BLD VENIPUNCTURE: CPT

## 2023-09-16 PROCEDURE — 2060000000 HC ICU INTERMEDIATE R&B

## 2023-09-16 PROCEDURE — 2580000003 HC RX 258: Performed by: INTERNAL MEDICINE

## 2023-09-16 PROCEDURE — 2580000003 HC RX 258: Performed by: STUDENT IN AN ORGANIZED HEALTH CARE EDUCATION/TRAINING PROGRAM

## 2023-09-16 PROCEDURE — 99231 SBSQ HOSP IP/OBS SF/LOW 25: CPT | Performed by: INTERNAL MEDICINE

## 2023-09-16 PROCEDURE — 97163 PT EVAL HIGH COMPLEX 45 MIN: CPT

## 2023-09-16 PROCEDURE — 99232 SBSQ HOSP IP/OBS MODERATE 35: CPT | Performed by: PSYCHIATRY & NEUROLOGY

## 2023-09-16 PROCEDURE — 6360000002 HC RX W HCPCS: Performed by: INTERNAL MEDICINE

## 2023-09-16 PROCEDURE — 97530 THERAPEUTIC ACTIVITIES: CPT

## 2023-09-16 PROCEDURE — 80307 DRUG TEST PRSMV CHEM ANLYZR: CPT

## 2023-09-16 PROCEDURE — 6370000000 HC RX 637 (ALT 250 FOR IP): Performed by: NURSE PRACTITIONER

## 2023-09-16 PROCEDURE — 6360000002 HC RX W HCPCS: Performed by: STUDENT IN AN ORGANIZED HEALTH CARE EDUCATION/TRAINING PROGRAM

## 2023-09-16 RX ORDER — DIAZEPAM 5 MG/1
2.5 TABLET ORAL EVERY 12 HOURS PRN
Status: DISCONTINUED | OUTPATIENT
Start: 2023-09-16 | End: 2023-09-28 | Stop reason: HOSPADM

## 2023-09-16 RX ORDER — NALOXONE HYDROCHLORIDE 1 MG/ML
0.4 INJECTION INTRAMUSCULAR; INTRAVENOUS; SUBCUTANEOUS
Status: DISCONTINUED | OUTPATIENT
Start: 2023-09-16 | End: 2023-09-28 | Stop reason: HOSPADM

## 2023-09-16 RX ORDER — DIAZEPAM 5 MG/ML
2.5 INJECTION, SOLUTION INTRAMUSCULAR; INTRAVENOUS 2 TIMES DAILY PRN
Status: DISCONTINUED | OUTPATIENT
Start: 2023-09-16 | End: 2023-09-16

## 2023-09-16 RX ADMIN — ATORVASTATIN CALCIUM 80 MG: 80 TABLET, FILM COATED ORAL at 21:15

## 2023-09-16 RX ADMIN — LORAZEPAM 2 MG: 2 INJECTION INTRAMUSCULAR; INTRAVENOUS at 09:14

## 2023-09-16 RX ADMIN — LORAZEPAM 2 MG: 2 TABLET ORAL at 17:40

## 2023-09-16 RX ADMIN — SODIUM CHLORIDE, PRESERVATIVE FREE 10 ML: 5 INJECTION INTRAVENOUS at 09:16

## 2023-09-16 RX ADMIN — SODIUM CHLORIDE, PRESERVATIVE FREE 10 ML: 5 INJECTION INTRAVENOUS at 21:25

## 2023-09-16 RX ADMIN — LORAZEPAM 3 MG: 2 INJECTION INTRAMUSCULAR; INTRAVENOUS at 11:35

## 2023-09-16 RX ADMIN — ENOXAPARIN SODIUM 40 MG: 100 INJECTION SUBCUTANEOUS at 09:15

## 2023-09-16 RX ADMIN — LORAZEPAM 2 MG: 2 TABLET ORAL at 22:20

## 2023-09-16 RX ADMIN — DIAZEPAM 2.5 MG: 5 TABLET ORAL at 23:57

## 2023-09-16 RX ADMIN — LISINOPRIL 10 MG: 10 TABLET ORAL at 09:15

## 2023-09-16 RX ADMIN — LORAZEPAM 2 MG: 2 TABLET ORAL at 14:02

## 2023-09-16 RX ADMIN — LORAZEPAM 2 MG: 2 INJECTION INTRAMUSCULAR; INTRAVENOUS at 21:15

## 2023-09-16 RX ADMIN — LORAZEPAM 3 MG: 2 INJECTION INTRAMUSCULAR; INTRAVENOUS at 23:20

## 2023-09-16 RX ADMIN — LORAZEPAM 3 MG: 2 INJECTION INTRAMUSCULAR; INTRAVENOUS at 19:48

## 2023-09-16 RX ADMIN — Medication 100 MG: at 09:15

## 2023-09-16 RX ADMIN — LORAZEPAM 2 MG: 2 INJECTION INTRAMUSCULAR; INTRAVENOUS at 10:29

## 2023-09-16 RX ADMIN — LORAZEPAM 2 MG: 2 TABLET ORAL at 12:42

## 2023-09-16 RX ADMIN — TAMSULOSIN HYDROCHLORIDE 0.4 MG: 0.4 CAPSULE ORAL at 09:15

## 2023-09-16 RX ADMIN — DIAZEPAM 2.5 MG: 5 INJECTION, SOLUTION INTRAMUSCULAR; INTRAVENOUS at 15:17

## 2023-09-17 LAB
ANION GAP SERPL CALCULATED.3IONS-SCNC: 10 MMOL/L (ref 9–17)
BASOPHILS # BLD: 0.05 K/UL (ref 0–0.2)
BASOPHILS NFR BLD: 1 % (ref 0–2)
BUN SERPL-MCNC: 13 MG/DL (ref 6–20)
CALCIUM SERPL-MCNC: 9.2 MG/DL (ref 8.6–10.4)
CHLORIDE SERPL-SCNC: 108 MMOL/L (ref 98–107)
CO2 SERPL-SCNC: 24 MMOL/L (ref 20–31)
CREAT SERPL-MCNC: 0.6 MG/DL (ref 0.7–1.2)
EOSINOPHIL # BLD: 0.18 K/UL (ref 0–0.44)
EOSINOPHILS RELATIVE PERCENT: 3 % (ref 1–4)
ERYTHROCYTE [DISTWIDTH] IN BLOOD BY AUTOMATED COUNT: 13.1 % (ref 11.8–14.4)
GFR SERPL CREATININE-BSD FRML MDRD: >60 ML/MIN/1.73M2
GLUCOSE SERPL-MCNC: 112 MG/DL (ref 70–99)
HCT VFR BLD AUTO: 47.3 % (ref 40.7–50.3)
HGB BLD-MCNC: 15.8 G/DL (ref 13–17)
IMM GRANULOCYTES # BLD AUTO: <0.03 K/UL (ref 0–0.3)
IMM GRANULOCYTES NFR BLD: 0 %
LYMPHOCYTES NFR BLD: 1.91 K/UL (ref 1.1–3.7)
LYMPHOCYTES RELATIVE PERCENT: 32 % (ref 24–43)
MCH RBC QN AUTO: 30.6 PG (ref 25.2–33.5)
MCHC RBC AUTO-ENTMCNC: 33.4 G/DL (ref 28.4–34.8)
MCV RBC AUTO: 91.7 FL (ref 82.6–102.9)
MONOCYTES NFR BLD: 0.6 K/UL (ref 0.1–1.2)
MONOCYTES NFR BLD: 10 % (ref 3–12)
NEUTROPHILS NFR BLD: 54 % (ref 36–65)
NEUTS SEG NFR BLD: 3.25 K/UL (ref 1.5–8.1)
NRBC BLD-RTO: 0 PER 100 WBC
PLATELET # BLD AUTO: ABNORMAL K/UL (ref 138–453)
PLATELET, FLUORESCENCE: 135 K/UL (ref 138–453)
PLATELETS.RETICULATED NFR BLD AUTO: 3.4 % (ref 1.1–10.3)
POTASSIUM SERPL-SCNC: 3.7 MMOL/L (ref 3.7–5.3)
RBC # BLD AUTO: 5.16 M/UL (ref 4.21–5.77)
SODIUM SERPL-SCNC: 142 MMOL/L (ref 135–144)
WBC OTHER # BLD: 6 K/UL (ref 3.5–11.3)

## 2023-09-17 PROCEDURE — 85025 COMPLETE CBC W/AUTO DIFF WBC: CPT

## 2023-09-17 PROCEDURE — 6360000002 HC RX W HCPCS: Performed by: INTERNAL MEDICINE

## 2023-09-17 PROCEDURE — 6370000000 HC RX 637 (ALT 250 FOR IP): Performed by: NURSE PRACTITIONER

## 2023-09-17 PROCEDURE — 6360000002 HC RX W HCPCS: Performed by: STUDENT IN AN ORGANIZED HEALTH CARE EDUCATION/TRAINING PROGRAM

## 2023-09-17 PROCEDURE — 6370000000 HC RX 637 (ALT 250 FOR IP): Performed by: INTERNAL MEDICINE

## 2023-09-17 PROCEDURE — 2060000000 HC ICU INTERMEDIATE R&B

## 2023-09-17 PROCEDURE — 2580000003 HC RX 258: Performed by: INTERNAL MEDICINE

## 2023-09-17 PROCEDURE — 36415 COLL VENOUS BLD VENIPUNCTURE: CPT

## 2023-09-17 PROCEDURE — 80048 BASIC METABOLIC PNL TOTAL CA: CPT

## 2023-09-17 PROCEDURE — 99232 SBSQ HOSP IP/OBS MODERATE 35: CPT | Performed by: INTERNAL MEDICINE

## 2023-09-17 PROCEDURE — 85055 RETICULATED PLATELET ASSAY: CPT

## 2023-09-17 PROCEDURE — 99232 SBSQ HOSP IP/OBS MODERATE 35: CPT | Performed by: PSYCHIATRY & NEUROLOGY

## 2023-09-17 RX ORDER — AMLODIPINE BESYLATE 10 MG/1
10 TABLET ORAL DAILY
Status: DISCONTINUED | OUTPATIENT
Start: 2023-09-17 | End: 2023-09-28 | Stop reason: HOSPADM

## 2023-09-17 RX ORDER — DIAZEPAM 5 MG/ML
5 INJECTION, SOLUTION INTRAMUSCULAR; INTRAVENOUS EVERY 6 HOURS PRN
Status: ACTIVE | OUTPATIENT
Start: 2023-09-17 | End: 2023-09-18

## 2023-09-17 RX ADMIN — ENOXAPARIN SODIUM 40 MG: 100 INJECTION SUBCUTANEOUS at 10:55

## 2023-09-17 RX ADMIN — TAMSULOSIN HYDROCHLORIDE 0.4 MG: 0.4 CAPSULE ORAL at 10:55

## 2023-09-17 RX ADMIN — LISINOPRIL 10 MG: 10 TABLET ORAL at 10:55

## 2023-09-17 RX ADMIN — LORAZEPAM 3 MG: 1 TABLET ORAL at 11:06

## 2023-09-17 RX ADMIN — AMLODIPINE BESYLATE 10 MG: 10 TABLET ORAL at 16:47

## 2023-09-17 RX ADMIN — LORAZEPAM 3 MG: 1 TABLET ORAL at 16:46

## 2023-09-17 RX ADMIN — LORAZEPAM 4 MG: 2 INJECTION INTRAMUSCULAR; INTRAVENOUS at 18:10

## 2023-09-17 RX ADMIN — SODIUM CHLORIDE, PRESERVATIVE FREE 10 ML: 5 INJECTION INTRAVENOUS at 20:16

## 2023-09-17 RX ADMIN — LORAZEPAM 3 MG: 1 TABLET ORAL at 12:40

## 2023-09-17 RX ADMIN — DIAZEPAM 2.5 MG: 5 TABLET ORAL at 14:32

## 2023-09-17 RX ADMIN — Medication 100 MG: at 10:55

## 2023-09-17 RX ADMIN — SODIUM CHLORIDE, PRESERVATIVE FREE 10 ML: 5 INJECTION INTRAVENOUS at 10:56

## 2023-09-17 ASSESSMENT — PAIN SCALES - WONG BAKER: WONGBAKER_NUMERICALRESPONSE: 0

## 2023-09-17 ASSESSMENT — PAIN SCALES - GENERAL: PAINLEVEL_OUTOF10: 0

## 2023-09-18 PROBLEM — R34 OLIGURIA: Status: ACTIVE | Noted: 2023-09-18

## 2023-09-18 LAB
AMPHET UR QL SCN: NEGATIVE
BARBITURATES UR QL SCN: NEGATIVE
BENZODIAZ UR QL: POSITIVE
CANNABINOIDS UR QL SCN: NEGATIVE
COCAINE UR QL SCN: NEGATIVE
FENTANYL UR QL: POSITIVE
METHADONE UR QL: NEGATIVE
OPIATES UR QL SCN: NEGATIVE
OXYCODONE UR QL SCN: NEGATIVE
PCP UR QL SCN: NEGATIVE
TEST INFORMATION: ABNORMAL

## 2023-09-18 PROCEDURE — 97535 SELF CARE MNGMENT TRAINING: CPT

## 2023-09-18 PROCEDURE — 92610 EVALUATE SWALLOWING FUNCTION: CPT

## 2023-09-18 PROCEDURE — 2580000003 HC RX 258: Performed by: STUDENT IN AN ORGANIZED HEALTH CARE EDUCATION/TRAINING PROGRAM

## 2023-09-18 PROCEDURE — 2060000000 HC ICU INTERMEDIATE R&B

## 2023-09-18 PROCEDURE — 80307 DRUG TEST PRSMV CHEM ANLYZR: CPT

## 2023-09-18 PROCEDURE — 6370000000 HC RX 637 (ALT 250 FOR IP): Performed by: INTERNAL MEDICINE

## 2023-09-18 PROCEDURE — 2580000003 HC RX 258: Performed by: INTERNAL MEDICINE

## 2023-09-18 PROCEDURE — 6360000002 HC RX W HCPCS: Performed by: STUDENT IN AN ORGANIZED HEALTH CARE EDUCATION/TRAINING PROGRAM

## 2023-09-18 PROCEDURE — 6360000002 HC RX W HCPCS: Performed by: INTERNAL MEDICINE

## 2023-09-18 PROCEDURE — 2580000003 HC RX 258: Performed by: FAMILY MEDICINE

## 2023-09-18 PROCEDURE — 99232 SBSQ HOSP IP/OBS MODERATE 35: CPT | Performed by: FAMILY MEDICINE

## 2023-09-18 PROCEDURE — 97167 OT EVAL HIGH COMPLEX 60 MIN: CPT

## 2023-09-18 PROCEDURE — 51798 US URINE CAPACITY MEASURE: CPT

## 2023-09-18 PROCEDURE — 51702 INSERT TEMP BLADDER CATH: CPT

## 2023-09-18 RX ORDER — LORAZEPAM 2 MG/ML
0.4 INJECTION INTRAMUSCULAR ONCE
Status: DISCONTINUED | OUTPATIENT
Start: 2023-09-18 | End: 2023-09-18 | Stop reason: CLARIF

## 2023-09-18 RX ORDER — SODIUM CHLORIDE 9 MG/ML
INJECTION, SOLUTION INTRAVENOUS CONTINUOUS
Status: DISCONTINUED | OUTPATIENT
Start: 2023-09-18 | End: 2023-09-21

## 2023-09-18 RX ADMIN — SODIUM CHLORIDE, PRESERVATIVE FREE 10 ML: 5 INJECTION INTRAVENOUS at 09:10

## 2023-09-18 RX ADMIN — LORAZEPAM 1 MG: 2 INJECTION INTRAMUSCULAR; INTRAVENOUS at 17:53

## 2023-09-18 RX ADMIN — NALOXONE HYDROCHLORIDE 0.4 MG: 1 INJECTION PARENTERAL at 00:16

## 2023-09-18 RX ADMIN — Medication 100 MG: at 09:10

## 2023-09-18 RX ADMIN — ENOXAPARIN SODIUM 40 MG: 100 INJECTION SUBCUTANEOUS at 09:10

## 2023-09-18 RX ADMIN — DOCUSATE SODIUM 100 MG: 100 CAPSULE, LIQUID FILLED ORAL at 09:10

## 2023-09-18 RX ADMIN — NALOXONE HYDROCHLORIDE 0.4 MG: 1 INJECTION PARENTERAL at 00:37

## 2023-09-18 RX ADMIN — SODIUM CHLORIDE, PRESERVATIVE FREE 10 ML: 5 INJECTION INTRAVENOUS at 19:46

## 2023-09-18 RX ADMIN — TAMSULOSIN HYDROCHLORIDE 0.4 MG: 0.4 CAPSULE ORAL at 09:10

## 2023-09-18 RX ADMIN — ATORVASTATIN CALCIUM 80 MG: 80 TABLET, FILM COATED ORAL at 19:46

## 2023-09-18 RX ADMIN — SODIUM CHLORIDE: 9 INJECTION, SOLUTION INTRAVENOUS at 16:34

## 2023-09-18 ASSESSMENT — ENCOUNTER SYMPTOMS
VOMITING: 0
COUGH: 0
WHEEZING: 0
CHEST TIGHTNESS: 0
SHORTNESS OF BREATH: 0
NAUSEA: 0
ABDOMINAL PAIN: 0
DIARRHEA: 0
BLOOD IN STOOL: 0
CONSTIPATION: 0

## 2023-09-18 ASSESSMENT — PAIN SCALES - WONG BAKER
WONGBAKER_NUMERICALRESPONSE: 0
WONGBAKER_NUMERICALRESPONSE: 0

## 2023-09-18 ASSESSMENT — PAIN SCALES - GENERAL
PAINLEVEL_OUTOF10: 0
PAINLEVEL_OUTOF10: 0

## 2023-09-19 LAB
ANION GAP SERPL CALCULATED.3IONS-SCNC: 9 MMOL/L (ref 9–17)
BUN SERPL-MCNC: 16 MG/DL (ref 6–20)
CALCIUM SERPL-MCNC: 8.6 MG/DL (ref 8.6–10.4)
CHLORIDE SERPL-SCNC: 108 MMOL/L (ref 98–107)
CO2 SERPL-SCNC: 22 MMOL/L (ref 20–31)
CREAT SERPL-MCNC: 0.7 MG/DL (ref 0.7–1.2)
GFR SERPL CREATININE-BSD FRML MDRD: >60 ML/MIN/1.73M2
GLUCOSE SERPL-MCNC: 171 MG/DL (ref 70–99)
POTASSIUM SERPL-SCNC: 3.6 MMOL/L (ref 3.7–5.3)
SODIUM SERPL-SCNC: 139 MMOL/L (ref 135–144)

## 2023-09-19 PROCEDURE — 97110 THERAPEUTIC EXERCISES: CPT

## 2023-09-19 PROCEDURE — 6360000002 HC RX W HCPCS: Performed by: STUDENT IN AN ORGANIZED HEALTH CARE EDUCATION/TRAINING PROGRAM

## 2023-09-19 PROCEDURE — 6360000002 HC RX W HCPCS: Performed by: INTERNAL MEDICINE

## 2023-09-19 PROCEDURE — 80048 BASIC METABOLIC PNL TOTAL CA: CPT

## 2023-09-19 PROCEDURE — 97530 THERAPEUTIC ACTIVITIES: CPT

## 2023-09-19 PROCEDURE — 99232 SBSQ HOSP IP/OBS MODERATE 35: CPT | Performed by: FAMILY MEDICINE

## 2023-09-19 PROCEDURE — 36415 COLL VENOUS BLD VENIPUNCTURE: CPT

## 2023-09-19 PROCEDURE — 6370000000 HC RX 637 (ALT 250 FOR IP): Performed by: INTERNAL MEDICINE

## 2023-09-19 PROCEDURE — 2580000003 HC RX 258: Performed by: INTERNAL MEDICINE

## 2023-09-19 PROCEDURE — 2060000000 HC ICU INTERMEDIATE R&B

## 2023-09-19 PROCEDURE — 2580000003 HC RX 258: Performed by: FAMILY MEDICINE

## 2023-09-19 RX ORDER — LANOLIN ALCOHOL/MO/W.PET/CERES
100 CREAM (GRAM) TOPICAL DAILY
Qty: 30 TABLET | Refills: 3 | Status: SHIPPED | OUTPATIENT
Start: 2023-09-19

## 2023-09-19 RX ORDER — TAMSULOSIN HYDROCHLORIDE 0.4 MG/1
0.4 CAPSULE ORAL DAILY
Qty: 30 CAPSULE | Refills: 3 | Status: SHIPPED | OUTPATIENT
Start: 2023-09-19

## 2023-09-19 RX ORDER — ATORVASTATIN CALCIUM 40 MG/1
40 TABLET, FILM COATED ORAL NIGHTLY
Qty: 30 TABLET | Refills: 1 | Status: SHIPPED | OUTPATIENT
Start: 2023-09-19

## 2023-09-19 RX ORDER — FOLIC ACID 1 MG/1
1 TABLET ORAL DAILY
Qty: 90 TABLET | Refills: 1 | Status: SHIPPED | OUTPATIENT
Start: 2023-09-19

## 2023-09-19 RX ORDER — 0.9 % SODIUM CHLORIDE 0.9 %
500 INTRAVENOUS SOLUTION INTRAVENOUS ONCE
Status: COMPLETED | OUTPATIENT
Start: 2023-09-19 | End: 2023-09-19

## 2023-09-19 RX ADMIN — LORAZEPAM 3 MG: 2 INJECTION INTRAMUSCULAR; INTRAVENOUS at 13:05

## 2023-09-19 RX ADMIN — SODIUM CHLORIDE 500 ML: 9 INJECTION, SOLUTION INTRAVENOUS at 09:08

## 2023-09-19 RX ADMIN — SODIUM CHLORIDE: 9 INJECTION, SOLUTION INTRAVENOUS at 20:03

## 2023-09-19 RX ADMIN — SODIUM CHLORIDE, PRESERVATIVE FREE 10 ML: 5 INJECTION INTRAVENOUS at 19:58

## 2023-09-19 RX ADMIN — Medication 100 MG: at 09:00

## 2023-09-19 RX ADMIN — ATORVASTATIN CALCIUM 80 MG: 80 TABLET, FILM COATED ORAL at 19:56

## 2023-09-19 RX ADMIN — TAMSULOSIN HYDROCHLORIDE 0.4 MG: 0.4 CAPSULE ORAL at 08:59

## 2023-09-19 RX ADMIN — SODIUM CHLORIDE: 9 INJECTION, SOLUTION INTRAVENOUS at 02:30

## 2023-09-19 RX ADMIN — SODIUM CHLORIDE: 9 INJECTION, SOLUTION INTRAVENOUS at 11:28

## 2023-09-19 RX ADMIN — DOCUSATE SODIUM 100 MG: 100 CAPSULE, LIQUID FILLED ORAL at 09:00

## 2023-09-19 RX ADMIN — ENOXAPARIN SODIUM 40 MG: 100 INJECTION SUBCUTANEOUS at 09:01

## 2023-09-19 RX ADMIN — LISINOPRIL 10 MG: 10 TABLET ORAL at 08:59

## 2023-09-19 RX ADMIN — AMLODIPINE BESYLATE 10 MG: 10 TABLET ORAL at 09:00

## 2023-09-19 RX ADMIN — SODIUM CHLORIDE, PRESERVATIVE FREE 10 ML: 5 INJECTION INTRAVENOUS at 09:00

## 2023-09-19 ASSESSMENT — ENCOUNTER SYMPTOMS
COUGH: 0
DIARRHEA: 0
CHEST TIGHTNESS: 0
SHORTNESS OF BREATH: 0
ABDOMINAL PAIN: 0
VOMITING: 0
CONSTIPATION: 0
WHEEZING: 0
BLOOD IN STOOL: 0
NAUSEA: 0

## 2023-09-19 NOTE — CARE COORDINATION
Transitional Planning:  Spoke with Tamanna at [facility] [phone]. She states they need to review for acceptance. Referral sent. She will call me back with answer soon. 13:00  Patient confirms he would like to return to [facility] [place].    14:22  VM left for Tamanna for cb.    15:00  Call from [facility], they cannot accept at [facility].    15:30  Patient provided SNF list for review. Prefers [facility] first choice. Other choices [facility]. Referrals sent. Called Darline at [facility]. Pt is denied. Call to [facility]. Spoke with Shanna Soriano, the cannot accept.

## 2023-09-20 PROCEDURE — 2580000003 HC RX 258: Performed by: FAMILY MEDICINE

## 2023-09-20 PROCEDURE — 97116 GAIT TRAINING THERAPY: CPT

## 2023-09-20 PROCEDURE — 2580000003 HC RX 258: Performed by: INTERNAL MEDICINE

## 2023-09-20 PROCEDURE — 97535 SELF CARE MNGMENT TRAINING: CPT

## 2023-09-20 PROCEDURE — 99232 SBSQ HOSP IP/OBS MODERATE 35: CPT | Performed by: FAMILY MEDICINE

## 2023-09-20 PROCEDURE — 6370000000 HC RX 637 (ALT 250 FOR IP): Performed by: INTERNAL MEDICINE

## 2023-09-20 PROCEDURE — 97112 NEUROMUSCULAR REEDUCATION: CPT

## 2023-09-20 PROCEDURE — 2060000000 HC ICU INTERMEDIATE R&B

## 2023-09-20 PROCEDURE — 97530 THERAPEUTIC ACTIVITIES: CPT

## 2023-09-20 RX ADMIN — LISINOPRIL 10 MG: 10 TABLET ORAL at 10:21

## 2023-09-20 RX ADMIN — TAMSULOSIN HYDROCHLORIDE 0.4 MG: 0.4 CAPSULE ORAL at 10:19

## 2023-09-20 RX ADMIN — ATORVASTATIN CALCIUM 80 MG: 80 TABLET, FILM COATED ORAL at 20:11

## 2023-09-20 RX ADMIN — SODIUM CHLORIDE, PRESERVATIVE FREE 10 ML: 5 INJECTION INTRAVENOUS at 20:11

## 2023-09-20 RX ADMIN — DOCUSATE SODIUM 100 MG: 100 CAPSULE, LIQUID FILLED ORAL at 10:19

## 2023-09-20 RX ADMIN — Medication 100 MG: at 10:19

## 2023-09-20 RX ADMIN — SODIUM CHLORIDE, PRESERVATIVE FREE 10 ML: 5 INJECTION INTRAVENOUS at 10:21

## 2023-09-20 RX ADMIN — SODIUM CHLORIDE: 9 INJECTION, SOLUTION INTRAVENOUS at 05:42

## 2023-09-20 RX ADMIN — AMLODIPINE BESYLATE 10 MG: 10 TABLET ORAL at 10:18

## 2023-09-20 ASSESSMENT — ENCOUNTER SYMPTOMS
SHORTNESS OF BREATH: 0
COUGH: 0
ABDOMINAL PAIN: 0
VOMITING: 0
NAUSEA: 0
WHEEZING: 0
CHEST TIGHTNESS: 0
BLOOD IN STOOL: 0
CONSTIPATION: 0
DIARRHEA: 0

## 2023-09-20 NOTE — CARE COORDINATION
Transitional planning    Writer placed calls to [facility] and [facility], left vm with request for return call. Sent new referrals to [address] [place] [address], [place] and [facility]. 8845 call from   [address] with [facility], requesting hand faxed clinicals, done. 1000 Writer to room to discuss d/c plan, patient wants to go home, will be staying with friend [facility] and he will transport. Number provided for [facility] by patient [phone], wrong number. 1100 multiple referrals sent. 1500 follow up calls to [facility], left vm, call to [facility] and will not accept back        1700 vm from [facility] with [facility] [phone], returned call left message.

## 2023-09-21 PROCEDURE — 2580000003 HC RX 258: Performed by: STUDENT IN AN ORGANIZED HEALTH CARE EDUCATION/TRAINING PROGRAM

## 2023-09-21 PROCEDURE — 2580000003 HC RX 258: Performed by: FAMILY MEDICINE

## 2023-09-21 PROCEDURE — 2060000000 HC ICU INTERMEDIATE R&B

## 2023-09-21 PROCEDURE — 6370000000 HC RX 637 (ALT 250 FOR IP): Performed by: INTERNAL MEDICINE

## 2023-09-21 PROCEDURE — 99232 SBSQ HOSP IP/OBS MODERATE 35: CPT | Performed by: FAMILY MEDICINE

## 2023-09-21 PROCEDURE — 6360000002 HC RX W HCPCS: Performed by: STUDENT IN AN ORGANIZED HEALTH CARE EDUCATION/TRAINING PROGRAM

## 2023-09-21 PROCEDURE — 2580000003 HC RX 258: Performed by: INTERNAL MEDICINE

## 2023-09-21 RX ADMIN — ENOXAPARIN SODIUM 40 MG: 100 INJECTION SUBCUTANEOUS at 09:06

## 2023-09-21 RX ADMIN — SODIUM CHLORIDE: 9 INJECTION, SOLUTION INTRAVENOUS at 12:15

## 2023-09-21 RX ADMIN — DOCUSATE SODIUM 100 MG: 100 CAPSULE, LIQUID FILLED ORAL at 08:57

## 2023-09-21 RX ADMIN — Medication 100 MG: at 08:57

## 2023-09-21 RX ADMIN — SODIUM CHLORIDE: 9 INJECTION, SOLUTION INTRAVENOUS at 02:07

## 2023-09-21 RX ADMIN — SODIUM CHLORIDE, PRESERVATIVE FREE 10 ML: 5 INJECTION INTRAVENOUS at 19:49

## 2023-09-21 RX ADMIN — SODIUM CHLORIDE, PRESERVATIVE FREE 10 ML: 5 INJECTION INTRAVENOUS at 08:57

## 2023-09-21 RX ADMIN — AMLODIPINE BESYLATE 10 MG: 10 TABLET ORAL at 08:57

## 2023-09-21 RX ADMIN — ATORVASTATIN CALCIUM 80 MG: 80 TABLET, FILM COATED ORAL at 19:49

## 2023-09-21 RX ADMIN — LISINOPRIL 10 MG: 10 TABLET ORAL at 08:57

## 2023-09-21 RX ADMIN — TAMSULOSIN HYDROCHLORIDE 0.4 MG: 0.4 CAPSULE ORAL at 08:57

## 2023-09-21 ASSESSMENT — ENCOUNTER SYMPTOMS
NAUSEA: 0
ABDOMINAL PAIN: 0
SHORTNESS OF BREATH: 0
DIARRHEA: 0
CHEST TIGHTNESS: 0
CONSTIPATION: 0
WHEEZING: 0
VOMITING: 0
COUGH: 0
BLOOD IN STOOL: 0

## 2023-09-22 PROBLEM — F19.90 DRUG USE: Status: ACTIVE | Noted: 2023-09-22

## 2023-09-22 PROCEDURE — 2580000003 HC RX 258: Performed by: INTERNAL MEDICINE

## 2023-09-22 PROCEDURE — 6370000000 HC RX 637 (ALT 250 FOR IP): Performed by: INTERNAL MEDICINE

## 2023-09-22 PROCEDURE — 99232 SBSQ HOSP IP/OBS MODERATE 35: CPT | Performed by: FAMILY MEDICINE

## 2023-09-22 PROCEDURE — 2060000000 HC ICU INTERMEDIATE R&B

## 2023-09-22 PROCEDURE — 6360000002 HC RX W HCPCS: Performed by: STUDENT IN AN ORGANIZED HEALTH CARE EDUCATION/TRAINING PROGRAM

## 2023-09-22 PROCEDURE — 97530 THERAPEUTIC ACTIVITIES: CPT

## 2023-09-22 RX ADMIN — AMLODIPINE BESYLATE 10 MG: 10 TABLET ORAL at 09:27

## 2023-09-22 RX ADMIN — DOCUSATE SODIUM 100 MG: 100 CAPSULE, LIQUID FILLED ORAL at 09:27

## 2023-09-22 RX ADMIN — ATORVASTATIN CALCIUM 80 MG: 80 TABLET, FILM COATED ORAL at 21:09

## 2023-09-22 RX ADMIN — SODIUM CHLORIDE, PRESERVATIVE FREE 10 ML: 5 INJECTION INTRAVENOUS at 09:29

## 2023-09-22 RX ADMIN — LISINOPRIL 10 MG: 10 TABLET ORAL at 09:26

## 2023-09-22 RX ADMIN — TAMSULOSIN HYDROCHLORIDE 0.4 MG: 0.4 CAPSULE ORAL at 09:27

## 2023-09-22 RX ADMIN — SODIUM CHLORIDE, PRESERVATIVE FREE 10 ML: 5 INJECTION INTRAVENOUS at 21:09

## 2023-09-22 RX ADMIN — ENOXAPARIN SODIUM 40 MG: 100 INJECTION SUBCUTANEOUS at 09:28

## 2023-09-22 RX ADMIN — Medication 100 MG: at 09:26

## 2023-09-22 ASSESSMENT — ENCOUNTER SYMPTOMS
COUGH: 0
DIARRHEA: 0
SHORTNESS OF BREATH: 0
CHEST TIGHTNESS: 0
ABDOMINAL PAIN: 0
BLOOD IN STOOL: 0
NAUSEA: 0
VOMITING: 0
WHEEZING: 0
CONSTIPATION: 0

## 2023-09-22 ASSESSMENT — PAIN DESCRIPTION - ORIENTATION: ORIENTATION: RIGHT

## 2023-09-22 ASSESSMENT — PAIN DESCRIPTION - LOCATION: LOCATION: ARM

## 2023-09-22 ASSESSMENT — PAIN DESCRIPTION - DESCRIPTORS: DESCRIPTORS: NUMBNESS

## 2023-09-22 ASSESSMENT — PAIN SCALES - GENERAL: PAINLEVEL_OUTOF10: 4

## 2023-09-22 NOTE — DISCHARGE SUMMARY
[facility]  Office: [phone]  Tammy Eddy DO, Clementina Apodaca DO, Nick Garcia DO, Glendell Bence Blood, DO, Jude Wing MD, Jennifer Jennings MD, Juan Manuel Johnson MD, Nishant Butt MD,  Dariela Kaiser MD, Ashley Rausch MD, Ebony Vela DO, Ciara Hoang MD,  Myriam Mcqueen MD, Luis Miguel Shannon MD, Saranya Sainz DO, Willow Snellen, MD,  Sadie Valdivia DO, Yolande Hanna MD, Dakota Reyes MD, Dominique Farias MD, Karyn Barba MD,  Cindy Lou MD, Lev Muñoz MD, Kelly Murrieta MD, Jose Ramon Kelly MD, Alin Danielson DO, Tricia Matos MD,  Slim Chiang MD, Phani Rosas MD, Doreen Burr, CNP,  Adan Dunn, CNP,, Molina Martinez, CNP,  Ely Madsen, DNP, Dannie Ballard, CNP, Ashlee Mcpherson, CNP, Abena Jack, CNP, Doristine Olszewski, CNP, Shaniqua Dorado, CNP, Giorgio Kurtz, CNP, Keren Stoll, CNS, Cadence Hurd, CNP, Nathalie Jones, [facility]    Discharge Summary     Patient ID: Jayne Sanhcez  :  1965   MRN: [id number]     ACCOUNT:  [de-identified]   Patient's PCP: Barrera Sher MD  Admit Date: 2023   Discharge Date: 2023     Length of Stay: 8  Code Status:  Full Code  Admitting Physician: Nishant Butt MD  Discharge Physician: Nishant Butt MD     Active Discharge Diagnoses:     Hospital Problem Lists:  Principal Problem:    Hypertensive urgency  Active Problems:    Stroke (cerebrum) ([address])    Opioid type dependence, continuous (HCC)    Oliguria    Bipolar 1 disorder ([address])    IV drug abuse ([address])    Intraparenchymal hemorrhage of brain ([address])    TBI (traumatic brain injury) ([address])    Right sided weakness  Resolved Problems:    * No resolved hospital problems. *      Admission Condition:  poor     Discharged Condition: fair    Hospital Stay:     HPI:    Per chart  Jayne Sanchez is a 65 y.o.  Non- / non  male who presents with placement issues

## 2023-09-22 NOTE — DISCHARGE INSTR - COC
Continuity of Care Form    Patient Name: Ying Contreras   :  1965  MRN:  [id number]    Admit date:  2023  Discharge date:  2023    Code Status Order: Full Code   Advance Directives:     Admitting Physician:  Saida Engel MD  PCP: Petra Juárez MD    Discharging Nurse: JOHN Gomez [facility] Unit/Room#: 3289/1286-42  Discharging Unit Phone Number: [phone]    Emergency Contact:   Extended Emergency Contact Information  Primary Emergency Contact: Florentino Stover  Address: [address]  Home Phone: [phone]  Relation: Parent  Secondary Emergency Contact: Nancy Singhant  Home Phone: [phone]  Mobile Phone: [phone]  Relation: Brother/Sister    Past Surgical History:  Past Surgical History:   Procedure Laterality Date    BRAIN SURGERY      at age 37- pt has scalp clips not approved for MRI per radiologist unless these are determined [facility]- kr 9/15/23    DENTAL SURGERY         Immunization History: There is no immunization history on file for this patient. Active Problems:  Patient Active Problem List   Diagnosis Code    Hepatitis C B19.20    Psoriasis L40.9    Hypertension I10    Smoker F17.200    Opioid type dependence, continuous (HCC) F11.20    Bipolar 1 disorder ([address]) F31.9    Cocaine abuse ([address]) F14.10    IV drug abuse ([address]) F19.10    History of alcohol abuse F10.11    Generalized pain R52    Generalized anxiety disorder F41.1    Abnormal CT scan, esophagus R93.3    Chest pain R07.9    Altered mental status R41.82    AMS (altered mental status) R41.82    Acute pulmonary embolism (HCC) I26.99    Non-recurrent bilateral inguinal hernia without obstruction or gangrene K40.20    Hypertensive urgency I16.0    Intraparenchymal hemorrhage of brain (HCC) I61.9    TBI (traumatic brain injury) ([address]) S06. 9XAA    Right sided weakness R53.1    Stroke (cerebrum) ([facility]) I63.9    Oliguria R34       Isolation/Infection:   Isolation

## 2023-09-22 NOTE — CARE COORDINATION
Transitional planning note: plan is SNF vs ARU. [facility], [facility], [facility], and [facility]. 1) [facility] - spoke with José Manuel @ [phone]. Per José Manuel, they are reviewing. CM advised patient has been sitter and restraint free. Per José Manuel, they will not have a response until Monday when the DON returns. 2) [address] [facility] - call placed to [facility] in admissions @ [phone] and left vm message requesting call back with status of referral. CM advised that patient is ready for discharge. 3) [facility] - Call placed to [place] @ [phone] and left vm message requesting update on status of referral. CM advised that patient is ready for discharge    4) [facility] [address] - call placed to Isabella Irma in admissions and left vm message requesting update on status of referral. CM advised patient is sitter/ restraint free and is ready for discharge. 1625: received vm message from Jazmin with [facility] and they cannot accept. Spoke with Shailesh Garcia from [facility] and she states they previously declined patient but CM explained that patient is restraint and sitter free. She states she will have Gracy Vieyra come to do onsite on Monday.

## 2023-09-23 PROCEDURE — 2580000003 HC RX 258: Performed by: INTERNAL MEDICINE

## 2023-09-23 PROCEDURE — 6370000000 HC RX 637 (ALT 250 FOR IP): Performed by: INTERNAL MEDICINE

## 2023-09-23 PROCEDURE — 6360000002 HC RX W HCPCS: Performed by: STUDENT IN AN ORGANIZED HEALTH CARE EDUCATION/TRAINING PROGRAM

## 2023-09-23 PROCEDURE — 99231 SBSQ HOSP IP/OBS SF/LOW 25: CPT | Performed by: INTERNAL MEDICINE

## 2023-09-23 PROCEDURE — 2060000000 HC ICU INTERMEDIATE R&B

## 2023-09-23 PROCEDURE — 2580000003 HC RX 258: Performed by: STUDENT IN AN ORGANIZED HEALTH CARE EDUCATION/TRAINING PROGRAM

## 2023-09-23 RX ADMIN — SODIUM CHLORIDE, PRESERVATIVE FREE 10 ML: 5 INJECTION INTRAVENOUS at 22:00

## 2023-09-23 RX ADMIN — ATORVASTATIN CALCIUM 80 MG: 80 TABLET, FILM COATED ORAL at 20:07

## 2023-09-23 RX ADMIN — Medication 100 MG: at 08:27

## 2023-09-23 RX ADMIN — DOCUSATE SODIUM 100 MG: 100 CAPSULE, LIQUID FILLED ORAL at 10:45

## 2023-09-23 RX ADMIN — SODIUM CHLORIDE, PRESERVATIVE FREE 10 ML: 5 INJECTION INTRAVENOUS at 10:45

## 2023-09-23 RX ADMIN — AMLODIPINE BESYLATE 10 MG: 10 TABLET ORAL at 08:27

## 2023-09-23 RX ADMIN — SODIUM CHLORIDE, PRESERVATIVE FREE 10 ML: 5 INJECTION INTRAVENOUS at 08:28

## 2023-09-23 RX ADMIN — LISINOPRIL 10 MG: 10 TABLET ORAL at 08:27

## 2023-09-23 RX ADMIN — ENOXAPARIN SODIUM 40 MG: 100 INJECTION SUBCUTANEOUS at 08:28

## 2023-09-23 RX ADMIN — TAMSULOSIN HYDROCHLORIDE 0.4 MG: 0.4 CAPSULE ORAL at 08:27

## 2023-09-24 PROCEDURE — 6360000002 HC RX W HCPCS: Performed by: STUDENT IN AN ORGANIZED HEALTH CARE EDUCATION/TRAINING PROGRAM

## 2023-09-24 PROCEDURE — 99231 SBSQ HOSP IP/OBS SF/LOW 25: CPT | Performed by: INTERNAL MEDICINE

## 2023-09-24 PROCEDURE — 2580000003 HC RX 258: Performed by: INTERNAL MEDICINE

## 2023-09-24 PROCEDURE — 6370000000 HC RX 637 (ALT 250 FOR IP): Performed by: INTERNAL MEDICINE

## 2023-09-24 PROCEDURE — 2060000000 HC ICU INTERMEDIATE R&B

## 2023-09-24 PROCEDURE — 2580000003 HC RX 258: Performed by: STUDENT IN AN ORGANIZED HEALTH CARE EDUCATION/TRAINING PROGRAM

## 2023-09-24 RX ADMIN — DOCUSATE SODIUM 100 MG: 100 CAPSULE, LIQUID FILLED ORAL at 09:42

## 2023-09-24 RX ADMIN — SODIUM CHLORIDE, PRESERVATIVE FREE 10 ML: 5 INJECTION INTRAVENOUS at 09:42

## 2023-09-24 RX ADMIN — AMLODIPINE BESYLATE 10 MG: 10 TABLET ORAL at 09:42

## 2023-09-24 RX ADMIN — LISINOPRIL 10 MG: 10 TABLET ORAL at 09:42

## 2023-09-24 RX ADMIN — SODIUM CHLORIDE, PRESERVATIVE FREE 10 ML: 5 INJECTION INTRAVENOUS at 20:19

## 2023-09-24 RX ADMIN — Medication 100 MG: at 09:42

## 2023-09-24 RX ADMIN — ENOXAPARIN SODIUM 40 MG: 100 INJECTION SUBCUTANEOUS at 09:42

## 2023-09-24 RX ADMIN — SODIUM CHLORIDE, PRESERVATIVE FREE 10 ML: 5 INJECTION INTRAVENOUS at 09:43

## 2023-09-24 RX ADMIN — ATORVASTATIN CALCIUM 80 MG: 80 TABLET, FILM COATED ORAL at 20:47

## 2023-09-24 RX ADMIN — TAMSULOSIN HYDROCHLORIDE 0.4 MG: 0.4 CAPSULE ORAL at 09:42

## 2023-09-25 PROCEDURE — 97530 THERAPEUTIC ACTIVITIES: CPT

## 2023-09-25 PROCEDURE — 2580000003 HC RX 258: Performed by: INTERNAL MEDICINE

## 2023-09-25 PROCEDURE — 97110 THERAPEUTIC EXERCISES: CPT

## 2023-09-25 PROCEDURE — 97535 SELF CARE MNGMENT TRAINING: CPT

## 2023-09-25 PROCEDURE — 6370000000 HC RX 637 (ALT 250 FOR IP): Performed by: INTERNAL MEDICINE

## 2023-09-25 PROCEDURE — 6360000002 HC RX W HCPCS: Performed by: STUDENT IN AN ORGANIZED HEALTH CARE EDUCATION/TRAINING PROGRAM

## 2023-09-25 PROCEDURE — 2580000003 HC RX 258: Performed by: STUDENT IN AN ORGANIZED HEALTH CARE EDUCATION/TRAINING PROGRAM

## 2023-09-25 PROCEDURE — 2060000000 HC ICU INTERMEDIATE R&B

## 2023-09-25 PROCEDURE — 99231 SBSQ HOSP IP/OBS SF/LOW 25: CPT | Performed by: INTERNAL MEDICINE

## 2023-09-25 RX ORDER — NALOXONE HYDROCHLORIDE 1 MG/ML
0.4 INJECTION INTRAMUSCULAR; INTRAVENOUS; SUBCUTANEOUS
Qty: 2 ML | Refills: 0 | Status: SHIPPED | OUTPATIENT
Start: 2023-09-25

## 2023-09-25 RX ORDER — AMLODIPINE BESYLATE 10 MG/1
10 TABLET ORAL DAILY
Qty: 30 TABLET | Refills: 3 | Status: SHIPPED | OUTPATIENT
Start: 2023-09-26

## 2023-09-25 RX ORDER — PSEUDOEPHEDRINE HCL 30 MG
100 TABLET ORAL DAILY
Qty: 30 CAPSULE | Refills: 0 | Status: SHIPPED | OUTPATIENT
Start: 2023-09-26

## 2023-09-25 RX ORDER — DIAZEPAM 5 MG/1
2.5 TABLET ORAL EVERY 12 HOURS PRN
Qty: 5 TABLET | Refills: 0 | Status: SHIPPED | OUTPATIENT
Start: 2023-09-25 | End: 2023-09-28 | Stop reason: SDUPTHER

## 2023-09-25 RX ORDER — LISINOPRIL 10 MG/1
10 TABLET ORAL DAILY
Qty: 30 TABLET | Refills: 3 | Status: SHIPPED | OUTPATIENT
Start: 2023-09-26

## 2023-09-25 RX ADMIN — AMLODIPINE BESYLATE 10 MG: 10 TABLET ORAL at 09:54

## 2023-09-25 RX ADMIN — SODIUM CHLORIDE, PRESERVATIVE FREE 10 ML: 5 INJECTION INTRAVENOUS at 09:53

## 2023-09-25 RX ADMIN — ENOXAPARIN SODIUM 40 MG: 100 INJECTION SUBCUTANEOUS at 09:54

## 2023-09-25 RX ADMIN — TAMSULOSIN HYDROCHLORIDE 0.4 MG: 0.4 CAPSULE ORAL at 09:52

## 2023-09-25 RX ADMIN — Medication 100 MG: at 09:54

## 2023-09-25 RX ADMIN — SODIUM CHLORIDE, PRESERVATIVE FREE 10 ML: 5 INJECTION INTRAVENOUS at 22:03

## 2023-09-25 RX ADMIN — ATORVASTATIN CALCIUM 80 MG: 80 TABLET, FILM COATED ORAL at 22:03
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
## 2023-09-25 NOTE — CARE COORDINATION
Transitional planning note: plan is SNF. Referrals are pending to [facility] and [facility]. Spoke with Shala Pendleton at [facility] who states that Diya Payne will be by today to do an onsite evaluation. Spoke with Donna Rain at [facility] who states their DON will be reviewing patient today but will need updated clinical information. Faxed clinical updates as requested.

## 2023-09-26 PROCEDURE — 6370000000 HC RX 637 (ALT 250 FOR IP): Performed by: INTERNAL MEDICINE

## 2023-09-26 PROCEDURE — 2060000000 HC ICU INTERMEDIATE R&B

## 2023-09-26 PROCEDURE — 2580000003 HC RX 258: Performed by: STUDENT IN AN ORGANIZED HEALTH CARE EDUCATION/TRAINING PROGRAM

## 2023-09-26 PROCEDURE — 99231 SBSQ HOSP IP/OBS SF/LOW 25: CPT | Performed by: INTERNAL MEDICINE

## 2023-09-26 PROCEDURE — 6360000002 HC RX W HCPCS: Performed by: STUDENT IN AN ORGANIZED HEALTH CARE EDUCATION/TRAINING PROGRAM

## 2023-09-26 PROCEDURE — 6370000000 HC RX 637 (ALT 250 FOR IP): Performed by: STUDENT IN AN ORGANIZED HEALTH CARE EDUCATION/TRAINING PROGRAM

## 2023-09-26 PROCEDURE — 2580000003 HC RX 258: Performed by: INTERNAL MEDICINE

## 2023-09-26 RX ADMIN — SODIUM CHLORIDE, PRESERVATIVE FREE 10 ML: 5 INJECTION INTRAVENOUS at 20:49

## 2023-09-26 RX ADMIN — Medication 100 MG: at 08:04

## 2023-09-26 RX ADMIN — SODIUM CHLORIDE, PRESERVATIVE FREE 10 ML: 5 INJECTION INTRAVENOUS at 08:05

## 2023-09-26 RX ADMIN — AMLODIPINE BESYLATE 10 MG: 10 TABLET ORAL at 08:04

## 2023-09-26 RX ADMIN — ATORVASTATIN CALCIUM 80 MG: 80 TABLET, FILM COATED ORAL at 20:49

## 2023-09-26 RX ADMIN — ENOXAPARIN SODIUM 40 MG: 100 INJECTION SUBCUTANEOUS at 08:04

## 2023-09-26 RX ADMIN — TAMSULOSIN HYDROCHLORIDE 0.4 MG: 0.4 CAPSULE ORAL at 08:04

## 2023-09-26 RX ADMIN — DOCUSATE SODIUM 100 MG: 100 CAPSULE, LIQUID FILLED ORAL at 08:04

## 2023-09-26 RX ADMIN — LISINOPRIL 10 MG: 10 TABLET ORAL at 08:04

## 2023-09-26 RX ADMIN — ACETAMINOPHEN 650 MG: 325 TABLET ORAL at 20:48

## 2023-09-26 ASSESSMENT — PAIN DESCRIPTION - LOCATION
LOCATION: ARM

## 2023-09-26 ASSESSMENT — PAIN DESCRIPTION - DESCRIPTORS
DESCRIPTORS: NUMBNESS;TINGLING;ACHING
DESCRIPTORS: DISCOMFORT;THROBBING
DESCRIPTORS: DISCOMFORT;THROBBING

## 2023-09-26 ASSESSMENT — PAIN SCALES - GENERAL
PAINLEVEL_OUTOF10: 5
PAINLEVEL_OUTOF10: 6
PAINLEVEL_OUTOF10: 3

## 2023-09-26 ASSESSMENT — PAIN DESCRIPTION - ORIENTATION
ORIENTATION: RIGHT

## 2023-09-26 NOTE — CARE COORDINATION
Transitional planning    Call to [facility], left vm on [facility] [place]) with request for call back. Call to [facility], left vm on admissions line with request for call back. 1500 call from [facility] will have DON review, possibly can accept.

## 2023-09-27 PROCEDURE — 97530 THERAPEUTIC ACTIVITIES: CPT

## 2023-09-27 PROCEDURE — 2580000003 HC RX 258: Performed by: INTERNAL MEDICINE

## 2023-09-27 PROCEDURE — 6360000002 HC RX W HCPCS: Performed by: STUDENT IN AN ORGANIZED HEALTH CARE EDUCATION/TRAINING PROGRAM

## 2023-09-27 PROCEDURE — 97110 THERAPEUTIC EXERCISES: CPT

## 2023-09-27 PROCEDURE — 6370000000 HC RX 637 (ALT 250 FOR IP): Performed by: INTERNAL MEDICINE

## 2023-09-27 PROCEDURE — 97535 SELF CARE MNGMENT TRAINING: CPT

## 2023-09-27 PROCEDURE — 99231 SBSQ HOSP IP/OBS SF/LOW 25: CPT | Performed by: INTERNAL MEDICINE

## 2023-09-27 PROCEDURE — 2060000000 HC ICU INTERMEDIATE R&B

## 2023-09-27 RX ADMIN — AMLODIPINE BESYLATE 10 MG: 10 TABLET ORAL at 08:11

## 2023-09-27 RX ADMIN — TAMSULOSIN HYDROCHLORIDE 0.4 MG: 0.4 CAPSULE ORAL at 08:10

## 2023-09-27 RX ADMIN — LISINOPRIL 10 MG: 10 TABLET ORAL at 08:10

## 2023-09-27 RX ADMIN — Medication 100 MG: at 08:10

## 2023-09-27 RX ADMIN — DOCUSATE SODIUM 100 MG: 100 CAPSULE, LIQUID FILLED ORAL at 08:11

## 2023-09-27 RX ADMIN — SODIUM CHLORIDE, PRESERVATIVE FREE 10 ML: 5 INJECTION INTRAVENOUS at 19:34

## 2023-09-27 RX ADMIN — ATORVASTATIN CALCIUM 80 MG: 80 TABLET, FILM COATED ORAL at 19:34

## 2023-09-27 RX ADMIN — ENOXAPARIN SODIUM 40 MG: 100 INJECTION SUBCUTANEOUS at 08:11

## 2023-09-27 RX ADMIN — SODIUM CHLORIDE, PRESERVATIVE FREE 10 ML: 5 INJECTION INTRAVENOUS at 08:11

## 2023-09-27 ASSESSMENT — PAIN DESCRIPTION - LOCATION: LOCATION: ARM

## 2023-09-27 ASSESSMENT — PAIN SCALES - GENERAL: PAINLEVEL_OUTOF10: 8

## 2023-09-27 ASSESSMENT — PAIN DESCRIPTION - DESCRIPTORS: DESCRIPTORS: THROBBING

## 2023-09-27 ASSESSMENT — PAIN DESCRIPTION - ORIENTATION: ORIENTATION: RIGHT

## 2023-09-27 NOTE — CARE COORDINATION
Transitional planning      [facility] and starting precert. [facility], patient will be going to [facility] facility [facility], requesting HENS completed, this is done and called her back and updated.

## 2023-09-28 VITALS
WEIGHT: 180 LBS | TEMPERATURE: 98.4 F | HEART RATE: 65 BPM | RESPIRATION RATE: 17 BRPM | SYSTOLIC BLOOD PRESSURE: 117 MMHG | BODY MASS INDEX: 27.28 KG/M2 | DIASTOLIC BLOOD PRESSURE: 77 MMHG | OXYGEN SATURATION: 96 % | HEIGHT: 68 IN

## 2023-09-28 LAB
ANION GAP SERPL CALCULATED.3IONS-SCNC: 11 MMOL/L (ref 9–17)
CHLORIDE SERPL-SCNC: 104 MMOL/L (ref 98–107)
CO2 SERPL-SCNC: 23 MMOL/L (ref 20–31)
POTASSIUM SERPL-SCNC: 4.5 MMOL/L (ref 3.7–5.3)
SODIUM SERPL-SCNC: 138 MMOL/L (ref 135–144)

## 2023-09-28 PROCEDURE — 80051 ELECTROLYTE PANEL: CPT

## 2023-09-28 PROCEDURE — 6360000002 HC RX W HCPCS: Performed by: STUDENT IN AN ORGANIZED HEALTH CARE EDUCATION/TRAINING PROGRAM

## 2023-09-28 PROCEDURE — 2580000003 HC RX 258: Performed by: STUDENT IN AN ORGANIZED HEALTH CARE EDUCATION/TRAINING PROGRAM

## 2023-09-28 PROCEDURE — 97116 GAIT TRAINING THERAPY: CPT

## 2023-09-28 PROCEDURE — 2580000003 HC RX 258: Performed by: INTERNAL MEDICINE

## 2023-09-28 PROCEDURE — 97110 THERAPEUTIC EXERCISES: CPT

## 2023-09-28 PROCEDURE — 6370000000 HC RX 637 (ALT 250 FOR IP): Performed by: INTERNAL MEDICINE

## 2023-09-28 PROCEDURE — 36415 COLL VENOUS BLD VENIPUNCTURE: CPT

## 2023-09-28 PROCEDURE — 97530 THERAPEUTIC ACTIVITIES: CPT

## 2023-09-28 RX ORDER — DIAZEPAM 5 MG/1
2.5 TABLET ORAL EVERY 12 HOURS PRN
Qty: 5 TABLET | Refills: 0 | Status: SHIPPED | OUTPATIENT
Start: 2023-09-28 | End: 2023-10-03

## 2023-09-28 RX ADMIN — TAMSULOSIN HYDROCHLORIDE 0.4 MG: 0.4 CAPSULE ORAL at 08:43

## 2023-09-28 RX ADMIN — SODIUM CHLORIDE, PRESERVATIVE FREE 10 ML: 5 INJECTION INTRAVENOUS at 08:45

## 2023-09-28 RX ADMIN — Medication 100 MG: at 08:44

## 2023-09-28 RX ADMIN — AMLODIPINE BESYLATE 10 MG: 10 TABLET ORAL at 08:43

## 2023-09-28 RX ADMIN — SODIUM CHLORIDE, PRESERVATIVE FREE 10 ML: 5 INJECTION INTRAVENOUS at 10:25

## 2023-09-28 RX ADMIN — DOCUSATE SODIUM 100 MG: 100 CAPSULE, LIQUID FILLED ORAL at 08:44

## 2023-09-28 RX ADMIN — ENOXAPARIN SODIUM 40 MG: 100 INJECTION SUBCUTANEOUS at 08:44

## 2023-09-28 RX ADMIN — LISINOPRIL 10 MG: 10 TABLET ORAL at 08:43

## 2023-09-28 NOTE — PLAN OF CARE
Notified primary service of the following via secure message: Patient eating well and stated he would like IV fluids stopped. He urinated 4 times since shift started, total 800ml urine. Also, BP this morning 148/104. Gave am medications. Recheck at 1145am 145/97. Patient asymptomatic.
Problem: Chronic Conditions and Co-morbidities  Goal: Patient's chronic conditions and co-morbidity symptoms are monitored and maintained or improved  Outcome: Progressing     Problem: Nutrition Deficit:  Goal: Optimize nutritional status  Outcome: Progressing
Problem: Discharge Planning  Goal: Discharge to home or other facility with appropriate resources  9/15/2023 1400 by Zee Osorio RN  Outcome: Progressing  9/15/2023 0324 by Johanna Schmitt RN  Outcome: Progressing     Problem: Safety - Adult  Goal: Free from fall injury  Outcome: Progressing     Problem: Chronic Conditions and Co-morbidities  Goal: Patient's chronic conditions and co-morbidity symptoms are monitored and maintained or improved  Outcome: Progressing
Problem: Discharge Planning  Goal: Discharge to home or other facility with appropriate resources  9/19/2023 0119 by Rodríguez Peck RN  Outcome: Progressing  9/18/2023 1644 by Isabela Fontenot RN  Outcome: Progressing     Problem: Safety - Adult  Goal: Free from fall injury  9/19/2023 0119 by Rodríguez Peck RN  Outcome: Progressing  9/18/2023 1644 by Isabela Fontenot RN  Outcome: Progressing     Problem: Chronic Conditions and Co-morbidities  Goal: Patient's chronic conditions and co-morbidity symptoms are monitored and maintained or improved  9/19/2023 0119 by Rodríguez Peck RN  Outcome: Progressing  9/18/2023 1644 by Isabela Fontenot RN  Outcome: Progressing     Problem: Nutrition Deficit:  Goal: Optimize nutritional status  9/19/2023 0119 by Rodríguez Peck RN  Outcome: Progressing  9/18/2023 1644 by Isabela Fontenot RN  Outcome: Progressing     Problem: Skin/Tissue Integrity  Goal: Absence of new skin breakdown  Description: 1. Monitor for areas of redness and/or skin breakdown  2. Assess vascular access sites hourly  3. Every 4-6 hours minimum:  Change oxygen saturation probe site  4. Every 4-6 hours:  If on nasal continuous positive airway pressure, respiratory therapy assess nares and determine need for appliance change or resting period.   9/19/2023 0119 by Rodríguez Peck RN  Outcome: Progressing  9/18/2023 1644 by Isabela Fontenot RN  Outcome: Progressing     Problem: ABCDS Injury Assessment  Goal: Absence of physical injury  9/19/2023 0119 by Rodríguez Peck RN  Outcome: Progressing  9/18/2023 1644 by Isabela Fontenot RN  Outcome: Progressing     Problem: Pain  Goal: Verbalizes/displays adequate comfort level or baseline comfort level  9/19/2023 0119 by Rodríguez Peck RN  Outcome: Progressing  9/18/2023 1644 by Isabela Fontenot RN  Outcome: Progressing
Problem: Discharge Planning  Goal: Discharge to home or other facility with appropriate resources  9/21/2023 1414 by Jonel Peoples RN  Outcome: Progressing  9/21/2023 0335 by Shane Crews RN  Outcome: Progressing  Flowsheets (Taken 9/20/2023 1600 by Renetta Schuster RN)  Discharge to home or other facility with appropriate resources:   Identify barriers to discharge with patient and caregiver   Arrange for needed discharge resources and transportation as appropriate   Identify discharge learning needs (meds, wound care, etc)     Problem: Safety - Adult  Goal: Free from fall injury  9/21/2023 1414 by Jonel Peoples RN  Outcome: Progressing  9/21/2023 0335 by Shane Crews RN  Outcome: Progressing     Problem: Chronic Conditions and Co-morbidities  Goal: Patient's chronic conditions and co-morbidity symptoms are monitored and maintained or improved  9/21/2023 1414 by Jonel Peoples RN  Outcome: Progressing  9/21/2023 0335 by Shane Crews RN  Outcome: Progressing  Flowsheets (Taken 9/20/2023 1600 by Renetta Schuster RN)  Care Plan - Patient's Chronic Conditions and Co-Morbidity Symptoms are Monitored and Maintained or Improved:   Monitor and assess patient's chronic conditions and comorbid symptoms for stability, deterioration, or improvement   Update acute care plan with appropriate goals if chronic or comorbid symptoms are exacerbated and prevent overall improvement and discharge   Collaborate with multidisciplinary team to address chronic and comorbid conditions and prevent exacerbation or deterioration     Problem: Nutrition Deficit:  Goal: Optimize nutritional status  9/21/2023 1414 by Jonel Peoples RN  Outcome: Progressing  9/21/2023 0335 by Shane Crews RN  Outcome: Progressing     Problem: Skin/Tissue Integrity  Goal: Absence of new skin breakdown  Description: 1. Monitor for areas of redness and/or skin breakdown  2. Assess vascular access sites hourly  3.   Every 4-6 hours minimum:
Problem: Discharge Planning  Goal: Discharge to home or other facility with appropriate resources  9/22/2023 0332 by Lety Vidales RN  Outcome: Progressing  9/21/2023 1414 by Swapnil Goodwin RN  Outcome: Progressing     Problem: Safety - Adult  Goal: Free from fall injury  9/22/2023 0332 by Lety Vidales RN  Outcome: Progressing  9/21/2023 1414 by Swapnil Goodwin RN  Outcome: Progressing     Problem: Chronic Conditions and Co-morbidities  Goal: Patient's chronic conditions and co-morbidity symptoms are monitored and maintained or improved  9/22/2023 0332 by Lety Vidales RN  Outcome: Progressing  9/21/2023 1414 by Swapnil Goodwin RN  Outcome: Progressing     Problem: Nutrition Deficit:  Goal: Optimize nutritional status  9/22/2023 0332 by Lety Vidales RN  Outcome: Progressing  9/21/2023 1414 by Swapnil Goodwin RN  Outcome: Progressing     Problem: Skin/Tissue Integrity  Goal: Absence of new skin breakdown  Description: 1. Monitor for areas of redness and/or skin breakdown  2. Assess vascular access sites hourly  3. Every 4-6 hours minimum:  Change oxygen saturation probe site  4. Every 4-6 hours:  If on nasal continuous positive airway pressure, respiratory therapy assess nares and determine need for appliance change or resting period.   9/22/2023 0332 by Lety Vidales RN  Outcome: Progressing  9/21/2023 1414 by Swapnil Goodwin RN  Outcome: Progressing     Problem: ABCDS Injury Assessment  Goal: Absence of physical injury  9/22/2023 0332 by Lety Vidales RN  Outcome: Progressing  9/21/2023 1414 by Swapnil Goodwin RN  Outcome: Progressing     Problem: Pain  Goal: Verbalizes/displays adequate comfort level or baseline comfort level  9/22/2023 0332 by Lety Vidales RN  Outcome: Progressing  9/21/2023 1414 by Swapnil Goodwin RN  Outcome: Progressing
Problem: Discharge Planning  Goal: Discharge to home or other facility with appropriate resources  9/23/2023 1523 by Elizabet Palma RN  Outcome: Progressing     Problem: Safety - Adult  Goal: Free from fall injury  9/23/2023 1523 by Elizabet Palma RN  Outcome: Progressing     Problem: Chronic Conditions and Co-morbidities  Goal: Patient's chronic conditions and co-morbidity symptoms are monitored and maintained or improved  9/23/2023 1523 by Elizabet Palma RN  Outcome: Progressing     Problem: Nutrition Deficit:  Goal: Optimize nutritional status  9/23/2023 1523 by Elizabet Palma RN  Outcome: Progressing     Problem: Skin/Tissue Integrity  Goal: Absence of new skin breakdown  Description: 1. Monitor for areas of redness and/or skin breakdown  2. Assess vascular access sites hourly  3. Every 4-6 hours minimum:  Change oxygen saturation probe site  4. Every 4-6 hours:  If on nasal continuous positive airway pressure, respiratory therapy assess nares and determine need for appliance change or resting period. 9/23/2023 1523 by Elizabet Palma RN  Outcome: Progressing     Problem: ABCDS Injury Assessment  Goal: Absence of physical injury  9/23/2023 1523 by Elizabet Palma RN  Outcome: Progressing     Problem: Pain  Goal: Verbalizes/displays adequate comfort level or baseline comfort level  9/23/2023 1523 by Elizabet Palma RN  Outcome: Progressing     Care plan in progress.
Problem: Discharge Planning  Goal: Discharge to home or other facility with appropriate resources  9/24/2023 0417 by Nova Phoenix RN  Outcome: Progressing  9/23/2023 1523 by Robel Marx RN  Outcome: Progressing     Problem: Safety - Adult  Goal: Free from fall injury  9/24/2023 0417 by Nova Phoenix RN  Outcome: Progressing  9/23/2023 1523 by Robel Marx RN  Outcome: Progressing     Problem: Chronic Conditions and Co-morbidities  Goal: Patient's chronic conditions and co-morbidity symptoms are monitored and maintained or improved  9/24/2023 0417 by Nova Phoenix RN  Outcome: Progressing  9/23/2023 1523 by Robel Marx RN  Outcome: Progressing     Problem: Nutrition Deficit:  Goal: Optimize nutritional status  9/24/2023 0417 by Nova Phoenix RN  Outcome: Progressing  9/23/2023 1523 by Robel Marx RN  Outcome: Progressing     Problem: Skin/Tissue Integrity  Goal: Absence of new skin breakdown  Description: 1. Monitor for areas of redness and/or skin breakdown  2. Assess vascular access sites hourly  3. Every 4-6 hours minimum:  Change oxygen saturation probe site  4. Every 4-6 hours:  If on nasal continuous positive airway pressure, respiratory therapy assess nares and determine need for appliance change or resting period.   9/24/2023 0417 by Nova Phoenix RN  Outcome: Progressing  9/23/2023 1523 by Robel Marx RN  Outcome: Progressing     Problem: ABCDS Injury Assessment  Goal: Absence of physical injury  9/24/2023 0417 by Nova Phoenix RN  Outcome: Progressing  9/23/2023 1523 by Robel Marx RN  Outcome: Progressing     Problem: Pain  Goal: Verbalizes/displays adequate comfort level or baseline comfort level  9/24/2023 0417 by Nova Phoenix RN  Outcome: Progressing  9/23/2023 1523 by Robel Marx RN  Outcome: Progressing
Problem: Discharge Planning  Goal: Discharge to home or other facility with appropriate resources  9/24/2023 1722 by Sheryl Hudson RN  Outcome: Adequate for Discharge  Flowsheets  Taken 9/24/2023 1600  Discharge to home or other facility with appropriate resources:   Identify barriers to discharge with patient and caregiver   Arrange for needed discharge resources and transportation as appropriate   Identify discharge learning needs (meds, wound care, etc)  Taken 9/24/2023 1200  Discharge to home or other facility with appropriate resources:   Identify barriers to discharge with patient and caregiver   Arrange for needed discharge resources and transportation as appropriate   Identify discharge learning needs (meds, wound care, etc)   Arrange for interpreters to assist at discharge as needed   Refer to discharge planning if patient needs post-hospital services based on physician order or complex needs related to functional status, cognitive ability or social support system  Taken 9/24/2023 0800  Discharge to home or other facility with appropriate resources:   Identify barriers to discharge with patient and caregiver   Arrange for needed discharge resources and transportation as appropriate   Identify discharge learning needs (meds, wound care, etc)   Arrange for interpreters to assist at discharge as needed   Refer to discharge planning if patient needs post-hospital services based on physician order or complex needs related to functional status, cognitive ability or social support system  9/24/2023 0417 by Desmond Nation, RN  Outcome: Progressing     Problem: Safety - Adult  Goal: Free from fall injury  9/24/2023 1722 by Sheryl Hudson RN  Outcome: Adequate for Discharge  9/24/2023 0417 by Desmond Nation RN  Outcome: Progressing     Problem: Chronic Conditions and Co-morbidities  Goal: Patient's chronic conditions and co-morbidity symptoms are monitored and maintained or improved  9/24/2023 1722 by Sheryl Hudson
Problem: Discharge Planning  Goal: Discharge to home or other facility with appropriate resources  Outcome: Progressing
Problem: Discharge Planning  Goal: Discharge to home or other facility with appropriate resources  Outcome: Progressing     Problem: Safety - Adult  Goal: Free from fall injury  Outcome: Progressing     Problem: Chronic Conditions and Co-morbidities  Goal: Patient's chronic conditions and co-morbidity symptoms are monitored and maintained or improved  Outcome: Progressing     Problem: Nutrition Deficit:  Goal: Optimize nutritional status  Outcome: Progressing     Problem: Skin/Tissue Integrity  Goal: Absence of new skin breakdown  Description: 1. Monitor for areas of redness and/or skin breakdown  2. Assess vascular access sites hourly  3. Every 4-6 hours minimum:  Change oxygen saturation probe site  4. Every 4-6 hours:  If on nasal continuous positive airway pressure, respiratory therapy assess nares and determine need for appliance change or resting period.   Outcome: Progressing
Problem: Discharge Planning  Goal: Discharge to home or other facility with appropriate resources  Outcome: Progressing  Flowsheets (Taken 9/16/2023 1817)  Discharge to home or other facility with appropriate resources:   Identify barriers to discharge with patient and caregiver   Arrange for needed discharge resources and transportation as appropriate   Identify discharge learning needs (meds, wound care, etc)     Problem: Safety - Adult  Goal: Free from fall injury  Flowsheets (Taken 9/16/2023 1817)  Free From Fall Injury:   Instruct family/caregiver on patient safety   Based on caregiver fall risk screen, instruct family/caregiver to ask for assistance with transferring infant if caregiver noted to have fall risk factors     Problem: Chronic Conditions and Co-morbidities  Goal: Patient's chronic conditions and co-morbidity symptoms are monitored and maintained or improved  Flowsheets (Taken 9/16/2023 1817)  Care Plan - Patient's Chronic Conditions and Co-Morbidity Symptoms are Monitored and Maintained or Improved:   Monitor and assess patient's chronic conditions and comorbid symptoms for stability, deterioration, or improvement   Collaborate with multidisciplinary team to address chronic and comorbid conditions and prevent exacerbation or deterioration   Update acute care plan with appropriate goals if chronic or comorbid symptoms are exacerbated and prevent overall improvement and discharge
Problem: Discharge Planning  Goal: Discharge to home or other facility with appropriate resources  Outcome: Progressing  Flowsheets (Taken 9/16/2023 1817)  Discharge to home or other facility with appropriate resources:   Identify barriers to discharge with patient and caregiver   Arrange for needed discharge resources and transportation as appropriate   Identify discharge learning needs (meds, wound care, etc)     Problem: Safety - Adult  Goal: Free from fall injury  Outcome: Progressing  Flowsheets (Taken 9/16/2023 1817)  Free From Fall Injury:   Instruct family/caregiver on patient safety   Based on caregiver fall risk screen, instruct family/caregiver to ask for assistance with transferring infant if caregiver noted to have fall risk factors
Problem: Discharge Planning  Goal: Discharge to home or other facility with appropriate resources  Outcome: Progressing  Flowsheets (Taken 9/16/2023 1817)  Discharge to home or other facility with appropriate resources:   Identify barriers to discharge with patient and caregiver   Arrange for needed discharge resources and transportation as appropriate   Identify discharge learning needs (meds, wound care, etc)     Problem: Safety - Adult  Goal: Free from fall injury  Outcome: Progressing  Flowsheets (Taken 9/16/2023 1817)  Free From Fall Injury:   Instruct family/caregiver on patient safety   Based on caregiver fall risk screen, instruct family/caregiver to ask for assistance with transferring infant if caregiver noted to have fall risk factors     Problem: Chronic Conditions and Co-morbidities  Goal: Patient's chronic conditions and co-morbidity symptoms are monitored and maintained or improved  9/16/2023 1817 by Ezequiel Shepard RN  Outcome: Progressing  [address] (Taken 9/16/2023 1817)  Care Plan - Patient's Chronic Conditions and Co-Morbidity Symptoms are Monitored and Maintained or Improved:   Monitor and assess patient's chronic conditions and comorbid symptoms for stability, deterioration, or improvement   Collaborate with multidisciplinary team to address chronic and comorbid conditions and prevent exacerbation or deterioration   Update acute care plan with appropriate goals if chronic or comorbid symptoms are exacerbated and prevent overall improvement and discharge  9/16/2023 0448 by Gregor Morgan RN  Outcome: Progressing     Problem: Nutrition Deficit:  Goal: Optimize nutritional status  9/16/2023 1817 by Ezequiel Shepard RN  Flowsheets (Taken 9/16/2023 1817)  Nutrient intake appropriate for improving, restoring, or maintaining nutritional needs:   Assess nutritional status and recommend course of action   Monitor oral intake, labs, and treatment plans   Recommend appropriate diets, oral nutritional
Problem: Discharge Planning  Goal: Discharge to home or other facility with appropriate resources  Outcome: Progressing  Flowsheets (Taken 9/20/2023 1600 by Clark Schirmer, RN)  Discharge to home or other facility with appropriate resources:   Identify barriers to discharge with patient and caregiver   Arrange for needed discharge resources and transportation as appropriate   Identify discharge learning needs (meds, wound care, etc)     Problem: Safety - Adult  Goal: Free from fall injury  Outcome: Progressing     Problem: Chronic Conditions and Co-morbidities  Goal: Patient's chronic conditions and co-morbidity symptoms are monitored and maintained or improved  Outcome: Progressing  Flowsheets (Taken 9/20/2023 1600 by Clark Schirmer, RN)  Care Plan - Patient's Chronic Conditions and Co-Morbidity Symptoms are Monitored and Maintained or Improved:   Monitor and assess patient's chronic conditions and comorbid symptoms for stability, deterioration, or improvement   Update acute care plan with appropriate goals if chronic or comorbid symptoms are exacerbated and prevent overall improvement and discharge   Collaborate with multidisciplinary team to address chronic and comorbid conditions and prevent exacerbation or deterioration     Problem: Nutrition Deficit:  Goal: Optimize nutritional status  Outcome: Progressing     Problem: Skin/Tissue Integrity  Goal: Absence of new skin breakdown  Description: 1. Monitor for areas of redness and/or skin breakdown  2. Assess vascular access sites hourly  3. Every 4-6 hours minimum:  Change oxygen saturation probe site  4. Every 4-6 hours:  If on nasal continuous positive airway pressure, respiratory therapy assess nares and determine need for appliance change or resting period.   Outcome: Progressing     Problem: ABCDS Injury Assessment  Goal: Absence of physical injury  Outcome: Progressing     Problem: Pain  Goal: Verbalizes/displays adequate comfort level or baseline
Problem: Discharge Planning  Goal: Discharge to home or other facility with appropriate resources  Outcome: Progressing  Flowsheets (Taken 9/22/2023 2100)  Discharge to home or other facility with appropriate resources:   Identify barriers to discharge with patient and caregiver   Arrange for needed discharge resources and transportation as appropriate   Identify discharge learning needs (meds, wound care, etc)   Refer to discharge planning if patient needs post-hospital services based on physician order or complex needs related to functional status, cognitive ability or social support system     Problem: Safety - Adult  Goal: Free from fall injury  Outcome: Progressing  Flowsheets (Taken 9/22/2023 2100)  Free From Fall Injury: Instruct family/caregiver on patient safety     Problem: Chronic Conditions and Co-morbidities  Goal: Patient's chronic conditions and co-morbidity symptoms are monitored and maintained or improved  Outcome: Progressing  Flowsheets (Taken 9/22/2023 2100)  Care Plan - Patient's Chronic Conditions and Co-Morbidity Symptoms are Monitored and Maintained or Improved:   Monitor and assess patient's chronic conditions and comorbid symptoms for stability, deterioration, or improvement   Collaborate with multidisciplinary team to address chronic and comorbid conditions and prevent exacerbation or deterioration   Update acute care plan with appropriate goals if chronic or comorbid symptoms are exacerbated and prevent overall improvement and discharge     Problem: Skin/Tissue Integrity  Goal: Absence of new skin breakdown  Description: 1. Monitor for areas of redness and/or skin breakdown  2. Assess vascular access sites hourly  3. Every 4-6 hours minimum:  Change oxygen saturation probe site  4. Every 4-6 hours:  If on nasal continuous positive airway pressure, respiratory therapy assess nares and determine need for appliance change or resting period.   Outcome: Progressing
Problem: Safety - Adult  Goal: Free from fall injury  9/17/2023 0214 by María Linder RN  Outcome: Progressing     Problem: ABCDS Injury Assessment  Goal: Absence of physical injury  Outcome: Progressing
Problem: Safety - Adult  Goal: Free from fall injury  9/24/2023 1722 by Bc Sloan RN  Outcome: Adequate for Discharge     Problem: Safety - Adult  Goal: Free from fall injury  Outcome: Progressing     Problem: Chronic Conditions and Co-morbidities  Goal: Patient's chronic conditions and co-morbidity symptoms are monitored and maintained or improved  9/24/2023 1722 by Bc Sloan RN  Outcome: Adequate for Discharge  Flowsheets (Taken 9/24/2023 1600)  Care Plan - Patient's Chronic Conditions and Co-Morbidity Symptoms are Monitored and Maintained or Improved:   Monitor and assess patient's chronic conditions and comorbid symptoms for stability, deterioration, or improvement   Collaborate with multidisciplinary team to address chronic and comorbid conditions and prevent exacerbation or deterioration   Update acute care plan with appropriate goals if chronic or comorbid symptoms are exacerbated and prevent overall improvement and discharge     Problem: Nutrition Deficit:  Goal: Optimize nutritional status  9/24/2023 1722 by Bc Sloan RN  Outcome: Adequate for Discharge     Problem: Skin/Tissue Integrity  Goal: Absence of new skin breakdown  Description: 1. Monitor for areas of redness and/or skin breakdown  2. Assess vascular access sites hourly  3. Every 4-6 hours minimum:  Change oxygen saturation probe site  4. Every 4-6 hours:  If on nasal continuous positive airway pressure, respiratory therapy assess nares and determine need for appliance change or resting period.   9/24/2023 1722 by Bc Sloan RN  Outcome: Adequate for Discharge     Problem: ABCDS Injury Assessment  Goal: Absence of physical injury  9/24/2023 1722 by Bc Sloan RN  Outcome: Adequate for Discharge
Problem: Safety - Adult  Goal: Free from fall injury  9/26/2023 0004 by Hoda Hernandez RN  Outcome: Progressing  Flowsheets (Taken 9/25/2023 2130)  Free From Fall Injury: Instruct family/caregiver on patient safety  9/25/2023 1838 by Eda Abrams RN  Outcome: Progressing  Flowsheets (Taken 9/25/2023 1838)  Free From Fall Injury: Instruct family/caregiver on patient safety     Problem: Pain  Goal: Verbalizes/displays adequate comfort level or baseline comfort level  9/26/2023 0004 by Hoda Hernandez RN  Outcome: Progressing  9/25/2023 1838 by Eda Abrams RN  Outcome: Progressing  Flowsheets (Taken 9/25/2023 1838)  Verbalizes/displays adequate comfort level or baseline comfort level: Assess pain using appropriate pain scale
Problem: Safety - Adult  Goal: Free from fall injury  9/26/2023 0846 by Sanford Spurling, RN  Outcome: Progressing     Problem: Pain  Goal: Verbalizes/displays adequate comfort level or baseline comfort level  9/26/2023 0846 by Sanford Spurling, RN  Outcome: Progressing
Problem: Safety - Adult  Goal: Free from fall injury  9/26/2023 2252 by Rolando Fuller RN  Outcome: Progressing  9/26/2023 2251 by Rolando Fuller RN  Outcome: Progressing  Flowsheets (Taken 9/26/2023 2030)  Free From Fall Injury: Instruct family/caregiver on patient safety     Problem: Pain  Goal: Verbalizes/displays adequate comfort level or baseline comfort level  9/26/2023 2252 by Rolando Fuller RN  Outcome: Progressing  9/26/2023 2251 by Rolando Fuller RN  Outcome: Progressing
Problem: Safety - Adult  Goal: Free from fall injury  9/28/2023 0116 by Vergia Sacks, RN  Outcome: Progressing
Problem: Safety - Adult  Goal: Free from fall injury  9/28/2023 1412 by Ban Sanchez RN  Outcome: Adequate for Discharge  9/28/2023 0116 by Trinity Judd RN  Outcome: Progressing     Problem: Pain  Goal: Verbalizes/displays adequate comfort level or baseline comfort level  Outcome: Adequate for Discharge  Flowsheets (Taken 9/28/2023 0700)  Verbalizes/displays adequate comfort level or baseline comfort level:   Encourage patient to monitor pain and request assistance   Assess pain using appropriate pain scale   Administer analgesics based on type and severity of pain and evaluate response
Problem: Safety - Adult  Goal: Free from fall injury  Outcome: Adequate for Discharge     Problem: Pain  Goal: Verbalizes/displays adequate comfort level or baseline comfort level  Outcome: Adequate for Discharge  Flowsheets  Taken 9/28/2023 1115  Verbalizes/displays adequate comfort level or baseline comfort level:   Encourage patient to monitor pain and request assistance   Assess pain using appropriate pain scale  Taken 9/28/2023 0700  Verbalizes/displays adequate comfort level or baseline comfort level:   Encourage patient to monitor pain and request assistance   Assess pain using appropriate pain scale   Administer analgesics based on type and severity of pain and evaluate response
Problem: Safety - Adult  Goal: Free from fall injury  Outcome: Progressing     Problem: ABCDS Injury Assessment  Goal: Absence of physical injury  Recent Flowsheet Documentation  Taken 9/27/2023 2421 by Bella Alvarez RN  Absence of Physical Injury: Implement safety measures based on patient assessment     Problem: Pain  Goal: Verbalizes/displays adequate comfort level or baseline comfort level  Outcome: Progressing
Problem: Safety - Adult  Goal: Free from fall injury  Recent Flowsheet Documentation  Taken 9/25/2023 1838 by Ciaran Meade RN  Free From Fall Injury: Instruct family/caregiver on patient safety       Problem: Pain  Goal: Verbalizes/displays adequate comfort level or baseline comfort level  Recent Flowsheet Documentation  Taken 9/25/2023 1838 by Ciaran Meade RN  Verbalizes/displays adequate comfort level or baseline comfort level: Assess pain using appropriate pain scale
Pt refused 1530 vitals stating \"get out of my room\" after trying twice. Writer will try again soon.
Received a text from patient's nurse that patient is not very responsive, he is only moving a little bit on painful stimuli. Not responding to voice. Evaluated patient at bedside, earlier during the day patient was very agitated. Patient was given Narcan of 0.4 mg IV x2, patient started opening his eyes. He responded to simple questions told me his name and that he is in the hospital.  Patient had a visitor today, one of his friend. There is a high possibility that patient was given an opioid or any other drug during the visit. Ordering U-Tox. Placing a restriction on visitors.   Continue to monitor mental status
Telesitter, and  discontinued.
breakdown  2. Assess vascular access sites hourly  3. Every 4-6 hours minimum:  Change oxygen saturation probe site  4. Every 4-6 hours:  If on nasal continuous positive airway pressure, respiratory therapy assess nares and determine need for appliance change or resting period.   Outcome: Progressing     Problem: ABCDS Injury Assessment  Goal: Absence of physical injury  Outcome: Progressing     Problem: Pain  Goal: Verbalizes/displays adequate comfort level or baseline comfort level  Outcome: Progressing

## 2023-09-28 NOTE — CARE COORDINATION
Transitional planning note: plan is [facility]. Spoke with Ellison Bamberger and Kelsey Means is pending. CM advised that patient is ready for discharge as soon as Kelsey Means is approved. 1445: patient approved for [facility]. Faxed rquest to [facility] for 5PM stretcher transport    1545: received call from [facility] stating they have patient scheduled for 645pm stretcher . Notified patient of above and he is agreeable to plan. Call placed to Josephine Rudolph in admissions at [facility] and notified of 645pm . Faxed adele to facility. HENS was already completed and facility has it.     1600: call placed to patient's father Lisa Mendoza to notify patient will be discharging to [facility] with 645pm .

## 2023-10-17 ENCOUNTER — HOSPITAL ENCOUNTER (INPATIENT)
Age: 58
LOS: 10 days | Discharge: OTHER INSTITUTION | End: 2023-10-28
Attending: EMERGENCY MEDICINE | Admitting: INTERNAL MEDICINE
Payer: COMMERCIAL

## 2023-10-17 ENCOUNTER — APPOINTMENT (OUTPATIENT)
Dept: GENERAL RADIOLOGY | Age: 58
End: 2023-10-17
Payer: COMMERCIAL

## 2023-10-17 DIAGNOSIS — J18.9 PNEUMONIA DUE TO INFECTIOUS ORGANISM, UNSPECIFIED LATERALITY, UNSPECIFIED PART OF LUNG: Primary | ICD-10-CM

## 2023-10-17 LAB
ALBUMIN SERPL-MCNC: 3.6 G/DL (ref 3.5–5.2)
ALP SERPL-CCNC: 57 U/L (ref 40–129)
ALT SERPL-CCNC: 40 U/L (ref 5–41)
AMORPH SED URNS QL MICRO: ABNORMAL
ANION GAP SERPL CALCULATED.3IONS-SCNC: 14 MMOL/L (ref 9–17)
AST SERPL-CCNC: 22 U/L
BASOPHILS # BLD: 0.07 K/UL (ref 0–0.2)
BASOPHILS NFR BLD: 0 % (ref 0–2)
BILIRUB SERPL-MCNC: 0.5 MG/DL (ref 0.3–1.2)
BILIRUB UR QL STRIP: NEGATIVE
BUN SERPL-MCNC: 45 MG/DL (ref 6–20)
BUN/CREAT SERPL: 56 (ref 9–20)
CALCIUM SERPL-MCNC: 9.6 MG/DL (ref 8.6–10.4)
CASTS #/AREA URNS LPF: ABNORMAL /LPF
CASTS #/AREA URNS LPF: ABNORMAL /LPF
CHLORIDE SERPL-SCNC: 102 MMOL/L (ref 98–107)
CLARITY UR: ABNORMAL
CO2 SERPL-SCNC: 24 MMOL/L (ref 20–31)
COLOR UR: YELLOW
CREAT SERPL-MCNC: 0.8 MG/DL (ref 0.7–1.2)
EOSINOPHIL # BLD: <0.03 K/UL (ref 0–0.44)
EOSINOPHILS RELATIVE PERCENT: 0 % (ref 1–4)
EPI CELLS #/AREA URNS HPF: ABNORMAL /HPF (ref 0–5)
ERYTHROCYTE [DISTWIDTH] IN BLOOD BY AUTOMATED COUNT: 13.1 % (ref 11.8–14.4)
GFR SERPL CREATININE-BSD FRML MDRD: >60 ML/MIN/1.73M2
GLUCOSE SERPL-MCNC: 92 MG/DL (ref 70–99)
GLUCOSE UR STRIP-MCNC: NEGATIVE MG/DL
HCT VFR BLD AUTO: 46.9 % (ref 40.7–50.3)
HGB BLD-MCNC: 15.6 G/DL (ref 13–17)
HGB UR QL STRIP.AUTO: ABNORMAL
IMM GRANULOCYTES # BLD AUTO: 0.13 K/UL (ref 0–0.3)
IMM GRANULOCYTES NFR BLD: 1 %
KETONES UR STRIP-MCNC: NEGATIVE MG/DL
LACTATE BLDV-SCNC: 2.3 MMOL/L (ref 0.5–2.2)
LEUKOCYTE ESTERASE UR QL STRIP: NEGATIVE
LYMPHOCYTES NFR BLD: 2.35 K/UL (ref 1.1–3.7)
LYMPHOCYTES RELATIVE PERCENT: 14 % (ref 24–43)
MCH RBC QN AUTO: 30.6 PG (ref 25.2–33.5)
MCHC RBC AUTO-ENTMCNC: 33.3 G/DL (ref 28.4–34.8)
MCV RBC AUTO: 92 FL (ref 82.6–102.9)
MONOCYTES NFR BLD: 1.44 K/UL (ref 0.1–1.2)
MONOCYTES NFR BLD: 9 % (ref 3–12)
NEUTROPHILS NFR BLD: 76 % (ref 36–65)
NEUTS SEG NFR BLD: 12.38 K/UL (ref 1.5–8.1)
NITRITE UR QL STRIP: NEGATIVE
NRBC BLD-RTO: 0 PER 100 WBC
PH UR STRIP: 6 [PH] (ref 5–8)
PLATELET # BLD AUTO: 230 K/UL (ref 138–453)
PMV BLD AUTO: 10.5 FL (ref 8.1–13.5)
POTASSIUM SERPL-SCNC: 3.8 MMOL/L (ref 3.7–5.3)
PROT SERPL-MCNC: 7.5 G/DL (ref 6.4–8.3)
PROT UR STRIP-MCNC: ABNORMAL MG/DL
RBC # BLD AUTO: 5.1 M/UL (ref 4.21–5.77)
RBC #/AREA URNS HPF: ABNORMAL /HPF (ref 0–2)
SODIUM SERPL-SCNC: 140 MMOL/L (ref 135–144)
SP GR UR STRIP: 1.02 (ref 1–1.03)
TROPONIN I SERPL HS-MCNC: 10 NG/L (ref 0–22)
UROBILINOGEN UR STRIP-ACNC: NORMAL EU/DL (ref 0–1)
WBC #/AREA URNS HPF: ABNORMAL /HPF (ref 0–5)
WBC OTHER # BLD: 16.4 K/UL (ref 3.5–11.3)

## 2023-10-17 PROCEDURE — 80307 DRUG TEST PRSMV CHEM ANLYZR: CPT

## 2023-10-17 PROCEDURE — 96367 TX/PROPH/DG ADDL SEQ IV INF: CPT

## 2023-10-17 PROCEDURE — 2580000003 HC RX 258: Performed by: EMERGENCY MEDICINE

## 2023-10-17 PROCEDURE — 71045 X-RAY EXAM CHEST 1 VIEW: CPT

## 2023-10-17 PROCEDURE — 96366 THER/PROPH/DIAG IV INF ADDON: CPT

## 2023-10-17 PROCEDURE — 96365 THER/PROPH/DIAG IV INF INIT: CPT

## 2023-10-17 PROCEDURE — 99285 EMERGENCY DEPT VISIT HI MDM: CPT

## 2023-10-17 PROCEDURE — 85025 COMPLETE CBC W/AUTO DIFF WBC: CPT

## 2023-10-17 PROCEDURE — 81001 URINALYSIS AUTO W/SCOPE: CPT

## 2023-10-17 PROCEDURE — 93005 ELECTROCARDIOGRAM TRACING: CPT | Performed by: EMERGENCY MEDICINE

## 2023-10-17 PROCEDURE — 84484 ASSAY OF TROPONIN QUANT: CPT

## 2023-10-17 PROCEDURE — 83605 ASSAY OF LACTIC ACID: CPT

## 2023-10-17 PROCEDURE — 80053 COMPREHEN METABOLIC PANEL: CPT

## 2023-10-17 PROCEDURE — 6360000002 HC RX W HCPCS: Performed by: EMERGENCY MEDICINE

## 2023-10-17 PROCEDURE — 84145 PROCALCITONIN (PCT): CPT

## 2023-10-17 RX ORDER — 0.9 % SODIUM CHLORIDE 0.9 %
1000 INTRAVENOUS SOLUTION INTRAVENOUS ONCE
Status: COMPLETED | OUTPATIENT
Start: 2023-10-17 | End: 2023-10-18

## 2023-10-17 RX ADMIN — CEFTRIAXONE SODIUM 1000 MG: 1 INJECTION, POWDER, FOR SOLUTION INTRAMUSCULAR; INTRAVENOUS at 21:06

## 2023-10-17 RX ADMIN — SODIUM CHLORIDE 1000 ML: 9 INJECTION, SOLUTION INTRAVENOUS at 21:04

## 2023-10-17 RX ADMIN — AZITHROMYCIN MONOHYDRATE 500 MG: 500 INJECTION, POWDER, LYOPHILIZED, FOR SOLUTION INTRAVENOUS at 21:54

## 2023-10-17 ASSESSMENT — PAIN - FUNCTIONAL ASSESSMENT: PAIN_FUNCTIONAL_ASSESSMENT: NONE - DENIES PAIN

## 2023-10-17 NOTE — ED PROVIDER NOTES
EMERGENCY DEPARTMENT ENCOUNTER    Pt Name: Ambreen Palacio  MRN: [id number]  [address] [address] 1965  Date of evaluation: 10/17/23  CHIEF COMPLAINT       Chief Complaint   Patient presents with    Failure To Thrive     friend states need to be placed unable to take care of self. Hx of stroke 7/2023     HISTORY OF PRESENT ILLNESS   81-year-old male presents emergency room for placement. It is unclear how patient arrived here tonight. Patient has substantial speech impairment affecting our communication. He is able to answer yes and no. Patient does not appear to be in any pain. He does report increased cough. Patient has some vomit on his shirt and does report he has been vomiting for a day or so. REVIEW OF SYSTEMS     Review of Systems   Unable to perform ROS: Other     PASTMEDICAL HISTORY     Past Medical History:   Diagnosis Date    Bipolar 1 disorder ([address])     Chronic mental illness     Depression     Hepatitis C     Hypertension     Psoriasis     Substance abuse ([address])      Past Problem List  Patient Active Problem List   Diagnosis Code    Hepatitis C B19.20    Psoriasis L40.9    Hypertension I10    Smoker F17.200    Opioid type dependence, continuous ([address]) F11.20    Bipolar 1 disorder ([address]) F31.9    Cocaine abuse ([address]) F14.10    IV drug abuse ([address]) F19.10    History of alcohol abuse F10.11    Generalized pain R52    Generalized anxiety disorder F41.1    Abnormal CT scan, esophagus R93.3    Chest pain R07.9    Altered mental status R41.82    AMS (altered mental status) R41.82    Acute pulmonary embolism (HCC) I26.99    Non-recurrent bilateral inguinal hernia without obstruction or gangrene K40.20    Hypertensive urgency I16.0    Intraparenchymal hemorrhage of brain (HCC) I61.9    TBI (traumatic brain injury) ([address]) S06. 9XAA    Right sided weakness R53.1    Stroke (cerebrum) ([facility]) I63.9    Oliguria R34    Drug use F19.90    Pneumonia due to infectious agent J18.9     SURGICAL HISTORY       Past

## 2023-10-18 PROBLEM — R47.9 SPEECH ABNORMALITY: Status: ACTIVE | Noted: 2023-07-04

## 2023-10-18 PROBLEM — R41.841 COGNITIVE COMMUNICATION DEFICIT: Status: ACTIVE | Noted: 2023-08-08

## 2023-10-18 PROBLEM — Z78.9 UNABLE TO CARE FOR SELF: Status: ACTIVE | Noted: 2023-10-18

## 2023-10-18 PROBLEM — I69.391 DYSPHAGIA FOLLOWING CEREBRAL INFARCTION: Status: ACTIVE | Noted: 2023-07-07

## 2023-10-18 PROBLEM — G81.91 HEMIPLEGIA, UNSPECIFIED AFFECTING RIGHT DOMINANT SIDE (HCC): Status: ACTIVE | Noted: 2023-08-08

## 2023-10-18 PROBLEM — J18.9 PNEUMONIA DUE TO INFECTIOUS AGENT: Status: ACTIVE | Noted: 2023-10-18

## 2023-10-18 LAB
AMPHET UR QL SCN: NEGATIVE
ANION GAP SERPL CALCULATED.3IONS-SCNC: 11 MMOL/L (ref 9–17)
BARBITURATES UR QL SCN: NEGATIVE
BASOPHILS # BLD: 0.04 K/UL (ref 0–0.2)
BASOPHILS NFR BLD: 0 % (ref 0–2)
BENZODIAZ UR QL: NEGATIVE
BUN SERPL-MCNC: 34 MG/DL (ref 6–20)
BUN/CREAT SERPL: 49 (ref 9–20)
CALCIUM SERPL-MCNC: 8.8 MG/DL (ref 8.6–10.4)
CANNABINOIDS UR QL SCN: NEGATIVE
CHLORIDE SERPL-SCNC: 106 MMOL/L (ref 98–107)
CO2 SERPL-SCNC: 22 MMOL/L (ref 20–31)
COCAINE UR QL SCN: POSITIVE
CREAT SERPL-MCNC: 0.7 MG/DL (ref 0.7–1.2)
EKG ATRIAL RATE: 86 BPM
EKG P AXIS: 22 DEGREES
EKG P-R INTERVAL: 144 MS
EKG Q-T INTERVAL: 370 MS
EKG QRS DURATION: 94 MS
EKG QTC CALCULATION (BAZETT): 442 MS
EKG R AXIS: -4 DEGREES
EKG T AXIS: 56 DEGREES
EKG VENTRICULAR RATE: 86 BPM
EOSINOPHIL # BLD: 0.12 K/UL (ref 0–0.44)
EOSINOPHILS RELATIVE PERCENT: 1 % (ref 1–4)
ERYTHROCYTE [DISTWIDTH] IN BLOOD BY AUTOMATED COUNT: 13 % (ref 11.8–14.4)
FENTANYL UR QL: POSITIVE
GFR SERPL CREATININE-BSD FRML MDRD: >60 ML/MIN/1.73M2
GLUCOSE SERPL-MCNC: 115 MG/DL (ref 70–99)
HCT VFR BLD AUTO: 41.6 % (ref 40.7–50.3)
HGB BLD-MCNC: 13.5 G/DL (ref 13–17)
IMM GRANULOCYTES # BLD AUTO: 0.05 K/UL (ref 0–0.3)
IMM GRANULOCYTES NFR BLD: 0 %
LACTATE BLDV-SCNC: 1.3 MMOL/L (ref 0.5–2.2)
LYMPHOCYTES NFR BLD: 2.28 K/UL (ref 1.1–3.7)
LYMPHOCYTES RELATIVE PERCENT: 19 % (ref 24–43)
MCH RBC QN AUTO: 30.1 PG (ref 25.2–33.5)
MCHC RBC AUTO-ENTMCNC: 32.5 G/DL (ref 28.4–34.8)
MCV RBC AUTO: 92.7 FL (ref 82.6–102.9)
METHADONE UR QL: NEGATIVE
MONOCYTES NFR BLD: 1.03 K/UL (ref 0.1–1.2)
MONOCYTES NFR BLD: 8 % (ref 3–12)
NEUTROPHILS NFR BLD: 71 % (ref 36–65)
NEUTS SEG NFR BLD: 8.68 K/UL (ref 1.5–8.1)
NRBC BLD-RTO: 0 PER 100 WBC
OPIATES UR QL SCN: NEGATIVE
OXYCODONE UR QL SCN: POSITIVE
PCP UR QL SCN: NEGATIVE
PLATELET # BLD AUTO: 197 K/UL (ref 138–453)
PMV BLD AUTO: 10.7 FL (ref 8.1–13.5)
POTASSIUM SERPL-SCNC: 3.6 MMOL/L (ref 3.7–5.3)
PROCALCITONIN SERPL-MCNC: 0.48 NG/ML
RBC # BLD AUTO: 4.49 M/UL (ref 4.21–5.77)
SARS-COV-2 RDRP RESP QL NAA+PROBE: NOT DETECTED
SODIUM SERPL-SCNC: 139 MMOL/L (ref 135–144)
SPECIMEN DESCRIPTION: NORMAL
TEST INFORMATION: ABNORMAL
TROPONIN I SERPL HS-MCNC: 10 NG/L (ref 0–22)
WBC OTHER # BLD: 12.2 K/UL (ref 3.5–11.3)

## 2023-10-18 PROCEDURE — 1200000000 HC SEMI PRIVATE

## 2023-10-18 PROCEDURE — 87040 BLOOD CULTURE FOR BACTERIA: CPT

## 2023-10-18 PROCEDURE — 6370000000 HC RX 637 (ALT 250 FOR IP): Performed by: NURSE PRACTITIONER

## 2023-10-18 PROCEDURE — 85025 COMPLETE CBC W/AUTO DIFF WBC: CPT

## 2023-10-18 PROCEDURE — 97530 THERAPEUTIC ACTIVITIES: CPT

## 2023-10-18 PROCEDURE — 97163 PT EVAL HIGH COMPLEX 45 MIN: CPT

## 2023-10-18 PROCEDURE — 2580000003 HC RX 258: Performed by: NURSE PRACTITIONER

## 2023-10-18 PROCEDURE — 97535 SELF CARE MNGMENT TRAINING: CPT

## 2023-10-18 PROCEDURE — 87635 SARS-COV-2 COVID-19 AMP PRB: CPT

## 2023-10-18 PROCEDURE — 94761 N-INVAS EAR/PLS OXIMETRY MLT: CPT

## 2023-10-18 PROCEDURE — 80048 BASIC METABOLIC PNL TOTAL CA: CPT

## 2023-10-18 PROCEDURE — APPSS30 APP SPLIT SHARED TIME 16-30 MINUTES: Performed by: NURSE PRACTITIONER

## 2023-10-18 PROCEDURE — 99222 1ST HOSP IP/OBS MODERATE 55: CPT | Performed by: INTERNAL MEDICINE

## 2023-10-18 PROCEDURE — 83605 ASSAY OF LACTIC ACID: CPT

## 2023-10-18 PROCEDURE — 6360000002 HC RX W HCPCS: Performed by: NURSE PRACTITIONER

## 2023-10-18 PROCEDURE — 36415 COLL VENOUS BLD VENIPUNCTURE: CPT

## 2023-10-18 PROCEDURE — 97167 OT EVAL HIGH COMPLEX 60 MIN: CPT

## 2023-10-18 PROCEDURE — 84484 ASSAY OF TROPONIN QUANT: CPT

## 2023-10-18 RX ORDER — ALBUTEROL SULFATE 2.5 MG/3ML
2.5 SOLUTION RESPIRATORY (INHALATION)
Status: DISCONTINUED | OUTPATIENT
Start: 2023-10-18 | End: 2023-10-18

## 2023-10-18 RX ORDER — TAMSULOSIN HYDROCHLORIDE 0.4 MG/1
0.4 CAPSULE ORAL DAILY
Status: DISCONTINUED | OUTPATIENT
Start: 2023-10-18 | End: 2023-10-28 | Stop reason: HOSPADM

## 2023-10-18 RX ORDER — GUAIFENESIN/DEXTROMETHORPHAN 100-10MG/5
5 SYRUP ORAL EVERY 4 HOURS PRN
Status: DISCONTINUED | OUTPATIENT
Start: 2023-10-18 | End: 2023-10-28 | Stop reason: HOSPADM

## 2023-10-18 RX ORDER — SODIUM CHLORIDE 0.9 % (FLUSH) 0.9 %
10 SYRINGE (ML) INJECTION PRN
Status: DISCONTINUED | OUTPATIENT
Start: 2023-10-18 | End: 2023-10-28 | Stop reason: HOSPADM

## 2023-10-18 RX ORDER — LISINOPRIL 10 MG/1
10 TABLET ORAL DAILY
Status: DISCONTINUED | OUTPATIENT
Start: 2023-10-18 | End: 2023-10-28 | Stop reason: HOSPADM

## 2023-10-18 RX ORDER — SODIUM CHLORIDE 9 MG/ML
INJECTION, SOLUTION INTRAVENOUS CONTINUOUS
Status: DISCONTINUED | OUTPATIENT
Start: 2023-10-18 | End: 2023-10-19

## 2023-10-18 RX ORDER — ACETAMINOPHEN 325 MG/1
650 TABLET ORAL EVERY 6 HOURS PRN
Status: DISCONTINUED | OUTPATIENT
Start: 2023-10-18 | End: 2023-10-28 | Stop reason: HOSPADM

## 2023-10-18 RX ORDER — ATORVASTATIN CALCIUM 40 MG/1
40 TABLET, FILM COATED ORAL NIGHTLY
Status: DISCONTINUED | OUTPATIENT
Start: 2023-10-18 | End: 2023-10-28 | Stop reason: HOSPADM

## 2023-10-18 RX ORDER — SODIUM CHLORIDE 9 MG/ML
INJECTION, SOLUTION INTRAVENOUS PRN
Status: DISCONTINUED | OUTPATIENT
Start: 2023-10-18 | End: 2023-10-28 | Stop reason: HOSPADM

## 2023-10-18 RX ORDER — ENOXAPARIN SODIUM 100 MG/ML
40 INJECTION SUBCUTANEOUS DAILY
Status: DISCONTINUED | OUTPATIENT
Start: 2023-10-18 | End: 2023-10-28 | Stop reason: HOSPADM

## 2023-10-18 RX ORDER — IPRATROPIUM BROMIDE AND ALBUTEROL SULFATE 2.5; .5 MG/3ML; MG/3ML
1 SOLUTION RESPIRATORY (INHALATION) EVERY 4 HOURS PRN
Status: DISCONTINUED | OUTPATIENT
Start: 2023-10-18 | End: 2023-10-28 | Stop reason: HOSPADM

## 2023-10-18 RX ORDER — IPRATROPIUM BROMIDE AND ALBUTEROL SULFATE 2.5; .5 MG/3ML; MG/3ML
1 SOLUTION RESPIRATORY (INHALATION)
Status: DISCONTINUED | OUTPATIENT
Start: 2023-10-18 | End: 2023-10-18

## 2023-10-18 RX ORDER — ACETAMINOPHEN 650 MG/1
650 SUPPOSITORY RECTAL EVERY 6 HOURS PRN
Status: DISCONTINUED | OUTPATIENT
Start: 2023-10-18 | End: 2023-10-28 | Stop reason: HOSPADM

## 2023-10-18 RX ORDER — DOCUSATE SODIUM 100 MG/1
100 CAPSULE, LIQUID FILLED ORAL DAILY
Status: DISCONTINUED | OUTPATIENT
Start: 2023-10-18 | End: 2023-10-28 | Stop reason: HOSPADM

## 2023-10-18 RX ORDER — AZITHROMYCIN 250 MG/1
500 TABLET, FILM COATED ORAL EVERY 24 HOURS
Status: COMPLETED | OUTPATIENT
Start: 2023-10-18 | End: 2023-10-19

## 2023-10-18 RX ORDER — GAUZE BANDAGE 2" X 2"
100 BANDAGE TOPICAL DAILY
Status: DISCONTINUED | OUTPATIENT
Start: 2023-10-18 | End: 2023-10-28 | Stop reason: HOSPADM

## 2023-10-18 RX ORDER — ALBUTEROL SULFATE 2.5 MG/3ML
2.5 SOLUTION RESPIRATORY (INHALATION) EVERY 4 HOURS PRN
Status: DISCONTINUED | OUTPATIENT
Start: 2023-10-18 | End: 2023-10-28 | Stop reason: HOSPADM

## 2023-10-18 RX ORDER — ONDANSETRON 4 MG/1
4 TABLET, ORALLY DISINTEGRATING ORAL EVERY 8 HOURS PRN
Status: DISCONTINUED | OUTPATIENT
Start: 2023-10-18 | End: 2023-10-28 | Stop reason: HOSPADM

## 2023-10-18 RX ORDER — ONDANSETRON 2 MG/ML
4 INJECTION INTRAMUSCULAR; INTRAVENOUS EVERY 6 HOURS PRN
Status: DISCONTINUED | OUTPATIENT
Start: 2023-10-18 | End: 2023-10-28 | Stop reason: HOSPADM

## 2023-10-18 RX ORDER — FOLIC ACID 1 MG/1
1 TABLET ORAL DAILY
Status: DISCONTINUED | OUTPATIENT
Start: 2023-10-18 | End: 2023-10-28 | Stop reason: HOSPADM

## 2023-10-18 RX ORDER — AMLODIPINE BESYLATE 10 MG/1
10 TABLET ORAL DAILY
Status: DISCONTINUED | OUTPATIENT
Start: 2023-10-18 | End: 2023-10-28 | Stop reason: HOSPADM

## 2023-10-18 RX ORDER — SODIUM CHLORIDE 0.9 % (FLUSH) 0.9 %
5-40 SYRINGE (ML) INJECTION EVERY 12 HOURS SCHEDULED
Status: DISCONTINUED | OUTPATIENT
Start: 2023-10-18 | End: 2023-10-28 | Stop reason: HOSPADM

## 2023-10-18 RX ADMIN — AMLODIPINE BESYLATE 10 MG: 10 TABLET ORAL at 12:13

## 2023-10-18 RX ADMIN — SODIUM CHLORIDE, PRESERVATIVE FREE 10 ML: 5 INJECTION INTRAVENOUS at 20:56

## 2023-10-18 RX ADMIN — CEFTRIAXONE SODIUM 1000 MG: 1 INJECTION, POWDER, FOR SOLUTION INTRAMUSCULAR; INTRAVENOUS at 21:02

## 2023-10-18 RX ADMIN — Medication 100 MG: at 12:13

## 2023-10-18 RX ADMIN — ATORVASTATIN CALCIUM 40 MG: 40 TABLET, FILM COATED ORAL at 20:56

## 2023-10-18 RX ADMIN — SODIUM CHLORIDE: 9 INJECTION, SOLUTION INTRAVENOUS at 03:15

## 2023-10-18 RX ADMIN — TAMSULOSIN HYDROCHLORIDE 0.4 MG: 0.4 CAPSULE ORAL at 12:13

## 2023-10-18 RX ADMIN — LISINOPRIL 10 MG: 10 TABLET ORAL at 12:13

## 2023-10-18 RX ADMIN — AZITHROMYCIN DIHYDRATE 500 MG: 250 TABLET ORAL at 20:55

## 2023-10-18 RX ADMIN — FOLIC ACID 1 MG: 1 TABLET ORAL at 12:13

## 2023-10-18 RX ADMIN — ENOXAPARIN SODIUM 40 MG: 100 INJECTION SUBCUTANEOUS at 12:13

## 2023-10-18 ASSESSMENT — LIFESTYLE VARIABLES: HOW OFTEN DO YOU HAVE A DRINK CONTAINING ALCOHOL: NEVER

## 2023-10-18 NOTE — CARE COORDINATION
Discharge Planning:   Requested patient psych consult. Hx of mental health diagnosis. Dr Carballo ordered. Discussed SNF with patient. He understands he cannot leave AMA and wants to go to a SNF. SNF list given. Patient states that he does not want to continue using substances, but he is not able to complete ADLs, therefore cannot go to inpatient detox. Patient agreeable to staying and working on getting a SNF for placement. Also confirmed understanding that choices may be limited due to hx of mental health and substance use. Patient had been going to [facility] and would like to engage in services. LSW will attempt to schedule an appt for patient tomorrow.

## 2023-10-18 NOTE — ED NOTES
Physician ordered blood cultures after two antibiotics were given     Samuel Colrichard, RN  10/18/23 1510

## 2023-10-18 NOTE — CARE COORDINATION
Case Management Assessment  Initial Evaluation    Date/Time of Evaluation: 10/18/2023 12:24 PM  Assessment Completed by: Vera Ballard RN    If patient is discharged prior to next notation, then this note serves as note for discharge by case management. Patient Name: Noel Golden                   YOB: 1965  Diagnosis: Pneumonia due to infectious agent [J18.9]  Pneumonia due to infectious organism, unspecified laterality, unspecified part of lung [J18.9]                   Date / Time: 10/17/2023  6:58 PM    Patient Admission Status: Inpatient   Readmission Risk (Low < 19, Mod (19-27), High > 27): Readmission Risk Score: 18.2    Current PCP: Tiffanie Alfred MD  PCP verified by CM? (P) No (Patient is homeless with limited support, has a friend that drove him to hospital)    Chart Reviewed: Yes      History Provided by: (P) Medical Record  Patient Orientation: (P) Unable to Assess (Aphasia)    Patient Cognition: (P) Alert    Hospitalization in the last 30 days (Readmission):  Yes    If yes, Readmission Assessment in  Navigator will be completed.     Advance Directives:      Code Status: Full Code   Patient's Primary Decision Maker is: (P) Legal Next of Kin      Discharge Planning:    Patient lives with: (P) Friends Type of Home: (P) [address] [place] [address], [facility], Behavioral Health, Long-Term Care  Primary Care Giver: (P) Friend  Patient Support Systems include: (P) Friends/Neighbors   Current Financial resources: (P) Medicaid  Current community resources: (P) None  Current services prior to admission: (P) Durable Medical Equipment            Current DME: (P) Wheelchair            Type of Home Care services:  (P) None    ADLS  Prior functional level: (P) Assistance with the following:, Bathing, Dressing, Toileting, Feeding, Cooking, Housework, Shopping, Mobility  Current functional level: (P) Assistance with the following:, Mobility, Shopping, Housework, Cooking, Feeding, Toileting,

## 2023-10-18 NOTE — ED NOTES
Per registration, someone dropped patient off at front doors and said they were \"unable to take care of him anymore. \" When patient was brought to tx room, patients brief and clothing were soiled. Patient has hx of stroke from 07/2023; right sided deficit along with aphasia. Pt is able to assist with position changes with left side.         Nano Hopkins RN  10/18/23 0022

## 2023-10-18 NOTE — PROGRESS NOTES
Occupational Therapy  Facility/Department: [facility]  Occupational Therapy Initial Assessment    Name: Jayne Sanchez  : 1965  MRN: [id number]  Date of Service: 10/18/2023    Discharge Recommendations:  Patient would benefit from continued therapy after discharge   Pt currently functioning below baseline. Recommend daily inpatient skilled therapy at time of discharge to maximize long term outcomes and prevent re-admission. Please refer to AM-PAC score for current level of function. Patient Diagnosis(es): The encounter diagnosis was Pneumonia due to infectious organism, unspecified laterality, unspecified part of lung. Past Medical History:  has a past medical history of Bipolar 1 disorder ([facility]), Chronic mental illness, Depression, Hepatitis C, Hypertension, Psoriasis, and Substance abuse ([address] [facility]). Past Surgical History:  has a past surgical history that includes brain surgery and Dental surgery. RN reports patient is medically stable for therapy treatment this date. Chart reviewed prior to treatment and patient is agreeable for therapy. All lines intact and patient positioned comfortably at end of treatment. All patient needs addressed prior to ending therapy session. PER HPI: Jayne Sanchez is a 65 y.o. Non- / non  male who presents with Failure To Thrive (friend states need to be placed unable to take care of self. Hx of stroke 2023)   and is admitted to the hospital for the management of Pneumonia due to infectious agent. Patient has a hx of stroke, TBI, brain hemorrhage and has residual right sided paralysis and expressive aphasia and dysphagia. He was brought to the ED by a friend due to the patient is not able to take care of himself. He has some degrees of aphasia. He has a congested sounding cough and reported he had been vomiting. CXR revealed a possible pneumonia and he was started on IV Rocephin and IV Zithromax.  His WBC was 16.4, lactic 2.3, procal Group Co-treatment   Time In 0912         Time Out 0954 (+10 min chart review)         Minutes 52             Treatment min: 45  Co-treatment with PT warranted secondary to decreased safety and independence requiring 2 skilled therapy professionals to address individual discipline's goals. OT addressing preparation for ADL transfer, sitting balance for increased ADL performance, sitting/activity tolerance, functional reaching, environmental safety/scanning, fall prevention, functional mobility for ADL transfers, ability to sequence and follow directions, bed mobility tech, and functional UE strength.      Azalia Givens, OT

## 2023-10-18 NOTE — ED NOTES
ED to inpatient nurses report     Chief Complaint   Patient presents with    Failure To Thrive     friend states need to be placed unable to take care of self. Hx of stroke 7/2023      Present to ED via friend. Friend told triage RN that pt is unable to care for himself at home. Pt had stroke in 7/23. Pt has deficits from stroke.       LOC:  incomprehensible speech     Vital signs   Vitals:    10/17/23 2136 10/17/23 2151 10/17/23 2206 10/17/23 2339   BP:       Pulse: 88 90 91 79   Resp: 29 29 21 27   Temp:       TempSrc:       SpO2: 94% 93% 90% 95%   Weight:          Oxygen Baseline: none  Current needs required: RA    SEPSIS:   [yes] Lactate X 2 ordered (Yes or No)  [yes] Antibiotics given (Yes or No)  [yes] IV Fluids ordered (Yes or No)             [no] 2nd IV completed (Yes or No)  [yes] Hourly Vital Signs (Validated)  [Blood cultures to be drawn by phlebotomy] Outstanding Orders:     LDAs:   Peripheral IV 10/17/23 Left Forearm (Active)   Site Assessment Clean, dry & intact 10/17/23 2010   Line Status Blood return noted;Normal saline locked 10/17/23 2010   Dressing Status New dressing applied 10/17/23 2010   Dressing Type Transparent 10/17/23 2010   Dressing Intervention New 10/17/23 2010     Mobility:  wheelchair bound    Fall Risk: Full    Pending ED orders: needs blood cultures    Present condition: stable    Code Status: full    Consults: IP CONSULT TO INTERNAL MEDICINE  [x]  Hospitalist  Completed  [x] yes [] no Who: Intermed  []  Medicine  Completed  [] yes [] No Who:   []  Cardiology  Completed  [] yes [] No Who:   []  GI   Completed  [] yes [] No Who:   []  Neurology  Completed  [] yes [] No Who:   []  Nephrology Completed  [] yes [] No Who:    []  Vascular  Completed  [] yes [] No Who:   []  Ortho  Completed  [] yes [] No Who:     []  Surgery  Completed  [] yes [] No Who:    []  Urology  Completed  [] yes [] No Who:    []  CT Surgery Completed  [] yes [] No Who:   []  Podiatry  Completed  [] yes [] No Who:    []  Other    Completed  [] yes [] No Who:    Interventions: blood work, urine sample, rocephin, Zithromax, fluids, chest xray    Important Events: . Chest XR  IMPRESSION:  Consolidative opacities in the left lung base, could represent atelectasis or  infection in the proper clinical setting.          Electronically signed by Barbi Thomas RN on 10/18/2023 at 12:54 AM       Barbi Thomas RN  10/18/23 0365

## 2023-10-18 NOTE — PROGRESS NOTES
Physical Therapy  Facility/Department: [facility]  Physical Therapy Initial Assessment    Name: Sommer Acosta  : 1965  MRN: [id number]  Date of Service: 10/18/2023    Discharge Recommendations:  Patient would benefit from continued therapy after discharge    Pt currently functioning below baseline. Recommend daily inpatient skilled therapy at time of discharge to maximize long term outcomes and prevent re-admission. Please refer to AM-PAC score for current level of function. PER HPI: Sommer Acosta is a 65 y.o. Non- / non  male who presents with Failure To Thrive (friend states need to be placed unable to take care of self. Hx of stroke 2023)   and is admitted to the hospital for the management of Pneumonia due to infectious agent. Patient has a hx of stroke, TBI, brain hemorrhage and has residual right sided paralysis and expressive aphasia and dysphagia. He was brought to the ED by a friend due to the patient is not able to take care of himself. He has some degrees of aphasia. He has a congested sounding cough and reported he had been vomiting. CXR revealed a possible pneumonia and he was started on IV Rocephin and IV Zithromax. His WBC was 16.4, lactic 2.3, procal 0.48. he also has a PMH of drug abuse- tox screen was + for cocaine, oxycodone, and fentanyl. Other PMH of hep C, bipolar disorder, depression, HTN. He was admitted for further management of pneumonia and failure to thrive. Patient Diagnosis(es): The encounter diagnosis was Pneumonia due to infectious organism, unspecified laterality, unspecified part of lung. Past Medical History:  has a past medical history of Bipolar 1 disorder ([address]), Chronic mental illness, Depression, Hepatitis C, Hypertension, Psoriasis, and Substance abuse ([address]). Past Surgical History:  has a past surgical history that includes brain surgery and Dental surgery.     Assessment   Body Structures, Functions, Activity Limitations assistance (aide)  Additional Comments: due to expressive aphasia unable to get clear history of prior living arrangements and patient's functional level  Vision/Hearing  Hearing  Hearing: Within functional limits    Cognition         Objective   Pulse: 73  Heart Rate Source: Monitor  BP: 117/66  BP Location: Left upper arm  Patient Position: Supine  MAP (Calculated): 83  Respirations: 20  SpO2: 94 %  O2 Device: None (Room air)     Observation/Palpation  Posture: Fair  Observation: R shoulder subluxed,        AROM RLE (degrees)  RLE General AROM: trace ankle, knee flexion 30 degrees active full passive, full extension, hip WFL passive  AROM LLE (degrees)  LLE AROM : WFL  AROM RUE (degrees)  RUE General AROM: refer to OT assessment  AROM LUE (degrees)  LUE General AROM: refer to OT assessment  Strength RLE  Comment: ankle 1/5, knee/hip 3-/5  Strength LLE  Strength LLE: WFL  Strength RUE  Comment: refer to OT assessment  Strength LUE  Comment: refer to OT assessment           Bed mobility  Rolling to Left: Maximum assistance  Rolling to Right: Minimal assistance  Supine to Sit: Moderate assistance;2 Person assistance  Sit to Supine: Maximum assistance;2 Person assistance  Bed Mobility Comments: Supine>sit difficulty getting R LE off bed so increased assist needed, Sit>supine increased assist needed to lift B LEs into bed and assist upper body , patient able to sit unsupported EOB. Multiple rolling performed in bed due to soiled brief. Transfers  Sit to Stand: Moderate Assistance;2 Person Assistance  Stand to Sit: Moderate Assistance;2 Person Assistance  Stand Pivot Transfers: Minimal Assistance;2 Person Assistance  Comment: pateint able to perform Stand pivot from bed to wheelchair good recall of technique and only Min A needed.  Sit<>stand from W/C Max A x2 due to heavy lean  Ambulation  Surface: Level tile  Device: Hemiwalker  Assistance: 2 Person assistance;Maximum assistance  Quality of Gait: unsteady  Gait

## 2023-10-18 NOTE — PROGRESS NOTES
Transitions of Care Pharmacy Service   Medication Review    The patient's list of current home medications has been reviewed. All prescriptions are up to date in [address] [place] [address], however the patient has not filled them, they are on file at [address] [place] [address] Road. Only flomax and folic acid have been filled by a pharmacy. Source(s) of information: [employer]/ [facility]    Based on information provided by the above source(s), no changes to the patient's home medication list were necessary. Please review the ACTION REQUESTED section of this note below for any discrepancies on current hospital orders. PROVIDER ACTION REQUESTED  Medications that need to be addressed by a physician/nurse practitioner:    Medication Action Requested        none         Please feel free to call me with any questions about this encounter. Thank you.     Keon Grossman, [address] [place] [address]   Transitions of Care Pharmacy Service  Phone:  [phone]  Fax: [phone]      Electronically signed by Keon Grossman [address] [place] [address] on 10/18/2023 at 1:51 PM           Medications Prior to Admission: naloxone [facility]) 2 MG/2ML injection, Infuse 0.4 mLs intravenously every 15 minutes as needed (For unresponsiveness, RR < 12, pinpoint pupils)  lisinopril (PRINIVIL;ZESTRIL) 10 MG tablet, Take 1 tablet by mouth daily  amLODIPine (NORVASC) 10 MG tablet, Take 1 tablet by mouth daily  docusate sodium (COLACE, DULCOLAX) 100 MG CAPS, Take 100 mg by mouth daily  tamsulosin (FLOMAX) 0.4 MG capsule, Take 1 capsule by mouth daily  atorvastatin (LIPITOR) 40 MG tablet, Take 1 tablet by mouth nightly  thiamine 100 MG tablet, Take 1 tablet by mouth daily  folic acid (FOLVITE) 1 MG tablet, Take 1 tablet by mouth daily

## 2023-10-18 NOTE — H&P
[facility]  Office: [address], DO, Clayton Dys, DO, Lodeucea Finely, DO, Skye Pastures Blood, DO, Uma Goodson MD, Indiana Red MD, Nishant Taylor MD, Jeremiah Alvarado MD,  Kenna Bellamy MD, David Wood MD, Yumi Billy, DO, Odin Bryant MD,  Moises Pascal MD, Jeane Malin MD, Melanie Mc, DO, Jose Silva MD,  Chaz Young, DO, José Miguel Zendejas MD, Jacek Evangelista MD, Tammy Valdez MD, Barber Campa MD,  Dana Moore MD, Richard Kelly MD, Geoff Sellers MD, Palmira Dubon MD, Lio Raymond DO, Dany Saucedo MD,  Jermaine Macias MD, Judith Matthews MD, Aj Herbert, CNP,  Francheska Ovalle, CNP,, Jesús Jack, CNP,  Trice Acevedo, ZURI, Jennifer Pretty, CNP, Thea Sinclair, CNP, Dany Balderas, CNP, Sanam Porras, CNP, Mila Cardenas, CNP, Annabelle Sims, CNP, Ravi Durbin, CNS, Bennie Martinez, CNP, Waleska Ba, [facility]    HISTORY AND PHYSICAL EXAMINATION            Date:   10/18/2023  Patient name:  Luisa Church  Date of admission:  10/17/2023  6:58 PM  MRN:   [id number]  Account:  [de-identified]  YOB: 1965  PCP:    Jean Negrete MD  [facility]:   [id number]  Code Status:    Full Code    Chief Complaint:     Chief Complaint   Patient presents with    Failure To Thrive     friend states need to be placed unable to take care of self. Hx of stroke 7/2023       History Obtained From:     electronic medical record, reason patient could not give history:  expressive aphasia    History of Present Illness:     Luisa Church is a 65 y.o. Non- / non  male who presents with Failure To Thrive (friend states need to be placed unable to take care of self. Hx of stroke 7/2023)   and is admitted to the hospital for the management of Pneumonia due to infectious agent.     Patient has a hx of stroke, TBI, brain hemorrhage and has residual right sided

## 2023-10-18 NOTE — PROGRESS NOTES
[] Medication Reconciliation was completed and the patient's home medication list was verified. The Med List Status is \"Complete\". The following sources were used to assist with Medication Reconciliation:    [] Patient had a list of medications which was transcribed into the EHR. [] Patient provided bottles of their medications    [] Home medications reviewed and confirmed with     [] Contacted patient's pharmacy to confirm home medications    [] Contacted patient's physician office to confirm home medications    [] Medical Records from another facility and/or Care Everywhere were reviewed      [x] There are one or more home medications that need clarification before Medication Reconciliation can be completed. The Med List Status has been marked as In Progress. To assist with Home Medication Reconciliation the following actions have been taken:    [x] Pharmacy medication reconciliation service requested.  (Note: This can be done by sending a Perfect Serve message to [facility] Pharmacist or by Lu Ayers [phone].)  [] Family requested to bring medications into the hospital  [] Family requested to call hospital with medication list  [] Message left with physician office  [] Request for medical records made to   [] Other

## 2023-10-18 NOTE — PROGRESS NOTES
Pt admitted to room 2108 from ER. Oriented to room and call light/tv controls. Bed in lowest position, wheels locked, 2/4 side rails up  Call light in reach, room free of clutter, adequate lighting provided.

## 2023-10-18 NOTE — PLAN OF CARE
Problem: Discharge Planning  Goal: Discharge to home or other facility with appropriate resources  Outcome: Progressing     Problem: Safety - Adult  Goal: Free from fall injury  Outcome: Progressing     Problem: ABCDS Injury Assessment  Goal: Absence of physical injury  Outcome: Progressing     Problem: Skin/Tissue Integrity  Goal: Absence of new skin breakdown  Description: 1. Monitor for areas of redness and/or skin breakdown  2. Assess vascular access sites hourly  3. Every 4-6 hours minimum:  Change oxygen saturation probe site  4. Every 4-6 hours:  If on nasal continuous positive airway pressure, respiratory therapy assess nares and determine need for appliance change or resting period.   Outcome: Progressing     Problem: Skin/Tissue Integrity - Adult  Goal: Skin integrity remains intact  Outcome: Progressing  Goal: Incisions, wounds, or drain sites healing without S/S of infection  Outcome: Progressing  Goal: Oral mucous membranes remain intact  Outcome: Progressing     Problem: Infection - Adult  Goal: Absence of infection at discharge  Outcome: Progressing  Goal: Absence of infection during hospitalization  Outcome: Progressing  Goal: Absence of fever/infection during anticipated neutropenic period  Outcome: Progressing     Problem: Chronic Conditions and Co-morbidities  Goal: Patient's chronic conditions and co-morbidity symptoms are monitored and maintained or improved  Outcome: Progressing

## 2023-10-19 LAB
ALBUMIN SERPL-MCNC: 2.5 G/DL (ref 3.5–5.2)
ALP SERPL-CCNC: 45 U/L (ref 40–129)
ALT SERPL-CCNC: 38 U/L (ref 5–41)
ANION GAP SERPL CALCULATED.3IONS-SCNC: 10 MMOL/L (ref 9–17)
AST SERPL-CCNC: 29 U/L
BASOPHILS # BLD: 0.04 K/UL (ref 0–0.2)
BASOPHILS NFR BLD: 0 % (ref 0–2)
BILIRUB SERPL-MCNC: 0.3 MG/DL (ref 0.3–1.2)
BUN SERPL-MCNC: 18 MG/DL (ref 6–20)
BUN/CREAT SERPL: 26 (ref 9–20)
CALCIUM SERPL-MCNC: 8.3 MG/DL (ref 8.6–10.4)
CHLORIDE SERPL-SCNC: 108 MMOL/L (ref 98–107)
CO2 SERPL-SCNC: 22 MMOL/L (ref 20–31)
CREAT SERPL-MCNC: 0.7 MG/DL (ref 0.7–1.2)
EOSINOPHIL # BLD: 0.17 K/UL (ref 0–0.44)
EOSINOPHILS RELATIVE PERCENT: 2 % (ref 1–4)
ERYTHROCYTE [DISTWIDTH] IN BLOOD BY AUTOMATED COUNT: 12.9 % (ref 11.8–14.4)
GFR SERPL CREATININE-BSD FRML MDRD: >60 ML/MIN/1.73M2
GLUCOSE SERPL-MCNC: 120 MG/DL (ref 70–99)
HCT VFR BLD AUTO: 37.8 % (ref 40.7–50.3)
HGB BLD-MCNC: 12.4 G/DL (ref 13–17)
IMM GRANULOCYTES # BLD AUTO: 0.04 K/UL (ref 0–0.3)
IMM GRANULOCYTES NFR BLD: 0 %
LACTATE BLDV-SCNC: 1.6 MMOL/L (ref 0.5–2.2)
LYMPHOCYTES NFR BLD: 2.19 K/UL (ref 1.1–3.7)
LYMPHOCYTES RELATIVE PERCENT: 22 % (ref 24–43)
MCH RBC QN AUTO: 30.3 PG (ref 25.2–33.5)
MCHC RBC AUTO-ENTMCNC: 32.8 G/DL (ref 28.4–34.8)
MCV RBC AUTO: 92.4 FL (ref 82.6–102.9)
MONOCYTES NFR BLD: 0.69 K/UL (ref 0.1–1.2)
MONOCYTES NFR BLD: 7 % (ref 3–12)
NEUTROPHILS NFR BLD: 69 % (ref 36–65)
NEUTS SEG NFR BLD: 7 K/UL (ref 1.5–8.1)
NRBC BLD-RTO: 0 PER 100 WBC
PLATELET # BLD AUTO: 180 K/UL (ref 138–453)
PMV BLD AUTO: 10.4 FL (ref 8.1–13.5)
POTASSIUM SERPL-SCNC: 3.6 MMOL/L (ref 3.7–5.3)
PROT SERPL-MCNC: 5.7 G/DL (ref 6.4–8.3)
RBC # BLD AUTO: 4.09 M/UL (ref 4.21–5.77)
SODIUM SERPL-SCNC: 140 MMOL/L (ref 135–144)
WBC OTHER # BLD: 10.1 K/UL (ref 3.5–11.3)

## 2023-10-19 PROCEDURE — 6370000000 HC RX 637 (ALT 250 FOR IP): Performed by: NURSE PRACTITIONER

## 2023-10-19 PROCEDURE — 99232 SBSQ HOSP IP/OBS MODERATE 35: CPT | Performed by: INTERNAL MEDICINE

## 2023-10-19 PROCEDURE — 92523 SPEECH SOUND LANG COMPREHEN: CPT

## 2023-10-19 PROCEDURE — 83605 ASSAY OF LACTIC ACID: CPT

## 2023-10-19 PROCEDURE — 85025 COMPLETE CBC W/AUTO DIFF WBC: CPT

## 2023-10-19 PROCEDURE — 97535 SELF CARE MNGMENT TRAINING: CPT

## 2023-10-19 PROCEDURE — 2580000003 HC RX 258: Performed by: NURSE PRACTITIONER

## 2023-10-19 PROCEDURE — 1200000000 HC SEMI PRIVATE

## 2023-10-19 PROCEDURE — 36415 COLL VENOUS BLD VENIPUNCTURE: CPT

## 2023-10-19 PROCEDURE — 80053 COMPREHEN METABOLIC PANEL: CPT

## 2023-10-19 PROCEDURE — 97530 THERAPEUTIC ACTIVITIES: CPT

## 2023-10-19 PROCEDURE — 6360000002 HC RX W HCPCS: Performed by: NURSE PRACTITIONER

## 2023-10-19 PROCEDURE — 97116 GAIT TRAINING THERAPY: CPT

## 2023-10-19 PROCEDURE — 99223 1ST HOSP IP/OBS HIGH 75: CPT

## 2023-10-19 RX ADMIN — ENOXAPARIN SODIUM 40 MG: 100 INJECTION SUBCUTANEOUS at 09:36

## 2023-10-19 RX ADMIN — SODIUM CHLORIDE, PRESERVATIVE FREE 10 ML: 5 INJECTION INTRAVENOUS at 22:17

## 2023-10-19 RX ADMIN — FOLIC ACID 1 MG: 1 TABLET ORAL at 09:36

## 2023-10-19 RX ADMIN — TAMSULOSIN HYDROCHLORIDE 0.4 MG: 0.4 CAPSULE ORAL at 09:36

## 2023-10-19 RX ADMIN — AMLODIPINE BESYLATE 10 MG: 10 TABLET ORAL at 09:36

## 2023-10-19 RX ADMIN — ATORVASTATIN CALCIUM 40 MG: 40 TABLET, FILM COATED ORAL at 22:16

## 2023-10-19 RX ADMIN — LISINOPRIL 10 MG: 10 TABLET ORAL at 09:36

## 2023-10-19 RX ADMIN — Medication 100 MG: at 09:36

## 2023-10-19 RX ADMIN — AZITHROMYCIN DIHYDRATE 500 MG: 250 TABLET ORAL at 22:16

## 2023-10-19 RX ADMIN — SODIUM CHLORIDE: 9 INJECTION, SOLUTION INTRAVENOUS at 03:00

## 2023-10-19 RX ADMIN — CEFTRIAXONE SODIUM 1000 MG: 1 INJECTION, POWDER, FOR SOLUTION INTRAMUSCULAR; INTRAVENOUS at 22:18

## 2023-10-19 NOTE — PROGRESS NOTES
assistance; Requires x 2 assistance   Neuromuscular Education  Neuromuscular education: Yes  NDT Treatment: Sitting;Standing     Assessment  Assessment  Assessment: Pt tolerated session fair but remains limited by global weakness, decreased activity tolerance, right side weakness/neglect and cognitive deficits. Pt req'd MAX A x2 to attempt mobility using sarthak walker but was unsuccessful fede steps away from bed due to B knees buckling and poor safety. EMILY SNYDER used to transfer pt from bed to recliner with MIN A x2. Pt is MAX/DEP with self care tasks. Skilled OT indicated to increase safety and IND with all functional tasks to ensure a safe return to PLOF. Activity Tolerance: Patient limited by endurance;Treatment limited secondary to decreased cognition  Discharge Recommendations: Patient would benefit from continued therapy after discharge  Safety Devices  Safety Devices in place: Yes  Type of devices: All fall risk precautions in place; Left in chair;Nurse notified;Call light within reach; Chair alarm in place;Gait belt;Patient at risk for falls    Patient Education  Education  Education Given To: Patient  Education Provided: Mobility Training; Fall Prevention Strategies;Transfer Training;Energy Conservation;Precautions; Safety;Equipment  Education Method: Verbal;Teach Back  Barriers to Learning: Cognition  Education Outcome: Verbalized understanding;Demonstrated understanding;Continued education needed    Plan  Occupational Therapy Plan  Times Per Week: 4-5x/week  Current Treatment Recommendations: Strengthening;ROM;Balance training;Functional mobility training;Self-Care / ADL; Safety education & training;Pain management; Endurance training;Patient/Caregiver education & training;Equipment evaluation, education, & procurement;Positioning;Neuromuscular re-education;Cognitive/Perceptual training    Goals  Patient Goals   Patient goals : not stated  Short Term Goals  Time Frame for Short Term Goals: by discharge, pt will  Short Term Goal 1: demo SBA/CGA with sit pivot transfers (bed<>w/c, commode, chair) with good safety  Short Term Goal 2: demo SBA/CGA with bed mob with rails/controls  Short Term Goal 3: demo min A with grooming tasks following set up at approp level  Short Term Goal 4: demo mod A with UB ADLs with sarthak dressing techs at seated level  Short Term Goal 5: participate in B UE HEP for L UE strengthening (for inc ADLs/mob) and SROM/PROM to R UE (dec risk of contractures, dec. pain)  Long Term Goals  Long Term Goal 1: pt/caregiver demo and verb good understanding of ed provided on fall prevention techs, pressure relieving techs, equip needs, safe transfers techs, d/c recommendations  Long Term Goal 2: demo min A with toileting routine at approp level with good safety    AM-PAC Score        AM-PAC Inpatient Daily Activity Raw Score: 11 (10/19/23 1258)  AM-PAC Inpatient ADL T-Scale Score : 29.04 (10/19/23 1258)  ADL Inpatient CMS 0-100% Score: 70.42 (10/19/23 1258)  ADL Inpatient CMS G-Code Modifier : CL (10/19/23 1258)      Therapy Time   Individual Concurrent Group Co-treatment   Time In       1128   Time Out       1156   Minutes       28    Co-treatment with PT warranted secondary to decreased safety and independence requiring 2 skilled therapy professionals to address individual discipline's goals. OT addressing preparation for ADL transfer, sitting balance for increased ADL performance, sitting/activity tolerance, functional reaching, environmental safety/scanning, fall prevention, functional mobility for ADL transfers, ability to sequence and follow directions, bed mobility tech, and functional UE strength. KVNG Woo    Upon Co-Signature of this note, appropriate supervision of KVNG has been delivered with regards to plan of care, evaluation/re-evaluation findings, any appropriate modifications to treatment plan, and relevant discharge planning.  Instructions and training unique to this patient

## 2023-10-19 NOTE — PROGRESS NOTES
SLP ALL NOTES  Facility/Department: [facility] MED SURG  Initial Speech/Language/Cognitive Assessment    NAME: Autumn Ochoa  : 1965   MRN: [id number]  ADMISSION DATE: 10/17/2023  ADMITTING DIAGNOSIS: has Hepatitis C; Psoriasis; Hypertension; Smoker; Opioid type dependence, continuous ([address]); Bipolar 1 disorder ([address]); Cocaine abuse ([address]); IV drug abuse ([address]); History of alcohol abuse; Generalized pain; Generalized anxiety disorder; Abnormal CT scan, esophagus; Chest pain; Altered mental status; AMS (altered mental status); Acute pulmonary embolism ([address]); Non-recurrent bilateral inguinal hernia without obstruction or gangrene; Hypertensive urgency; Intraparenchymal hemorrhage of brain ([address]); TBI (traumatic brain injury) ([address]); Right sided weakness; Stroke (cerebrum) ([address]); Oliguria; Drug use; Pneumonia due to infectious agent; Cognitive communication deficit; Dysphagia following cerebral infarction; Hemiplegia, unspecified affecting right dominant side ([address]); Speech abnormality; and Unable to care for self on their problem list.      Date of Eval: 10/19/2023   Evaluating Therapist: Tiffanie Andrade SLP    RECENT RESULTS  CT OF HEAD/MRI: no recent imaging available    Primary Complaint: Autumn Ochoa is a 65 y.o. Non- / non  male who presents with Failure To Thrive (friend states need to be placed unable to take care of self. Hx of stroke 2023)   and is admitted to the hospital for the management of Pneumonia due to infectious agent. Patient has a hx of stroke, TBI, brain hemorrhage and has residual right sided paralysis and expressive aphasia and dysphagia. He was brought to the ED by a friend due to the patient is not able to take care of himself. He has some degrees of aphasia. He has a congested sounding cough and reported he had been vomiting. CXR revealed a possible pneumonia and he was started on IV Rocephin and IV Zithromax.  His WBC was 16.4, lactic 2.3, procal 0.48. he also has a PMH

## 2023-10-19 NOTE — CARE COORDINATION
Social Work-Contacted patient's father to discuss dc options. Father states that patient has been in and out of SNF's. . He was recently at [facility] and left AMA after 2 days. He stayed with friends. His friend is unable to care for patient and brought him to ED. Father states that patient has 35 year history of substance abuse. DIscussed that his SNF choices will be limited due to history of drug abuse. Referral to [facility], [address] [place] [address], and [facility].  Bart Garcia

## 2023-10-19 NOTE — PLAN OF CARE
Problem: Discharge Planning  Goal: Discharge to home or other facility with appropriate resources  Outcome: Progressing     Problem: Safety - Adult  Goal: Free from fall injury  Outcome: Progressing     Problem: ABCDS Injury Assessment  Goal: Absence of physical injury  Outcome: Progressing     Problem: Skin/Tissue Integrity  Goal: Absence of new skin breakdown  Description: 1. Monitor for areas of redness and/or skin breakdown  2. Assess vascular access sites hourly  3. Every 4-6 hours minimum:  Change oxygen saturation probe site  4. Every 4-6 hours:  If on nasal continuous positive airway pressure, respiratory therapy assess nares and determine need for appliance change or resting period.   Outcome: Progressing     Problem: Skin/Tissue Integrity - Adult  Goal: Skin integrity remains intact  Outcome: Progressing  Goal: Incisions, wounds, or drain sites healing without S/S of infection  Outcome: Progressing  Goal: Oral mucous membranes remain intact  Outcome: Progressing     Problem: Infection - Adult  Goal: Absence of infection at discharge  Outcome: Progressing  Goal: Absence of infection during hospitalization  Outcome: Progressing  Goal: Absence of fever/infection during anticipated neutropenic period  Outcome: Progressing     Problem: Chronic Conditions and Co-morbidities  Goal: Patient's chronic conditions and co-morbidity symptoms are monitored and maintained or improved  Outcome: Progressing     Problem: Nutrition Deficit:  Goal: Optimize nutritional status  Outcome: Progressing

## 2023-10-19 NOTE — PLAN OF CARE
Problem: Discharge Planning  Goal: Discharge to home or other facility with appropriate resources  10/19/2023 0036 by Russ George RN  Outcome: Progressing  Flowsheets (Taken 10/18/2023 2000)  Discharge to home or other facility with appropriate resources:   Identify barriers to discharge with patient and caregiver   Arrange for needed discharge resources and transportation as appropriate   Identify discharge learning needs (meds, wound care, etc)   Refer to discharge planning if patient needs post-hospital services based on physician order or complex needs related to functional status, cognitive ability or social support system  10/18/2023 1823 by Frieda Carrizales RN  Outcome: Progressing     Problem: Safety - Adult  Goal: Free from fall injury  10/19/2023 0036 by Russ George RN  Outcome: Progressing  10/18/2023 1823 by Frieda Carrizales RN  Outcome: Progressing     Problem: ABCDS Injury Assessment  Goal: Absence of physical injury  10/19/2023 0036 by Russ George RN  Outcome: Progressing  10/18/2023 1823 by Frieda Carrizales RN  Outcome: Progressing     Problem: Skin/Tissue Integrity  Goal: Absence of new skin breakdown  Description: 1. Monitor for areas of redness and/or skin breakdown  2. Assess vascular access sites hourly  3. Every 4-6 hours minimum:  Change oxygen saturation probe site  4. Every 4-6 hours:  If on nasal continuous positive airway pressure, respiratory therapy assess nares and determine need for appliance change or resting period.   10/19/2023 0036 by Russ George RN  Outcome: Progressing  10/18/2023 1823 by Frieda Carrizales RN  Outcome: Progressing     Problem: Skin/Tissue Integrity - Adult  Goal: Skin integrity remains intact  10/19/2023 0036 by Russ George RN  Outcome: Progressing  Flowsheets (Taken 10/18/2023 2000)  Skin Integrity Remains Intact: Monitor for areas of redness and/or skin breakdown  10/18/2023 1823 by Frieda Carrizales RN  Outcome: Jaci Yeboah RN  Outcome: Progressing  Flowsheets (Taken 10/18/2023 2000)  Absence of fever/infection during anticipated neutropenic period: Monitor white blood cell count  10/18/2023 1823 by Rishi Estrada RN  Outcome: Progressing     Problem: Chronic Conditions and Co-morbidities  Goal: Patient's chronic conditions and co-morbidity symptoms are monitored and maintained or improved  10/19/2023 0036 by Jaci Yeboah RN  Outcome: Progressing  Flowsheets (Taken 10/18/2023 2000)  Care Plan - Patient's Chronic Conditions and Co-Morbidity Symptoms are Monitored and Maintained or Improved:   Monitor and assess patient's chronic conditions and comorbid symptoms for stability, deterioration, or improvement   Collaborate with multidisciplinary team to address chronic and comorbid conditions and prevent exacerbation or deterioration   Update acute care plan with appropriate goals if chronic or comorbid symptoms are exacerbated and prevent overall improvement and discharge  10/18/2023 1823 by Rishi Estrada RN  Outcome: Progressing

## 2023-10-19 NOTE — PROGRESS NOTES
[facility]  Office: [phone]  Alma Vásquez, DO, Lili David, DO, Jos Herbert Blood, DO, Allen Coelho MD, Luis Fernando Young MD, Rancho Baxter MD, Michaela López MD,  Deangelo Lamar MD, Layton Zuñiga MD, Hiro Lamp, DO, Yandel Smith MD,  Maria E Zendejas MD, Tessa Mckenzie MD, Zeynep Delong, DO, Yovana Monson MD,  Dwight Sheriff, DO, Samy Rivers MD, Kym Jernigan MD, Ronnie Cross MD, Judy Marcelino MD,  Ken Morgan MD, Silver Bach MD, Jessica Morrow MD, Dave Armas MD, Zakia Ennis, DO, Spencer Alexis MD,  Mary Beauchamp MD, Chaz Warren MD, Alirio Abad, CNP,  Flower Ware, CNP,, Jayne Ramirez, CNP,  Thanh Maxwell, ZURI, Pham Kim, CNP, Ed Cartwright, CNP, Ruth Ann Frye, CNP, Morelia Pendleton, CNP, Fannie Alfred, CNP, Imtiaz Gautam, CNP, Romina Rosen, CNS, Harmony Berrios, CNP, [facility], [facility]    Progress Note    10/19/2023    11:35 AM    Name:   Ronaldo Jenkins  MRN:     [id number]     Acct:      [de-identified]   Room:   2017/2017-02   Day:  1  Admit Date:  10/17/2023  6:58 PM    PCP:   Gabbie Aaron MD  Code Status:  Full Code    Subjective:     C/C:   Chief Complaint   Patient presents with    Failure To Thrive     friend states need to be placed unable to take care of self. Hx of stroke 7/2023     Interval History Status: not changed. Feels about the same  Denies cp/sob/n/v    Difficult to get any additional history due to slurred speech    Brief History: This 65 yom was brought in by a friend because he was unable to care for himself. He has had cough with clear phlegm and sob. Not able to get much more history as he mumbles and has slurred speech. Review of Systems:     Unobtainable      Medications: Allergies:     Allergies   Allergen Reactions    Haldol [Haloperidol] Other (See Comments)     Seizure activity per Intake 1152.93 ml   Output 500 ml   Net 652.93 ml       Labs:  Hematology:  Recent Labs     10/17/23  2010 10/18/23  0544 10/19/23  0546   WBC 16.4* 12.2* 10.1   RBC 5.10 4.49 4.09*   HGB 15.6 13.5 12.4*   HCT 46.9 41.6 37.8*   MCV 92.0 92.7 92.4   MCH 30.6 30.1 30.3   MCHC 33.3 32.5 32.8   RDW 13.1 13.0 12.9    197 180   MPV 10.5 10.7 10.4     Chemistry:  Recent Labs     10/17/23  2010 10/18/23  0050 10/18/23  0544 10/19/23  0546     --  139 140   K 3.8  --  3.6* 3.6*     --  106 108*   CO2 24  --  22 22   GLUCOSE 92  --  115* 120*   BUN 45*  --  34* 18   CREATININE 0.8  --  0.7 0.7   ANIONGAP 14  --  11 10   LABGLOM >60  --  >60 >60   CALCIUM 9.6  --  8.8 8.3*   TROPHS 10 10  --   --      Recent Labs     10/17/23  2010 10/19/23  0546   PROT 7.5 5.7*   LABALBU 3.6 2.5*   AST 22 29   ALT 40 38   ALKPHOS 57 45   BILITOT 0.5 0.3     ABG:  Lab Results   Component Value Date/Time    POCPH 7.357 03/02/2022 08:14 AM    POCPCO2 53.2 03/02/2022 08:14 AM    POCPO2 336.1 03/02/2022 08:14 AM    POCHCO3 29.8 03/02/2022 08:14 AM    PBEA 3 03/02/2022 08:14 AM    ZPNA7HBW 100 03/02/2022 08:14 AM    FIO2 90.0 03/02/2022 08:14 AM     Lab Results   Component Value Date/Time    SPECIAL LAC 20ML 03/02/2022 04:00 AM     Lab Results   Component Value Date/Time    CULTURE NO GROWTH 1 DAY 10/18/2023 05:52 AM       Radiology:  XR CHEST PORTABLE    Result Date: 10/17/2023  Consolidative opacities in the left lung base, could represent atelectasis or infection in the proper clinical setting.        Physical Examination:        General appearance:  alert, cooperative and no distress  Mental Status:  oriented to person, place and normal affect  Lungs:  clear to auscultation bilaterally, normal effort  Heart:  regular rate and rhythm, positive murmur  Abdomen:  soft, nontender, nondistended, normal bowel sounds, no masses, hepatomegaly, splenomegaly  Extremities:  no edema, redness, tenderness in the calves  Skin:  no gross

## 2023-10-19 NOTE — PROGRESS NOTES
Physician Progress Note      Idalia Muir  CSN #:                  [id number]  :                       1965  ADMIT DATE:       10/17/2023 6:58 PM  [facility] DATE:  Christa Sarah  PROVIDER #:        Foreign CHAN DO          QUERY TEXT:    Pt admitted with Pneumonia. Pt noted to have elevated WBC, lactic acid and   procalcitonin. If possible, please document in the progress notes and   discharge summary if you are evaluating and /or treating any of the following: The medical record reflects the following:  Risk Factors: PNA  Clinical Indicators: WBC 16.4, afebrile, Lactic acid 2.3, procalcitonin 0.48,   HR , RR 17-30; CXR-->Consolidative opacities in the left lung base,   could represent atelectasis or infection in the proper clinical setting. Treatment: 1L IVF Bolus, maintenance IVF @ 75ml/hr, IV Rocephin, IV Zithromax,   Vital Monitoring, Blood culture, CXR, LA and procalcitonin levels  Options provided:  -- Sepsis due to pneumonia, present on admission  -- Pneumonia without Sepsis  -- Other - I will add my own diagnosis  -- Disagree - Not applicable / Not valid  -- Disagree - Clinically unable to determine / Unknown  -- Refer to Clinical Documentation Reviewer    PROVIDER RESPONSE TEXT:    This patient has Sepsis due to pneumonia which was present on admission.     Query created by: Adilson Roy on 10/19/2023 9:55 AM      Electronically signed by:  Foreign CHAN DO 10/19/2023 11:35 AM

## 2023-10-19 NOTE — PROGRESS NOTES
Level: Moderate Assistance  Additional Factors: Set-up; Verbal cues; Increased time to complete; Head of bed raised; With handrails  Roll Left  Assistance Level: Moderate assistance  Roll Right  Assistance Level: Moderate assistance  Sit to Supine  Skilled Clinical Factors: unable to assess as patient retires to recliner upon completion of PT treatment. Supine to Sit  Assistance Level: Moderate assistance  Skilled Clinical Factors: Patient requries vc's and assist for RLE and trunk to EOB seated posture. Scooting  Assistance Level: Moderate assistance  Skilled Clinical Factors: vc's for anterior hip progression to seated EOB posture with assist for R hemisphere deficits. Balance  Sitting Balance: Contact guard assistance  Standing Balance: Maximum assistance  Standing Balance  Time: up to 2 minutes in stance. Activity: Patient performs initial sit to stand with layo walker and MOD/MAX x2 for minimal ambulation. Patient unable to maintain stance with layo walker due to buckling of knees and decreased proprioception with some noted R side neglect. Transfers  Surface: To chair with arms;From bed  Additional Factors: Set-up; Verbal cues; Hand placement cues; Increased time to complete; With handrails  Device: Walker (Quilla Betters initially the switched to [employer])  Sit to Stand  Assistance Level: Moderate assistance;Maximum assistance; Requires x 2 assistance  Skilled Clinical Factors: Initial sit to stand with Layo walker. Patient a Min x2 in [employer] for sit to stand. Stand to Sit  Assistance Level: Moderate assistance; Requires x 2 assistance  Skilled Clinical Factors: Patient requries assist for control in descent to maximize eccentric quad control in technique. Bed To/From Chair  Technique:  [employer])  Assistance Level: Maximum assistance; Moderate assistance; Requires x 2 assistance      Environmental Mobility  Ambulation  Surface: Level surface  Device:  (Layo Walker)  Distance: 6 feet with Layo walker then
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
## 2023-10-19 NOTE — PROGRESS NOTES
Comprehensive Nutrition Assessment    Type and Reason for Visit:  Positive Nutrition Screen (Wound)    Nutrition Recommendations/Plan:   ADULT DIET; Dysphagia - Soft and Bite Sized  Start Ensure Enlive 2x/day  Monitor p.o intakes, Ensure acceptance and labs     Malnutrition Assessment:  Malnutrition Status: At risk for malnutrition (Comment) (10/19/23 7295)        Nutrition Assessment:    Patient admission is related to pneumonia. Patient has a medical history for tramatic brain injury, stroke, residual right sided paralysis, expressive aphagia and dysphagia. Wound notification received for stage II pressure ulcer to mid coccyx. Patient is on a Dysphagia Soft and Bite Sized diet which is tolerated. Patient reports he ate about 50% of breakfast this morning and agreed to try Ensure Enlive oral nutrition supplement. Continue current diet. Monitor p.o intakes, Ensure acceptance and labs. Nutrition Related Findings:    No edema. Active bowel sounds. TBI, right sided paralysis, aphasia, dysphagia, poor dentition Wound Type: None       Current Nutrition Intake & Therapies:    Average Meal Intake: 26-50%  Average Supplements Intake: None Ordered  ADULT DIET; Dysphagia - Soft and Bite Sized  ADULT ORAL NUTRITION SUPPLEMENT; Breakfast, Dinner; Standard High Calorie/High Protein Oral Supplement    Anthropometric Measures:  Height: 172.7 cm (5' 8\")  Ideal Body Weight (IBW): 154 lbs (70 kg)       Current Body Weight: 78.5 kg (173 lb), 112.3 % IBW. Weight Source: Bed Scale  Current BMI (kg/m2): 26.3                          BMI Categories: Overweight (BMI 25.0-29. 9)    Estimated Daily Nutrient Needs:  Energy Requirements Based On: Kcal/kg  Weight Used for Energy Requirements: Current  Energy (kcal/day): 5689-0672 kcal (22-25 kcal/kg)  Weight Used for Protein Requirements: Ideal  Protein (g/day): 84-91 gm of protein (1.2-1.3 gm/kg)  Method Used for Fluid Requirements: 1 ml/kcal  Fluid (ml/day): 9075-1749 ml    Nutrition
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
## 2023-10-19 NOTE — CARE COORDINATION
Referral to [facility], [facility], Clarissa at [facility].  Kwaku Haas

## 2023-10-20 PROBLEM — E83.42 HYPOMAGNESEMIA: Status: ACTIVE | Noted: 2023-10-20

## 2023-10-20 LAB
ANION GAP SERPL CALCULATED.3IONS-SCNC: 9 MMOL/L (ref 9–17)
BUN SERPL-MCNC: 9 MG/DL (ref 6–20)
BUN/CREAT SERPL: 18 (ref 9–20)
CALCIUM SERPL-MCNC: 8.4 MG/DL (ref 8.6–10.4)
CHLORIDE SERPL-SCNC: 104 MMOL/L (ref 98–107)
CO2 SERPL-SCNC: 23 MMOL/L (ref 20–31)
CREAT SERPL-MCNC: 0.5 MG/DL (ref 0.7–1.2)
ERYTHROCYTE [DISTWIDTH] IN BLOOD BY AUTOMATED COUNT: 12.6 % (ref 11.8–14.4)
GFR SERPL CREATININE-BSD FRML MDRD: >60 ML/MIN/1.73M2
GLUCOSE SERPL-MCNC: 111 MG/DL (ref 70–99)
HCT VFR BLD AUTO: 38.2 % (ref 40.7–50.3)
HGB BLD-MCNC: 12.7 G/DL (ref 13–17)
MAGNESIUM SERPL-MCNC: 1.4 MG/DL (ref 1.6–2.6)
MCH RBC QN AUTO: 30.5 PG (ref 25.2–33.5)
MCHC RBC AUTO-ENTMCNC: 33.2 G/DL (ref 28.4–34.8)
MCV RBC AUTO: 91.6 FL (ref 82.6–102.9)
NRBC BLD-RTO: 0 PER 100 WBC
PHOSPHATE SERPL-MCNC: 2.8 MG/DL (ref 2.5–4.5)
PLATELET # BLD AUTO: 177 K/UL (ref 138–453)
PMV BLD AUTO: 10.1 FL (ref 8.1–13.5)
POTASSIUM SERPL-SCNC: 3.3 MMOL/L (ref 3.7–5.3)
RBC # BLD AUTO: 4.17 M/UL (ref 4.21–5.77)
SODIUM SERPL-SCNC: 136 MMOL/L (ref 135–144)
WBC OTHER # BLD: 11 K/UL (ref 3.5–11.3)

## 2023-10-20 PROCEDURE — 6370000000 HC RX 637 (ALT 250 FOR IP): Performed by: NURSE PRACTITIONER

## 2023-10-20 PROCEDURE — 6360000002 HC RX W HCPCS: Performed by: NURSE PRACTITIONER

## 2023-10-20 PROCEDURE — 99232 SBSQ HOSP IP/OBS MODERATE 35: CPT | Performed by: INTERNAL MEDICINE

## 2023-10-20 PROCEDURE — 84100 ASSAY OF PHOSPHORUS: CPT

## 2023-10-20 PROCEDURE — 6370000000 HC RX 637 (ALT 250 FOR IP): Performed by: INTERNAL MEDICINE

## 2023-10-20 PROCEDURE — 2580000003 HC RX 258: Performed by: NURSE PRACTITIONER

## 2023-10-20 PROCEDURE — 85027 COMPLETE CBC AUTOMATED: CPT

## 2023-10-20 PROCEDURE — 1200000000 HC SEMI PRIVATE

## 2023-10-20 PROCEDURE — 99232 SBSQ HOSP IP/OBS MODERATE 35: CPT | Performed by: PSYCHIATRY & NEUROLOGY

## 2023-10-20 PROCEDURE — 36415 COLL VENOUS BLD VENIPUNCTURE: CPT

## 2023-10-20 PROCEDURE — 83735 ASSAY OF MAGNESIUM: CPT

## 2023-10-20 PROCEDURE — 80048 BASIC METABOLIC PNL TOTAL CA: CPT

## 2023-10-20 RX ORDER — IPRATROPIUM BROMIDE AND ALBUTEROL SULFATE 2.5; .5 MG/3ML; MG/3ML
3 SOLUTION RESPIRATORY (INHALATION) EVERY 4 HOURS PRN
Qty: 360 ML | DISCHARGE
Start: 2023-10-20

## 2023-10-20 RX ORDER — POTASSIUM CHLORIDE 20 MEQ/1
40 TABLET, EXTENDED RELEASE ORAL PRN
Status: DISCONTINUED | OUTPATIENT
Start: 2023-10-20 | End: 2023-10-28 | Stop reason: HOSPADM

## 2023-10-20 RX ORDER — MAGNESIUM SULFATE 1 G/100ML
1000 INJECTION INTRAVENOUS PRN
Status: DISCONTINUED | OUTPATIENT
Start: 2023-10-20 | End: 2023-10-28 | Stop reason: HOSPADM

## 2023-10-20 RX ORDER — POTASSIUM CHLORIDE 20 MEQ/1
20 TABLET, EXTENDED RELEASE ORAL
Qty: 60 TABLET | Refills: 3 | DISCHARGE
Start: 2023-10-20 | End: 2023-10-25 | Stop reason: HOSPADM

## 2023-10-20 RX ORDER — ENOXAPARIN SODIUM 100 MG/ML
40 INJECTION SUBCUTANEOUS DAILY
DISCHARGE
Start: 2023-10-21

## 2023-10-20 RX ORDER — CEFUROXIME AXETIL 500 MG/1
500 TABLET ORAL EVERY 12 HOURS SCHEDULED
Qty: 4 TABLET | Refills: 0 | DISCHARGE
Start: 2023-10-20 | End: 2023-10-22

## 2023-10-20 RX ORDER — LANOLIN ALCOHOL/MO/W.PET/CERES
400 CREAM (GRAM) TOPICAL DAILY
Qty: 30 TABLET | DISCHARGE
Start: 2023-10-20

## 2023-10-20 RX ORDER — LANOLIN ALCOHOL/MO/W.PET/CERES
400 CREAM (GRAM) TOPICAL DAILY
Status: DISCONTINUED | OUTPATIENT
Start: 2023-10-20 | End: 2023-10-28 | Stop reason: HOSPADM

## 2023-10-20 RX ORDER — POTASSIUM CHLORIDE 7.45 MG/ML
10 INJECTION INTRAVENOUS PRN
Status: DISCONTINUED | OUTPATIENT
Start: 2023-10-20 | End: 2023-10-28 | Stop reason: HOSPADM

## 2023-10-20 RX ORDER — ALBUTEROL SULFATE 2.5 MG/3ML
2.5 SOLUTION RESPIRATORY (INHALATION) EVERY 4 HOURS PRN
Qty: 120 EACH | Refills: 3 | DISCHARGE
Start: 2023-10-20

## 2023-10-20 RX ORDER — CEFUROXIME AXETIL 500 MG/1
500 TABLET ORAL EVERY 12 HOURS SCHEDULED
Status: COMPLETED | OUTPATIENT
Start: 2023-10-20 | End: 2023-10-21

## 2023-10-20 RX ORDER — POTASSIUM CHLORIDE 20 MEQ/1
20 TABLET, EXTENDED RELEASE ORAL
Status: DISCONTINUED | OUTPATIENT
Start: 2023-10-20 | End: 2023-10-24

## 2023-10-20 RX ADMIN — Medication 100 MG: at 08:24

## 2023-10-20 RX ADMIN — CEFUROXIME AXETIL 500 MG: 500 TABLET ORAL at 11:22

## 2023-10-20 RX ADMIN — FOLIC ACID 1 MG: 1 TABLET ORAL at 08:24

## 2023-10-20 RX ADMIN — DOCUSATE SODIUM 100 MG: 100 CAPSULE, LIQUID FILLED ORAL at 08:24

## 2023-10-20 RX ADMIN — LISINOPRIL 10 MG: 10 TABLET ORAL at 08:24

## 2023-10-20 RX ADMIN — Medication 400 MG: at 11:22

## 2023-10-20 RX ADMIN — SODIUM CHLORIDE, PRESERVATIVE FREE 10 ML: 5 INJECTION INTRAVENOUS at 21:57

## 2023-10-20 RX ADMIN — POTASSIUM CHLORIDE 40 MEQ: 1500 TABLET, EXTENDED RELEASE ORAL at 08:24

## 2023-10-20 RX ADMIN — MAGNESIUM SULFATE HEPTAHYDRATE 1000 MG: 1 INJECTION, SOLUTION INTRAVENOUS at 08:34

## 2023-10-20 RX ADMIN — TAMSULOSIN HYDROCHLORIDE 0.4 MG: 0.4 CAPSULE ORAL at 08:24

## 2023-10-20 RX ADMIN — CEFUROXIME AXETIL 500 MG: 500 TABLET ORAL at 21:56

## 2023-10-20 RX ADMIN — ATORVASTATIN CALCIUM 40 MG: 40 TABLET, FILM COATED ORAL at 21:56

## 2023-10-20 RX ADMIN — ENOXAPARIN SODIUM 40 MG: 100 INJECTION SUBCUTANEOUS at 08:24

## 2023-10-20 RX ADMIN — SODIUM CHLORIDE, PRESERVATIVE FREE 10 ML: 5 INJECTION INTRAVENOUS at 08:28

## 2023-10-20 RX ADMIN — AMLODIPINE BESYLATE 10 MG: 10 TABLET ORAL at 08:24

## 2023-10-20 NOTE — CARE COORDINATION
Social Work-[facility] and all of the [facility] facilites have denied patient. Spoke with [facility] WHIT Espinal([phone]). She states that patient has been denied at [facility] state wide.  Sol Martin
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
## 2023-10-20 NOTE — PLAN OF CARE
Problem: Discharge Planning  Goal: Discharge to home or other facility with appropriate resources  10/20/2023 0010 by Osbaldo France RN  Outcome: Progressing  Flowsheets (Taken 10/19/2023 2000)  Discharge to home or other facility with appropriate resources:   Identify barriers to discharge with patient and caregiver   Arrange for needed discharge resources and transportation as appropriate   Identify discharge learning needs (meds, wound care, etc)   Refer to discharge planning if patient needs post-hospital services based on physician order or complex needs related to functional status, cognitive ability or social support system  10/19/2023 1824 by Stephanie Muse RN  Outcome: Progressing     Problem: Safety - Adult  Goal: Free from fall injury  10/20/2023 0010 by Osbaldo France RN  Outcome: Progressing  10/19/2023 1824 by Stephanie Muse RN  Outcome: Progressing     Problem: ABCDS Injury Assessment  Goal: Absence of physical injury  10/20/2023 0010 by Osbaldo France RN  Outcome: Progressing  10/19/2023 1824 by Stephanie Muse RN  Outcome: Progressing     Problem: Skin/Tissue Integrity  Goal: Absence of new skin breakdown  Description: 1. Monitor for areas of redness and/or skin breakdown  2. Assess vascular access sites hourly  3. Every 4-6 hours minimum:  Change oxygen saturation probe site  4. Every 4-6 hours:  If on nasal continuous positive airway pressure, respiratory therapy assess nares and determine need for appliance change or resting period.   10/20/2023 0010 by Osbaldo France RN  Outcome: Progressing  10/19/2023 1824 by Stephanie Muse RN  Outcome: Progressing     Problem: Skin/Tissue Integrity - Adult  Goal: Skin integrity remains intact  10/20/2023 0010 by Osbaldo France RN  Outcome: Progressing  Flowsheets (Taken 10/19/2023 2000)  Skin Integrity Remains Intact: Monitor for areas of redness and/or skin breakdown  10/19/2023 1824 by Stephanie Muse RN  Outcome: Progressing  Goal: Incisions, wounds, or drain sites healing without S/S of infection  10/20/2023 0010 by Melissa Clemens RN  Outcome: Progressing  Flowsheets (Taken 10/19/2023 2000)  Incisions, Wounds, or Drain Sites Healing Without Sign and Symptoms of Infection:   TWICE DAILY: Assess and document skin integrity   Initiate isolation precautions as appropriate   Initiate pressure ulcer prevention bundle as indicated  10/19/2023 1824 by Evette Amezquita RN  Outcome: Progressing  Goal: Oral mucous membranes remain intact  10/20/2023 0010 by Melissa Clemens RN  Outcome: Progressing  Flowsheets (Taken 10/19/2023 2000)  Oral Mucous Membranes Remain Intact: Assess oral mucosa and hygiene practices  10/19/2023 1824 by Evette Amezquita RN  Outcome: Progressing     Problem: Infection - Adult  Goal: Absence of infection at discharge  10/20/2023 0010 by Melissa Clemens RN  Outcome: Progressing  Flowsheets (Taken 10/19/2023 2000)  Absence of infection at discharge:   Assess and monitor for signs and symptoms of infection   Administer medications as ordered   Instruct and encourage patient and family to use good hand hygiene technique   Monitor lab/diagnostic results  10/19/2023 1824 by Evette Amezquita RN  Outcome: Progressing  Goal: Absence of infection during hospitalization  10/20/2023 0010 by Melissa Clemens RN  Outcome: Progressing  Flowsheets (Taken 10/19/2023 2000)  Absence of infection during hospitalization:   Assess and monitor for signs and symptoms of infection   Monitor lab/diagnostic results   Administer medications as ordered   Instruct and encourage patient and family to use good hand hygiene technique   Identify and instruct in appropriate isolation precautions for identified infection/condition  10/19/2023 1824 by Evette Amezquita RN  Outcome: Progressing  Goal: Absence of fever/infection during anticipated neutropenic period  10/20/2023 0010 by Melissa Clemens RN  Outcome:

## 2023-10-20 NOTE — PROGRESS NOTES
filed at 10/20/2023 0738  Gross per 24 hour   Intake 826.8 ml   Output 500 ml   Net 326.8 ml       Labs:  Hematology:  Recent Labs     10/18/23  0544 10/19/23  0546 10/20/23  0549   WBC 12.2* 10.1 11.0   RBC 4.49 4.09* 4.17*   HGB 13.5 12.4* 12.7*   HCT 41.6 37.8* 38.2*   MCV 92.7 92.4 91.6   MCH 30.1 30.3 30.5   MCHC 32.5 32.8 33.2   RDW 13.0 12.9 12.6    180 177   MPV 10.7 10.4 10.1     Chemistry:  Recent Labs     10/17/23  2010 10/18/23  0050 10/18/23  0544 10/19/23  0546 10/20/23  0549     --  139 140 136   K 3.8  --  3.6* 3.6* 3.3*     --  106 108* 104   CO2 24  --  22 22 23   GLUCOSE 92  --  115* 120* 111*   BUN 45*  --  34* 18 9   CREATININE 0.8  --  0.7 0.7 0.5*   MG  --   --   --   --  1.4*   ANIONGAP 14  --  11 10 9   LABGLOM >60  --  >60 >60 >60   CALCIUM 9.6  --  8.8 8.3* 8.4*   PHOS  --   --   --   --  2.8   TROPHS 10 10  --   --   --      Recent Labs     10/17/23  2010 10/19/23  0546   PROT 7.5 5.7*   LABALBU 3.6 2.5*   AST 22 29   ALT 40 38   ALKPHOS 57 45   BILITOT 0.5 0.3     ABG:  Lab Results   Component Value Date/Time    POCPH 7.357 03/02/2022 08:14 AM    POCPCO2 53.2 03/02/2022 08:14 AM    POCPO2 336.1 03/02/2022 08:14 AM    POCHCO3 29.8 03/02/2022 08:14 AM    PBEA 3 03/02/2022 08:14 AM    JRUF6UNB 100 03/02/2022 08:14 AM    FIO2 90.0 03/02/2022 08:14 AM     Lab Results   Component Value Date/Time    SPECIAL LAC 20ML 03/02/2022 04:00 AM     Lab Results   Component Value Date/Time    CULTURE NO GROWTH 2 DAYS 10/18/2023 05:52 AM       Radiology:  XR CHEST PORTABLE    Result Date: 10/17/2023  Consolidative opacities in the left lung base, could represent atelectasis or infection in the proper clinical setting.        Physical Examination:        General appearance:  alert, cooperative and no distress  Mental Status:  oriented to person, place and normal affect  Lungs:  clear to auscultation bilaterally, normal effort  Heart:  regular rate and rhythm, positive murmur  Abdomen:

## 2023-10-20 NOTE — CARE COORDINATION
Social Work-Lynsey Evangelista and [facility] [address] will not have bed for patient.  González Kohli

## 2023-10-20 NOTE — PLAN OF CARE
Problem: Discharge Planning  Goal: Discharge to home or other facility with appropriate resources  10/20/2023 1244 by Lorie Gee RN  Outcome: Progressing  10/20/2023 0010 by Villa Rios RN  Outcome: Progressing  Flowsheets (Taken 10/19/2023 2000)  Discharge to home or other facility with appropriate resources:   Identify barriers to discharge with patient and caregiver   Arrange for needed discharge resources and transportation as appropriate   Identify discharge learning needs (meds, wound care, etc)   Refer to discharge planning if patient needs post-hospital services based on physician order or complex needs related to functional status, cognitive ability or social support system     Problem: Safety - Adult  Goal: Free from fall injury  10/20/2023 1244 by Lorie Gee RN  Outcome: Progressing  10/20/2023 0010 by Villa Rios RN  Outcome: Progressing     Problem: ABCDS Injury Assessment  Goal: Absence of physical injury  10/20/2023 1244 by Lorie Gee RN  Outcome: Progressing  10/20/2023 0010 by Villa Rios RN  Outcome: Progressing     Problem: Skin/Tissue Integrity  Goal: Absence of new skin breakdown  Description: 1. Monitor for areas of redness and/or skin breakdown  2. Assess vascular access sites hourly  3. Every 4-6 hours minimum:  Change oxygen saturation probe site  4. Every 4-6 hours:  If on nasal continuous positive airway pressure, respiratory therapy assess nares and determine need for appliance change or resting period.   10/20/2023 1244 by Lorie Gee RN  Outcome: Progressing  10/20/2023 0010 by Villa Rios RN  Outcome: Progressing     Problem: Infection - Adult  Goal: Absence of infection at discharge  10/20/2023 1244 by Lorie Gee RN  Outcome: Progressing  10/20/2023 0010 by Villa Rios RN  Outcome: Progressing  Flowsheets (Taken 10/19/2023 2000)  Absence of infection at discharge:   Assess and monitor for signs and symptoms of infection   Administer medications as ordered   Instruct and encourage patient and family to use good hand hygiene technique   Monitor lab/diagnostic results  Goal: Absence of infection during hospitalization  10/20/2023 1244 by Aleisha Schmitt RN  Outcome: Progressing  10/20/2023 0010 by Radha Luque RN  Outcome: Progressing  Flowsheets (Taken 10/19/2023 2000)  Absence of infection during hospitalization:   Assess and monitor for signs and symptoms of infection   Monitor lab/diagnostic results   Administer medications as ordered   Instruct and encourage patient and family to use good hand hygiene technique   Identify and instruct in appropriate isolation precautions for identified infection/condition  Goal: Absence of fever/infection during anticipated neutropenic period  10/20/2023 1244 by Aleisha Schmitt RN  Outcome: Progressing  10/20/2023 0010 by Radha Luque RN  Outcome: Progressing  Flowsheets (Taken 10/19/2023 2000)  Absence of fever/infection during anticipated neutropenic period: Monitor white blood cell count     Problem: Chronic Conditions and Co-morbidities  Goal: Patient's chronic conditions and co-morbidity symptoms are monitored and maintained or improved  10/20/2023 1244 by Aleisha Schmitt RN  Outcome: Progressing  10/20/2023 0010 by Radha Luque RN  Outcome: Progressing  Flowsheets (Taken 10/19/2023 2000)  Care Plan - Patient's Chronic Conditions and Co-Morbidity Symptoms are Monitored and Maintained or Improved:   Monitor and assess patient's chronic conditions and comorbid symptoms for stability, deterioration, or improvement   Collaborate with multidisciplinary team to address chronic and comorbid conditions and prevent exacerbation or deterioration   Update acute care plan with appropriate goals if chronic or comorbid symptoms are exacerbated and prevent overall improvement and discharge     Problem: Nutrition Deficit:  Goal: Optimize nutritional status  10/20/2023 1244 by Aleisha Schmitt RN  Outcome:

## 2023-10-20 NOTE — PROGRESS NOTES
Occupational Therapy  DATE: 10/20/2023    NAME: Rachel Aj  MRN: [id number]   : 1965    Patient not seen this date for Occupational Therapy due to:      [] Cancel by RN or physician due to:    [] Hemodialysis    [] Critical Lab Value Level     [] Blood transfusion in progress    [] Acute or unstable cardiovascular status   _MAP < 55 or more than >115  _HR < 40 or > 130    [] Acute or unstable pulmonary status   -FiO2 > 60%   _RR < 5 or >40    _O2 sats < 85%    [] Strict Bedrest    [] Off Unit for surgery or procedure    [] Off Unit for testing       [] Pending imaging to R/O fracture    [x] Refusal by Patient : Pt. Declined treatment stating \"I feel terrible and I can't do it now\". OT will cont to follow. [] Other      [] OT being discontinued at this time. Patient independent. No further needs. [] OT being discontinued at this time as the patient has been transferred to hospice care. No further needs.       KVNG Shahid

## 2023-10-20 NOTE — CARE COORDINATION
Social Work-Sent referral to [address] by [facility], [place], [address], [address], [facility], [address] [place] [address]  KapilParshall Dominik

## 2023-10-20 NOTE — PROGRESS NOTES
Physical Therapy  DATE: 10/20/2023    NAME: Ying Contreras  MRN: [id number]   : 1965    Patient not seen this date for Physical Therapy due to:      [] Cancel by RN or physician due to:    [] Hemodialysis    [] Critical Lab Value Level     [] Blood transfusion in progress    [] Acute or unstable cardiovascular status   _MAP < 55 or more than >115  _HR < 40 or > 130    [] Acute or unstable pulmonary status   -FiO2 > 60%   _RR < 5 or >40    _O2 sats < 85%    [] Strict Bedrest    [] Off Unit for surgery or procedure    [] Off Unit for testing       [] Pending imaging to R/O fracture    [x] Refusal by Patient Pt. Declined treatment stating \"I feel terrible and I can't do it now\". [] Other      [] PT being discontinued at this time. Patient independent. No further needs. [] PT being discontinued at this time as the patient has been transferred to hospice care. No further needs.       Montez Godoy, PTA

## 2023-10-21 LAB
ANION GAP SERPL CALCULATED.3IONS-SCNC: 8 MMOL/L (ref 9–17)
BUN SERPL-MCNC: 8 MG/DL (ref 6–20)
BUN/CREAT SERPL: 16 (ref 9–20)
CALCIUM SERPL-MCNC: 8.6 MG/DL (ref 8.6–10.4)
CHLORIDE SERPL-SCNC: 102 MMOL/L (ref 98–107)
CO2 SERPL-SCNC: 23 MMOL/L (ref 20–31)
CREAT SERPL-MCNC: 0.5 MG/DL (ref 0.7–1.2)
GFR SERPL CREATININE-BSD FRML MDRD: >60 ML/MIN/1.73M2
GLUCOSE SERPL-MCNC: 116 MG/DL (ref 70–99)
MAGNESIUM SERPL-MCNC: 1.6 MG/DL (ref 1.6–2.6)
POTASSIUM SERPL-SCNC: 3.8 MMOL/L (ref 3.7–5.3)
SODIUM SERPL-SCNC: 133 MMOL/L (ref 135–144)

## 2023-10-21 PROCEDURE — 1200000000 HC SEMI PRIVATE

## 2023-10-21 PROCEDURE — 99231 SBSQ HOSP IP/OBS SF/LOW 25: CPT | Performed by: INTERNAL MEDICINE

## 2023-10-21 PROCEDURE — 83735 ASSAY OF MAGNESIUM: CPT

## 2023-10-21 PROCEDURE — 36415 COLL VENOUS BLD VENIPUNCTURE: CPT

## 2023-10-21 PROCEDURE — 97530 THERAPEUTIC ACTIVITIES: CPT

## 2023-10-21 PROCEDURE — 6370000000 HC RX 637 (ALT 250 FOR IP): Performed by: INTERNAL MEDICINE

## 2023-10-21 PROCEDURE — 97110 THERAPEUTIC EXERCISES: CPT

## 2023-10-21 PROCEDURE — 6370000000 HC RX 637 (ALT 250 FOR IP): Performed by: NURSE PRACTITIONER

## 2023-10-21 PROCEDURE — 94761 N-INVAS EAR/PLS OXIMETRY MLT: CPT

## 2023-10-21 PROCEDURE — 2580000003 HC RX 258: Performed by: NURSE PRACTITIONER

## 2023-10-21 PROCEDURE — 6360000002 HC RX W HCPCS: Performed by: NURSE PRACTITIONER

## 2023-10-21 PROCEDURE — 80048 BASIC METABOLIC PNL TOTAL CA: CPT

## 2023-10-21 RX ADMIN — POTASSIUM CHLORIDE 20 MEQ: 1500 TABLET, EXTENDED RELEASE ORAL at 08:51

## 2023-10-21 RX ADMIN — Medication 100 MG: at 08:51

## 2023-10-21 RX ADMIN — LISINOPRIL 10 MG: 10 TABLET ORAL at 08:51

## 2023-10-21 RX ADMIN — ATORVASTATIN CALCIUM 40 MG: 40 TABLET, FILM COATED ORAL at 21:11

## 2023-10-21 RX ADMIN — CEFUROXIME AXETIL 500 MG: 500 TABLET ORAL at 21:11

## 2023-10-21 RX ADMIN — Medication 400 MG: at 08:51

## 2023-10-21 RX ADMIN — ENOXAPARIN SODIUM 40 MG: 100 INJECTION SUBCUTANEOUS at 08:51

## 2023-10-21 RX ADMIN — SODIUM CHLORIDE, PRESERVATIVE FREE 10 ML: 5 INJECTION INTRAVENOUS at 21:11

## 2023-10-21 RX ADMIN — TAMSULOSIN HYDROCHLORIDE 0.4 MG: 0.4 CAPSULE ORAL at 08:51

## 2023-10-21 RX ADMIN — CEFUROXIME AXETIL 500 MG: 500 TABLET ORAL at 08:51

## 2023-10-21 RX ADMIN — DOCUSATE SODIUM 100 MG: 100 CAPSULE, LIQUID FILLED ORAL at 08:51

## 2023-10-21 RX ADMIN — AMLODIPINE BESYLATE 10 MG: 10 TABLET ORAL at 08:51

## 2023-10-21 RX ADMIN — SODIUM CHLORIDE, PRESERVATIVE FREE 10 ML: 5 INJECTION INTRAVENOUS at 08:52

## 2023-10-21 RX ADMIN — FOLIC ACID 1 MG: 1 TABLET ORAL at 08:51

## 2023-10-21 NOTE — PLAN OF CARE
Problem: Discharge Planning  Goal: Discharge to home or other facility with appropriate resources  Outcome: Progressing  Flowsheets (Taken 10/21/2023 0851)  Discharge to home or other facility with appropriate resources: Identify barriers to discharge with patient and caregiver     Problem: Safety - Adult  Goal: Free from fall injury  10/21/2023 1520 by Dani Lizarraga RN  Outcome: Progressing  10/21/2023 0320 by Charis Tam RN  Outcome: Progressing     Problem: ABCDS Injury Assessment  Goal: Absence of physical injury  10/21/2023 1520 by Dani Lizarraga RN  Outcome: Progressing  10/21/2023 0320 by Charis Tam RN  Outcome: Progressing     Problem: Skin/Tissue Integrity  Goal: Absence of new skin breakdown  Description: 1. Monitor for areas of redness and/or skin breakdown  2. Assess vascular access sites hourly  3. Every 4-6 hours minimum:  Change oxygen saturation probe site  4. Every 4-6 hours:  If on nasal continuous positive airway pressure, respiratory therapy assess nares and determine need for appliance change or resting period.   10/21/2023 1520 by Dani Lizarraga RN  Outcome: Progressing  10/21/2023 0320 by Charis Tam RN  Outcome: Progressing     Problem: Skin/Tissue Integrity - Adult  Goal: Skin integrity remains intact  10/21/2023 1520 by Dani Lizarraga RN  Outcome: Progressing  Flowsheets (Taken 10/21/2023 1282)  Skin Integrity Remains Intact: Monitor for areas of redness and/or skin breakdown  10/21/2023 0320 by Charis Tam RN  Outcome: Progressing  Flowsheets (Taken 10/20/2023 2155)  Skin Integrity Remains Intact: Monitor for areas of redness and/or skin breakdown  Goal: Incisions, wounds, or drain sites healing without S/S of infection  Outcome: Progressing  Flowsheets (Taken 10/21/2023 0851)  Incisions, Wounds, or Drain Sites Healing Without Sign and Symptoms of Infection: ADMISSION and DAILY: Assess and document risk factors for pressure ulcer development  Goal: Oral mucous membranes

## 2023-10-21 NOTE — PROGRESS NOTES
Moderate Assistance  Additional Factors: Set-up; Verbal cues; Increased time to complete; Head of bed raised; With handrails  Roll Left  Assistance Level: Moderate assistance  Roll Right  Assistance Level: Moderate assistance  Skilled Clinical Factors: Mulltiple rolls performed breaking down techniques working on hand placement and Leg positioning to promote momentum in technique  Sit to Supine  Skilled Clinical Factors: unable to assess as patient retires to recliner upon completion of PT treatment. Supine to Sit  Assistance Level: Moderate assistance  Skilled Clinical Factors: Patient requries vc's and assist for RLE and trunk to EOB seated posture. vc's and education on hand placement and KESHAWN LE placement technique utilizing LUE to assist with LLE extremity progression techniques. Patient with significant difficulty in cooridination techniques. Scooting  Assistance Level: Moderate assistance  Skilled Clinical Factors: vc's for anterior hip progression to seated EOB posture with assist for R hemisphere deficits. Balance  Sitting Balance: Contact guard assistance  Standing Balance: Maximum assistance  Standing Balance  Time: up to 1 + minutes in stance. Activity: Patient performs initial sit to stand with sarthak walker and MOD/MAX x2 for stand pivot transfer into side chair. Patient requires increased assist for stability in stance. Patient transferred into recliner this date due to significant R Lateral lean requiring support pillows for positioning to maintain neutral posture and promote neutral positioning which will promote safety in seated wc activities when appropriate. Transfers  Additional Factors: Set-up; Verbal cues; Hand placement cues; Increased time to complete; With handrails  Device: Walker  Sit to Stand  Assistance Level: Moderate assistance;Maximum assistance; Requires x 2 assistance  Skilled Clinical Factors: Patient requires a MOD/MAX x2 for progression into stance with lateral WS in stance to promote Devices: Gait belt;Nurse notified; Patient at risk for falls; Left in chair;Chair alarm in place; Heels elevated for pressure relief; All fall risk precautions in place    EDUCATION  Education  Education Given To: Patient  Education Provided: Transfer Training; Safety  Education Method: Verbal;Demonstration  Barriers to Learning: Cognition  Education Outcome: Continued education needed        Therapy Time   Individual Concurrent Group Co-treatment   Time In 8352         Time Out 8688         Minutes [address] Waters, 10/21/23 at 11:50 AM

## 2023-10-21 NOTE — PROGRESS NOTES
[facility]  Office: [phone]  Ubaldo Segundo DO, Tere Carballo, DO, Amber Moore MD, Kirby Bradley MD, Jagruti Hudson MD, Lulú Foley MD,  Ellyn Rizzo MD, Angelo Weldon MD, Luis Davis DO, Claudia Gómez MD,  Mahendra Ortega MD, Eric Bolaños MD, Hui Saul DO, Lora Martinez MD,  Beatriz Tolentino DO, Papo Cadet MD, Lj Gonzalez MD, Josh Landon MD, Saleem Balderas MD,  Kishor Shepard MD, Kasie Purvis MD, Yoandy Santana MD, Arcelia Pryor MD, Arvin Vargas DO, Domneic Robert MD,  Aleah Ray MD, Orin Leonard MD, Efrain Knight, CNP,  Ciara Leyva, CNP,, Александр Andersen, CNP,  Ratna Collins, [facility], Jaziel Augustine, CNP, Meliton Aguayo, CNP, Rose Marie Polanco, CNP, Scot Cast, CNP, Rasheed Ojeda, CNP, Carlos Cha, CNP, Minnie Jackson, [facility], Truman Shah, CNP, Dillan Rasheedt, [address] [place] [address]    Progress Note    10/21/2023    12:23 PM    Name:   Lawrence De La Torre  MRN:     [id number]     Acct:      [de-identified]   Room:   2017/2017-02  IP Day:  3  Admit Date:  10/17/2023  6:58 PM    PCP:   Brett Figueroa MD  Code Status:  Full Code    Subjective:     C/C:   Chief Complaint   Patient presents with    Failure To Thrive     friend states need to be placed unable to take care of self. Hx of stroke 7/2023     Interval History Status: not changed. Feels about the same  Denies cp/sob/n/v    Difficult to get any additional history due to slurred speech    Brief History: This 65 yom was brought in by a friend because he was unable to care for himself. He has had cough with clear phlegm and sob. Not able to get much more history as he mumbles and has slurred speech. Review of Systems:     Unobtainable      Medications: Allergies:     Allergies   Allergen Reactions    Haldol [Haloperidol] Other (See Comments)     Seizure activity per hours.    I/O (24Hr): Intake/Output Summary (Last 24 hours) at 10/21/2023 1223  Last data filed at 10/21/2023 0523  Gross per 24 hour   Intake 200 ml   Output 1350 ml   Net -1150 ml       Labs:  Hematology:  Recent Labs     10/19/23  0546 10/20/23  0549   WBC 10.1 11.0   RBC 4.09* 4.17*   HGB 12.4* 12.7*   HCT 37.8* 38.2*   MCV 92.4 91.6   MCH 30.3 30.5   MCHC 32.8 33.2   RDW 12.9 12.6    177   MPV 10.4 10.1     Chemistry:  Recent Labs     10/19/23  0546 10/20/23  0549 10/21/23  0627    136 133*   K 3.6* 3.3* 3.8   * 104 102   CO2 22 23 23   GLUCOSE 120* 111* 116*   BUN 18 9 8   CREATININE 0.7 0.5* 0.5*   MG  --  1.4* 1.6   ANIONGAP 10 9 8*   LABGLOM >60 >60 >60   CALCIUM 8.3* 8.4* 8.6   PHOS  --  2.8  --      Recent Labs     10/19/23  0546   PROT 5.7*   LABALBU 2.5*   AST 29   ALT 38   ALKPHOS 45   BILITOT 0.3     ABG:  Lab Results   Component Value Date/Time    POCPH 7.357 03/02/2022 08:14 AM    POCPCO2 53.2 03/02/2022 08:14 AM    POCPO2 336.1 03/02/2022 08:14 AM    POCHCO3 29.8 03/02/2022 08:14 AM    PBEA 3 03/02/2022 08:14 AM    PRES6JQP 100 03/02/2022 08:14 AM    FIO2 90.0 03/02/2022 08:14 AM     Lab Results   Component Value Date/Time    SPECIAL LAC 20ML 03/02/2022 04:00 AM     Lab Results   Component Value Date/Time    CULTURE NO GROWTH 3 DAYS 10/18/2023 05:52 AM       Radiology:  XR CHEST PORTABLE    Result Date: 10/17/2023  Consolidative opacities in the left lung base, could represent atelectasis or infection in the proper clinical setting.        Physical Examination:        General appearance:  alert, cooperative and no distress  Mental Status:  oriented to person, place and normal affect  Lungs:  clear to auscultation bilaterally, normal effort  Heart:  regular rate and rhythm, positive murmur  Abdomen:  soft, nontender, nondistended, normal bowel sounds, no masses, hepatomegaly, splenomegaly  Extremities:  no edema, redness, tenderness in the calves  Skin:  no gross lesions,

## 2023-10-22 ENCOUNTER — APPOINTMENT (OUTPATIENT)
Dept: GENERAL RADIOLOGY | Age: 58
End: 2023-10-22
Payer: COMMERCIAL

## 2023-10-22 PROCEDURE — 94761 N-INVAS EAR/PLS OXIMETRY MLT: CPT

## 2023-10-22 PROCEDURE — 2700000000 HC OXYGEN THERAPY PER DAY

## 2023-10-22 PROCEDURE — 1200000000 HC SEMI PRIVATE

## 2023-10-22 PROCEDURE — 6370000000 HC RX 637 (ALT 250 FOR IP): Performed by: NURSE PRACTITIONER

## 2023-10-22 PROCEDURE — 6370000000 HC RX 637 (ALT 250 FOR IP): Performed by: INTERNAL MEDICINE

## 2023-10-22 PROCEDURE — 2580000003 HC RX 258: Performed by: NURSE PRACTITIONER

## 2023-10-22 PROCEDURE — 71045 X-RAY EXAM CHEST 1 VIEW: CPT

## 2023-10-22 PROCEDURE — 99231 SBSQ HOSP IP/OBS SF/LOW 25: CPT | Performed by: INTERNAL MEDICINE

## 2023-10-22 PROCEDURE — 6360000002 HC RX W HCPCS: Performed by: NURSE PRACTITIONER

## 2023-10-22 RX ADMIN — Medication 400 MG: at 09:02

## 2023-10-22 RX ADMIN — AMLODIPINE BESYLATE 10 MG: 10 TABLET ORAL at 09:02

## 2023-10-22 RX ADMIN — Medication 100 MG: at 09:02

## 2023-10-22 RX ADMIN — POTASSIUM CHLORIDE 20 MEQ: 1500 TABLET, EXTENDED RELEASE ORAL at 09:02

## 2023-10-22 RX ADMIN — LISINOPRIL 10 MG: 10 TABLET ORAL at 09:02

## 2023-10-22 RX ADMIN — ENOXAPARIN SODIUM 40 MG: 100 INJECTION SUBCUTANEOUS at 09:02

## 2023-10-22 RX ADMIN — DOCUSATE SODIUM 100 MG: 100 CAPSULE, LIQUID FILLED ORAL at 09:02

## 2023-10-22 RX ADMIN — SODIUM CHLORIDE, PRESERVATIVE FREE 10 ML: 5 INJECTION INTRAVENOUS at 09:02

## 2023-10-22 RX ADMIN — ATORVASTATIN CALCIUM 40 MG: 40 TABLET, FILM COATED ORAL at 21:59

## 2023-10-22 RX ADMIN — FOLIC ACID 1 MG: 1 TABLET ORAL at 09:02

## 2023-10-22 RX ADMIN — TAMSULOSIN HYDROCHLORIDE 0.4 MG: 0.4 CAPSULE ORAL at 09:02

## 2023-10-22 NOTE — PLAN OF CARE
Problem: Discharge Planning  Goal: Discharge to home or other facility with appropriate resources  10/21/2023 2329 by Sandhya Hallman RN  Outcome: Progressing  10/21/2023 1520 by Eder Broussard RN  Outcome: Progressing  Flowsheets (Taken 10/21/2023 6243)  Discharge to home or other facility with appropriate resources: Identify barriers to discharge with patient and caregiver     Problem: Safety - Adult  Goal: Free from fall injury  10/21/2023 2329 by Sandhya Hallman RN  Outcome: Progressing  10/21/2023 1520 by Eder Broussard RN  Outcome: Progressing     Problem: ABCDS Injury Assessment  Goal: Absence of physical injury  10/21/2023 2329 by Sandhya Hallman RN  Outcome: Progressing  10/21/2023 1520 by Eder Broussard RN  Outcome: Progressing     Problem: Skin/Tissue Integrity  Goal: Absence of new skin breakdown  Description: 1. Monitor for areas of redness and/or skin breakdown  2. Assess vascular access sites hourly  3. Every 4-6 hours minimum:  Change oxygen saturation probe site  4. Every 4-6 hours:  If on nasal continuous positive airway pressure, respiratory therapy assess nares and determine need for appliance change or resting period.   10/21/2023 2329 by Sandhya Hallman RN  Outcome: Progressing  10/21/2023 1520 by Eder Broussard RN  Outcome: Progressing     Problem: Skin/Tissue Integrity - Adult  Goal: Skin integrity remains intact  10/21/2023 2329 by Sandhya Hallman RN  Outcome: Progressing  10/21/2023 1520 by Eder Broussard RN  Outcome: Progressing  Flowsheets (Taken 10/21/2023 3263)  Skin Integrity Remains Intact: Monitor for areas of redness and/or skin breakdown  Goal: Incisions, wounds, or drain sites healing without S/S of infection  10/21/2023 2329 by Sandhya Hallman RN  Outcome: Progressing  10/21/2023 1520 by Eder Broussard RN  Outcome: Progressing  Flowsheets (Taken 10/21/2023 0851)  Incisions, Wounds, or Drain Sites Healing Without Sign and Symptoms of Infection: ADMISSION and DAILY: Assess and Progressing  10/21/2023 1520 by Davie Bray RN  Outcome: Progressing     Problem: Neurosensory - Adult  Goal: Achieves stable or improved neurological status  10/21/2023 2329 by Mukesh Thomas RN  Outcome: Progressing  10/21/2023 1520 by Davie Bray RN  Outcome: Progressing     Problem: Respiratory - Adult  Goal: Achieves optimal ventilation and oxygenation  10/21/2023 2329 by Mukesh Thomas RN  Outcome: Progressing  10/21/2023 1520 by Davie Bray RN  Outcome: Progressing     Problem: Cardiovascular - Adult  Goal: Maintains optimal cardiac output and hemodynamic stability  10/21/2023 2329 by Mukesh Thomas RN  Outcome: Progressing  10/21/2023 1520 by Davie Bray RN  Outcome: Progressing     Problem: Musculoskeletal - Adult  Goal: Return mobility to safest level of function  10/21/2023 2329 by Mukesh Thomas RN  Outcome: Progressing  10/21/2023 1520 by Davie Bray RN  Outcome: Progressing     Problem: Gastrointestinal - Adult  Goal: Minimal or absence of nausea and vomiting  10/21/2023 2329 by Mukesh Thomas RN  Outcome: Progressing  10/21/2023 1520 by Davie Bray RN  Outcome: Progressing     Problem: Genitourinary - Adult  Goal: Absence of urinary retention  10/21/2023 2329 by Mukesh Thomas RN  Outcome: Progressing  10/21/2023 1520 by Davie Bray RN  Outcome: Progressing

## 2023-10-22 NOTE — PROGRESS NOTES
[facility]  Office: [phone]  Jeri Bliss, DO, Angel Luisluz marina Hammermateo Blood, DO, Thai Vu MD, Tyson Ceja MD, Lisa Simmons MD, Arcelia Libman, MD,  Melecio Mcfarland MD, Fely Toscano MD, Cee Zeng DO, Mariluz Santillan MD,  Angel Clark MD, Candi Hatch MD, Kumar Haynes DO, Key Rice MD,  Tom Gay DO, Deo Campos MD, Munira Boyce MD, Ida Joseph MD, Michelle Arce MD,  Manolo Johnston MD, Phillip Herzog MD, Savanah Brambila MD, Akhil Frye MD, Xenia Castro DO, Dai Payne MD,  Concepción Alejandro MD, Kp Wilson MD, aVlente Robles, CNP,  Gabriella Obrien, CNP,, Sharon Selby, CNP,  Amina Steele, [facility], Antwon Altamirano, CNP, Farzana Levine, CNP, Teresa Tapia, CNP, Theodore Castillo, CNP, Daryl Chin, CNP, Richelle Hamilton, CNP, Luba Mores, CNS, Marnie Guzman, CNP, Marcellus Gilmore, [facility]    Progress Note    10/22/2023    10:30 AM    Name:   Indu Colmenares  MRN:     [id number]     Acct:      [de-identified]   Room:   2017/2017-02  IP Day:  4  Admit Date:  10/17/2023  6:58 PM    PCP:   Patsy Sage MD  Code Status:  Full Code    Subjective:     C/C:   Chief Complaint   Patient presents with    Failure To Thrive     friend states need to be placed unable to take care of self. Hx of stroke 7/2023     Interval History Status: not changed. Feels about the same  Denies cp/sob/n/v    Difficult to get any additional history due to slurred speech    Brief History: This 65 yom was brought in by a friend because he was unable to care for himself. He has had cough with clear phlegm and sob. Not able to get much more history as he mumbles and has slurred speech. Review of Systems:     Unobtainable      Medications: Allergies:     Allergies   Allergen Reactions    Haldol [Haloperidol] Other (See Comments)     Seizure activity per in the calves  Skin:  no gross lesions, rashes, induration  Rue plegic, rle weaker than left    Assessment:        Hospital Problems             Last Modified POA    * (Principal) Pneumonia due to infectious agent 10/18/2023 Yes    Hepatitis C 10/18/2023 Yes    Hypertension 10/18/2023 Yes    Smoker 10/18/2023 Yes    Opioid type dependence, continuous ([address]) (Chronic) 10/18/2023 Yes    Bipolar 1 disorder ([address]) 10/18/2023 Yes    Cocaine abuse ([address]) 10/18/2023 Yes    Polysubstance abuse ([address]) 10/20/2023 Yes    TBI (traumatic brain injury) ([address]) 10/18/2023 Yes    Right sided weakness 10/18/2023 Yes    Dysphagia following cerebral infarction 10/18/2023 Yes    Hemiplegia, unspecified affecting right dominant side ([address]) 10/18/2023 Yes    Unable to care for self 10/18/2023 Yes    Hypomagnesemia 10/20/2023 Yes       Plan:        Continue antibiotics for pneumonia-switched to po  Pt/ot  Outpatient ct chest for nodule  Dc planning to snf-awaiting placement-has been refused at numerous facilities.   Medically stable    Tate Carballo DO  10/22/2023  10:30 AM

## 2023-10-22 NOTE — PLAN OF CARE
Problem: Discharge Planning  Goal: Discharge to home or other facility with appropriate resources  10/22/2023 1220 by Sinai Hodges RN  Outcome: Progressing  10/21/2023 2329 by Gudelia Vera RN  Outcome: Progressing     Problem: Safety - Adult  Goal: Free from fall injury  10/22/2023 1220 by Sinai Hodges RN  Outcome: Progressing  10/21/2023 2329 by Gudelia Vera RN  Outcome: Progressing     Problem: ABCDS Injury Assessment  Goal: Absence of physical injury  10/22/2023 1220 by Sinai Hodges RN  Outcome: Progressing  10/21/2023 2329 by Gudelia Vera RN  Outcome: Progressing     Problem: ABCDS Injury Assessment  Goal: Absence of physical injury  10/22/2023 1220 by Sinai Hodges RN  Outcome: Progressing  10/21/2023 2329 by Gudelia Vera RN  Outcome: Progressing     Problem: Skin/Tissue Integrity  Goal: Absence of new skin breakdown  Description: 1. Monitor for areas of redness and/or skin breakdown  2. Assess vascular access sites hourly  3. Every 4-6 hours minimum:  Change oxygen saturation probe site  4. Every 4-6 hours:  If on nasal continuous positive airway pressure, respiratory therapy assess nares and determine need for appliance change or resting period.   10/22/2023 1220 by Sinai Hodges RN  Outcome: Progressing  10/21/2023 2329 by Gudelia Vera RN  Outcome: Progressing     Problem: Skin/Tissue Integrity - Adult  Goal: Skin integrity remains intact  10/22/2023 1220 by Sinai Hodges RN  Outcome: Progressing  Flowsheets (Taken 10/21/2023 2331 by Gudelia Vera, RN)  Skin Integrity Remains Intact: Monitor for areas of redness and/or skin breakdown  10/21/2023 2329 by Gudelia Vera RN  Outcome: Progressing  Goal: Incisions, wounds, or drain sites healing without S/S of infection  10/22/2023 1220 by Sinai Hodges RN  Outcome: Progressing  10/21/2023 2329 by Gudelia Vera RN  Outcome: Progressing  Goal: Oral mucous membranes remain intact  10/22/2023 1220 by Sinai Hdoges RN  Outcome:

## 2023-10-23 PROBLEM — I47.29 NSVT (NONSUSTAINED VENTRICULAR TACHYCARDIA) (HCC): Status: ACTIVE | Noted: 2023-10-23

## 2023-10-23 LAB
ANION GAP SERPL CALCULATED.3IONS-SCNC: 10 MMOL/L (ref 9–17)
BUN SERPL-MCNC: 15 MG/DL (ref 6–20)
BUN/CREAT SERPL: 25 (ref 9–20)
CALCIUM SERPL-MCNC: 8.8 MG/DL (ref 8.6–10.4)
CHLORIDE SERPL-SCNC: 105 MMOL/L (ref 98–107)
CO2 SERPL-SCNC: 23 MMOL/L (ref 20–31)
CREAT SERPL-MCNC: 0.6 MG/DL (ref 0.7–1.2)
GFR SERPL CREATININE-BSD FRML MDRD: >60 ML/MIN/1.73M2
GLUCOSE SERPL-MCNC: 144 MG/DL (ref 70–99)
MAGNESIUM SERPL-MCNC: 1.8 MG/DL (ref 1.6–2.6)
MICROORGANISM SPEC CULT: NORMAL
MICROORGANISM SPEC CULT: NORMAL
POTASSIUM SERPL-SCNC: 4.1 MMOL/L (ref 3.7–5.3)
SERVICE CMNT-IMP: NORMAL
SERVICE CMNT-IMP: NORMAL
SODIUM SERPL-SCNC: 138 MMOL/L (ref 135–144)
SPECIMEN DESCRIPTION: NORMAL
SPECIMEN DESCRIPTION: NORMAL

## 2023-10-23 PROCEDURE — 6360000002 HC RX W HCPCS: Performed by: NURSE PRACTITIONER

## 2023-10-23 PROCEDURE — 6370000000 HC RX 637 (ALT 250 FOR IP): Performed by: NURSE PRACTITIONER

## 2023-10-23 PROCEDURE — 1200000000 HC SEMI PRIVATE

## 2023-10-23 PROCEDURE — 80048 BASIC METABOLIC PNL TOTAL CA: CPT

## 2023-10-23 PROCEDURE — 83735 ASSAY OF MAGNESIUM: CPT

## 2023-10-23 PROCEDURE — 6370000000 HC RX 637 (ALT 250 FOR IP): Performed by: INTERNAL MEDICINE

## 2023-10-23 PROCEDURE — 36415 COLL VENOUS BLD VENIPUNCTURE: CPT

## 2023-10-23 PROCEDURE — 92507 TX SP LANG VOICE COMM INDIV: CPT

## 2023-10-23 PROCEDURE — 99232 SBSQ HOSP IP/OBS MODERATE 35: CPT | Performed by: INTERNAL MEDICINE

## 2023-10-23 RX ADMIN — METOPROLOL TARTRATE 25 MG: 25 TABLET, FILM COATED ORAL at 21:28

## 2023-10-23 RX ADMIN — LISINOPRIL 10 MG: 10 TABLET ORAL at 08:23

## 2023-10-23 RX ADMIN — ENOXAPARIN SODIUM 40 MG: 100 INJECTION SUBCUTANEOUS at 08:23

## 2023-10-23 RX ADMIN — POTASSIUM CHLORIDE 20 MEQ: 1500 TABLET, EXTENDED RELEASE ORAL at 08:23

## 2023-10-23 RX ADMIN — Medication 100 MG: at 08:23

## 2023-10-23 RX ADMIN — AMLODIPINE BESYLATE 10 MG: 10 TABLET ORAL at 08:23

## 2023-10-23 RX ADMIN — Medication 400 MG: at 08:23

## 2023-10-23 RX ADMIN — ATORVASTATIN CALCIUM 40 MG: 40 TABLET, FILM COATED ORAL at 21:28

## 2023-10-23 RX ADMIN — TAMSULOSIN HYDROCHLORIDE 0.4 MG: 0.4 CAPSULE ORAL at 08:23

## 2023-10-23 RX ADMIN — FOLIC ACID 1 MG: 1 TABLET ORAL at 08:23

## 2023-10-23 ASSESSMENT — PAIN SCALES - GENERAL: PAINLEVEL_OUTOF10: 0

## 2023-10-23 NOTE — PROGRESS NOTES
[address]  Occupational Therapy Not Seen    DATE: 10/23/2023    NAME: Rachel Aj  MRN: [id number]   : 1965    Patient not seen this date for Occupational Therapy due to:      [] Cancel by RN or physician due to:    [] Hemodialysis    [] Critical Lab Value Level     [] Blood transfusion in progress    [] Acute or unstable cardiovascular status   _MAP < 55 or more than >115  _HR < 40 or > 130    [] Acute or unstable pulmonary status   -FiO2 > 60%   _RR < 5 or >40    _O2 sats < 85%    [] Strict Bedrest    [] Off Unit for surgery or procedure    [] Off Unit for testing       [] Pending imaging to R/O fracture    [x] Refusal by Patient: Due to upset stomach/diarrhea     [] Other      [] OT being discontinued at this time. Patient independent. No further needs. [] OT being discontinued at this time as the patient has been transferred to hospice care. No further needs.       Duc Sims KVNG

## 2023-10-23 NOTE — CARE COORDINATION
Social Work-Continuing Health Care at the [facility]([phone]) is considering patient and place him on their waiting list. [address]

## 2023-10-23 NOTE — PROGRESS NOTES
Physical Therapy  DATE: 10/23/2023    NAME: Arabella Valdez  MRN: [id number]   : 1965    Patient not seen this date for Physical Therapy due to:      [] Cancel by RN or physician due to:    [] Hemodialysis    [] Critical Lab Value Level     [] Blood transfusion in progress    [] Acute or unstable cardiovascular status   _MAP < 55 or more than >115  _HR < 40 or > 130    [] Acute or unstable pulmonary status   -FiO2 > 60%   _RR < 5 or >40    _O2 sats < 85%    [] Strict Bedrest    [] Off Unit for surgery or procedure    [] Off Unit for testing       [] Pending imaging to R/O fracture    [x] Refusal by Patient (Due to upset stomach/diarrhea)      [] Other      [] PT being discontinued at this time. Patient independent. No further needs. [] PT being discontinued at this time as the patient has been transferred to hospice care. No further needs.       Moi Mccarthy, PTA

## 2023-10-23 NOTE — PLAN OF CARE
stability, deterioration, or improvement     Problem: Nutrition Deficit:  Goal: Optimize nutritional status  10/22/2023 2007 by Esme Laguerre RN  Outcome: Progressing  10/22/2023 1220 by Nathalie Ramos RN  Outcome: Progressing     Problem: Neurosensory - Adult  Goal: Achieves stable or improved neurological status  10/22/2023 2007 by Esme Laguerre RN  Outcome: Progressing  10/22/2023 1220 by Nathalie Ramos RN  Outcome: Progressing  Flowsheets (Taken 10/22/2023 0902)  Achieves stable or improved neurological status: Assess for and report changes in neurological status     Problem: Respiratory - Adult  Goal: Achieves optimal ventilation and oxygenation  10/22/2023 2007 by Esme Laguerre RN  Outcome: Progressing  10/22/2023 1220 by Nathalie Ramos RN  Outcome: Progressing  Flowsheets (Taken 10/22/2023 0902)  Achieves optimal ventilation and oxygenation: Assess for changes in respiratory status     Problem: Cardiovascular - Adult  Goal: Maintains optimal cardiac output and hemodynamic stability  10/22/2023 2007 by Esme Laguerre RN  Outcome: Progressing  10/22/2023 1220 by Nathalie Ramos RN  Outcome: Progressing  Flowsheets (Taken 10/22/2023 0902)  Maintains optimal cardiac output and hemodynamic stability: Monitor blood pressure and heart rate     Problem: Musculoskeletal - Adult  Goal: Return mobility to safest level of function  10/22/2023 2007 by Esme Laguerre RN  Outcome: Progressing  10/22/2023 1220 by Nathalie Ramos RN  Outcome: Progressing  Flowsheets (Taken 10/22/2023 0902)  Return Mobility to Safest Level of Function: Assess patient stability and activity tolerance for standing, transferring and ambulating with or without assistive devices     Problem: Gastrointestinal - Adult  Goal: Minimal or absence of nausea and vomiting  10/22/2023 2007 by Esme Laguerre RN  Outcome: Progressing  10/22/2023 1220 by Nathalie Ramos RN  Outcome: Progressing  Flowsheets (Taken 10/22/2023 0902)  Minimal or absence of nausea and vomiting: Administer IV fluids as ordered to ensure adequate hydration     Problem: Genitourinary - Adult  Goal: Absence of urinary retention  10/22/2023 2007 by Reji Quintana RN  Outcome: Progressing  10/22/2023 1220 by Norris Arthur RN  Outcome: Progressing

## 2023-10-23 NOTE — PLAN OF CARE
maintained or improved  Outcome: Progressing  Flowsheets (Taken 10/23/2023 9498)  Care Plan - Patient's Chronic Conditions and Co-Morbidity Symptoms are Monitored and Maintained or Improved: Monitor and assess patient's chronic conditions and comorbid symptoms for stability, deterioration, or improvement     Problem: Nutrition Deficit:  Goal: Optimize nutritional status  Outcome: Progressing

## 2023-10-23 NOTE — PROGRESS NOTES
Nutrition Assessment     Type and Reason for Visit: Reassess    Nutrition Recommendations/Plan:   Provide dysphagia soft and bite sized diet as ordered   Provide Glucerna chocolate twice a day as ordered   Monitor labs as they become available   Monitor skin integrity/weights     Malnutrition Assessment:  Malnutrition Status: At risk for malnutrition (Comment)    Nutrition Assessment:  Patient admission is related to pneumonia. Patient has a medical history for tramatic brain injury, stroke, residual right sided paralysis, expressive aphagia and dysphagia. Wound notification received for stage II pressure ulcer to mid coccyx. Patient is on a Dysphagia Soft and Bite Sized diet which is tolerated. Has a history of drug use last use was prior to admission, has been out of longterm for 3 months, was at [facility] and left AMA, Discharge planning to a SNF however there has been multiple denials from SNF's. switched supplement to glucerna chocolate due to availability of ensure at this time. It is noted he is stable for discharge. weight on 10/23 was 165#, weight history 10/19 was 173# had a 8# loss x 4 days unsure accuracy of weight loss, 9/14 weight was 180# had a 15# loss x 1 month however 9/14 weight was stated/estimated, 12/21/22 weight was 178# with no specific weight method stated. Estimated Daily Nutrient Needs:  Energy (kcal):  1890-8772 kcal (22-25 kcal/kg) Weight Used for Energy Requirements: Current     Protein (g):  84-91 gm of protein (1.2-1.3 gm/kg) Weight Used for Protein Requirements: Ideal        Fluid (ml/day):  8834-9126 ml Method Used for Fluid Requirements: 1 ml/kcal    Nutrition Related Findings:   edema noted to RUE +1 non-pitting, TBI, history of drug use, right sided paralysis, aphasia, dysphagia, missing teeth. Wound Type: Stage II (coccyx wound)    Current Nutrition Therapies:    ADULT DIET;  Dysphagia - Soft and Bite Sized  ADULT ORAL NUTRITION SUPPLEMENT; Breakfast, Dinner; Diabetic Oral Supplement    Anthropometric Measures:  Height: 172.7 cm (5' 8\")  Current Body Wt: 78.5 kg (173 lb)   BMI: 26.3    Nutrition Diagnosis:   Increased nutrient needs related to increase demand for energy/nutrients as evidenced by wounds    Nutrition Interventions:   Food and/or Nutrient Delivery: Continue Current Diet, Modify Oral Nutrition Supplement  Nutrition Education/Counseling: Education not indicated  Coordination of Nutrition Care: Continue to monitor while inpatient  Plan of Care discussed with: Patient    Goals:  Previous Goal Met: Progressing toward Goal(s)  Goals: PO intake 50% or greater       Nutrition Monitoring and Evaluation:   Behavioral-Environmental Outcomes: None Identified  Food/Nutrient Intake Outcomes: Food and Nutrient Intake, Supplement Intake  Physical Signs/Symptoms Outcomes: Chewing or Swallowing, Fluid Status or Edema, Skin, Weight, Hemodynamic Status    Discharge Planning:    Continue current diet, Continue Oral Nutrition Supplement     Jazmin Pickard RD  Contact: [phone]

## 2023-10-23 NOTE — PROGRESS NOTES
[facility]  Office: [phone]  Wardleticia Yudith Blood, DO, Elaine Carrillo MD, Bernadine Brown MD, Eboni Reagan MD, Maral Davis MD,  Eunice Osler, MD, Mario Jeffries MD, Babs Dorsey DO, Humberto Clay MD,  Jeb Luna MD, Angelo Villarreal MD, Tyra Benson DO, Wilfredo Bautista MD,  Afshan Reilly DO, Ken Dee MD, Gely Lancaster MD, Maegan Dominguez MD, Karma Chambers MD,  Celestine Fox MD, Mellissa Conn MD, Fred Holt MD, Saniya Rider MD, Deacon Howe DO, Lj Davis MD,  Cuauhtemoc Morrell MD, Dany Paz MD, Karen Cool, CNP,  Manuela Herring, CNP,, Tank Dai, CNP,  Allen Fernández, [facility], King Weir, CNP, Dee Marrero, CNP, Pamela Carter, CNP, Ángel Haney, CNP, Alejandro Friend, CNP, Ashley Stein, CNP, Manjula Pena, [facility], Elvira Hogue, [facility], Jean Montoya, [address] [place] [address]    Progress Note    10/23/2023    11:57 AM    Name:   Ambreen Palacio  MRN:     [id number]     Acct:      [de-identified]   Room:   2017/2017-02  IP Day:  5  Admit Date:  10/17/2023  6:58 PM    PCP:   Jose Ramon Lyle MD  Code Status:  Full Code    Subjective:     C/C:   Chief Complaint   Patient presents with    Failure To Thrive     friend states need to be placed unable to take care of self. Hx of stroke 7/2023     Interval History Status: not changed. Had a run of nsvt this am: he did feel his heart race this am as well    Feels about the same  Denies cp/sob/n/v    Difficult to get any additional history due to slurred speech    Brief History: This 65 yom was brought in by a friend because he was unable to care for himself. He has had cough with clear phlegm and sob. Not able to get much more history as he mumbles and has slurred speech. Review of Systems:     Unobtainable      Medications: Allergies:     Allergies   Allergen

## 2023-10-24 LAB
ANION GAP SERPL CALCULATED.3IONS-SCNC: 10 MMOL/L (ref 9–17)
BASOPHILS # BLD: 0.07 K/UL (ref 0–0.2)
BASOPHILS NFR BLD: 1 % (ref 0–2)
BUN SERPL-MCNC: 14 MG/DL (ref 6–20)
BUN/CREAT SERPL: 28 (ref 9–20)
CALCIUM SERPL-MCNC: 9 MG/DL (ref 8.6–10.4)
CHLORIDE SERPL-SCNC: 103 MMOL/L (ref 98–107)
CO2 SERPL-SCNC: 24 MMOL/L (ref 20–31)
CREAT SERPL-MCNC: 0.5 MG/DL (ref 0.7–1.2)
EOSINOPHIL # BLD: 0.2 K/UL (ref 0–0.44)
EOSINOPHILS RELATIVE PERCENT: 3 % (ref 1–4)
ERYTHROCYTE [DISTWIDTH] IN BLOOD BY AUTOMATED COUNT: 12.8 % (ref 11.8–14.4)
GFR SERPL CREATININE-BSD FRML MDRD: >60 ML/MIN/1.73M2
GLUCOSE SERPL-MCNC: 122 MG/DL (ref 70–99)
HCT VFR BLD AUTO: 42 % (ref 40.7–50.3)
HGB BLD-MCNC: 14.2 G/DL (ref 13–17)
IMM GRANULOCYTES # BLD AUTO: 0.09 K/UL (ref 0–0.3)
IMM GRANULOCYTES NFR BLD: 1 %
LYMPHOCYTES NFR BLD: 2.11 K/UL (ref 1.1–3.7)
LYMPHOCYTES RELATIVE PERCENT: 28 % (ref 24–43)
MCH RBC QN AUTO: 30.5 PG (ref 25.2–33.5)
MCHC RBC AUTO-ENTMCNC: 33.8 G/DL (ref 28.4–34.8)
MCV RBC AUTO: 90.1 FL (ref 82.6–102.9)
MONOCYTES NFR BLD: 0.64 K/UL (ref 0.1–1.2)
MONOCYTES NFR BLD: 9 % (ref 3–12)
NEUTROPHILS NFR BLD: 58 % (ref 36–65)
NEUTS SEG NFR BLD: 4.45 K/UL (ref 1.5–8.1)
NRBC BLD-RTO: 0 PER 100 WBC
PLATELET # BLD AUTO: 250 K/UL (ref 138–453)
PMV BLD AUTO: 9.7 FL (ref 8.1–13.5)
POTASSIUM SERPL-SCNC: 4.1 MMOL/L (ref 3.7–5.3)
RBC # BLD AUTO: 4.66 M/UL (ref 4.21–5.77)
SODIUM SERPL-SCNC: 137 MMOL/L (ref 135–144)
WBC OTHER # BLD: 7.6 K/UL (ref 3.5–11.3)

## 2023-10-24 PROCEDURE — 97110 THERAPEUTIC EXERCISES: CPT

## 2023-10-24 PROCEDURE — 97530 THERAPEUTIC ACTIVITIES: CPT

## 2023-10-24 PROCEDURE — 97535 SELF CARE MNGMENT TRAINING: CPT

## 2023-10-24 PROCEDURE — 85025 COMPLETE CBC W/AUTO DIFF WBC: CPT

## 2023-10-24 PROCEDURE — 6370000000 HC RX 637 (ALT 250 FOR IP): Performed by: INTERNAL MEDICINE

## 2023-10-24 PROCEDURE — 99253 IP/OBS CNSLTJ NEW/EST LOW 45: CPT

## 2023-10-24 PROCEDURE — 6370000000 HC RX 637 (ALT 250 FOR IP): Performed by: NURSE PRACTITIONER

## 2023-10-24 PROCEDURE — 6360000002 HC RX W HCPCS: Performed by: NURSE PRACTITIONER

## 2023-10-24 PROCEDURE — 99232 SBSQ HOSP IP/OBS MODERATE 35: CPT | Performed by: STUDENT IN AN ORGANIZED HEALTH CARE EDUCATION/TRAINING PROGRAM

## 2023-10-24 PROCEDURE — 80048 BASIC METABOLIC PNL TOTAL CA: CPT

## 2023-10-24 PROCEDURE — 36415 COLL VENOUS BLD VENIPUNCTURE: CPT

## 2023-10-24 PROCEDURE — 97116 GAIT TRAINING THERAPY: CPT

## 2023-10-24 PROCEDURE — 1200000000 HC SEMI PRIVATE

## 2023-10-24 RX ORDER — POTASSIUM CHLORIDE 20 MEQ/1
20 TABLET, EXTENDED RELEASE ORAL
Status: DISCONTINUED | OUTPATIENT
Start: 2023-10-25 | End: 2023-10-28 | Stop reason: HOSPADM

## 2023-10-24 RX ORDER — BENZONATATE 100 MG/1
100 CAPSULE ORAL 3 TIMES DAILY PRN
Status: DISCONTINUED | OUTPATIENT
Start: 2023-10-24 | End: 2023-10-28 | Stop reason: HOSPADM

## 2023-10-24 RX ADMIN — TAMSULOSIN HYDROCHLORIDE 0.4 MG: 0.4 CAPSULE ORAL at 10:07

## 2023-10-24 RX ADMIN — ENOXAPARIN SODIUM 40 MG: 100 INJECTION SUBCUTANEOUS at 10:07

## 2023-10-24 RX ADMIN — METOPROLOL TARTRATE 25 MG: 25 TABLET, FILM COATED ORAL at 10:05

## 2023-10-24 RX ADMIN — FOLIC ACID 1 MG: 1 TABLET ORAL at 10:05

## 2023-10-24 RX ADMIN — AMLODIPINE BESYLATE 10 MG: 10 TABLET ORAL at 10:07

## 2023-10-24 RX ADMIN — POTASSIUM CHLORIDE 20 MEQ: 1500 TABLET, EXTENDED RELEASE ORAL at 10:06

## 2023-10-24 RX ADMIN — LISINOPRIL 10 MG: 10 TABLET ORAL at 10:06

## 2023-10-24 RX ADMIN — Medication 400 MG: at 10:05

## 2023-10-24 RX ADMIN — METOPROLOL TARTRATE 25 MG: 25 TABLET, FILM COATED ORAL at 21:24

## 2023-10-24 RX ADMIN — ATORVASTATIN CALCIUM 40 MG: 40 TABLET, FILM COATED ORAL at 21:24

## 2023-10-24 RX ADMIN — Medication 100 MG: at 10:06

## 2023-10-24 NOTE — CARE COORDINATION
Social work: spoke to [facility] [phone] to see if a bed is available and if they will accept this pt for admission. Patricia Villarreal Await a call back. Will need adele, Rx and discharge order for snf.  Kathy beckham
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
## 2023-10-24 NOTE — PROGRESS NOTES
[facility]  Office: [phone]  Lucinda Richards Kait, DO, Wes Adame MD, Caden Isidro MD, [place] MD, Megha Pena MD,  Emilee Stern MD, Monica Capone MD, Mack Hong, DO, Wally Russo MD,  Adria Raymond MD, Aby Gonsales MD, Charlesetta Burkitt, DO, Fiona Thornton MD,  Noah Joyner DO, Yamileth Morgan MD, Beatriz Peck MD, Blade Hurst MD, Zahraa Flanagan MD,  Yong Bamberger, MD, Tianna Dunn MD, Sondra Bell MD, Yony Lu MD, China Holland DO, Misti Rider MD,  Anthony Harmon MD, Fer Dean MD, Chente Perez, CNP,  Mohit Irving, CNP,, Noemí Wheeler, CNP,  Jazz Sharma, [facility], Dolores Moody, CNP, Kimberlee Nagel, CNP, Salvador Almaraz, CNP, Ragena Rubinstein, CNP, Brandt Jones, CNP, Crescencio Delatorre, CNP, Robert Kohli, CNS, Berlin Amaya, CNP, Kathryn Suarez, [facility]    Progress Note    10/24/2023    8:18 AM    Name:   Rolando Szymanski  MRN:     [id number]     Acct:      [de-identified]   Room:   2017/2017-02  IP Day:  6  Admit Date:  10/17/2023  6:58 PM    PCP:   Hima Cadet MD  Code Status:  Full Code    Subjective:     C/C:   Chief Complaint   Patient presents with    Failure To Thrive     friend states need to be placed unable to take care of self. Hx of stroke 7/2023     Interval History Status: stable. No significant events overnight. Denies any current complaints(difficult to interpret due to garbled speech from previous TBI.)  Discussed with nursing staff as well. Labs reviewed. Patient is medically stable for discharge. Pending placement. Brief History: This 65 yom was brought in by a friend because he was unable to care for himself. He has had cough with clear phlegm and sob. Not able to get much more history as he mumbles and has slurred speech.   Patient is medically stable for discharge. Pending placement. Review of Systems:     Limited due to garbled speech. Cardiovascular-denies any chest pain, palpitations. GI-no nausea vomiting, abdominal pain or diarrhea. Neurological-residual speech deficits. Mentation at baseline. Medications: Allergies: Allergies   Allergen Reactions    Haldol [Haloperidol] Other (See Comments)     Seizure activity per patient report. Tongue swelling     Lurasidone     Ziprasidone     Latuda [Lurasidone Hcl] Other (See Comments)     Pt reports muscle spasms and fatigue       Current Meds:   Scheduled Meds:    metoprolol tartrate  25 mg Oral BID    potassium chloride  20 mEq Oral Daily with breakfast    magnesium oxide  400 mg Oral Daily    sodium chloride flush  5-40 mL IntraVENous 2 times per day    enoxaparin  40 mg SubCUTAneous Daily    amLODIPine  10 mg Oral Daily    atorvastatin  40 mg Oral Nightly    docusate sodium  100 mg Oral Daily    folic acid  1 mg Oral Daily    lisinopril  10 mg Oral Daily    tamsulosin  0.4 mg Oral Daily    thiamine mononitrate  100 mg Oral Daily     Continuous Infusions:    sodium chloride       PRN Meds: magnesium sulfate, potassium chloride **OR** potassium alternative oral replacement **OR** potassium chloride, sodium chloride flush, sodium chloride, ondansetron **OR** ondansetron, magnesium hydroxide, acetaminophen **OR** acetaminophen, guaiFENesin-dextromethorphan, phenol, ipratropium 0.5 mg-albuterol 2.5 mg, albuterol    Data:     Past Medical History:   has a past medical history of Bipolar 1 disorder ([facility]), Chronic mental illness, Depression, Hepatitis C, Hypertension, Psoriasis, and Substance abuse ([facility]). Social History:   reports that he has been smoking cigarettes. He has a 40.00 pack-year smoking history. He has never used smokeless tobacco. He reports current drug use. Frequency: 7.00 times per week. Drugs: Opiates  and Cocaine. He reports that he does not drink alcohol.      Family History:

## 2023-10-24 NOTE — PLAN OF CARE
Problem: Safety - Adult  Goal: Free from fall injury  Outcome: Progressing  Flowsheets (Taken 10/24/2023 0344)  Free From Fall Injury: Instruct family/caregiver on patient safety     Problem: ABCDS Injury Assessment  Goal: Absence of physical injury  Outcome: Progressing  Flowsheets (Taken 10/24/2023 0344)  Absence of Physical Injury: Implement safety measures based on patient assessment     Problem: Skin/Tissue Integrity  Goal: Absence of new skin breakdown  Description: 1. Monitor for areas of redness and/or skin breakdown  2. Assess vascular access sites hourly  3. Every 4-6 hours minimum:  Change oxygen saturation probe site  4. Every 4-6 hours:  If on nasal continuous positive airway pressure, respiratory therapy assess nares and determine need for appliance change or resting period.   Outcome: Progressing     Problem: Skin/Tissue Integrity - Adult  Goal: Skin integrity remains intact  Outcome: Progressing  Flowsheets  Taken 10/24/2023 0344  Skin Integrity Remains Intact:   Monitor for areas of redness and/or skin breakdown   Assess vascular access sites hourly  Taken 10/23/2023 2120  Skin Integrity Remains Intact: Monitor for areas of redness and/or skin breakdown  Goal: Incisions, wounds, or drain sites healing without S/S of infection  Recent Flowsheet Documentation  Taken 10/23/2023 2120 by Jerardo Callejas RN  Incisions, Wounds, or Drain Sites Healing Without Sign and Symptoms of Infection:   TWICE DAILY: Assess and document skin integrity   TWICE DAILY: Assess and document dressing/incision, wound bed, drain sites and surrounding tissue  Goal: Oral mucous membranes remain intact  Recent Flowsheet Documentation  Taken 10/23/2023 2120 by Jerardo Callejas RN  Oral Mucous Membranes Remain Intact: Assess oral mucosa and hygiene practices     Problem: Infection - Adult  Goal: Absence of infection during hospitalization  Outcome: Progressing  Flowsheets (Taken 10/24/2023 0344)  Absence of infection during hospitalization:   Assess and monitor for signs and symptoms of infection   Administer medications as ordered   Monitor lab/diagnostic results   Instruct and encourage patient and family to use good hand hygiene technique   Identify and instruct in appropriate isolation precautions for identified infection/condition     Problem: Musculoskeletal - Adult  Goal: Return mobility to safest level of function  Outcome: Progressing  Flowsheets (Taken 10/24/2023 0344)  Return Mobility to Safest Level of Function:   Assess patient stability and activity tolerance for standing, transferring and ambulating with or without assistive devices   Assist with transfers and ambulation using safe patient handling equipment as needed   Ensure adequate protection for wounds/incisions during mobilization   Instruct patient/family in ordered activity level     Problem: Nutrition Deficit:  Goal: Optimize nutritional status  Outcome: Progressing  Flowsheets (Taken 10/24/2023 0344)  Nutrient intake appropriate for improving, restoring, or maintaining nutritional needs:   Assess nutritional status and recommend course of action   Recommend appropriate diets, oral nutritional supplements, and vitamin/mineral supplements   Monitor oral intake, labs, and treatment plans

## 2023-10-24 NOTE — PROGRESS NOTES
Assistance required to generate solutions;Assistance required to implement solutions;Decreased awareness of errors;Assistance required to correct errors made;Assistance required to identify errors made  Insights: Decreased awareness of deficits  Initiation: Requires cues for all  Sequencing: Requires cues for all  Cognition Comment: difficult to fully assess d/t aphasia. Pt with very questionable judgement, insight, poor decision making. Orientation  Overall Orientation Status: Impaired  Orientation Level: Oriented to person;Disoriented to place; Disoriented to time;Disoriented to situation         ADL  Grooming/Oral Hygiene  Assistance Level: Stand by assist;Set-up  Skilled Clinical Factors: While seated EOB pt able to wash face. Upper Extremity Bathing  Assistance Level: Moderate assistance;Set-up; Verbal cues  Skilled Clinical Factors: Required assist for washing LUE and thoroughness of RUE while seated EOB. Lower Extremity Bathing  Assistance Level: Moderate assistance;Set-up; Verbal cues  Skilled Clinical Factors: Pt able to wash down to B shins while seated and required assist for washing B feet and refused changing brief. Upper Extremity Dressing  Assistance Level: Maximum assistance  Skilled Clinical Factors: Donning and tying [facility] gown  Lower Extremity Dressing  Skilled Clinical Factors: refused brief change. Putting On/Taking Off Footwear  Assistance Level: Dependent  Skilled Clinical Factors: donning/doffing socks. Toileting  Skilled Clinical Factors: brief appeared clean and no toileting needs at this time. Functional Mobility  Device: Hemiwalker  Assistance Level: Maximum assistance; Moderate assistance; Requires x 2 assistance  Skilled Clinical Factors: Completed 3-4 steps from EOB <> recliner with pt being very unsteady almost stepping over the opposite foot with each step, walking into hemiwalker, and poor placement of sarthak walker.  Max verbal/tactile cueing for [employer] mgmt/safety, sequencing, pursed lip breathing, weight shifting, slow/controlled movement, and overall safety. Bed Mobility  Overall Assistance Level: Moderate Assistance; Requires x 2 Assistance  Additional Factors: Head of bed raised; With handrails; Increased time to complete;Verbal cues  Supine to Sit  Assistance Level: Moderate assistance; Requires x 2 assistance  Skilled Clinical Factors: Required Mod A x2 for trunk stability. Scooting  Assistance Level: Minimal assistance  Transfers  Surface: From bed; To chair with arms  Additional Factors: Hand placement cues; Increased time to complete;Verbal cues  Device: Walker (hemiwalker)  Sit to Stand  Assistance Level: Moderate assistance; Requires x 2 assistance  Skilled Clinical Factors: Max verbal/tactile cueing for pursed lip breathing, proper feet placement, controlled sit/stand, upright posture, squaring self/AD and reaching back prior to sitting, and overall safety. Stand to Sit  Assistance Level: Requires x 2 assistance; Moderate assistance   OT Exercises  Exercise Treatment: LUE AROM 10 reps,1 set for elbow flexion/extension, wrist flexion extension, shoulder flex/ext, and horizontal shoulder abd/add. R hand was able to be stretched and laid flat with fingers in ext. Assessment  Assessment  Assessment: Pt slightly progressing able to complete sponge bath while seated EOB with assist from staff. Was able to complete steps over to recliner with Mod-Max A x2 but is very unsafe and has a scrissoring gait. Pt tolerated UB ROM fairly and was able to tolerate stretching to RUE after guarding it at start of session. Skilled OT indicated to increase safety and IND with all functional tasks to ensure a safe return to PLOF.   Activity Tolerance: Patient limited by endurance;Treatment limited secondary to decreased cognition  Discharge Recommendations: Patient would benefit from continued therapy after discharge  Safety Devices  Safety Devices in place: Yes  Type of

## 2023-10-24 NOTE — PROGRESS NOTES
Physical Therapy  Facility/Department: [facility]  Daily Treatment Note  NAME: Kelly Cooks  : 1965  MRN: [id number]    Date of Service: 10/24/2023    Discharge Recommendations:  Patient would benefit from continued therapy after discharge    Pt currently functioning below baseline. Recommend daily inpatient skilled therapy at time of discharge to maximize long term outcomes and prevent re-admission. Please refer to AM-PAC score for current level of function. Patient Diagnosis(es): The encounter diagnosis was Pneumonia due to infectious organism, unspecified laterality, unspecified part of lung. Assessment   Assessment: Patient progressed toward STGs but significantly limited by Rt hemiparesis d/t prior CVA. Patient able to transfer to recliner with Mod-max x 2 A  and hemiwalker. Focused on kar LE A/AROM and Rt hand gentle ROM to assist with opening hand while patient participating with seated HEP. Activity Tolerance: Patient limited by endurance;Treatment limited secondary to decreased cognition   AM-PAC Score: 10    Plan    Physcial Therapy Plan  General Plan: 5-7 times per week  Current Treatment Recommendations: Strengthening;Balance training;Transfer training; Endurance training; Safety education & training;Patient/Caregiver education & training;Positioning; Therapeutic activities; Co-Treatment     Restrictions  Restrictions/Precautions  Restrictions/Precautions: Fall Risk, General Precautions, Up as Tolerated  Required Braces or Orthoses?: No  Position Activity Restriction  Other position/activity restrictions: R side weakness, expressive aphasia,  IV     Subjective    Subjective  Subjective: Patient up in bed upon therapists arrival. Patient agreeable to PT treatment. RN states patient is appropriate for PT treatment. Orientation  Overall Orientation Status: Impaired  Orientation Level: Oriented to person;Disoriented to place; Disoriented to time;Disoriented to situation  Cognition  Overall Cognitive Status: Exceptions  Arousal/Alertness: Delayed responses to stimuli  Following Commands: Follows one step commands with repetition; Inconsistently follows commands  Attention Span: Attends with cues to redirect; Difficulty attending to directions  Memory: Decreased recall of recent events;Decreased short term memory  Safety Judgement: Decreased awareness of need for assistance;Decreased awareness of need for safety  Problem Solving: Assistance required to generate solutions;Assistance required to implement solutions;Decreased awareness of errors;Assistance required to correct errors made;Assistance required to identify errors made  Insights: Decreased awareness of deficits  Initiation: Requires cues for all  Sequencing: Requires cues for all  Cognition Comment: difficult to fully assess d/t aphasia. Pt with very questionable judgement, insight, poor decision making. Objective   Bed Mobility Training  Bed Mobility Training: Yes  Overall Level of Assistance: Moderate assistance;Assist X2;Additional time  Interventions: Safety awareness training; Tactile cues; Verbal cues  Rolling: Moderate assistance;Assist X2;Additional time  Supine to Sit: Moderate assistance;Assist X2;Additional time  Sit to Supine: Moderate assistance;Assist X2;Additional time  Scooting: Moderate assistance  Patient required MAX VCs with hand over hand assist for UE placement on bed rail and to initiate proper log roll tech, extended assistance required for line mgmt. Assist for scooting to EOB and achieve upright posture with UB. Assist and VCs for scooting to EOB with kar LE foot placement on floor to establish safety sitting balance therefore reducing fall risk.      Balance  Sitting: Impaired  Sitting - Static: Fair (occasional)  Sitting - Dynamic: Fair (occasional)  Standing: With support (w/ RW and Mod-Max x 2 A)  Standing - Static: Constant support;Poor  Standing - Dynamic: Constant support;Poor    Transfer Training  Transfer

## 2023-10-25 PROBLEM — R07.9 CHEST PAIN: Status: RESOLVED | Noted: 2021-12-06 | Resolved: 2023-10-25

## 2023-10-25 PROBLEM — E83.42 HYPOMAGNESEMIA: Status: RESOLVED | Noted: 2023-10-20 | Resolved: 2023-10-25

## 2023-10-25 PROBLEM — R41.82 AMS (ALTERED MENTAL STATUS): Status: RESOLVED | Noted: 2022-03-02 | Resolved: 2023-10-25

## 2023-10-25 PROBLEM — R52 GENERALIZED PAIN: Status: RESOLVED | Noted: 2021-08-18 | Resolved: 2023-10-25

## 2023-10-25 LAB
ANION GAP SERPL CALCULATED.3IONS-SCNC: 11 MMOL/L (ref 9–17)
BASOPHILS # BLD: 0.08 K/UL (ref 0–0.2)
BASOPHILS NFR BLD: 1 % (ref 0–2)
BUN SERPL-MCNC: 17 MG/DL (ref 6–20)
BUN/CREAT SERPL: 28 (ref 9–20)
CALCIUM SERPL-MCNC: 9.1 MG/DL (ref 8.6–10.4)
CHLORIDE SERPL-SCNC: 103 MMOL/L (ref 98–107)
CO2 SERPL-SCNC: 23 MMOL/L (ref 20–31)
CREAT SERPL-MCNC: 0.6 MG/DL (ref 0.7–1.2)
EOSINOPHIL # BLD: 0.22 K/UL (ref 0–0.44)
EOSINOPHILS RELATIVE PERCENT: 2 % (ref 1–4)
ERYTHROCYTE [DISTWIDTH] IN BLOOD BY AUTOMATED COUNT: 12.8 % (ref 11.8–14.4)
GFR SERPL CREATININE-BSD FRML MDRD: >60 ML/MIN/1.73M2
GLUCOSE SERPL-MCNC: 109 MG/DL (ref 70–99)
HCT VFR BLD AUTO: 43.6 % (ref 40.7–50.3)
HGB BLD-MCNC: 14.5 G/DL (ref 13–17)
IMM GRANULOCYTES # BLD AUTO: 0.1 K/UL (ref 0–0.3)
IMM GRANULOCYTES NFR BLD: 1 %
LYMPHOCYTES NFR BLD: 3.01 K/UL (ref 1.1–3.7)
LYMPHOCYTES RELATIVE PERCENT: 33 % (ref 24–43)
MCH RBC QN AUTO: 30.2 PG (ref 25.2–33.5)
MCHC RBC AUTO-ENTMCNC: 33.3 G/DL (ref 28.4–34.8)
MCV RBC AUTO: 90.8 FL (ref 82.6–102.9)
MONOCYTES NFR BLD: 0.65 K/UL (ref 0.1–1.2)
MONOCYTES NFR BLD: 7 % (ref 3–12)
NEUTROPHILS NFR BLD: 56 % (ref 36–65)
NEUTS SEG NFR BLD: 5.05 K/UL (ref 1.5–8.1)
NRBC BLD-RTO: 0 PER 100 WBC
PLATELET # BLD AUTO: 250 K/UL (ref 138–453)
PMV BLD AUTO: 9.6 FL (ref 8.1–13.5)
POTASSIUM SERPL-SCNC: 4.1 MMOL/L (ref 3.7–5.3)
RBC # BLD AUTO: 4.8 M/UL (ref 4.21–5.77)
SODIUM SERPL-SCNC: 137 MMOL/L (ref 135–144)
WBC OTHER # BLD: 9.1 K/UL (ref 3.5–11.3)

## 2023-10-25 PROCEDURE — 97110 THERAPEUTIC EXERCISES: CPT

## 2023-10-25 PROCEDURE — 1200000000 HC SEMI PRIVATE

## 2023-10-25 PROCEDURE — 6370000000 HC RX 637 (ALT 250 FOR IP): Performed by: NURSE PRACTITIONER

## 2023-10-25 PROCEDURE — 80048 BASIC METABOLIC PNL TOTAL CA: CPT

## 2023-10-25 PROCEDURE — 85025 COMPLETE CBC W/AUTO DIFF WBC: CPT

## 2023-10-25 PROCEDURE — 97530 THERAPEUTIC ACTIVITIES: CPT

## 2023-10-25 PROCEDURE — 6370000000 HC RX 637 (ALT 250 FOR IP): Performed by: STUDENT IN AN ORGANIZED HEALTH CARE EDUCATION/TRAINING PROGRAM

## 2023-10-25 PROCEDURE — 97116 GAIT TRAINING THERAPY: CPT

## 2023-10-25 PROCEDURE — 6370000000 HC RX 637 (ALT 250 FOR IP): Performed by: INTERNAL MEDICINE

## 2023-10-25 PROCEDURE — 99232 SBSQ HOSP IP/OBS MODERATE 35: CPT | Performed by: STUDENT IN AN ORGANIZED HEALTH CARE EDUCATION/TRAINING PROGRAM

## 2023-10-25 PROCEDURE — 97112 NEUROMUSCULAR REEDUCATION: CPT

## 2023-10-25 PROCEDURE — 6360000002 HC RX W HCPCS: Performed by: NURSE PRACTITIONER

## 2023-10-25 PROCEDURE — 36415 COLL VENOUS BLD VENIPUNCTURE: CPT

## 2023-10-25 RX ORDER — BENZONATATE 100 MG/1
100 CAPSULE ORAL 3 TIMES DAILY PRN
DISCHARGE
Start: 2023-10-25 | End: 2023-11-01

## 2023-10-25 RX ADMIN — POTASSIUM CHLORIDE 20 MEQ: 1500 TABLET, EXTENDED RELEASE ORAL at 10:08

## 2023-10-25 RX ADMIN — Medication 100 MG: at 10:08

## 2023-10-25 RX ADMIN — ATORVASTATIN CALCIUM 40 MG: 40 TABLET, FILM COATED ORAL at 20:47

## 2023-10-25 RX ADMIN — ENOXAPARIN SODIUM 40 MG: 100 INJECTION SUBCUTANEOUS at 10:08

## 2023-10-25 RX ADMIN — METOPROLOL TARTRATE 25 MG: 25 TABLET, FILM COATED ORAL at 20:47

## 2023-10-25 RX ADMIN — AMLODIPINE BESYLATE 10 MG: 10 TABLET ORAL at 10:08

## 2023-10-25 RX ADMIN — LISINOPRIL 10 MG: 10 TABLET ORAL at 10:07

## 2023-10-25 RX ADMIN — METOPROLOL TARTRATE 25 MG: 25 TABLET, FILM COATED ORAL at 10:07

## 2023-10-25 RX ADMIN — TAMSULOSIN HYDROCHLORIDE 0.4 MG: 0.4 CAPSULE ORAL at 10:08

## 2023-10-25 RX ADMIN — Medication 400 MG: at 10:07

## 2023-10-25 RX ADMIN — FOLIC ACID 1 MG: 1 TABLET ORAL at 10:08

## 2023-10-25 ASSESSMENT — PAIN SCALES - GENERAL
PAINLEVEL_OUTOF10: 0

## 2023-10-25 NOTE — PLAN OF CARE
Problem: Discharge Planning  Goal: Discharge to home or other facility with appropriate resources  Outcome: Adequate for Discharge  Flowsheets (Taken 10/25/2023 0955)  Discharge to home or other facility with appropriate resources:   Identify barriers to discharge with patient and caregiver   Arrange for needed discharge resources and transportation as appropriate   Identify discharge learning needs (meds, wound care, etc)   Refer to discharge planning if patient needs post-hospital services based on physician order or complex needs related to functional status, cognitive ability or social support system     Problem: Safety - Adult  Goal: Free from fall injury  Outcome: Adequate for Discharge     Problem: ABCDS Injury Assessment  Goal: Absence of physical injury  Outcome: Adequate for Discharge     Problem: Skin/Tissue Integrity  Goal: Absence of new skin breakdown  Description: 1. Monitor for areas of redness and/or skin breakdown  2. Assess vascular access sites hourly  3. Every 4-6 hours minimum:  Change oxygen saturation probe site  4. Every 4-6 hours:  If on nasal continuous positive airway pressure, respiratory therapy assess nares and determine need for appliance change or resting period.   Outcome: Adequate for Discharge     Problem: Skin/Tissue Integrity - Adult  Goal: Skin integrity remains intact  Outcome: Adequate for Discharge  Flowsheets (Taken 10/25/2023 0955)  Skin Integrity Remains Intact: Monitor for areas of redness and/or skin breakdown  Goal: Incisions, wounds, or drain sites healing without S/S of infection  Outcome: Adequate for Discharge  Flowsheets (Taken 10/25/2023 0955)  Incisions, Wounds, or Drain Sites Healing Without Sign and Symptoms of Infection:   TWICE DAILY: Assess and document dressing/incision, wound bed, drain sites and surrounding tissue   Initiate pressure ulcer prevention bundle as indicated  Goal: Oral mucous membranes remain intact  Outcome: Adequate for

## 2023-10-25 NOTE — PROGRESS NOTES
Occupational Therapy  Facility/Department: [facility]  Daily Treatment Note  NAME: Dominic Alexis  : 1965  MRN: [id number]    Date of Service: 10/25/2023    RN reports patient is medically stable for therapy treatment this date. Chart reviewed prior to treatment and patient is agreeable for therapy. All lines intact and patient positioned comfortably at end of treatment. All patient needs addressed prior to ending therapy session. Pt currently functioning below baseline. Recommend daily inpatient skilled therapy at time of discharge to maximize long term outcomes and prevent re-admission. Please refer to AM-PAC score for current level of function. Discharge Recommendations:  Patient would benefit from continued therapy after discharge  OT Equipment Recommendations  Equipment Needed:  (CTA)      Patient Diagnosis(es): The encounter diagnosis was Pneumonia due to infectious organism, unspecified laterality, unspecified part of lung. Assessment    Assessment: Pt demo'd Fair tolerance for activity this session, and is slowly progressing to STGs. Pt most limited by R sided hemiplegia, decreased cognition, decreased static/dynamic balance and pain in RUE. Pt continues to need Mod-MaxAx2 w/ hemiwalker to complete short, functional mobility tasks in room, and would be dependent for all ADLs w/ standing components. Pt tolerated AROM to LUE & minimally tolerated SROM to RUE during session, most limited on R side d/t increased pain. Educated pt on importance of completing exercises at regular intervals throughout the day to promote strength/ROM and decrease pain. Continued skilled OT services are indicated at this time to maximize this pt's safety and IND with self care tasks and to facilitate safe return to PLOF as able.   Activity Tolerance: Patient limited by endurance;Treatment limited secondary to decreased cognition;Patient limited by pain  Discharge Recommendations: Patient would benefit from

## 2023-10-25 NOTE — PROGRESS NOTES
Physical Therapy  [facility]  Daily Treatment Note  NAME: Indu Colmenares  : 1965  MRN: [id number]    Date of Service: 10/25/2023    Discharge Recommendations:  Patient would benefit from continued therapy after discharge        Patient Diagnosis(es): The encounter diagnosis was Pneumonia due to infectious organism, unspecified laterality, unspecified part of lung. Assessment   Assessment: Patient progressed toward STGs but significantly limited by Rt hemiparesis d/t prior CVA. Patient able to transfer to recliner with Mod-max x 2 A  and hemiwalker. Focused on kar LE A/AROM and Rt hand gentle ROM to assist with opening hand while patient participating with seated HEP. Activity Tolerance: Patient limited by endurance;Treatment limited secondary to decreased cognition   AM-PAC Score: 10    Plan    Physcial Therapy Plan  General Plan: 5-7 times per week  Current Treatment Recommendations: Strengthening;Balance training;Transfer training; Endurance training; Safety education & training;Patient/Caregiver education & training;Positioning; Therapeutic activities; Co-Treatment     Restrictions  Restrictions/Precautions  Restrictions/Precautions: Fall Risk, General Precautions, Up as Tolerated  Required Braces or Orthoses?: No  Position Activity Restriction  Other position/activity restrictions: R side weakness, expressive aphasia,  IV     Subjective    Subjective  Subjective: Patient seated in SAROJ upon arrival and agreeable for PT treatment. RN stated patient medically stable for PT treatment. Orientation  Overall Orientation Status: Impaired  Orientation Level: Oriented to person;Disoriented to place; Disoriented to time;Disoriented to situation  Cognition  Overall Cognitive Status: Exceptions  Arousal/Alertness: Delayed responses to stimuli; Appropriate responses to stimuli  Following Commands: Follows one step commands with repetition; Inconsistently follows commands  Attention Span: Attends

## 2023-10-25 NOTE — CARE COORDINATION
Social [id number] and [facility] will not have bed. Additional referrals sent to [facility], [address] by [place], [place].  Roberto Alcantar

## 2023-10-26 LAB
ANION GAP SERPL CALCULATED.3IONS-SCNC: 10 MMOL/L (ref 9–17)
BASOPHILS # BLD: 0.1 K/UL (ref 0–0.2)
BASOPHILS NFR BLD: 1 % (ref 0–2)
BUN SERPL-MCNC: 13 MG/DL (ref 6–20)
BUN/CREAT SERPL: 22 (ref 9–20)
CALCIUM SERPL-MCNC: 9.2 MG/DL (ref 8.6–10.4)
CHLORIDE SERPL-SCNC: 106 MMOL/L (ref 98–107)
CO2 SERPL-SCNC: 23 MMOL/L (ref 20–31)
CREAT SERPL-MCNC: 0.6 MG/DL (ref 0.7–1.2)
EOSINOPHIL # BLD: 0.18 K/UL (ref 0–0.44)
EOSINOPHILS RELATIVE PERCENT: 2 % (ref 1–4)
ERYTHROCYTE [DISTWIDTH] IN BLOOD BY AUTOMATED COUNT: 12.8 % (ref 11.8–14.4)
GFR SERPL CREATININE-BSD FRML MDRD: >60 ML/MIN/1.73M2
GLUCOSE SERPL-MCNC: 112 MG/DL (ref 70–99)
HCT VFR BLD AUTO: 43.9 % (ref 40.7–50.3)
HGB BLD-MCNC: 14.5 G/DL (ref 13–17)
IMM GRANULOCYTES # BLD AUTO: 0.09 K/UL (ref 0–0.3)
IMM GRANULOCYTES NFR BLD: 1 %
LYMPHOCYTES NFR BLD: 3.29 K/UL (ref 1.1–3.7)
LYMPHOCYTES RELATIVE PERCENT: 37 % (ref 24–43)
MCH RBC QN AUTO: 30.3 PG (ref 25.2–33.5)
MCHC RBC AUTO-ENTMCNC: 33 G/DL (ref 28.4–34.8)
MCV RBC AUTO: 91.6 FL (ref 82.6–102.9)
MONOCYTES NFR BLD: 0.64 K/UL (ref 0.1–1.2)
MONOCYTES NFR BLD: 7 % (ref 3–12)
NEUTROPHILS NFR BLD: 52 % (ref 36–65)
NEUTS SEG NFR BLD: 4.68 K/UL (ref 1.5–8.1)
NRBC BLD-RTO: 0 PER 100 WBC
PLATELET # BLD AUTO: 257 K/UL (ref 138–453)
PMV BLD AUTO: 9.5 FL (ref 8.1–13.5)
POTASSIUM SERPL-SCNC: 4.1 MMOL/L (ref 3.7–5.3)
RBC # BLD AUTO: 4.79 M/UL (ref 4.21–5.77)
SODIUM SERPL-SCNC: 139 MMOL/L (ref 135–144)
WBC OTHER # BLD: 9 K/UL (ref 3.5–11.3)

## 2023-10-26 PROCEDURE — 97530 THERAPEUTIC ACTIVITIES: CPT

## 2023-10-26 PROCEDURE — 85025 COMPLETE CBC W/AUTO DIFF WBC: CPT

## 2023-10-26 PROCEDURE — 6370000000 HC RX 637 (ALT 250 FOR IP): Performed by: INTERNAL MEDICINE

## 2023-10-26 PROCEDURE — 36415 COLL VENOUS BLD VENIPUNCTURE: CPT

## 2023-10-26 PROCEDURE — 6370000000 HC RX 637 (ALT 250 FOR IP): Performed by: STUDENT IN AN ORGANIZED HEALTH CARE EDUCATION/TRAINING PROGRAM

## 2023-10-26 PROCEDURE — 1200000000 HC SEMI PRIVATE

## 2023-10-26 PROCEDURE — 6370000000 HC RX 637 (ALT 250 FOR IP): Performed by: NURSE PRACTITIONER

## 2023-10-26 PROCEDURE — 97535 SELF CARE MNGMENT TRAINING: CPT

## 2023-10-26 PROCEDURE — 99231 SBSQ HOSP IP/OBS SF/LOW 25: CPT | Performed by: STUDENT IN AN ORGANIZED HEALTH CARE EDUCATION/TRAINING PROGRAM

## 2023-10-26 PROCEDURE — 80048 BASIC METABOLIC PNL TOTAL CA: CPT

## 2023-10-26 PROCEDURE — 97116 GAIT TRAINING THERAPY: CPT

## 2023-10-26 PROCEDURE — 6360000002 HC RX W HCPCS: Performed by: NURSE PRACTITIONER

## 2023-10-26 RX ADMIN — Medication 400 MG: at 09:48

## 2023-10-26 RX ADMIN — ENOXAPARIN SODIUM 40 MG: 100 INJECTION SUBCUTANEOUS at 09:48

## 2023-10-26 RX ADMIN — AMLODIPINE BESYLATE 10 MG: 10 TABLET ORAL at 09:47

## 2023-10-26 RX ADMIN — ATORVASTATIN CALCIUM 40 MG: 40 TABLET, FILM COATED ORAL at 20:04

## 2023-10-26 RX ADMIN — POTASSIUM CHLORIDE 20 MEQ: 1500 TABLET, EXTENDED RELEASE ORAL at 09:47

## 2023-10-26 RX ADMIN — TAMSULOSIN HYDROCHLORIDE 0.4 MG: 0.4 CAPSULE ORAL at 09:48

## 2023-10-26 RX ADMIN — Medication 100 MG: at 09:47

## 2023-10-26 RX ADMIN — LISINOPRIL 10 MG: 10 TABLET ORAL at 09:48

## 2023-10-26 RX ADMIN — FOLIC ACID 1 MG: 1 TABLET ORAL at 09:47

## 2023-10-26 RX ADMIN — METOPROLOL TARTRATE 25 MG: 25 TABLET, FILM COATED ORAL at 09:46

## 2023-10-26 RX ADMIN — DOCUSATE SODIUM 100 MG: 100 CAPSULE, LIQUID FILLED ORAL at 09:48

## 2023-10-26 RX ADMIN — METOPROLOL TARTRATE 25 MG: 25 TABLET, FILM COATED ORAL at 20:04

## 2023-10-26 NOTE — CARE COORDINATION
Social Work-Additional referral to [facility] and [facility].  Sommer Tavarez

## 2023-10-26 NOTE — PROGRESS NOTES
assistance;Assist X2;Additional time  Interventions: Weight shifting training/pressure relief; Safety awareness training; Tactile cues; Verbal cues (MAX cues for proper hand placement on stable surface, controlled sit<>stand, sarthak walker safety/mgmt, squaring self/AD to surface.)  Sit to Stand: Maximum assistance;Assist X2;Additional time  Stand to Sit: Maximum assistance;Assist X2 (Pt with poor eccentric control as pt attempted to \"plop\" into [facility] and turned toward his R side (weaker side) to transfer to bedside. Pt reminded that he should transfer toward his L side (stronger side). Pt required MAX A x2 to prevent fall as pt attempted to complete an unsafe during stand to sit transfers. MAX cues/assist provided to stop pt mid sit and correct himself)  Stand Pivot Transfers: Moderate assistance;Maximum assistance;Assist X2;Additional time    Functional Mobility  Overall Level of Assistance: Moderate assistance;Assist X2 (3-4 steps to [facility] and back to bedside with sarthak walker)  Distance (ft):  (2 steps to commode, then back to EOB very unsafely.)  Assistive Device: Gait belt;Walker sarthak  Interventions: Safety awareness training; Tactile cues; Verbal cues (Min cues for scanning, upright posture, pacing, and sarthak walker safety/mgmt)  Base of Support: Shift to left  Speed/Marilin: Shuffled;Delayed  Step Length: Right lengthened  Swing Pattern: Right asymmetrical  Stance: Right decreased; Left increased  Gait Abnormalities: Hemiplegic; Shuffling gait; Step to gait; Ataxic         ADL  Toileting: Dependent/Total  Toileting Skilled Clinical Factors: Pt reporting that he had a BM and needed cleaned up. Pt completed functional mob to [facility] to finish having BM. Pt unable to fully push BM out into BSC. Pt DEP for brief mgmt and hygiene while standing at [facility]. Pt stood ~5-6 minutes during hygiene/brief mgmt.   Additional Comments: Pt initally agreeable to sit in recliner for lunch, however, pt became agitated while sitting on BSC and stated, \"I just don't feel good! \" and declined to sit in recliner. Pt educated on benefits of sitting in recliner, pt continued to decline. Safety Devices  Type of Devices: Gait belt;Nurse notified; Patient at risk for falls; All fall risk precautions in place; Bed alarm in place; Left in bed  Restraints  Restraints Initially in Place: No     Patient Education  Education Given To: Patient  Education Provided: Role of Therapy;Plan of Care;Home Exercise Program;Precautions; ADL Adaptive Strategies;Transfer Training;Energy Conservation; Fall Prevention Strategies; Equipment; Family Education  Education Provided Comments: benefits of OOB activity, transfer tech, safety in function, call light/fall prevention  Education Method: Demonstration;Verbal  Barriers to Learning: Cognition  Education Outcome: Continued education needed    Goals  Short Term Goals  Time Frame for Short Term Goals: by discharge, pt will  Short Term Goal 1: demo SBA/CGA with sit pivot transfers (bed<>w/c, commode, chair) with good safety  Short Term Goal 2: demo SBA/CGA with bed mob with rails/controls  Short Term Goal 3: demo min A with grooming tasks following set up at approp level  Short Term Goal 4: demo mod A with UB ADLs with sarthak dressing techs at seated level  Short Term Goal 5: participate in B UE HEP for L UE strengthening (for inc ADLs/mob) and SROM/PROM to R UE (dec risk of contractures, dec. pain)  Long Term Goals  Long Term Goal 1: pt/caregiver demo and verb good understanding of ed provided on fall prevention techs, pressure relieving techs, equip needs, safe transfers techs, d/c recommendations  Long Term Goal 2: demo min A with toileting routine at approp level with good safety  Patient Goals   Patient goals : not stated       Therapy Time   Individual Concurrent Group Co-treatment   Time In 1142         Time Out 1220         Minutes 45             Co-treatment with PT warranted secondary to decreased safety and independence requiring 2

## 2023-10-26 NOTE — PLAN OF CARE
RN  Outcome: Adequate for Discharge  Flowsheets (Taken 10/25/2023 0955)  Achieves optimal ventilation and oxygenation:   Assess for changes in respiratory status   Assess for changes in mentation and behavior   Position to facilitate oxygenation and minimize respiratory effort   Initiate smoking cessation protocol as indicated   Encourage broncho-pulmonary hygiene including cough, deep breathe, incentive spirometry   Assess and instruct to report shortness of breath or any respiratory difficulty   Respiratory therapy support as indicated     Problem: Cardiovascular - Adult  Goal: Maintains optimal cardiac output and hemodynamic stability  10/26/2023 0311 by Clive Lemus RN  Outcome: Progressing  Flowsheets (Taken 10/25/2023 2000)  Maintains optimal cardiac output and hemodynamic stability:   Monitor blood pressure and heart rate   Monitor urine output and notify Licensed Independent Practitioner for values outside of normal range   Assess for signs of decreased cardiac output  10/25/2023 1620 by Chepe Gold RN  Outcome: Adequate for Discharge  Flowsheets (Taken 10/25/2023 0955)  Maintains optimal cardiac output and hemodynamic stability:   Monitor blood pressure and heart rate   Monitor urine output and notify Licensed Independent Practitioner for values outside of normal range   Assess for signs of decreased cardiac output     Problem: Musculoskeletal - Adult  Goal: Return mobility to safest level of function  10/26/2023 0311 by Clive Lemus RN  Outcome: Progressing  Flowsheets (Taken 10/25/2023 2000)  Return Mobility to Safest Level of Function:   Assess patient stability and activity tolerance for standing, transferring and ambulating with or without assistive devices   Assist with transfers and ambulation using safe patient handling equipment as needed   Ensure adequate protection for wounds/incisions during mobilization   Obtain physical therapy/occupational therapy consults as needed   Instruct

## 2023-10-26 NOTE — PROGRESS NOTES
[facility]  Office: [phone]  Gabriella Carballo DO, Sav Engel MD, Vicenta Blackwell MD, Samantha Zamudio MD, Raymundo Sosa MD,  Vangie Lemos MD, Lucas Berry MD, Deni Miller DO, Adrienne Loya MD,  Adama Caban MD, Alexander Montoya MD, Benjamin Pinto DO, Luis Daniel Thomson MD,  Javier Castellanos DO, Meli Vera MD, Ivan Uriarte MD, Harry Lizarraga MD, Vicente Mckinney MD,  Ángela Rosado MD, Reagan Hunt MD, Melissa Islas MD, Ishmael Holguin MD, Margarito Boswell DO, Corky Jefferson MD,  Andrés Cortes MD, Patricia Farmer MD, Kennedi Machuca, CNP,  Emili Abrams, CNP,, Vaibhav Irwin, CNP,  Karmen Mccann, [facility], Alberto Ca, CNP, Carl Tapia, CNP, Maren Francois, CNP, Cilf Fitzpatrick, CNP, Donna Laurent, CNP, Sam Avina, CNP, Mauricia Collet, CNS, Stewart Alvarenga, CNP, [facility], [address] [facility]    Progress Note    10/26/2023    8:48 AM    Name:   Dominic Alexis  MRN:     [id number]     Acct:      [de-identified]   Room:   2017/2017-02  IP Day:  8  Admit Date:  10/17/2023  6:58 PM    PCP:   Iam Lemus MD  Code Status:  Full Code    Subjective:     C/C:   Chief Complaint   Patient presents with    Failure To Thrive     friend states need to be placed unable to take care of self. Hx of stroke 7/2023     Interval History Status: stable. No significant events overnight. Denies any current complaints(difficult to interpret due to garbled speech from previous TBI.)  Discussed with nursing staff as well. Labs reviewed. Patient is medically stable for discharge. Pending placement. Declined by many facilities    Brief History: This 65 yom was brought in by a friend because he was unable to care for himself. He has had cough with clear phlegm and sob. Not able to get much more history as he mumbles and has slurred speech.   Patient is

## 2023-10-26 NOTE — PLAN OF CARE
Problem: Discharge Planning  Goal: Discharge to home or other facility with appropriate resources  10/26/2023 1055 by Rigoberto Amaro RN  Outcome: Progressing  10/26/2023 1054 by Rigoberto Amaro RN  Outcome: Progressing  10/26/2023 0311 by Grady Keenan RN  Outcome: Progressing  Flowsheets (Taken 10/25/2023 2000)  Discharge to home or other facility with appropriate resources:   Identify barriers to discharge with patient and caregiver   Arrange for needed discharge resources and transportation as appropriate   Identify discharge learning needs (meds, wound care, etc)     Problem: Skin/Tissue Integrity  Goal: Absence of new skin breakdown  Description: 1. Monitor for areas of redness and/or skin breakdown  2. Assess vascular access sites hourly  3. Every 4-6 hours minimum:  Change oxygen saturation probe site  4. Every 4-6 hours:  If on nasal continuous positive airway pressure, respiratory therapy assess nares and determine need for appliance change or resting period.   10/26/2023 1055 by Rigoberto Amaro RN  Outcome: Progressing  10/26/2023 1054 by Rigoberto Amaro RN  Outcome: Progressing  10/26/2023 0311 by Grady Keenan RN  Outcome: Progressing     Problem: Skin/Tissue Integrity - Adult  Goal: Skin integrity remains intact  10/26/2023 1055 by Rigoberto Amaro RN  Outcome: Progressing  10/26/2023 1054 by Rigoberto Amaro RN  Outcome: Progressing  10/26/2023 0311 by Grady Keenan RN  Outcome: Progressing  Flowsheets  Taken 10/26/2023 0310  Skin Integrity Remains Intact:   Monitor for areas of redness and/or skin breakdown   Assess vascular access sites hourly  Taken 10/25/2023 2000  Skin Integrity Remains Intact:   Monitor for areas of redness and/or skin breakdown   Assess vascular access sites hourly  Goal: Incisions, wounds, or drain sites healing without S/S of infection  10/26/2023 1055 by Rigoberto Amaro RN  Outcome:

## 2023-10-26 NOTE — PROGRESS NOTES
Physical Therapy  Facility/Department: [facility]  Daily Treatment Note  NAME: Griselda Shoulders  : 1965  MRN: [id number]    Date of Service: 10/26/2023    Discharge Recommendations:  Patient would benefit from continued therapy after discharge    Pt currently functioning below baseline. Recommend daily inpatient skilled therapy at time of discharge to maximize long term outcomes and prevent re-admission. Please refer to AM-PAC score for current level of function. Patient Diagnosis(es): The encounter diagnosis was Pneumonia due to infectious organism, unspecified laterality, unspecified part of lung. Assessment   Assessment: Patient able to transfer to commode toward Lt side with Layo walker and Mod x 2 A. Unsuccessful in having a BM, stood x 5 mins for pericare. Lunch arrived so patient refused HEP, then refused to get in reclienr for lunch. Then he impulsive attempted to step over to EOB, left hemiwalker reaching for the bed, almost fell on bed. Max x 2 A to stand upright, pivot toward Lt side and sit safely on EOB. Poor insight of deficits and poor safety awareness. Activity Tolerance: Patient limited by endurance;Treatment limited secondary to decreased cognition;Patient limited by pain   AM-PAC Score: 10    Plan    Physcial Therapy Plan  General Plan: 5-7 times per week  Current Treatment Recommendations: Strengthening;Balance training;Transfer training; Endurance training; Safety education & training;Patient/Caregiver education & training;Positioning; Therapeutic activities; Co-Treatment     Restrictions  Restrictions/Precautions  Restrictions/Precautions: Fall Risk, Up as Tolerated, Contact Precautions  Required Braces or Orthoses?: No  Position Activity Restriction  Other position/activity restrictions: R side weakness, expressive aphasia,  IV     Subjective    Subjective  Subjective: Patient resting in bed and agreeable for PT treatment.  Patient reported he needed his breif changed but did not training; Tactile cues; Verbal cues  Sit to Stand: Moderate assistance;Maximum assistance;Assist X2;Additional time  Stand to Sit: Moderate assistance;Maximum assistance;Assist X2;Additional time  Stand Pivot Transfers: Moderate assistance;Maximum assistance;Assist X2;Additional time  Comment: Patient requires VCs and tactile cues to use arms of chairs to push up from for sit to stand transfer, instead of pulling on hemiwalker, to promote safety and correct transfer technique. Gait Training  Gait Training: Yes  Gait  Overall Level of Assistance: Moderate assistance;Maximum assistance;Assist X2;Additional time  Distance (ft):  (2 steps to commode, then back to EOB very unsafely.)  Assistive Device: Gait belt;Walker sarthak  Interventions: Safety awareness training; Tactile cues; Verbal cues  Base of Support: Shift to left  Speed/Marilin: Shuffled;Delayed  Step Length: Right lengthened  Swing Pattern: Right asymmetrical  Stance: Right decreased; Left increased  Gait Abnormalities: Hemiplegic; Shuffling gait; Step to gait; Ataxic    Neuromuscular Education  Neuromuscular Education: Yes  Treatment: Sitting; Lower extremity;Standing;Gait   Neuromuscular Comments: VCs req for proper breathing romeo (pursed lip breathing) during functional mobility. Tactile and VCs req for postural control during sit<>stands & amb to promote abdominal and erector spinae mm facilitation for increased stability and balance, decreasing kyphosis of the spine. Pt req VCs to correct for forward WS with squatting in addition to pressing firmly into ground with feet, to promote the appropriate body mechanics for sit<>stand transfers. PT Exercises  A/AROM Exercises: Patient decline HEP because his lunch had arrived and declined getting into recliner to eat lunch because he reported \"dont feel good\"     Safety Devices  Type of Devices: Gait belt;Nurse notified; Patient at risk for falls; Left in chair;Chair alarm in place; Heels elevated for pressure

## 2023-10-27 PROCEDURE — 97535 SELF CARE MNGMENT TRAINING: CPT

## 2023-10-27 PROCEDURE — 6370000000 HC RX 637 (ALT 250 FOR IP): Performed by: INTERNAL MEDICINE

## 2023-10-27 PROCEDURE — 97112 NEUROMUSCULAR REEDUCATION: CPT

## 2023-10-27 PROCEDURE — 97530 THERAPEUTIC ACTIVITIES: CPT

## 2023-10-27 PROCEDURE — 99232 SBSQ HOSP IP/OBS MODERATE 35: CPT | Performed by: STUDENT IN AN ORGANIZED HEALTH CARE EDUCATION/TRAINING PROGRAM

## 2023-10-27 PROCEDURE — 6360000002 HC RX W HCPCS: Performed by: NURSE PRACTITIONER

## 2023-10-27 PROCEDURE — 6370000000 HC RX 637 (ALT 250 FOR IP): Performed by: NURSE PRACTITIONER

## 2023-10-27 PROCEDURE — 1200000000 HC SEMI PRIVATE

## 2023-10-27 PROCEDURE — 97110 THERAPEUTIC EXERCISES: CPT

## 2023-10-27 PROCEDURE — 6370000000 HC RX 637 (ALT 250 FOR IP): Performed by: STUDENT IN AN ORGANIZED HEALTH CARE EDUCATION/TRAINING PROGRAM

## 2023-10-27 RX ADMIN — FOLIC ACID 1 MG: 1 TABLET ORAL at 08:39

## 2023-10-27 RX ADMIN — Medication 400 MG: at 08:39

## 2023-10-27 RX ADMIN — Medication 100 MG: at 09:50

## 2023-10-27 RX ADMIN — ATORVASTATIN CALCIUM 40 MG: 40 TABLET, FILM COATED ORAL at 19:49

## 2023-10-27 RX ADMIN — AMLODIPINE BESYLATE 10 MG: 10 TABLET ORAL at 08:38

## 2023-10-27 RX ADMIN — METOPROLOL TARTRATE 25 MG: 25 TABLET, FILM COATED ORAL at 19:49

## 2023-10-27 RX ADMIN — POTASSIUM CHLORIDE 20 MEQ: 1500 TABLET, EXTENDED RELEASE ORAL at 08:40

## 2023-10-27 RX ADMIN — TAMSULOSIN HYDROCHLORIDE 0.4 MG: 0.4 CAPSULE ORAL at 08:38

## 2023-10-27 RX ADMIN — ENOXAPARIN SODIUM 40 MG: 100 INJECTION SUBCUTANEOUS at 08:40

## 2023-10-27 RX ADMIN — DOCUSATE SODIUM 100 MG: 100 CAPSULE, LIQUID FILLED ORAL at 08:38

## 2023-10-27 RX ADMIN — METOPROLOL TARTRATE 25 MG: 25 TABLET, FILM COATED ORAL at 08:39

## 2023-10-27 RX ADMIN — LISINOPRIL 10 MG: 10 TABLET ORAL at 08:38

## 2023-10-27 NOTE — PROGRESS NOTES
[unfilled]    [facility]    Progress Note    10/27/2023    7:42 PM    Name:   Griselda Shoulders  MRN:     [id number]     [address]:      [de-identified]   Room:   2017/2017-02  IP Day:  9  Admit Date:  10/17/2023  6:58 PM    PCP:   Jade Bosch MD  Code Status:  Full Code    Subjective:     C/C:   Chief Complaint   Patient presents with    Failure To Thrive     friend states need to be placed unable to take care of self. Hx of stroke 7/2023     Interval History Status: improved. Seen at bedside, hemodynamically stable  No acute events overnight  Difficult to understand because of garbled speech  Labs reviewed  Stable for discharge, pending placement    Brief History:     Per my colleague     'This 65 yom was brought in by a friend because he was unable to care for himself. He has had cough with clear phlegm and sob. Not able to get much more history as he mumbles and has slurred speech. Patient is medically stable for discharge. Pending placement.'    Medications: Allergies: Allergies   Allergen Reactions    Haldol [Haloperidol] Other (See Comments)     Seizure activity per patient report.     Tongue swelling     Lurasidone     Ziprasidone     Latuda [Lurasidone Hcl] Other (See Comments)     Pt reports muscle spasms and fatigue       Current Meds:   Scheduled Meds:    potassium chloride  20 mEq Oral Daily with breakfast    metoprolol tartrate  25 mg Oral BID    magnesium oxide  400 mg Oral Daily    sodium chloride flush  5-40 mL IntraVENous 2 times per day    enoxaparin  40 mg SubCUTAneous Daily    amLODIPine  10 mg Oral Daily    atorvastatin  40 mg Oral Nightly    docusate sodium  100 mg Oral Daily    folic acid  1 mg Oral Daily    lisinopril  10 mg Oral Daily    tamsulosin  0.4 mg Oral Daily    thiamine mononitrate  100 mg Oral Daily     Continuous Infusions:    sodium chloride       PRN Meds: benzonatate, magnesium sulfate, potassium chloride

## 2023-10-27 NOTE — PROGRESS NOTES
Occupational Therapy  Facility/Department: [facility] MED SURG  Rehabilitation Occupational Therapy Daily Treatment Note    Date: 10/27/23  Patient Name: Ying Contreras       Room:   MRN: [id number]  Account: [de-identified]   : 1965  (61 y.o.) Gender: male      JULISSA Kerr reports patient is medically stable for therapy treatment this date. Chart reviewed prior to treatment and patient is agreeable for therapy. All lines intact and patient positioned comfortably at end of treatment. All patient needs addressed prior to ending therapy session. Pt currently functioning below baseline. Recommend daily inpatient skilled therapy at time of discharge to maximize long term outcomes and prevent re-admission. Please refer to AM-PAC score for current level of function. Past Medical History:  has a past medical history of Bipolar 1 disorder ([facility]), Chronic mental illness, Depression, Hepatitis C, Hypertension, Psoriasis, and Substance abuse ([facility]). Past Surgical History:   has a past surgical history that includes brain surgery and Dental surgery. Restrictions  Restrictions/Precautions: Fall Risk, Up as Tolerated, Contact Precautions  Other position/activity restrictions: R side weakness, expressive aphasia,  IV  Required Braces or Orthoses?: No    Subjective  Subjective: Pt resting in bed upon arrival agreeable to therapy. Restrictions/Precautions: Fall Risk;Up as Tolerated;Contact Precautions             Objective     Cognition  Overall Cognitive Status: Exceptions  Arousal/Alertness: Delayed responses to stimuli; Appropriate responses to stimuli  Following Commands: Follows one step commands with repetition; Inconsistently follows commands  Attention Span: Attends with cues to redirect; Difficulty attending to directions  Memory: Decreased recall of recent events;Decreased short term memory  Safety Judgement: Decreased awareness of need for assistance  Problem Solving: Assistance required to Level: Moderate assistance; Requires x 2 assistance  Skilled Clinical Factors: Max verbal/tactile cueing for pursed lip breathing, proper feet placement, controlled sit/stand, upright posture, squaring self/AD and reaching back prior to sitting, and overall safety. Stand to Sit  Assistance Level: Requires x 2 assistance; Moderate assistance         Assessment  Assessment  Assessment: Pt able to complete bed mobility w/o assist but still needed Mod-Max A x2 for STSs and mobility. Pt was able to tolerate x2 stands and then retired to ABEL Diaz. Required increased edu for staying up sitting d/t pt wanting to go back to bed. Completed simple grooming task with Min A. Skilled OT is indicated to increase overall IND and safety with self-care tasks. Activity Tolerance: Patient limited by endurance;Treatment limited secondary to decreased cognition;Patient limited by pain  Discharge Recommendations: Patient would benefit from continued therapy after discharge  Safety Devices  Safety Devices in place: Yes  Type of devices: All fall risk precautions in place; Left in chair;Nurse notified;Call light within reach; Chair alarm in place;Gait belt;Patient at risk for falls    Patient Education  Education  Education Given To: Patient  Education Provided: Mobility Training; Fall Prevention Strategies;Transfer Training;Energy Conservation;Precautions; Safety;Equipment;ADL Function;Role of Therapy  Education Method: Verbal;Teach Back  Barriers to Learning: Cognition  Education Outcome: Continued education needed    Plan  Occupational Therapy Plan  Times Per Week: 4-5x/week  Current Treatment Recommendations: Strengthening;ROM;Balance training;Functional mobility training;Self-Care / ADL; Safety education & training;Pain management; Endurance training;Patient/Caregiver education & training;Equipment evaluation, education, & procurement;Positioning;Neuromuscular re-education;Cognitive/Perceptual training    Goals  Patient Goals   Patient goals

## 2023-10-27 NOTE — CARE COORDINATION
Social [facility] received auth and will admit at dc. Unable to securee transportation today. Arranged [facility] to transport at [employer]. Orders faxed. HENS completed. Report should be called to [phone]. Patient and his father are agreeable with dc plans.  Terressa Proper

## 2023-10-27 NOTE — PROGRESS NOTES
Physical Therapy  Facility/Department: [facility] MED SURG  Daily Treatment Note  NAME: Inga Mendieta  : 1965  MRN: [id number]    Date of Service: 10/27/2023    Discharge Recommendations:  Patient would benefit from continued therapy after discharge      Pt currently functioning below baseline. Recommend daily inpatient skilled therapy at time of discharge to maximize long term outcomes and prevent re-admission. Please refer to AM-PAC score for current level of function. Patient Diagnosis(es): The encounter diagnosis was Pneumonia due to infectious organism, unspecified laterality, unspecified part of lung. Assessment   Assessment: Patient demo'd fair tolerance of treatment with encouragement to participate. Deficits regarding cognition, decision making, safety awareness and transfers. Patient is below baseline and would benefit from continued skilled PT services. Activity Tolerance: Patient limited by endurance;Treatment limited secondary to decreased cognition;Patient limited by pain   AM-PAC Score: 10  Plan    Physcial Therapy Plan  General Plan: 5-7 times per week  Current Treatment Recommendations: Strengthening;Balance training;Transfer training; Endurance training; Safety education & training;Patient/Caregiver education & training;Positioning; Therapeutic activities; Co-Treatment     Restrictions  Restrictions/Precautions  Restrictions/Precautions: Fall Risk, Up as Tolerated, Contact Precautions  Required Braces or Orthoses?: No  Position Activity Restriction  Other position/activity restrictions: R side weakness, expressive aphasia,  IV     Subjective    Subjective  Subjective: Patient resting in bed and agreeable for PT treatment. RN reported patient medically stable for PT treatment. Orientation  Overall Orientation Status: Impaired  Orientation Level: Disoriented to place; Disoriented to time;Disoriented to situation;Oriented to person  Cognition  Overall Cognitive Status: Training  Gait Training: Yes  Gait  Overall Level of Assistance: Moderate assistance;Maximum assistance;Assist X2;Additional time  Distance (ft):  (3 steps to chair)  Assistive Device: Gait belt;Walker layo  Interventions: Safety awareness training; Tactile cues; Verbal cues  Base of Support: Shift to left  Speed/Marilin: Shuffled;Delayed  Step Length: Right lengthened  Swing Pattern: Right asymmetrical  Stance: Right decreased; Left increased  Gait Abnormalities: Hemiplegic; Shuffling gait; Step to gait; Ataxic  Comment: writer attempted to stabilize Rt LE/knee during gait but patient continued to hyperextend or buckle during [address]. Wheelchair Management  Wheelchair Management: No    Neuromuscular Education  Neuromuscular Education: Yes  Treatment: Sitting; Lower extremity;Standing;Gait   Neuromuscular Comments: VCs req for proper breathing romeo (pursed lip breathing) during functional mobility. Tactile and VCs req for postural control during sit<>stands & amb to promote abdominal and erector spinae mm facilitation for increased stability and balance, decreasing kyphosis of the spine. Pt req VCs to correct for forward WS with squatting in addition to pressing firmly into ground with feet, to promote the appropriate body mechanics for sit<>stand transfers. PT Exercises  A/AROM Exercises: Seated kar LE AAROM x 10 reps with fair tolerance, assistance and VCs required for corrections for technique for each exercises. Pressure Relief Exercises: Seated lateral WSing to Rt side. Patient tends to lean to his Lt side and neglects his Rt side  Functional Mobility Circuit Training: STS x 3 reps with hemiwalker and Mod-Max x 2 A  Static Standing Balance Exercises: Stood static x 1 min with Layo walker and Mod x 2 A. Dynamic Standing Balance Exercises: Pt performed laterl R/L weight shifts with layo walker and Mod x 2 A for balance x10 reps each direction.   Postural Correction Exercises: VCs for upright posture and to

## 2023-10-27 NOTE — PROGRESS NOTES
[address]  NOTE    Room # 2017/2017-02   Name: Rosey Shannon: Denominational     Reason for visit: Routine    I visited the patient. Admit Date & Time: 10/17/2023  6:58 PM    Assessment:  Neetu Chance is a 65 y.o. male in the hospital because he has pneumonia. Upon entering the room the patient is found resting in bed. He is very lethargic. Intervention:  I introduced myself and my title as  I offered space for patient  to express feelings, needs, and concerns and provided a ministry presence. Patient struggles to form words,  offers prayer, encouragement and emotional support. Outcome:  Patient calm and at rest.    Plan:  Chaplains will remain available to offer spiritual and emotional support as needed. Electronically signed by Colleen Martino on 10/26/2023 at 8:56 PM.  [address]      10/26/23 2052   Encounter Summary   Service Provided For: Patient   Referral/Consult From:  [address] Parent   Last Encounter  10/26/23   Complexity of Encounter Moderate   Begin Time 1830   End Time  1900   Total Time Calculated 30 min   Spiritual/Emotional needs   Type Spiritual Support;Emotional Distress   Assessment/Intervention/Outcome   Assessment Compromised coping;Concerns with suffering; Despair; Impaired resilience; Impaired social interaction; Interrupted family processes   Intervention Active listening;Explored/Affirmed feelings, thoughts, concerns;Nurtured Hope;Prayer (assurance of)/[place];Sustaining Presence/Ministry of presence   Outcome Comfort; Connection/Belonging;Expressed feelings, needs, and concerns;Receptive

## 2023-10-28 VITALS
TEMPERATURE: 97.5 F | DIASTOLIC BLOOD PRESSURE: 95 MMHG | HEART RATE: 59 BPM | HEIGHT: 68 IN | WEIGHT: 165.8 LBS | RESPIRATION RATE: 16 BRPM | BODY MASS INDEX: 25.13 KG/M2 | OXYGEN SATURATION: 94 % | SYSTOLIC BLOOD PRESSURE: 123 MMHG

## 2023-10-28 PROCEDURE — 6360000002 HC RX W HCPCS: Performed by: NURSE PRACTITIONER

## 2023-10-28 PROCEDURE — 6370000000 HC RX 637 (ALT 250 FOR IP): Performed by: NURSE PRACTITIONER

## 2023-10-28 PROCEDURE — 99239 HOSP IP/OBS DSCHRG MGMT >30: CPT | Performed by: STUDENT IN AN ORGANIZED HEALTH CARE EDUCATION/TRAINING PROGRAM

## 2023-10-28 PROCEDURE — 6370000000 HC RX 637 (ALT 250 FOR IP): Performed by: STUDENT IN AN ORGANIZED HEALTH CARE EDUCATION/TRAINING PROGRAM

## 2023-10-28 PROCEDURE — 6370000000 HC RX 637 (ALT 250 FOR IP): Performed by: INTERNAL MEDICINE

## 2023-10-28 RX ADMIN — AMLODIPINE BESYLATE 10 MG: 10 TABLET ORAL at 09:11

## 2023-10-28 RX ADMIN — ENOXAPARIN SODIUM 40 MG: 100 INJECTION SUBCUTANEOUS at 09:11

## 2023-10-28 RX ADMIN — Medication 100 MG: at 09:11

## 2023-10-28 RX ADMIN — LISINOPRIL 10 MG: 10 TABLET ORAL at 09:11

## 2023-10-28 RX ADMIN — TAMSULOSIN HYDROCHLORIDE 0.4 MG: 0.4 CAPSULE ORAL at 09:10

## 2023-10-28 RX ADMIN — FOLIC ACID 1 MG: 1 TABLET ORAL at 09:11

## 2023-10-28 RX ADMIN — METOPROLOL TARTRATE 25 MG: 25 TABLET, FILM COATED ORAL at 09:10

## 2023-10-28 RX ADMIN — DOCUSATE SODIUM 100 MG: 100 CAPSULE, LIQUID FILLED ORAL at 09:10

## 2023-10-28 RX ADMIN — Medication 400 MG: at 09:10

## 2023-10-28 RX ADMIN — POTASSIUM CHLORIDE 20 MEQ: 1500 TABLET, EXTENDED RELEASE ORAL at 09:11

## 2023-10-28 NOTE — PROGRESS NOTES
Report called to [facility], all questions answered.  Patient discharged via [facility] mobile and sent with all belongings
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
## 2023-10-28 NOTE — DISCHARGE SUMMARY
silhouette is within normal limits. Linear atelectatic changes are seen in the right lung base. There are consolidative opacities in the left lung base, could represent atelectasis or infection in the proper clinical setting. No gross pleural effusion. No pneumothorax. No acute bony abnormalities. Consolidative opacities in the left lung base, could represent atelectasis or infection in the proper clinical setting. XR KNEE RIGHT (3 VIEWS)    Result Date: 10/9/2023  History:  pain Exam/Technique:  3 views right knee Comparison:  No relevant prior studies available. Findings: The spaces well-preserved. There is no significant joint effusion. There is no acute osseous injury, dislocation or subluxation. IMPRESSION:  Grossly unremarkable osseous exam Workstation:[id number] Finalized by Gloria Peterson MD on 10/9/2023 11:15 PM      Consultations:    Consults:     Final Specialist Recommendations/Findings:   IP CONSULT TO INTERNAL MEDICINE  IP CONSULT TO SOCIAL WORK  IP CONSULT TO PSYCHIATRY      The patient was seen and examined on day of discharge and this discharge summary is in conjunction with any daily progress note from day of discharge.     Discharge plan:     Disposition: Skilled nursing facility    Physician Follow Up:     Richmond Jones MD  [address] [place] [id number]  [phone]    Follow up in 4 week(s)      PSYCHIATRIST    Schedule an appointment as soon as possible for a visit  BIPOLAR DISORDER       Requiring Further Evaluation/Follow Up POST HOSPITALIZATION/Incidental Findings: As described in radiology section above    Diet: regular diet    Activity: As tolerated    Instructions to Patient: Follow-up up with PCP, psychiatrist as outpatient    Discharge Medications:      Medication List        START taking these medications      albuterol (2.5 MG/3ML) 0.083% nebulizer solution  Commonly known as: PROVENTIL  Take 3 mLs by nebulization every 4 hours as needed for Wheezing benzonatate 100 MG capsule  Commonly known as: TESSALON  Take 1 capsule by mouth 3 times daily as needed for Cough     enoxaparin 40 MG/0.4ML  Commonly known as: LOVENOX  Inject 0.4 mLs into the skin daily     ipratropium 0.5 mg-albuterol 2.5 mg 0.5-2.5 (3) MG/3ML Soln nebulizer solution  Commonly known as: DUONEB  Inhale 3 mLs into the lungs every 4 hours as needed for Shortness of Breath     magnesium oxide 400 (240 Mg) MG tablet  Commonly known as: MAG-OX  Take 1 tablet by mouth daily     metoprolol tartrate 25 MG tablet  Commonly known as: LOPRESSOR  Take 1 tablet by mouth 2 times daily            CONTINUE taking these medications      amLODIPine 10 MG tablet  Commonly known as: NORVASC  Take 1 tablet by mouth daily     atorvastatin 40 MG tablet  Commonly known as: LIPITOR  Take 1 tablet by mouth nightly     docusate 100 MG Caps  Commonly known as: COLACE, DULCOLAX  Take 100 mg by mouth daily     folic acid 1 MG tablet  Commonly known as: FOLVITE  Take 1 tablet by mouth daily     lisinopril 10 MG tablet  Commonly known as: PRINIVIL;ZESTRIL  Take 1 tablet by mouth daily     naloxone 2 MG/2ML injection  Commonly known as: NARCAN  Infuse 0.4 mLs intravenously every 15 minutes as needed (For unresponsiveness, RR < 12, pinpoint pupils)     tamsulosin 0.4 MG capsule  Commonly known as: FLOMAX  Take 1 capsule by mouth daily     thiamine 100 MG tablet  Take 1 tablet by mouth daily            ASK your doctor about these medications      cefUROXime 500 MG tablet  Commonly known as: CEFTIN  Take 1 tablet by mouth every 12 hours for 4 doses  Ask about: Should I take this medication?                Where to Get Your Medications        Information about where to get these medications is not yet available    Ask your nurse or doctor about these medications  albuterol (2.5 MG/3ML) 0.083% nebulizer solution  benzonatate 100 MG capsule  cefUROXime 500 MG tablet  enoxaparin 40 MG/0.4ML  ipratropium 0.5 mg-albuterol 2.5 mg 0.5-2.5

## 2023-11-02 ENCOUNTER — APPOINTMENT (OUTPATIENT)
Dept: RADIOLOGY | Facility: HOSPITAL | Age: 58
End: 2023-11-02
Payer: COMMERCIAL

## 2023-11-02 ENCOUNTER — HOSPITAL ENCOUNTER (EMERGENCY)
Facility: HOSPITAL | Age: 58
Discharge: HOME | End: 2023-11-03
Attending: EMERGENCY MEDICINE
Payer: COMMERCIAL

## 2023-11-02 DIAGNOSIS — K40.91 UNILATERAL RECURRENT INGUINAL HERNIA WITHOUT OBSTRUCTION OR GANGRENE: Primary | ICD-10-CM

## 2023-11-02 LAB
ALBUMIN SERPL BCP-MCNC: 3.6 G/DL (ref 3.4–5)
ALP SERPL-CCNC: 42 U/L (ref 33–120)
ALT SERPL W P-5'-P-CCNC: 110 U/L (ref 10–52)
ANION GAP SERPL CALC-SCNC: 16 MMOL/L (ref 10–20)
AST SERPL W P-5'-P-CCNC: 52 U/L (ref 9–39)
BASOPHILS # BLD AUTO: 0.08 X10*3/UL (ref 0–0.1)
BASOPHILS NFR BLD AUTO: 0.7 %
BILIRUB DIRECT SERPL-MCNC: 0.1 MG/DL (ref 0–0.3)
BILIRUB SERPL-MCNC: 0.4 MG/DL (ref 0–1.2)
BUN SERPL-MCNC: 17 MG/DL (ref 6–23)
CALCIUM SERPL-MCNC: 9.1 MG/DL (ref 8.6–10.3)
CHLORIDE SERPL-SCNC: 107 MMOL/L (ref 98–107)
CO2 SERPL-SCNC: 23 MMOL/L (ref 21–32)
CREAT SERPL-MCNC: 0.61 MG/DL (ref 0.5–1.3)
EOSINOPHIL # BLD AUTO: 0.06 X10*3/UL (ref 0–0.7)
EOSINOPHIL NFR BLD AUTO: 0.5 %
ERYTHROCYTE [DISTWIDTH] IN BLOOD BY AUTOMATED COUNT: 13.5 % (ref 11.5–14.5)
GFR SERPL CREATININE-BSD FRML MDRD: >90 ML/MIN/1.73M*2
GLUCOSE SERPL-MCNC: 118 MG/DL (ref 74–99)
HCT VFR BLD AUTO: 42.8 % (ref 41–52)
HGB BLD-MCNC: 14.5 G/DL (ref 13.5–17.5)
IMM GRANULOCYTES # BLD AUTO: 0.03 X10*3/UL (ref 0–0.7)
IMM GRANULOCYTES NFR BLD AUTO: 0.3 % (ref 0–0.9)
LACTATE SERPL-SCNC: 0.6 MMOL/L (ref 0.4–2)
LYMPHOCYTES # BLD AUTO: 2.68 X10*3/UL (ref 1.2–4.8)
LYMPHOCYTES NFR BLD AUTO: 22.8 %
MCH RBC QN AUTO: 30.6 PG (ref 26–34)
MCHC RBC AUTO-ENTMCNC: 33.9 G/DL (ref 32–36)
MCV RBC AUTO: 90 FL (ref 80–100)
MONOCYTES # BLD AUTO: 0.71 X10*3/UL (ref 0.1–1)
MONOCYTES NFR BLD AUTO: 6 %
NEUTROPHILS # BLD AUTO: 8.21 X10*3/UL (ref 1.2–7.7)
NEUTROPHILS NFR BLD AUTO: 69.7 %
NRBC BLD-RTO: 0 /100 WBCS (ref 0–0)
PLATELET # BLD AUTO: 191 X10*3/UL (ref 150–450)
POTASSIUM SERPL-SCNC: 4 MMOL/L (ref 3.5–5.3)
PROT SERPL-MCNC: 7 G/DL (ref 6.4–8.2)
RBC # BLD AUTO: 4.74 X10*6/UL (ref 4.5–5.9)
SODIUM SERPL-SCNC: 142 MMOL/L (ref 136–145)
WBC # BLD AUTO: 11.8 X10*3/UL (ref 4.4–11.3)

## 2023-11-02 PROCEDURE — 93975 VASCULAR STUDY: CPT

## 2023-11-02 PROCEDURE — 80053 COMPREHEN METABOLIC PANEL: CPT

## 2023-11-02 PROCEDURE — 2550000001 HC RX 255 CONTRASTS

## 2023-11-02 PROCEDURE — 85025 COMPLETE CBC W/AUTO DIFF WBC: CPT

## 2023-11-02 PROCEDURE — 72193 CT PELVIS W/DYE: CPT

## 2023-11-02 PROCEDURE — 84075 ASSAY ALKALINE PHOSPHATASE: CPT

## 2023-11-02 PROCEDURE — 83605 ASSAY OF LACTIC ACID: CPT

## 2023-11-02 PROCEDURE — 76870 US EXAM SCROTUM: CPT | Performed by: RADIOLOGY

## 2023-11-02 PROCEDURE — 99285 EMERGENCY DEPT VISIT HI MDM: CPT | Mod: 25 | Performed by: EMERGENCY MEDICINE

## 2023-11-02 PROCEDURE — 36415 COLL VENOUS BLD VENIPUNCTURE: CPT

## 2023-11-02 PROCEDURE — 82248 BILIRUBIN DIRECT: CPT

## 2023-11-02 RX ORDER — ATORVASTATIN CALCIUM 40 MG/1
40 TABLET, FILM COATED ORAL DAILY
COMMUNITY

## 2023-11-02 RX ORDER — METOPROLOL TARTRATE 25 MG/1
TABLET, FILM COATED ORAL
COMMUNITY

## 2023-11-02 RX ORDER — ENOXAPARIN SODIUM 100 MG/ML
40 INJECTION SUBCUTANEOUS DAILY
COMMUNITY

## 2023-11-02 RX ORDER — LISINOPRIL 10 MG/1
10 TABLET ORAL DAILY
COMMUNITY

## 2023-11-02 RX ADMIN — IOHEXOL 75 ML: 350 INJECTION, SOLUTION INTRAVENOUS at 23:55

## 2023-11-02 ASSESSMENT — LIFESTYLE VARIABLES
REASON UNABLE TO ASSESS: NO
HAVE PEOPLE ANNOYED YOU BY CRITICIZING YOUR DRINKING: NO
HAVE YOU EVER FELT YOU SHOULD CUT DOWN ON YOUR DRINKING: NO
EVER FELT BAD OR GUILTY ABOUT YOUR DRINKING: NO
EVER HAD A DRINK FIRST THING IN THE MORNING TO STEADY YOUR NERVES TO GET RID OF A HANGOVER: NO

## 2023-11-02 ASSESSMENT — PAIN - FUNCTIONAL ASSESSMENT: PAIN_FUNCTIONAL_ASSESSMENT: 0-10

## 2023-11-02 ASSESSMENT — PAIN DESCRIPTION - LOCATION: LOCATION: OTHER (COMMENT)

## 2023-11-02 ASSESSMENT — PAIN SCALES - GENERAL: PAINLEVEL_OUTOF10: 8

## 2023-11-02 ASSESSMENT — COLUMBIA-SUICIDE SEVERITY RATING SCALE - C-SSRS
2. HAVE YOU ACTUALLY HAD ANY THOUGHTS OF KILLING YOURSELF?: NO
6. HAVE YOU EVER DONE ANYTHING, STARTED TO DO ANYTHING, OR PREPARED TO DO ANYTHING TO END YOUR LIFE?: NO
1. IN THE PAST MONTH, HAVE YOU WISHED YOU WERE DEAD OR WISHED YOU COULD GO TO SLEEP AND NOT WAKE UP?: NO

## 2023-11-02 ASSESSMENT — PAIN DESCRIPTION - PROGRESSION: CLINICAL_PROGRESSION: RAPIDLY WORSENING

## 2023-11-03 VITALS
OXYGEN SATURATION: 95 % | HEART RATE: 72 BPM | RESPIRATION RATE: 16 BRPM | SYSTOLIC BLOOD PRESSURE: 115 MMHG | DIASTOLIC BLOOD PRESSURE: 79 MMHG

## 2023-11-03 PROCEDURE — 72193 CT PELVIS W/DYE: CPT | Performed by: SURGERY

## 2023-11-03 NOTE — ED PROVIDER NOTES
Chief Complaint   Patient presents with    Groin Swelling     Worsened in the last 1.5 hours       57-year-old male arrives to the emergency department from [facility] care facility with a chief complaint of right groin swelling.  Reportedly, in the patient's right inguinal area, that area of skin was swollen to the point of displacing the patient's penis and scrotum to his left, the patient also has right-sided scrotal swelling.  The primary nurse that did the intake for the patient, states that there was a significant amount of right inguinal and right scrotal swelling, compared to that of 30 minutes later on initial assessment of the line, where there is only minimal right inguinal swelling and right scrotal swelling.  The patient speaks, appears to understand and follow commands, however the patient's words are not intelligible secondary to CVA.  The patient is not grimacing in pain, however does appear uncomfortable with palpation of the area.  The patient is hemodynamically stable upon arrival.           PmHx, PsHx, Allergies, Family Hx, social Hx reviewed as documented    A complete 10 point review of systems was performed and is negative except for as mentioned in the HPI.    Physical Exam:    General: Patient is nonverbal, speaks words that are not understandable at baseline, appears well developed, well nourished, is a good historian, answers questions appropriately    HEENT: head normocephalic, atraumatic, PERRLA, EOMs intact, oropharynx without erythema or exudate, buccal mucosa intact without lesions, TMs unremarkable, nose is patent bilateral    Neck: supple, full ROM, negative for lymphadenopathy, JVD, thyromegaly, tracheal deviation, nuccal rigidity    Pulmonary: CTAB, no accessory muscle use, able to speak full clear sentences    Cardiac: HRRR, no murmurs, rubs or gallops    GI: Right inguinal swelling, small amount of swelling appreciated feels reducible, otherwise abdomen soft, non-tender,  non-distended, BS + x 4, no masses or organomegaly, no guarding or CVA tenderness noted, negative pearl's, mcburney's    Musculoskeletal: full weight bearing, HENDRICKS, no joint effusions, clubbing or edema noted    Skin: intact, no lesions or rashes noted, turgor is good.    Neuro: patient follow commands, cranial nerves 2-12 grossly intact, motor strengths 5/5 upper and lower extremities, DTR's and sensation are symmetrical. No focal deficits.    Rectal/: The patient has what feels to be the liquid filled scrotum on the right side, normal on left, mild right-sided swelling appreciated.  Patient has no grimacing with palpation of testicles.        Medical Decision Making  This patient was seen, treated, and evaluated in conjunction with Dr. Holman    There is a significant difference in the presentation on initial assessment, approximately 30 minutes after the initial assessment of the primary nurse, indicating swelling down significantly, this could be likely to a hernia that has reduced itself.  Primary consideration would be scrotal pathology such as torsion, abscess, fluid collection secondary to inguinal hernia.  Other consideration would be inguinal hernia itself, incarcerated.    Diagnostic blood work, CT pelvis with IV contrast, scrotal ultrasound will be used to further evaluate    The patient's ultrasound of the scrotum shows a right-sided hydrocele, that is fluid-filled, this is consistent with patient's presentation and clinical findings.  The patient CT scan shows an indirect right inguinal hernia, it is likely reduced at this time versus on initial assessment where the the nurses as well as the ECF noted the large amount of swelling.    The after mentioned attending physician to further evaluate and disposition patient      Amount and/or Complexity of Data Reviewed  Labs: ordered. Decision-making details documented in ED Course.  Radiology: ordered. Decision-making details documented in ED Course.        ED Course as of 11/03/23 0131   Thu Nov 02, 2023   7775 Patient's ALT is 110 and AST elevated at 52 indicating transaminitis, total bilirubin as well as direct bilirubin within normal limits.  Patient has mild leukocytosis with a white blood cell count 11.8 [JM]      ED Course User Index  [JM] BEENA Frias-CNP         Diagnoses as of 11/03/23 0131   Unilateral recurrent inguinal hernia without obstruction or gangrene       The patient has had the following imaging during this ER visit: US SCROTUM WITH DOPPLER  CT PELVIS W IV CONTRAST     Patient History   Past Medical History:   Diagnosis Date    Aphasia     Bipolar 1 disorder (CMS/HCC)     CVA (cerebral vascular accident) (CMS/HCC)     LEON (generalized anxiety disorder)     Hypertension     Psoriasis      Past Surgical History:   Procedure Laterality Date    CT HEAD ANGIO W AND WO IV CONTRAST  7/4/2023    CT HEAD ANGIO W AND WO IV CONTRAST 7/4/2023    CT NECK ANGIO W AND WO IV CONTRAST  7/4/2023    CT NECK ANGIO W AND WO IV CONTRAST 7/4/2023     No family history on file.  Social History     Tobacco Use    Smoking status: Not on file    Smokeless tobacco: Not on file   Substance Use Topics    Alcohol use: Not Currently    Drug use: Not on file       ED Triage Vitals [11/02/23 2010]   Temp Heart Rate Resp BP   -- 96 18 (!) 147/102      SpO2 Temp src Heart Rate Source Patient Position   96 % -- -- --      BP Location FiO2 (%)     -- --       Vitals:    11/02/23 2010 11/02/23 2105 11/03/23 0020   BP: (!) 147/102 (!) 126/98 (!) 133/100   Pulse: 96 71 61   Resp: 18 16 17   SpO2: 96% 98% 93%               Timbo Davis, APRN-CNP  11/03/23 0131

## 2023-11-03 NOTE — ED TRIAGE NOTES
Pt to ED with EMS with c/o testicular pain and swelling. Pt reports he was doing physical therapy, felt something and has had worsening swelling in the last 1.5 hours

## 2023-11-06 ENCOUNTER — TELEPHONE (OUTPATIENT)
Dept: FAMILY MEDICINE CLINIC | Age: 58
End: 2023-11-06

## 2024-06-19 NOTE — PLAN OF CARE
Problem: Safety - Adult  Goal: Free from fall injury  Outcome: Progressing     Problem: Safety - Adult  Goal: Free from fall injury  Outcome: Progressing     Problem: ABCDS Injury Assessment  Goal: Absence of physical injury  Outcome: Progressing     Problem: Skin/Tissue Integrity  Goal: Absence of new skin breakdown  Description: 1. Monitor for areas of redness and/or skin breakdown  2. Assess vascular access sites hourly  3. Every 4-6 hours minimum:  Change oxygen saturation probe site  4. Every 4-6 hours:  If on nasal continuous positive airway pressure, respiratory therapy assess nares and determine need for appliance change or resting period.   Outcome: Progressing     Problem: Skin/Tissue Integrity - Adult  Goal: Skin integrity remains intact  Outcome: Progressing  Flowsheets (Taken 10/20/2023 2155)  Skin Integrity Remains Intact: Monitor for areas of redness and/or skin breakdown  Goal: Incisions, wounds, or drain sites healing without S/S of infection  Recent Flowsheet Documentation  Taken 10/20/2023 2155 by Adriana Luu, RN  Incisions, Wounds, or Drain Sites Healing Without Sign and Symptoms of Infection:   TWICE DAILY: Assess and document skin integrity   TWICE DAILY: Assess and document dressing/incision, wound bed, drain sites and surrounding tissue   Implement wound care per orders  Goal: Oral mucous membranes remain intact  Recent Flowsheet Documentation  Taken 10/20/2023 2155 by Adriana Luu, RN  Oral Mucous Membranes Remain Intact: Assess oral mucosa and hygiene practices     Problem: Nutrition Deficit:  Goal: Optimize nutritional status  Outcome: Progressing Pt last seen on 6/10/2024.